# Patient Record
Sex: FEMALE | Race: WHITE | NOT HISPANIC OR LATINO | Employment: OTHER | URBAN - METROPOLITAN AREA
[De-identification: names, ages, dates, MRNs, and addresses within clinical notes are randomized per-mention and may not be internally consistent; named-entity substitution may affect disease eponyms.]

---

## 2018-04-01 ENCOUNTER — HOSPITAL ENCOUNTER (EMERGENCY)
Facility: HOSPITAL | Age: 83
Discharge: HOME | End: 2018-04-01
Attending: EMERGENCY MEDICINE | Admitting: EMERGENCY MEDICINE

## 2018-04-01 ENCOUNTER — APPOINTMENT (OUTPATIENT)
Dept: GENERAL RADIOLOGY | Facility: HOSPITAL | Age: 83
End: 2018-04-01

## 2018-04-01 VITALS
HEIGHT: 64 IN | OXYGEN SATURATION: 100 % | BODY MASS INDEX: 21.34 KG/M2 | RESPIRATION RATE: 18 BRPM | TEMPERATURE: 98 F | DIASTOLIC BLOOD PRESSURE: 84 MMHG | WEIGHT: 125 LBS | HEART RATE: 76 BPM | SYSTOLIC BLOOD PRESSURE: 164 MMHG

## 2018-04-01 DIAGNOSIS — E87.1 HYPONATREMIA: ICD-10-CM

## 2018-04-01 DIAGNOSIS — R53.1 GENERALIZED WEAKNESS: Primary | ICD-10-CM

## 2018-04-01 DIAGNOSIS — J10.1 INFLUENZA B: ICD-10-CM

## 2018-04-01 LAB
ALBUMIN SERPL-MCNC: 4.1 G/DL (ref 3.2–4.8)
ALBUMIN/GLOB SERPL: 1.6 G/DL (ref 1.5–2.5)
ALP SERPL-CCNC: 76 U/L (ref 25–100)
ALT SERPL W P-5'-P-CCNC: 28 U/L (ref 7–40)
ANION GAP SERPL CALCULATED.3IONS-SCNC: 8 MMOL/L (ref 3–11)
AST SERPL-CCNC: 24 U/L (ref 0–33)
BACTERIA UR QL AUTO: ABNORMAL /HPF
BASOPHILS # BLD AUTO: 0.03 10*3/MM3 (ref 0–0.2)
BASOPHILS NFR BLD AUTO: 0.3 % (ref 0–1)
BILIRUB SERPL-MCNC: 0.5 MG/DL (ref 0.3–1.2)
BILIRUB UR QL STRIP: NEGATIVE
BUN BLD-MCNC: 7 MG/DL (ref 9–23)
BUN/CREAT SERPL: 14 (ref 7–25)
CALCIUM SPEC-SCNC: 9.6 MG/DL (ref 8.7–10.4)
CHLORIDE SERPL-SCNC: 92 MMOL/L (ref 99–109)
CLARITY UR: CLEAR
CO2 SERPL-SCNC: 28 MMOL/L (ref 20–31)
COLOR UR: YELLOW
CREAT BLD-MCNC: 0.5 MG/DL (ref 0.6–1.3)
DEPRECATED RDW RBC AUTO: 42.4 FL (ref 37–54)
EOSINOPHIL # BLD AUTO: 0.04 10*3/MM3 (ref 0–0.3)
EOSINOPHIL NFR BLD AUTO: 0.4 % (ref 0–3)
ERYTHROCYTE [DISTWIDTH] IN BLOOD BY AUTOMATED COUNT: 13.2 % (ref 11.3–14.5)
GFR SERPL CREATININE-BSD FRML MDRD: 115 ML/MIN/1.73
GLOBULIN UR ELPH-MCNC: 2.6 GM/DL
GLUCOSE BLD-MCNC: 102 MG/DL (ref 70–100)
GLUCOSE UR STRIP-MCNC: NEGATIVE MG/DL
HCT VFR BLD AUTO: 34.4 % (ref 34.5–44)
HGB BLD-MCNC: 11.5 G/DL (ref 11.5–15.5)
HGB UR QL STRIP.AUTO: NEGATIVE
HOLD SPECIMEN: NORMAL
HOLD SPECIMEN: NORMAL
HYALINE CASTS UR QL AUTO: ABNORMAL /LPF
IMM GRANULOCYTES # BLD: 0.03 10*3/MM3 (ref 0–0.03)
IMM GRANULOCYTES NFR BLD: 0.3 % (ref 0–0.6)
KETONES UR QL STRIP: NEGATIVE
LEUKOCYTE ESTERASE UR QL STRIP.AUTO: ABNORMAL
LYMPHOCYTES # BLD AUTO: 1 10*3/MM3 (ref 0.6–4.8)
LYMPHOCYTES NFR BLD AUTO: 9.9 % (ref 24–44)
MAGNESIUM SERPL-MCNC: 1.8 MG/DL (ref 1.3–2.7)
MCH RBC QN AUTO: 29.5 PG (ref 27–31)
MCHC RBC AUTO-ENTMCNC: 33.4 G/DL (ref 32–36)
MCV RBC AUTO: 88.2 FL (ref 80–99)
MONOCYTES # BLD AUTO: 0.9 10*3/MM3 (ref 0–1)
MONOCYTES NFR BLD AUTO: 8.9 % (ref 0–12)
NEUTROPHILS # BLD AUTO: 8.13 10*3/MM3 (ref 1.5–8.3)
NEUTROPHILS NFR BLD AUTO: 80.2 % (ref 41–71)
NITRITE UR QL STRIP: NEGATIVE
PH UR STRIP.AUTO: 7.5 [PH] (ref 5–8)
PLATELET # BLD AUTO: 374 10*3/MM3 (ref 150–450)
PMV BLD AUTO: 8.8 FL (ref 6–12)
POTASSIUM BLD-SCNC: 3.7 MMOL/L (ref 3.5–5.5)
PROT SERPL-MCNC: 6.7 G/DL (ref 5.7–8.2)
PROT UR QL STRIP: NEGATIVE
RBC # BLD AUTO: 3.9 10*6/MM3 (ref 3.89–5.14)
RBC # UR: ABNORMAL /HPF
REF LAB TEST METHOD: ABNORMAL
SODIUM BLD-SCNC: 128 MMOL/L (ref 132–146)
SP GR UR STRIP: <=1.005 (ref 1–1.03)
SQUAMOUS #/AREA URNS HPF: ABNORMAL /HPF
TROPONIN I SERPL-MCNC: 0 NG/ML (ref 0–0.07)
UROBILINOGEN UR QL STRIP: ABNORMAL
WBC NRBC COR # BLD: 10.13 10*3/MM3 (ref 3.5–10.8)
WBC UR QL AUTO: ABNORMAL /HPF
WHOLE BLOOD HOLD SPECIMEN: NORMAL
WHOLE BLOOD HOLD SPECIMEN: NORMAL

## 2018-04-01 PROCEDURE — 99285 EMERGENCY DEPT VISIT HI MDM: CPT

## 2018-04-01 PROCEDURE — 87086 URINE CULTURE/COLONY COUNT: CPT | Performed by: EMERGENCY MEDICINE

## 2018-04-01 PROCEDURE — 71045 X-RAY EXAM CHEST 1 VIEW: CPT

## 2018-04-01 PROCEDURE — 87186 SC STD MICRODIL/AGAR DIL: CPT | Performed by: EMERGENCY MEDICINE

## 2018-04-01 PROCEDURE — 87077 CULTURE AEROBIC IDENTIFY: CPT | Performed by: EMERGENCY MEDICINE

## 2018-04-01 PROCEDURE — 85025 COMPLETE CBC W/AUTO DIFF WBC: CPT | Performed by: EMERGENCY MEDICINE

## 2018-04-01 PROCEDURE — 80053 COMPREHEN METABOLIC PANEL: CPT | Performed by: EMERGENCY MEDICINE

## 2018-04-01 PROCEDURE — 81001 URINALYSIS AUTO W/SCOPE: CPT | Performed by: EMERGENCY MEDICINE

## 2018-04-01 PROCEDURE — 83735 ASSAY OF MAGNESIUM: CPT | Performed by: EMERGENCY MEDICINE

## 2018-04-01 PROCEDURE — 84484 ASSAY OF TROPONIN QUANT: CPT

## 2018-04-01 PROCEDURE — 93005 ELECTROCARDIOGRAM TRACING: CPT | Performed by: EMERGENCY MEDICINE

## 2018-04-01 RX ORDER — ASPIRIN 81 MG/1
81 TABLET, CHEWABLE ORAL DAILY
COMMUNITY
End: 2019-02-22 | Stop reason: HOSPADM

## 2018-04-01 RX ORDER — SODIUM CHLORIDE 0.9 % (FLUSH) 0.9 %
10 SYRINGE (ML) INJECTION AS NEEDED
Status: DISCONTINUED | OUTPATIENT
Start: 2018-04-01 | End: 2018-04-01 | Stop reason: HOSPADM

## 2018-04-01 RX ORDER — ESOMEPRAZOLE MAGNESIUM 40 MG/1
40 CAPSULE, DELAYED RELEASE ORAL
COMMUNITY
End: 2019-10-28

## 2018-04-01 RX ORDER — HYDROCODONE BITARTRATE AND ACETAMINOPHEN 5; 325 MG/1; MG/1
1 TABLET ORAL EVERY 6 HOURS PRN
Status: ON HOLD | COMMUNITY
End: 2018-04-12

## 2018-04-01 RX ORDER — IBUPROFEN 200 MG
400 TABLET ORAL EVERY 8 HOURS PRN
COMMUNITY
End: 2019-02-22 | Stop reason: HOSPADM

## 2018-04-01 RX ORDER — HYDROCHLOROTHIAZIDE 12.5 MG/1
12.5 TABLET ORAL DAILY
COMMUNITY
End: 2018-04-01

## 2018-04-01 RX ADMIN — SODIUM CHLORIDE 500 ML: 9 INJECTION, SOLUTION INTRAVENOUS at 11:28

## 2018-04-01 NOTE — ED PROVIDER NOTES
Subjective   Debbie Baum is a 89 y.o. female with a hx of an ablation who presents to the ED with c/o generalized weakness. The patient was diagnosed with flu b over a week ago at [facility], where she was admitted to the hospital for 2 days and discharged on 03/25/18. Although she was given and completed 5 days of tamiflu, she reports that she has been getting increasingly more weak since discharge. However, she remains capable of getting up from bed and ambulating with assistance from her walker which is her baseline. The patient denies N/V/D, cough, myalgias, fever, chills, decrease in appetite, sore throat, or any other acute complaints at this time. At the time of discharge she was given potassium and magnesium tablets which she states she has been compliant with.        History provided by:  Patient and relative  Weakness - Generalized   Severity:  Unable to specify  Onset quality:  Gradual  Duration:  1 week  Timing:  Constant  Progression:  Worsening  Chronicity:  New  Relieved by:  None tried  Worsened by:  Nothing  Ineffective treatments:  None tried  Associated symptoms: no abdominal pain, no chest pain, no cough, no diarrhea, no fever, no myalgias, no nausea, no shortness of breath and no vomiting        Review of Systems   Constitutional: Negative for appetite change, chills and fever.   HENT: Negative for sore throat.    Respiratory: Negative for cough and shortness of breath.    Cardiovascular: Negative for chest pain.   Gastrointestinal: Negative for abdominal pain, diarrhea, nausea and vomiting.   Musculoskeletal: Negative for myalgias.   Neurological: Positive for weakness (Generalized).   All other systems reviewed and are negative.      Past Medical History:   Diagnosis Date   • Cancer     colon   • Coronary artery disease    • GERD (gastroesophageal reflux disease)    • H/O cardiac radiofrequency ablation        Allergies   Allergen Reactions   • Penicillins Unknown (See  Comments)     unsure       Past Surgical History:   Procedure Laterality Date   • CHOLECYSTECTOMY     • COLON SURGERY     • HYSTERECTOMY     • REPLACEMENT TOTAL KNEE         History reviewed. No pertinent family history.    Social History     Social History   • Marital status:      Social History Main Topics   • Smoking status: Never Smoker   • Smokeless tobacco: Never Used   • Alcohol use No   • Drug use: No   • Sexual activity: Defer     Other Topics Concern   • Not on file         Objective   Physical Exam   Constitutional: She is oriented to person, place, and time. She appears well-developed and well-nourished. No distress.   HENT:   Head: Normocephalic and atraumatic.   Nose: Nose normal.   Mouth/Throat: Oropharynx is clear and moist and mucous membranes are normal.   Airway patent. Pharynx is benign.   Eyes: Conjunctivae are normal. No scleral icterus.   Neck: Normal range of motion. Neck supple.   Cardiovascular: Normal rate, regular rhythm and normal heart sounds.    No murmur heard.  Pulmonary/Chest: Effort normal and breath sounds normal. No respiratory distress.   Abdominal: Soft. There is no tenderness.   Musculoskeletal: She exhibits edema (Slightly puffy legs with trace pretibial edema).   Neurological: She is alert and oriented to person, place, and time.   Skin: Skin is warm and dry.   Psychiatric: She has a normal mood and affect. Her behavior is normal.   Nursing note and vitals reviewed.      Procedures         ED Course  ED Course     Recent Results (from the past 24 hour(s))   Comprehensive Metabolic Panel    Collection Time: 04/01/18 10:11 AM   Result Value Ref Range    Glucose 102 (H) 70 - 100 mg/dL    BUN 7 (L) 9 - 23 mg/dL    Creatinine 0.50 (L) 0.60 - 1.30 mg/dL    Sodium 128 (L) 132 - 146 mmol/L    Potassium 3.7 3.5 - 5.5 mmol/L    Chloride 92 (L) 99 - 109 mmol/L    CO2 28.0 20.0 - 31.0 mmol/L    Calcium 9.6 8.7 - 10.4 mg/dL    Total Protein 6.7 5.7 - 8.2 g/dL    Albumin 4.10 3.20  - 4.80 g/dL    ALT (SGPT) 28 7 - 40 U/L    AST (SGOT) 24 0 - 33 U/L    Alkaline Phosphatase 76 25 - 100 U/L    Total Bilirubin 0.5 0.3 - 1.2 mg/dL    eGFR Non African Amer 115 >60 mL/min/1.73    Globulin 2.6 gm/dL    A/G Ratio 1.6 1.5 - 2.5 g/dL    BUN/Creatinine Ratio 14.0 7.0 - 25.0    Anion Gap 8.0 3.0 - 11.0 mmol/L   Magnesium    Collection Time: 04/01/18 10:11 AM   Result Value Ref Range    Magnesium 1.8 1.3 - 2.7 mg/dL   Green Top (Gel)    Collection Time: 04/01/18 10:11 AM   Result Value Ref Range    Extra Tube Hold for add-ons.    Lavender Top    Collection Time: 04/01/18 10:11 AM   Result Value Ref Range    Extra Tube hold for add-on    CBC Auto Differential    Collection Time: 04/01/18 10:11 AM   Result Value Ref Range    WBC 10.13 3.50 - 10.80 10*3/mm3    RBC 3.90 3.89 - 5.14 10*6/mm3    Hemoglobin 11.5 11.5 - 15.5 g/dL    Hematocrit 34.4 (L) 34.5 - 44.0 %    MCV 88.2 80.0 - 99.0 fL    MCH 29.5 27.0 - 31.0 pg    MCHC 33.4 32.0 - 36.0 g/dL    RDW 13.2 11.3 - 14.5 %    RDW-SD 42.4 37.0 - 54.0 fl    MPV 8.8 6.0 - 12.0 fL    Platelets 374 150 - 450 10*3/mm3    Neutrophil % 80.2 (H) 41.0 - 71.0 %    Lymphocyte % 9.9 (L) 24.0 - 44.0 %    Monocyte % 8.9 0.0 - 12.0 %    Eosinophil % 0.4 0.0 - 3.0 %    Basophil % 0.3 0.0 - 1.0 %    Immature Grans % 0.3 0.0 - 0.6 %    Neutrophils, Absolute 8.13 1.50 - 8.30 10*3/mm3    Lymphocytes, Absolute 1.00 0.60 - 4.80 10*3/mm3    Monocytes, Absolute 0.90 0.00 - 1.00 10*3/mm3    Eosinophils, Absolute 0.04 0.00 - 0.30 10*3/mm3    Basophils, Absolute 0.03 0.00 - 0.20 10*3/mm3    Immature Grans, Absolute 0.03 0.00 - 0.03 10*3/mm3   Light Blue Top    Collection Time: 04/01/18 10:12 AM   Result Value Ref Range    Extra Tube hold for add-on    Gold Top - SST    Collection Time: 04/01/18 10:12 AM   Result Value Ref Range    Extra Tube Hold for add-ons.    POC Troponin, Rapid    Collection Time: 04/01/18 10:16 AM   Result Value Ref Range    Troponin I 0.00 0.00 - 0.07 ng/mL  "  Urinalysis With / Culture If Indicated - Urine, Clean Catch    Collection Time: 04/01/18 11:17 AM   Result Value Ref Range    Color, UA Yellow Yellow, Straw    Appearance, UA Clear Clear    pH, UA 7.5 5.0 - 8.0    Specific Gravity, UA <=1.005 1.001 - 1.030    Glucose, UA Negative Negative    Ketones, UA Negative Negative    Bilirubin, UA Negative Negative    Blood, UA Negative Negative    Protein, UA Negative Negative    Leuk Esterase, UA Moderate (2+) (A) Negative    Nitrite, UA Negative Negative    Urobilinogen, UA 0.2 E.U./dL 0.2 - 1.0 E.U./dL   Urinalysis, Microscopic Only - Urine, Clean Catch    Collection Time: 04/01/18 11:17 AM   Result Value Ref Range    RBC, UA 0-2 None Seen, 0-2 /HPF    WBC, UA 13-20 (A) None Seen, 0-2 /HPF    Bacteria, UA 3+ (A) None Seen, Trace /HPF    Squamous Epithelial Cells, UA 3-6 (A) None Seen, 0-2 /HPF    Hyaline Casts, UA None Seen 0 - 6 /LPF    Methodology Automated Microscopy      Note: In addition to lab results from this visit, the labs listed above may include labs taken at another facility or during a different encounter within the last 24 hours. Please correlate lab times with ED admission and discharge times for further clarification of the services performed during this visit.    XR Chest 1 View    (Results Pending)     Vitals:    04/01/18 0959 04/01/18 1015 04/01/18 1045 04/01/18 1100   BP: 148/75 146/79 158/73 164/84   Patient Position: Lying   Lying   Pulse: 72 69 72 76   Resp: 18   18   Temp: 98 °F (36.7 °C)      TempSrc: Oral      SpO2: 98% 99% 97% 100%   Weight: 56.7 kg (125 lb)      Height: 162.6 cm (64\")        Medications   sodium chloride 0.9 % flush 10 mL (not administered)   sodium chloride 0.9 % bolus 500 mL (500 mL Intravenous New Bag 4/1/18 1128)     ECG/EMG Results (last 24 hours)     Procedure Component Value Units Date/Time    ECG 12 Lead [[id number]] Collected:  04/01/18 1011     Updated:  04/01/18 1025    Narrative:       Test Reason : Weak/Dizzy/AMS " protocol  Blood Pressure : **/** mmHG  Vent. Rate : 070 BPM     Atrial Rate : 394 BPM     P-R Int : 230 ms          QRS Dur : 072 ms      QT Int : 402 ms       P-R-T Axes : 000 039 020 degrees     QTc Int : 434 ms    Normal sinus rhythm  Abnormal ECG  No previous ECGs available  Confirmed by BRODERICK GARCIA MD ([id number]) on 4/1/2018 10:24:58 AM    Referred By:  ALFRED           Confirmed By:BRODERICK GARCIA MD                        [facility]    Final diagnoses:   Generalized weakness   Influenza B   Hyponatremia       Documentation assistance provided by joaquina Serrano.  Information recorded by the scribe was done at my direction and has been verified and validated by me.     Huy Serrano  04/01/18 1014       Huy Serrano  04/01/18 1107       Huy Serrano  04/01/18 1205       Broderick Garcia MD  04/01/18 5673

## 2018-04-01 NOTE — DISCHARGE INSTRUCTIONS
Follow up with Dr. Scott in 3 days if you are not improving.    Activity as tolerated    Continue eating and drinking normally    Stop taking your fluid pill for 1 week.

## 2018-04-03 LAB — BACTERIA SPEC AEROBE CULT: ABNORMAL

## 2018-04-04 ENCOUNTER — TELEPHONE (OUTPATIENT)
Dept: EMERGENCY DEPT | Facility: HOSPITAL | Age: 83
End: 2018-04-04

## 2018-04-04 ENCOUNTER — APPOINTMENT (OUTPATIENT)
Dept: GENERAL RADIOLOGY | Facility: HOSPITAL | Age: 83
End: 2018-04-04

## 2018-04-04 ENCOUNTER — HOSPITAL ENCOUNTER (INPATIENT)
Facility: HOSPITAL | Age: 83
LOS: 8 days | Discharge: NURSING, REHAB OR LONG-TERM CARE | End: 2018-04-12
Attending: EMERGENCY MEDICINE | Admitting: INTERNAL MEDICINE

## 2018-04-04 DIAGNOSIS — R53.1 GENERALIZED WEAKNESS: ICD-10-CM

## 2018-04-04 DIAGNOSIS — Z74.09 IMPAIRED MOBILITY AND ADLS: ICD-10-CM

## 2018-04-04 DIAGNOSIS — N39.0 URINARY TRACT INFECTION WITHOUT HEMATURIA, SITE UNSPECIFIED: ICD-10-CM

## 2018-04-04 DIAGNOSIS — Z74.09 IMPAIRED FUNCTIONAL MOBILITY, BALANCE, GAIT, AND ENDURANCE: ICD-10-CM

## 2018-04-04 DIAGNOSIS — Z78.9 IMPAIRED MOBILITY AND ADLS: ICD-10-CM

## 2018-04-04 DIAGNOSIS — E87.1 HYPONATREMIA: Primary | ICD-10-CM

## 2018-04-04 PROBLEM — A49.8 PROTEUS MIRABILIS INFECTION: Status: ACTIVE | Noted: 2018-04-04

## 2018-04-04 LAB
ALBUMIN SERPL-MCNC: 4 G/DL (ref 3.2–4.8)
ALBUMIN/GLOB SERPL: 1.6 G/DL (ref 1.5–2.5)
ALP SERPL-CCNC: 76 U/L (ref 25–100)
ALT SERPL W P-5'-P-CCNC: 31 U/L (ref 7–40)
ANION GAP SERPL CALCULATED.3IONS-SCNC: 10 MMOL/L (ref 3–11)
AST SERPL-CCNC: 33 U/L (ref 0–33)
BASOPHILS # BLD AUTO: 0.03 10*3/MM3 (ref 0–0.2)
BASOPHILS NFR BLD AUTO: 0.2 % (ref 0–1)
BILIRUB SERPL-MCNC: 0.4 MG/DL (ref 0.3–1.2)
BILIRUB UR QL STRIP: NEGATIVE
BUN BLD-MCNC: 6 MG/DL (ref 9–23)
BUN/CREAT SERPL: 10 (ref 7–25)
CALCIUM SPEC-SCNC: 9.2 MG/DL (ref 8.7–10.4)
CHLORIDE SERPL-SCNC: 86 MMOL/L (ref 99–109)
CLARITY UR: CLEAR
CO2 SERPL-SCNC: 25 MMOL/L (ref 20–31)
COLOR UR: YELLOW
CREAT BLD-MCNC: 0.6 MG/DL (ref 0.6–1.3)
DEPRECATED RDW RBC AUTO: 41.2 FL (ref 37–54)
EOSINOPHIL # BLD AUTO: 0.01 10*3/MM3 (ref 0–0.3)
EOSINOPHIL NFR BLD AUTO: 0.1 % (ref 0–3)
ERYTHROCYTE [DISTWIDTH] IN BLOOD BY AUTOMATED COUNT: 13 % (ref 11.3–14.5)
GFR SERPL CREATININE-BSD FRML MDRD: 93 ML/MIN/1.73
GLOBULIN UR ELPH-MCNC: 2.5 GM/DL
GLUCOSE BLD-MCNC: 107 MG/DL (ref 70–100)
GLUCOSE UR STRIP-MCNC: NEGATIVE MG/DL
HCT VFR BLD AUTO: 32.3 % (ref 34.5–44)
HGB BLD-MCNC: 10.7 G/DL (ref 11.5–15.5)
HGB UR QL STRIP.AUTO: NEGATIVE
HOLD SPECIMEN: NORMAL
HOLD SPECIMEN: NORMAL
IMM GRANULOCYTES # BLD: 0.06 10*3/MM3 (ref 0–0.03)
IMM GRANULOCYTES NFR BLD: 0.5 % (ref 0–0.6)
KETONES UR QL STRIP: ABNORMAL
LEUKOCYTE ESTERASE UR QL STRIP.AUTO: NEGATIVE
LYMPHOCYTES # BLD AUTO: 0.85 10*3/MM3 (ref 0.6–4.8)
LYMPHOCYTES NFR BLD AUTO: 6.9 % (ref 24–44)
MAGNESIUM SERPL-MCNC: 1.6 MG/DL (ref 1.3–2.7)
MCH RBC QN AUTO: 28.5 PG (ref 27–31)
MCHC RBC AUTO-ENTMCNC: 33.1 G/DL (ref 32–36)
MCV RBC AUTO: 86.1 FL (ref 80–99)
MONOCYTES # BLD AUTO: 0.72 10*3/MM3 (ref 0–1)
MONOCYTES NFR BLD AUTO: 5.8 % (ref 0–12)
NEUTROPHILS # BLD AUTO: 10.73 10*3/MM3 (ref 1.5–8.3)
NEUTROPHILS NFR BLD AUTO: 86.5 % (ref 41–71)
NITRITE UR QL STRIP: NEGATIVE
OSMOLALITY SERPL: 251 MOSM/KG (ref 275–295)
OSMOLALITY UR: 213 MOSM/KG (ref 300–1100)
PH UR STRIP.AUTO: 8 [PH] (ref 5–8)
PLATELET # BLD AUTO: 402 10*3/MM3 (ref 150–450)
PMV BLD AUTO: 9 FL (ref 6–12)
POTASSIUM BLD-SCNC: 3.9 MMOL/L (ref 3.5–5.5)
PROT SERPL-MCNC: 6.5 G/DL (ref 5.7–8.2)
PROT UR QL STRIP: NEGATIVE
RBC # BLD AUTO: 3.75 10*6/MM3 (ref 3.89–5.14)
SODIUM BLD-SCNC: 121 MMOL/L (ref 132–146)
SODIUM UR-SCNC: 60 MMOL/L (ref 30–90)
SP GR UR STRIP: 1.01 (ref 1–1.03)
TROPONIN I SERPL-MCNC: 0 NG/ML (ref 0–0.07)
UROBILINOGEN UR QL STRIP: ABNORMAL
WBC NRBC COR # BLD: 12.4 10*3/MM3 (ref 3.5–10.8)
WHOLE BLOOD HOLD SPECIMEN: NORMAL
WHOLE BLOOD HOLD SPECIMEN: NORMAL

## 2018-04-04 PROCEDURE — 83735 ASSAY OF MAGNESIUM: CPT | Performed by: EMERGENCY MEDICINE

## 2018-04-04 PROCEDURE — 99285 EMERGENCY DEPT VISIT HI MDM: CPT

## 2018-04-04 PROCEDURE — 81003 URINALYSIS AUTO W/O SCOPE: CPT | Performed by: HOSPITALIST

## 2018-04-04 PROCEDURE — 85025 COMPLETE CBC W/AUTO DIFF WBC: CPT | Performed by: EMERGENCY MEDICINE

## 2018-04-04 PROCEDURE — 80053 COMPREHEN METABOLIC PANEL: CPT | Performed by: EMERGENCY MEDICINE

## 2018-04-04 PROCEDURE — 84484 ASSAY OF TROPONIN QUANT: CPT

## 2018-04-04 PROCEDURE — 71045 X-RAY EXAM CHEST 1 VIEW: CPT

## 2018-04-04 PROCEDURE — 25010000002 ENOXAPARIN PER 10 MG: Performed by: HOSPITALIST

## 2018-04-04 PROCEDURE — 84300 ASSAY OF URINE SODIUM: CPT | Performed by: PHYSICIAN ASSISTANT

## 2018-04-04 PROCEDURE — 25010000002 CEFTRIAXONE PER 250 MG: Performed by: HOSPITALIST

## 2018-04-04 PROCEDURE — 99223 1ST HOSP IP/OBS HIGH 75: CPT | Performed by: HOSPITALIST

## 2018-04-04 PROCEDURE — 83930 ASSAY OF BLOOD OSMOLALITY: CPT | Performed by: PHYSICIAN ASSISTANT

## 2018-04-04 PROCEDURE — 93005 ELECTROCARDIOGRAM TRACING: CPT | Performed by: EMERGENCY MEDICINE

## 2018-04-04 PROCEDURE — 83935 ASSAY OF URINE OSMOLALITY: CPT | Performed by: PHYSICIAN ASSISTANT

## 2018-04-04 RX ORDER — ONDANSETRON 4 MG/1
4 TABLET, FILM COATED ORAL EVERY 6 HOURS PRN
Status: DISCONTINUED | OUTPATIENT
Start: 2018-04-04 | End: 2018-04-12 | Stop reason: HOSPADM

## 2018-04-04 RX ORDER — SULFAMETHOXAZOLE AND TRIMETHOPRIM 800; 160 MG/1; MG/1
1 TABLET ORAL 2 TIMES DAILY
COMMUNITY
Start: 2018-04-03 | End: 2018-04-12 | Stop reason: HOSPADM

## 2018-04-04 RX ORDER — ESCITALOPRAM OXALATE 10 MG/1
10 TABLET ORAL DAILY
Status: DISCONTINUED | OUTPATIENT
Start: 2018-04-05 | End: 2018-04-05

## 2018-04-04 RX ORDER — HYDROXYZINE HYDROCHLORIDE 25 MG/1
25 TABLET, FILM COATED ORAL 3 TIMES DAILY PRN
Status: DISCONTINUED | OUTPATIENT
Start: 2018-04-04 | End: 2018-04-08

## 2018-04-04 RX ORDER — ESCITALOPRAM OXALATE 10 MG/1
10 TABLET ORAL DAILY
COMMUNITY
End: 2018-04-12 | Stop reason: HOSPADM

## 2018-04-04 RX ORDER — CEFTRIAXONE SODIUM 1 G/50ML
1 INJECTION, SOLUTION INTRAVENOUS EVERY 24 HOURS
Status: DISCONTINUED | OUTPATIENT
Start: 2018-04-04 | End: 2018-04-05

## 2018-04-04 RX ORDER — SODIUM CHLORIDE 0.9 % (FLUSH) 0.9 %
1-10 SYRINGE (ML) INJECTION AS NEEDED
Status: DISCONTINUED | OUTPATIENT
Start: 2018-04-04 | End: 2018-04-12 | Stop reason: HOSPADM

## 2018-04-04 RX ORDER — FUROSEMIDE 20 MG/1
10 TABLET ORAL DAILY
COMMUNITY
End: 2018-04-12 | Stop reason: HOSPADM

## 2018-04-04 RX ORDER — PHENAZOPYRIDINE HYDROCHLORIDE 100 MG/1
100 TABLET, FILM COATED ORAL ONCE
Status: DISCONTINUED | OUTPATIENT
Start: 2018-04-04 | End: 2018-04-04

## 2018-04-04 RX ORDER — ASPIRIN 81 MG/1
81 TABLET, CHEWABLE ORAL DAILY
Status: DISCONTINUED | OUTPATIENT
Start: 2018-04-05 | End: 2018-04-12 | Stop reason: HOSPADM

## 2018-04-04 RX ORDER — HYDROCODONE BITARTRATE AND ACETAMINOPHEN 5; 325 MG/1; MG/1
1 TABLET ORAL EVERY 4 HOURS PRN
Status: DISCONTINUED | OUTPATIENT
Start: 2018-04-04 | End: 2018-04-08

## 2018-04-04 RX ORDER — CARVEDILOL 6.25 MG/1
6.25 TABLET ORAL EVERY 12 HOURS SCHEDULED
Status: DISCONTINUED | OUTPATIENT
Start: 2018-04-04 | End: 2018-04-12 | Stop reason: HOSPADM

## 2018-04-04 RX ORDER — SODIUM CHLORIDE 0.9 % (FLUSH) 0.9 %
10 SYRINGE (ML) INJECTION AS NEEDED
Status: DISCONTINUED | OUTPATIENT
Start: 2018-04-04 | End: 2018-04-12 | Stop reason: HOSPADM

## 2018-04-04 RX ORDER — PANTOPRAZOLE SODIUM 40 MG/1
40 TABLET, DELAYED RELEASE ORAL EVERY MORNING
Status: DISCONTINUED | OUTPATIENT
Start: 2018-04-05 | End: 2018-04-12 | Stop reason: HOSPADM

## 2018-04-04 RX ORDER — POTASSIUM CHLORIDE 750 MG/1
10 TABLET, EXTENDED RELEASE ORAL DAILY
COMMUNITY
End: 2018-04-12 | Stop reason: HOSPADM

## 2018-04-04 RX ORDER — ACETAMINOPHEN 325 MG/1
650 TABLET ORAL EVERY 4 HOURS PRN
Status: DISCONTINUED | OUTPATIENT
Start: 2018-04-04 | End: 2018-04-12 | Stop reason: HOSPADM

## 2018-04-04 RX ADMIN — CEFTRIAXONE SODIUM 1 G: 1 INJECTION, SOLUTION INTRAVENOUS at 16:53

## 2018-04-04 RX ADMIN — HYDROXYZINE HYDROCHLORIDE 25 MG: 25 TABLET, FILM COATED ORAL at 20:59

## 2018-04-04 RX ADMIN — ENOXAPARIN SODIUM 30 MG: 30 INJECTION SUBCUTANEOUS at 20:59

## 2018-04-04 RX ADMIN — Medication 5 MG: at 20:59

## 2018-04-04 RX ADMIN — CARVEDILOL 6.25 MG: 6.25 TABLET, FILM COATED ORAL at 20:59

## 2018-04-04 RX ADMIN — SODIUM CHLORIDE 1000 ML: 9 INJECTION, SOLUTION INTRAVENOUS at 15:13

## 2018-04-04 NOTE — PLAN OF CARE
Problem: Skin Injury Risk (Adult)  Goal: Identify Related Risk Factors and Signs and Symptoms  Outcome: Ongoing (interventions implemented as appropriate)   04/04/18 4720   Skin Injury Risk (Adult)   Related Risk Factors (Skin Injury Risk) advanced age;infection;mobility impaired;moisture

## 2018-04-04 NOTE — H&P
[facility] Medicine Services  HISTORY AND PHYSICAL    Patient Name: Debbie Baum  : 1923  MRN: [id number]  Primary Care Physician: Paula Scott MD    Subjective   Subjective     Chief Complaint:  FU Generalized weakness    HPI:  Debbie Baum is a 89 y.o. female with recently diagnosed Influenza B in [facility] in 2018, A. Fib/flutter s/p ablation about 2 yrs ago, recent hx of pancreatitis presented with worsening generalized weakness. Pt presented to [facility] ED on  and UCx obtained grew Proteus. Bactrim was called in. She has taken a couple of doses since. Also saw PCP who stopped her HCTZ for hyponatremia and started her on lasix for LE edema. Other than generalized weakness, pt reports feeling well. No fevers, chills, CP, SOB, cough, N/V/D. +Urinary frequency. No dysuria.     Review of Systems   Gen- No fevers, chills  CV- No chest pain, palpitations  Resp- No cough, dyspnea  GI- No N/V/D, abd pain    Otherwise 10-system ROS reviewed and is negative except as mentioned in the HPI.    Personal History     Past Medical History:   Diagnosis Date   • Cancer     colon   • Coronary artery disease    • GERD (gastroesophageal reflux disease)    • H/O cardiac radiofrequency ablation        Past Surgical History:   Procedure Laterality Date   • CARDIAC ABLATION     • CHOLECYSTECTOMY     • COLON SURGERY      BOWEL RESECTION FOR HX COLON CANCER   • HYSTERECTOMY     • REPLACEMENT TOTAL KNEE         Family History: family history is not on file.     Social History:  reports that she has never smoked. She has never used smokeless tobacco. She reports that she does not drink alcohol or use drugs.    Medications:  Prescriptions Prior to Admission   Medication Sig Dispense Refill Last Dose   • aspirin 81 MG chewable tablet Chew 81 mg Daily.   4/3/2018 at Unknown time   • escitalopram (LEXAPRO) 10 MG tablet Take 10 mg by mouth Daily.   4/3/2018 at Unknown time   • esomeprazole (nexIUM) 40 MG  capsule Take 40 mg by mouth Every Morning Before Breakfast.   4/3/2018 at Unknown time   • furosemide (LASIX) 20 MG tablet Take 10 mg by mouth Daily. Take 1/2 tab daily   4/4/2018 at Unknown time   • HYDROcodone-acetaminophen (NORCO) 5-325 MG per tablet Take 1 tablet by mouth Every 6 (Six) Hours As Needed.   4/3/2018 at Unknown time   • ibuprofen (ADVIL,MOTRIN) 200 MG tablet Take 200 mg by mouth Every 8 (Eight) Hours As Needed for Mild Pain .   4/4/2018 at Unknown time   • magnesium oxide (MAGOX) 400 (241.3 Mg) MG tablet tablet Take 400 mg by mouth 2 (Two) Times a Day.   4/3/2018 at Unknown time   • Multiple Vitamins-Minerals (MULTIVITAMIN ADULT PO) Take 1 tablet by mouth Daily.   4/3/2018 at Unknown time   • potassium chloride (K-DUR,KLOR-CON) 10 MEQ CR tablet Take 10 mEq by mouth Daily. 1 tablet daily for 10 days.   4/3/2018 at Unknown time   • sulfamethoxazole-trimethoprim (BACTRIM DS,SEPTRA DS) 800-160 MG per tablet Take 1 tablet by mouth 2 (Two) Times a Day.   4/4/2018 at Unknown time       Allergies   Allergen Reactions   • Crab (Diagnostic) Hives   • Penicillins Rash     unsure       Objective   Objective     Vital Signs:   Temp:  [98.1 °F (36.7 °C)-98.3 °F (36.8 °C)] 98.3 °F (36.8 °C)  Heart Rate:  [65-92] 92  Resp:  [18] 18  BP: (146-173)/() 168/84        Physical Exam   Constitutional: No acute distress, awake, alert on RA  Eyes: PERRLA, sclerae anicteric, no conjunctival injection  HENT: NCAT, mucous membranes moist  Neck: Supple, no thyromegaly, no lymphadenopathy, trachea midline  Respiratory: Clear to auscultation bilaterally, nonlabored respirations   Cardiovascular: RRR, no murmurs, rubs, or gallops, palpable pedal pulses bilaterally  Gastrointestinal: Positive bowel sounds, soft, nontender, nondistended  Musculoskeletal: No bilateral ankle edema, no clubbing or cyanosis to extremities  Psychiatric: Appropriate affect, cooperative  Neurologic: Oriented x 3, strength symmetric in all  extremities, Cranial Nerves grossly intact to confrontation, speech clear  Skin: No rashes    Results Reviewed:  I have personally reviewed current lab, radiology, and data and agree.      Results from last 7 days  Lab Units 04/04/18  1321   WBC 10*3/mm3 12.40*   HEMOGLOBIN g/dL 10.7*   HEMATOCRIT % 32.3*   PLATELETS 10*3/mm3 402       Results from last 7 days  Lab Units 04/04/18  1321   SODIUM mmol/L 121*   POTASSIUM mmol/L 3.9   CHLORIDE mmol/L 86*   CO2 mmol/L 25.0   BUN mg/dL 6*   CREATININE mg/dL 0.60   GLUCOSE mg/dL 107*   CALCIUM mg/dL 9.2   ALT (SGPT) U/L 31   AST (SGOT) U/L 33     No results found for: BNP  No results found for: PHART  Imaging Results (last 24 hours)     Procedure Component Value Units Date/Time    XR Chest 1 View [[id number]] Collected:  04/04/18 1319     Updated:  04/04/18 1619    Narrative:       EXAMINATION: XR CHEST 1 VW-04/04/2018:      INDICATION: Weak/Dizzy/AMS, triage protocol.      COMPARISON: Chest x-ray 04/01/2018.     FINDINGS: Cardiac size within normal limits. Chronic lung changes  without focal consolidation, pneumothorax, or significant pleural  effusion. Degenerative changes of the thoracic spine and right shoulder  again noted.           Impression:       Chronic lung changes without acute cardiopulmonary process  specifically, no focal consolidation to suggest acute pneumonia.     D:  04/04/2018  E:  04/04/2018                      Assessment/Plan   Assessment / Plan     Hospital Problem List     Hyponatremia          Assessment & Plan:    - Proteus UTI: Ceftriaxone. Repeat UA unremarkable  - Euvolemic Hypotonic hyponatremia: Urine osm low. Likely tea-and-toast syndrome or malnutrition. Fluid restrict to 1000cc daily  - HTN: Start carvedilol BID     DVT prophylaxis: pLOV    CODE STATUS:  Full Code    Admission Status:  I believe this patient meets INPATIENT status due to the need for care which can only be reasonably provided in an hospital setting such as  aggressive/expedited ancillary services and/or consultation services, the necessity for IV medications, close physician monitoring and/or the possible need for procedures.  In such, I feel patient’s risk for adverse outcomes and need for care warrant INPATIENT evaluation and predict the patient’s care encounter to likely last beyond 2 midnights.    Rubina Varma MD   04/04/18   8:31 PM

## 2018-04-04 NOTE — ED PROVIDER NOTES
Subjective   89-year-old female presents to the emergency department with complaints of increasing generalized weakness.  The patient states that she was seen here a few days ago with the same symptoms and was discharged home with a diagnosis of influenza and mild hyponatremia.  Since her discharge, her weakness has increased.  Today, she states that she is too weak to walk with her walker.  She denies any pain.  No fever.  No nausea vomiting or diarrhea.  No urinary symptoms other than some increased urinary frequency.  Past medical history of cardiac ablation 5 years ago, colon resection 15 years ago secondary to colon cancer (resolved), right knee replacement.  The patient is a nonsmoker.  No alcohol use.  Her PCP is Dr. Scott.        History provided by:  Patient  Weakness - Generalized   Severity:  Severe  Onset quality:  Gradual  Duration: several days.  Timing:  Constant  Progression:  Worsening  Chronicity:  New  Relieved by:  Nothing  Worsened by:  Nothing  Ineffective treatments:  None tried  Associated symptoms: arthralgias (secondary to arthritis), difficulty walking and frequency    Associated symptoms: no abdominal pain, no chest pain, no cough, no diarrhea, no dysuria, no falls, no fever, no headaches, no loss of consciousness, no melena, no nausea, no shortness of breath and no vomiting        Review of Systems   Constitutional: Negative for fever.   HENT: Negative for sore throat.    Eyes: Negative for visual disturbance.   Respiratory: Negative for cough and shortness of breath.    Cardiovascular: Negative for chest pain.   Gastrointestinal: Negative for abdominal pain, diarrhea, melena, nausea and vomiting.   Endocrine: Negative.    Genitourinary: Positive for frequency. Negative for dysuria and hematuria.   Musculoskeletal: Positive for arthralgias (secondary to arthritis). Negative for back pain and falls.   Skin: Negative for rash.   Allergic/Immunologic: Negative for immunocompromised state.    Neurological: Positive for weakness (generalized). Negative for loss of consciousness, light-headedness and headaches.   Hematological: Negative for adenopathy.   Psychiatric/Behavioral: Negative for confusion.       Past Medical History:   Diagnosis Date   • Cancer     colon   • Coronary artery disease    • GERD (gastroesophageal reflux disease)    • H/O cardiac radiofrequency ablation        Allergies   Allergen Reactions   • Penicillins Unknown (See Comments)     unsure       Past Surgical History:   Procedure Laterality Date   • CHOLECYSTECTOMY     • COLON SURGERY     • HYSTERECTOMY     • REPLACEMENT TOTAL KNEE         No family history on file.    Social History     Social History   • Marital status:      Social History Main Topics   • Smoking status: Never Smoker   • Smokeless tobacco: Never Used   • Alcohol use No   • Drug use: No   • Sexual activity: Defer     Other Topics Concern   • Not on file           Objective   Physical Exam   Constitutional: She is oriented to person, place, and time. She appears well-developed and well-nourished. No distress.   HENT:   Nose: Nose normal.   Mouth/Throat: Oropharynx is clear and moist.   Eyes: Conjunctivae are normal. Pupils are equal, round, and reactive to light.   Neck: Normal range of motion.   Cardiovascular: Normal rate, regular rhythm, normal heart sounds and intact distal pulses.    Pulmonary/Chest: Effort normal and breath sounds normal.   Abdominal: Bowel sounds are normal. There is no tenderness.   Musculoskeletal: She exhibits no edema.   Neurological: She is alert and oriented to person, place, and time.   Skin: Skin is warm and dry.   Psychiatric: She has a normal mood and affect.       Procedures         ED Course  ED Course    The patient is resting comfortably.  She has a worsening hyponatremia at 121.  Her urine culture from a few days ago shows Proteus mirabilis infection.  Given her increasing weakness, hyponatremia, UTI and her age, the  patient will be admitted.  Recent Results (from the past 24 hour(s))   Comprehensive Metabolic Panel    Collection Time: 04/04/18  1:21 PM   Result Value Ref Range    Glucose 107 (H) 70 - 100 mg/dL    BUN 6 (L) 9 - 23 mg/dL    Creatinine 0.60 0.60 - 1.30 mg/dL    Sodium 121 (L) 132 - 146 mmol/L    Potassium 3.9 3.5 - 5.5 mmol/L    Chloride 86 (L) 99 - 109 mmol/L    CO2 25.0 20.0 - 31.0 mmol/L    Calcium 9.2 8.7 - 10.4 mg/dL    Total Protein 6.5 5.7 - 8.2 g/dL    Albumin 4.00 3.20 - 4.80 g/dL    ALT (SGPT) 31 7 - 40 U/L    AST (SGOT) 33 0 - 33 U/L    Alkaline Phosphatase 76 25 - 100 U/L    Total Bilirubin 0.4 0.3 - 1.2 mg/dL    eGFR Non African Amer 93 >60 mL/min/1.73    Globulin 2.5 gm/dL    A/G Ratio 1.6 1.5 - 2.5 g/dL    BUN/Creatinine Ratio 10.0 7.0 - 25.0    Anion Gap 10.0 3.0 - 11.0 mmol/L   Magnesium    Collection Time: 04/04/18  1:21 PM   Result Value Ref Range    Magnesium 1.6 1.3 - 2.7 mg/dL   Light Blue Top    Collection Time: 04/04/18  1:21 PM   Result Value Ref Range    Extra Tube hold for add-on    Green Top (Gel)    Collection Time: 04/04/18  1:21 PM   Result Value Ref Range    Extra Tube Hold for add-ons.    Lavender Top    Collection Time: 04/04/18  1:21 PM   Result Value Ref Range    Extra Tube hold for add-on    Gold Top - SST    Collection Time: 04/04/18  1:21 PM   Result Value Ref Range    Extra Tube Hold for add-ons.    CBC Auto Differential    Collection Time: 04/04/18  1:21 PM   Result Value Ref Range    WBC 12.40 (H) 3.50 - 10.80 10*3/mm3    RBC 3.75 (L) 3.89 - 5.14 10*6/mm3    Hemoglobin 10.7 (L) 11.5 - 15.5 g/dL    Hematocrit 32.3 (L) 34.5 - 44.0 %    MCV 86.1 80.0 - 99.0 fL    MCH 28.5 27.0 - 31.0 pg    MCHC 33.1 32.0 - 36.0 g/dL    RDW 13.0 11.3 - 14.5 %    RDW-SD 41.2 37.0 - 54.0 fl    MPV 9.0 6.0 - 12.0 fL    Platelets 402 150 - 450 10*3/mm3    Neutrophil % 86.5 (H) 41.0 - 71.0 %    Lymphocyte % 6.9 (L) 24.0 - 44.0 %    Monocyte % 5.8 0.0 - 12.0 %    Eosinophil % 0.1 0.0 - 3.0 %     "Basophil % 0.2 0.0 - 1.0 %    Immature Grans % 0.5 0.0 - 0.6 %    Neutrophils, Absolute 10.73 (H) 1.50 - 8.30 10*3/mm3    Lymphocytes, Absolute 0.85 0.60 - 4.80 10*3/mm3    Monocytes, Absolute 0.72 0.00 - 1.00 10*3/mm3    Eosinophils, Absolute 0.01 0.00 - 0.30 10*3/mm3    Basophils, Absolute 0.03 0.00 - 0.20 10*3/mm3    Immature Grans, Absolute 0.06 (H) 0.00 - 0.03 10*3/mm3   POC Troponin, Rapid    Collection Time: 04/04/18  1:44 PM   Result Value Ref Range    Troponin I 0.00 0.00 - 0.07 ng/mL   Sodium, Urine, Random - Urine, Clean Catch    Collection Time: 04/04/18  3:13 PM   Result Value Ref Range    Sodium, Urine 60 30 - 90 mmol/L     Note: In addition to lab results from this visit, the labs listed above may include labs taken at another facility or during a different encounter within the last 24 hours. Please correlate lab times with ED admission and discharge times for further clarification of the services performed during this visit.    XR Chest 1 View   Preliminary Result   Chronic lung changes without acute cardiopulmonary process   specifically no focal consolidation to suggest acute pneumonia.                Vitals:    04/04/18 1245 04/04/18 1410 04/04/18 1411 04/04/18 1412   BP: 170/79 162/74 161/80 (!) 146/105   BP Location: Right arm      Patient Position: Sitting Lying Sitting Standing   Pulse: 79 65 74    Resp: 18      Temp: 98.1 °F (36.7 °C)      TempSrc: Oral      SpO2: 97%      Weight: 54.4 kg (120 lb)      Height: 162.6 cm (64\")        Medications   sodium chloride 0.9 % flush 10 mL (not administered)   sodium chloride 0.9 % bolus 1,000 mL (1,000 mL Intravenous New Bag 4/4/18 1513)     ECG/EMG Results (last 24 hours)     Procedure Component Value Units Date/Time    ECG 12 Lead [[id number]] Collected:  04/04/18 1253     Updated:  04/04/18 1521                      MDM    Final diagnoses:   Hyponatremia   Generalized weakness   Urinary tract infection without hematuria, site unspecified          "   MONAE Rodriguez  04/04/18 1557

## 2018-04-05 PROBLEM — I25.10 CORONARY ARTERY DISEASE: Status: ACTIVE | Noted: 2018-04-05

## 2018-04-05 PROBLEM — R53.1 WEAKNESS GENERALIZED: Status: ACTIVE | Noted: 2018-04-05

## 2018-04-05 PROBLEM — I48.91 ATRIAL FIBRILLATION (HCC): Status: ACTIVE | Noted: 2018-04-05

## 2018-04-05 PROBLEM — I10 ESSENTIAL HYPERTENSION: Status: ACTIVE | Noted: 2018-04-05

## 2018-04-05 PROBLEM — K21.9 GERD (GASTROESOPHAGEAL REFLUX DISEASE): Status: ACTIVE | Noted: 2018-04-05

## 2018-04-05 PROBLEM — A49.8 PROTEUS MIRABILIS INFECTION: Status: RESOLVED | Noted: 2018-04-04 | Resolved: 2018-04-05

## 2018-04-05 LAB
ALBUMIN SERPL-MCNC: 3.4 G/DL (ref 3.2–4.8)
ANION GAP SERPL CALCULATED.3IONS-SCNC: 7 MMOL/L (ref 3–11)
ANION GAP SERPL CALCULATED.3IONS-SCNC: 7 MMOL/L (ref 3–11)
BUN BLD-MCNC: 5 MG/DL (ref 9–23)
BUN BLD-MCNC: 6 MG/DL (ref 9–23)
BUN/CREAT SERPL: 10 (ref 7–25)
BUN/CREAT SERPL: 12.5 (ref 7–25)
CALCIUM SPEC-SCNC: 8.9 MG/DL (ref 8.7–10.4)
CALCIUM SPEC-SCNC: 9.3 MG/DL (ref 8.7–10.4)
CHLORIDE SERPL-SCNC: 92 MMOL/L (ref 99–109)
CHLORIDE SERPL-SCNC: 96 MMOL/L (ref 99–109)
CO2 SERPL-SCNC: 24 MMOL/L (ref 20–31)
CO2 SERPL-SCNC: 25 MMOL/L (ref 20–31)
CREAT BLD-MCNC: 0.4 MG/DL (ref 0.6–1.3)
CREAT BLD-MCNC: 0.6 MG/DL (ref 0.6–1.3)
DEPRECATED RDW RBC AUTO: 41.2 FL (ref 37–54)
ERYTHROCYTE [DISTWIDTH] IN BLOOD BY AUTOMATED COUNT: 13 % (ref 11.3–14.5)
GFR SERPL CREATININE-BSD FRML MDRD: 149 ML/MIN/1.73
GFR SERPL CREATININE-BSD FRML MDRD: 93 ML/MIN/1.73
GLUCOSE BLD-MCNC: 97 MG/DL (ref 70–100)
GLUCOSE BLD-MCNC: 98 MG/DL (ref 70–100)
HCT VFR BLD AUTO: 31.6 % (ref 34.5–44)
HGB BLD-MCNC: 10.4 G/DL (ref 11.5–15.5)
MAGNESIUM SERPL-MCNC: 1.7 MG/DL (ref 1.3–2.7)
MCH RBC QN AUTO: 28.3 PG (ref 27–31)
MCHC RBC AUTO-ENTMCNC: 32.9 G/DL (ref 32–36)
MCV RBC AUTO: 86.1 FL (ref 80–99)
PHOSPHATE SERPL-MCNC: 2.5 MG/DL (ref 2.4–5.1)
PLATELET # BLD AUTO: 400 10*3/MM3 (ref 150–450)
PMV BLD AUTO: 9.1 FL (ref 6–12)
POTASSIUM BLD-SCNC: 3.3 MMOL/L (ref 3.5–5.5)
POTASSIUM BLD-SCNC: 4.6 MMOL/L (ref 3.5–5.5)
POTASSIUM BLD-SCNC: 4.6 MMOL/L (ref 3.5–5.5)
RBC # BLD AUTO: 3.67 10*6/MM3 (ref 3.89–5.14)
SODIUM BLD-SCNC: 123 MMOL/L (ref 132–146)
SODIUM BLD-SCNC: 128 MMOL/L (ref 132–146)
TSH SERPL DL<=0.05 MIU/L-ACNC: 1.89 MIU/ML (ref 0.35–5.35)
WBC NRBC COR # BLD: 9.24 10*3/MM3 (ref 3.5–10.8)

## 2018-04-05 PROCEDURE — 97163 PT EVAL HIGH COMPLEX 45 MIN: CPT

## 2018-04-05 PROCEDURE — 85027 COMPLETE CBC AUTOMATED: CPT | Performed by: HOSPITALIST

## 2018-04-05 PROCEDURE — 99233 SBSQ HOSP IP/OBS HIGH 50: CPT | Performed by: INTERNAL MEDICINE

## 2018-04-05 PROCEDURE — 80048 BASIC METABOLIC PNL TOTAL CA: CPT | Performed by: INTERNAL MEDICINE

## 2018-04-05 PROCEDURE — 80069 RENAL FUNCTION PANEL: CPT | Performed by: HOSPITALIST

## 2018-04-05 PROCEDURE — 84132 ASSAY OF SERUM POTASSIUM: CPT | Performed by: INTERNAL MEDICINE

## 2018-04-05 PROCEDURE — 97110 THERAPEUTIC EXERCISES: CPT

## 2018-04-05 PROCEDURE — 84443 ASSAY THYROID STIM HORMONE: CPT | Performed by: INTERNAL MEDICINE

## 2018-04-05 PROCEDURE — 83735 ASSAY OF MAGNESIUM: CPT | Performed by: NURSE PRACTITIONER

## 2018-04-05 RX ORDER — MAGNESIUM SULFATE HEPTAHYDRATE 40 MG/ML
2 INJECTION, SOLUTION INTRAVENOUS AS NEEDED
Status: DISCONTINUED | OUTPATIENT
Start: 2018-04-05 | End: 2018-04-12 | Stop reason: HOSPADM

## 2018-04-05 RX ORDER — ESCITALOPRAM OXALATE 10 MG/1
10 TABLET ORAL DAILY
Status: DISCONTINUED | OUTPATIENT
Start: 2018-04-06 | End: 2018-04-05

## 2018-04-05 RX ORDER — POTASSIUM CHLORIDE 1.5 G/1.77G
40 POWDER, FOR SOLUTION ORAL AS NEEDED
Status: DISCONTINUED | OUTPATIENT
Start: 2018-04-05 | End: 2018-04-12 | Stop reason: HOSPADM

## 2018-04-05 RX ORDER — MAGNESIUM SULFATE HEPTAHYDRATE 40 MG/ML
4 INJECTION, SOLUTION INTRAVENOUS AS NEEDED
Status: DISCONTINUED | OUTPATIENT
Start: 2018-04-05 | End: 2018-04-12 | Stop reason: HOSPADM

## 2018-04-05 RX ORDER — POTASSIUM CHLORIDE 750 MG/1
40 CAPSULE, EXTENDED RELEASE ORAL AS NEEDED
Status: DISCONTINUED | OUTPATIENT
Start: 2018-04-05 | End: 2018-04-12 | Stop reason: HOSPADM

## 2018-04-05 RX ADMIN — MAGNESIUM SULFATE HEPTAHYDRATE 4 G: 40 INJECTION, SOLUTION INTRAVENOUS at 08:38

## 2018-04-05 RX ADMIN — PANTOPRAZOLE SODIUM 40 MG: 40 TABLET, DELAYED RELEASE ORAL at 05:24

## 2018-04-05 RX ADMIN — POTASSIUM CHLORIDE 40 MEQ: 750 CAPSULE, EXTENDED RELEASE ORAL at 08:37

## 2018-04-05 RX ADMIN — ASPIRIN 81 MG 81 MG: 81 TABLET ORAL at 08:37

## 2018-04-05 RX ADMIN — POTASSIUM CHLORIDE 40 MEQ: 750 CAPSULE, EXTENDED RELEASE ORAL at 12:47

## 2018-04-05 RX ADMIN — HYDROXYZINE HYDROCHLORIDE 25 MG: 25 TABLET, FILM COATED ORAL at 08:38

## 2018-04-05 RX ADMIN — HYDROXYZINE HYDROCHLORIDE 25 MG: 25 TABLET, FILM COATED ORAL at 20:15

## 2018-04-05 RX ADMIN — ESCITALOPRAM OXALATE 10 MG: 10 TABLET ORAL at 08:37

## 2018-04-05 RX ADMIN — ACETAMINOPHEN 650 MG: 325 TABLET ORAL at 19:39

## 2018-04-05 RX ADMIN — Medication 5 MG: at 20:15

## 2018-04-05 RX ADMIN — CARVEDILOL 6.25 MG: 6.25 TABLET, FILM COATED ORAL at 20:14

## 2018-04-05 RX ADMIN — CARVEDILOL 6.25 MG: 6.25 TABLET, FILM COATED ORAL at 12:47

## 2018-04-05 NOTE — THERAPY EVALUATION
Acute Care - Physical Therapy Initial Evaluation  [facility]     Patient Name: Debbie Baum  : 1923  MRN: [id number]  Today's Date: 2018   Onset of Illness/Injury or Date of Surgery: 18  Date of Referral to PT: 18  Referring Physician: MD Kern      Admit Date: 2018    Visit Dx:     ICD-10-CM ICD-9-CM   1. Hyponatremia E87.1 276.1   2. Generalized weakness R53.1 780.79   3. Urinary tract infection without hematuria, site unspecified N39.0 599.0   4. Impaired functional mobility, balance, gait, and endurance Z74.09 V49.89     Patient Active Problem List   Diagnosis   • Hyponatremia   • Proteus mirabilis infection     Past Medical History:   Diagnosis Date   • Cancer     colon   • Coronary artery disease    • GERD (gastroesophageal reflux disease)    • H/O cardiac radiofrequency ablation      Past Surgical History:   Procedure Laterality Date   • CARDIAC ABLATION     • CHOLECYSTECTOMY     • COLON SURGERY      BOWEL RESECTION FOR HX COLON CANCER   • HYSTERECTOMY     • REPLACEMENT TOTAL KNEE          PT ASSESSMENT (last 72 hours)      Physical Therapy Evaluation     Row Name 18 1340          PT Evaluation Time/Intention    Subjective Information complains of;weakness  -DENICE     Document Type evaluation  -DENICE     Mode of Treatment physical therapy  -DENICE     Patient Effort good  -DENICE     Symptoms Noted During/After Treatment fatigue  -DENICE     Comment patient is upset she can't walk better  -DENICE     Row Name 18 1340          General Information    Patient Profile Reviewed? yes  -DENICE     Onset of Illness/Injury or Date of Surgery 18  -DENICE     Referring Physician MD Kern  -DENICE     Patient Observations alert;cooperative;agree to therapy  -DENICE     Prior Level of Function independent:;gait;transfer;bed mobility;ADL's  -DENICE     Equipment Currently Used at Home walker, rolling  -DENICE     Pertinent History of Current Functional Problem patient had recent flu now present to the [facility] with  increasing weakness, hyponatremia  -DENICE     Existing Precautions/Restrictions fall  -DENICE     Risks Reviewed patient:;LOB;increased discomfort  -DENICE     Benefits Reviewed patient:;improve function;increase strength;decrease risk of DVT  -DENICE     Barriers to Rehab medically complex  -DENICE     Row Name 04/05/18 1340          Relationship/Environment    Primary Source of Support/Comfort child(savage)  -DENICE     Lives With alone  -DENICE     Family Caregiver if Needed child(savage), adult  -DENICE     Row Name 04/05/18 1340          Resource/Environmental Concerns    Current Living Arrangements home/apartment/condo  -DENICE     Row Name 04/05/18 1340          Cognitive Assessment/Interventions    Additional Documentation Cognitive Assessment/Intervention (Group)  -DENICE     Row Name 04/05/18 1340          Cognitive Assessment/Intervention- PT/OT    Affect/Mental Status (Cognitive) WFL  -DENICE     Behavioral Issues (Cognitive) overwhelmed easily  -DENICE     Orientation Status (Cognition) oriented x 4  -DENICE     Follows Commands (Cognition) over 90% accuracy;follows one step commands  -DENICE     Cognitive Function (Cognitive) WFL  -DENICE     Safety Deficit (Cognitive) insight into deficits/self awareness  -DENICE     Personal Safety Interventions fall prevention program maintained;gait belt;nonskid shoes/slippers when out of bed  -DENICE     Row Name 04/05/18 1340          Safety Issues, Functional Mobility    Impairments Affecting Function (Mobility) balance;strength  -     Row Name 04/05/18 1340          Bed Mobility Assessment/Treatment    Comment (Bed Mobility) patient is OOB and returns to the chair  -     Row Name 04/05/18 1340          Transfer Assessment/Treatment    Transfer Assessment/Treatment sit-stand transfer;stand-sit transfer  -DENICE     Sit-Stand [place] (Transfers) moderate assist (50% patient effort)   needs assist shifting weight forward  -     Stand-Sit [place] (Transfers) moderate assist (50% patient effort)   dec, safety trying to sit  before squaring to the chair  -     Row Name 04/05/18 1340          Sit-Stand Transfer    Assistive Device (Sit-Stand Transfers) walker, front-wheeled  -     Row Name 04/05/18 1340          Stand-Sit Transfer    Assistive Device (Stand-Sit Transfers) walker, front-wheeled  -DENICE     Row Name 04/05/18 1340          Gait/Stairs Assessment/Training    Gait/Stairs Assessment/Training gait/ambulation independence  -     [place] Level (Gait) moderate assist (50% patient effort)  -     Assistive Device (Gait) walker, front-wheeled  -     Distance in Feet (Gait) 25  -DENICE     Pattern (Gait) step-to  -DENICE     Deviations/Abnormal Patterns (Gait) right sided deviations  -DENICE     Right Sided Gait Deviations forward flexed posture;heel strike decreased   dragging right LE behind dec hip flex to clear foot  -     Comment (Gait/Stairs) pt became anxious stating why can't I walk better than this. patient fatigues easily   -[facility] Name 04/05/18 1340          General ROM    GENERAL ROM COMMENTS age related limitations   -     Row Name 04/05/18 1340          MMT (Manual Muscle Testing)    Additional Documentation General Assessment (Manual Muscle Testing) (Group)  -[facility] Name 04/05/18 1340          General Assessment (Manual Muscle Testing)    General Manual Muscle Testing (MMT) Assessment lower extremity strength deficits identified  -     Row Name 04/05/18 1340          Lower Extremity (Manual Muscle Testing)    Lower Extremity: Manual Muscle Testing (MMT) left hip strength deficit;right hip strength deficit  -[facility] Name 04/05/18 1340          Left Hip (Manual Muscle Testing)    Left Hip Manual Muscle Testing (MMT) flexion  -     MMT: Flexion, Left Hip flexion  -     MMT, Gross Movement: Left Hip Flexion (4-/5) good minus  -[facility] Name 04/05/18 1340          Right Hip (Manual Muscle Testing)    Right Hip Manual Muscle Testing (MMT) flexion  -     MMT: Flexion, Right Hip flexion  -     MMT,  Gross Movement: Right Hip Flexion (3+/5) fair plus  -[facility] Name 04/05/18 1340          Motor Assessment/Intervention    Additional Documentation Balance (Group);Therapeutic Exercise (Group)  -[facility] Name 04/05/18 1340          Therapeutic Exercise    Therapeutic Exercise seated, lower extremities  -     Additional Documentation Therapeutic Exercise (Row)  -DENICE     Row Name 04/05/18 1340          Lower Extremity Seated Therapeutic Exercise    Performed, Seated Lower Extremity (Therapeutic Exercise) hip flexion/extension;knee flexion/extension;ankle dorsiflexion/plantarflexion  -     Exercise Type, Seated Lower Extremity (Therapeutic Exercise) AROM (active range of motion)  -     Sets/Reps Detail, Seated Lower Extremity (Therapeutic Exercise) 10 reps  -DENICE     Row Name 04/05/18 1340          Balance    Balance static sitting balance;static standing balance  -DENICE     Row Name 04/05/18 1340          Static Sitting Balance    Level of [place] (Unsupported Sitting, Static Balance) independent  -     Sitting Position (Unsupported Sitting, Static Balance) sitting in chair  -DENICE     Row Name 04/05/18 1340          Static Standing Balance    Level of [place] (Supported Standing, Static Balance) moderate assist, 50 to 74% patient effort  -     Time Able to Maintain Position (Supported Standing, Static Balance) 3 to 4 minutes  -[facility] Name 04/05/18 1340          Pain Assessment    Additional Documentation Pain Scale: Numbers Pre/Post-Treatment (Group)  -DENICE     Row Name 04/05/18 1340          Pain Scale: Numbers Pre/Post-Treatment    Pain Scale: Numbers, Pretreatment 0/10 - no pain  -     Pain Scale: Numbers, Post-Treatment 2/10  -DENICE     Pain Location - Side Bilateral  -DENICE     Pain Location - Orientation lower  -DENICE     Pain Location extremity  -DENICE     Pain Intervention(s) Repositioned;Ambulation/increased activity  -[facility] Name 04/05/18 1340          Coping    Observed Emotional State  calm;cooperative  -DENICE     Verbalized Emotional State anxiety   anxiety about not being able to ambulate well  -DENICE     Row Name 04/05/18 1340          Plan of Care Review    Plan of Care Reviewed With patient  -DENICE     Row Name 04/05/18 1340          Physical Therapy Clinical Impression    Date of Referral to PT 04/05/18  -DENICE     Patient/Family Goals Statement (PT Clinical Impression) patient wants to return home  -DENICE     Criteria for Skilled Interventions Met (PT Clinical Impression) yes;treatment indicated  -DENICE     Rehab Potential (PT Clinical Summary) fair, will monitor progress closely  -DENICE     Care Plan Review (PT) care plan/treatment goals reviewed;risks/benefits reviewed;patient/other agree to care plan  -     Row Name 04/05/18 1340          Physical Therapy Goals    Bed Mobility Goal Selection (PT) bed mobility, PT goal 1  -DENICE     Transfer Goal Selection (PT) transfer, PT goal 1  -DENICE     Gait Training Goal Selection (PT) gait training, PT goal 1  -     Row Name 04/05/18 1340          Bed Mobility Goal 1 (PT)    Activity/Assistive Device (Bed Mobility Goal 1, PT) sit to supine/supine to sit  -DENICE     [place] Level/Cues Needed (Bed Mobility Goal 1, PT) independent  -DENICE     Time Frame (Bed Mobility Goal 1, PT) 10 days  -DENICE     Progress/Outcomes (Bed Mobility Goal 1, PT) goal ongoing  -     Row Name 04/05/18 1340          Transfer Goal 1 (PT)    Activity/Assistive Device (Transfer Goal 1, PT) sit-to-stand/stand-to-sit  -DENICE     [place] Level/Cues Needed (Transfer Goal 1, PT) independent  -DENICE     Time Frame (Transfer Goal 1, PT) 10 days  -DENICE     Progress/Outcome (Transfer Goal 1, PT) goal ongoing  -DENICE     Row Name 04/05/18 1340          Gait Training Goal 1 (PT)    Activity/Assistive Device (Gait Training Goal 1, PT) gait (walking locomotion)  -DENICE     [place] Level (Gait Training Goal 1, PT) supervision required  -DENICE     Distance (Gait Goal 1, PT) 150  -DENICE     Time Frame (Gait Training Goal 1,  PT) 10 days  -DENICE     Progress/Outcome (Gait Training Goal 1, PT) goal ongoing  -DENICE     Row Name 04/05/18 1340          Patient Education Goal (PT)    Activity (Patient Education Goal, PT) verbalizes HEP  -DENICE     [place]/Cues/Accuracy (Memory Goal 2, PT) verbalizes understanding  -DENICE     Time Frame (Patient Education Goal, PT) 10 days  -DENICE     Progress/Outcome (Patient Education Goal, PT) goal ongoing  -DENICE     Row Name 04/05/18 1340          Positioning and Restraints    Pre-Treatment Position sitting in chair/recliner  -DENICE     Post Treatment Position chair  -DENICE     In Chair notified nsg;reclined;sitting;call light within reach;exit alarm on  -DENICE       User Key  (r) = Recorded By, (t) = Taken By, (c) = Cosigned By    Initials Name Provider Type    DENICE Summers PT Physical Therapist          Physical Therapy Education     Title: PT OT SLP Therapies (Active)     Topic: Physical Therapy (Active)     Point: Mobility training (Active)    Learning Progress Summary     Learner Status Readiness Method Response Comment Documented by    Patient Active Acceptance E NR   04/05/18 1519          Point: Home exercise program (Active)    Learning Progress Summary     Learner Status Readiness Method Response Comment Documented by    Patient Active Acceptance E NR   04/05/18 1519          Point: Body mechanics (Active)    Learning Progress Summary     Learner Status Readiness Method Response Comment Documented by    Patient Active Acceptance E NR   04/05/18 1519          Point: Precautions (Active)    Learning Progress Summary     Learner Status Readiness Method Response Comment Documented by    Patient Active Acceptance E NR   04/05/18 1519                      User Key     Initials Effective Dates Name Provider Type Discipline    DENICE 06/19/15 -  Deanna Summers PT Physical Therapist PT                PT Recommendation and Plan  Anticipated Discharge Disposition (PT): skilled nursing facility (SNF)  Planned  Therapy Interventions (PT Eval): bed mobility training, gait training, home exercise program, transfer training  Therapy Frequency (PT Clinical Impression): daily  Outcome Summary/Treatment Plan (PT)  Anticipated Discharge Disposition (PT): skilled nursing facility (SNF)  Plan of Care Reviewed With: patient  Outcome Summary: patient is able to ambulate 25 ft with mod assist using walker. pt may benefit from SNF placement for continued rehab post hosp stay          Outcome Measures     Row Name 04/05/18 1340             How much help from another person do you currently need...    Turning from your back to your side while in flat bed without using bedrails? 3  -DENICE      Moving from lying on back to sitting on the side of a flat bed without bedrails? 2  -DENICE      Moving to and from a bed to a chair (including a wheelchair)? 2  -DENICE      Standing up from a chair using your arms (e.g., wheelchair, bedside chair)? 2  -DENICE      Climbing 3-5 steps with a railing? 2  -DENICE      To walk in hospital room? 2  -DENICE      AM-PAC 6 Clicks Score 13  -DENICE         Functional Assessment    Outcome Measure Options AM-PAC 6 Clicks Basic Mobility (PT)  -DENICE        User Key  (r) = Recorded By, (t) = Taken By, (c) = Cosigned By    Initials Name Provider Type    DENICE Summers PT Physical Therapist           Time Calculation:         PT Charges     Row Name 04/05/18 1520             Time Calculation    Start Time 1340  -DENICE      PT Received On 04/05/18  -DENICE      PT Goal Re-Cert Due Date 04/15/18  -         Time Calculation- PT    Total Timed Code Minutes- PT 18 minute(s)  -DENICE        User Key  (r) = Recorded By, (t) = Taken By, (c) = Cosigned By    Initials Name Provider Type    DENICE Summers PT Physical Therapist          Therapy Charges for Today     Code Description Service Date Service Provider Modifiers Qty    30141466671 HC PT EVAL HIGH COMPLEXITY 3 4/5/2018 Deanna Summers PT GP 1    75904072156 HC PT THER PROC EA 15 MIN  4/5/2018 Deanna Summers, PT GP 1          PT G-Codes  Outcome Measure Options: AM-PAC 6 Clicks Basic Mobility (PT)      Deanna uSmmers, PT  4/5/2018

## 2018-04-05 NOTE — PLAN OF CARE
Problem: Patient Care Overview  Goal: Plan of Care Review  Outcome: Ongoing (interventions implemented as appropriate)   04/05/18 0171   OTHER   Outcome Summary patient is able to ambulate 25 ft with mod assist using walker. pt may benefit from SNF placement for continued rehab post hosp stay   Coping/Psychosocial   Plan of Care Reviewed With patient

## 2018-04-05 NOTE — PROGRESS NOTES
Discharge Planning Assessment  [facility]     Patient Name: Debbie Baum  MRN: [id number]  Today's Date: 4/5/2018    Admit Date: 4/4/2018          Discharge Needs Assessment     Row Name 04/05/18 1103       Living Environment    Lives With alone    Current Living Arrangements home/apartment/condo    Family Caregiver if Needed child(savage), adult    Living Arrangement Comments Lives in a house. One step to enter. States her children live close and help as needed. Has housekeeping help. The pt drives short distances. Rides an exercise bike.       Discharge Needs Assessment    Equipment Currently Used at Home cane, straight;bath bench;walker, rolling    Equipment Needed After Discharge none    Discharge Coordination/Progress States she has never had HH or gone for inpt rehab in the past. Has coverage for medications with her insurance. PCP is Dr Scott.            Discharge Plan     Row Name 04/05/18 1108       Plan    Plan Home at DC    Patient/Family in Agreement with Plan yes    Plan Comments I spoke with the pt. No family present. She states she is very independent. She has no DC needs at this time. She is anxious to get up and walk and see how she does. CM will follow.        Destination     No service coordination in this encounter.      Durable Medical Equipment     No service coordination in this encounter.      Dialysis/Infusion     No service coordination in this encounter.      Home Medical Care     No service coordination in this encounter.      Social Care     No service coordination in this encounter.        Expected Discharge Date and Time     Expected Discharge Date Expected Discharge Time    Apr 7, 2018               Demographic Summary    No documentation.           Functional Status     Row Name 04/05/18 1106       Functional Status    Usual Activity Tolerance excellent       Functional Status, IADL    Medications independent    Meal Preparation independent    Housekeeping assistive person    Laundry  assistive person    Shopping assistive person            Psychosocial    No documentation.           Abuse/Neglect    No documentation.           Legal    No documentation.           Substance Abuse    No documentation.           Patient Forms    No documentation.         Phyllis Corona RN

## 2018-04-05 NOTE — PROGRESS NOTES
[facility] Medicine Services  PROGRESS NOTE    Patient Name: Debbie Baum  : 1923  MRN: [id number]    Date of Admission: 2018  Length of Stay: 1  Primary Care Physician: Paula Scott MD    Subjective   Subjective     CC: F/U generalized weakness    HPI:  Feeling OK today.  She is eating well.  Complains of pain in left shoulder which she states was going on at her recent stay at [facility] and diagnosed as arthritis.  Denies vision changes or jaw claudication.    Review of Systems  Gen- No fevers, chills  CV- No chest pain, palpitations  Resp- No cough, dyspnea  GI- No N/V/D, abd pain    Otherwise ROS is negative except as mentioned in the HPI.    Objective   Objective     Vital Signs:   Temp:  [97.7 °F (36.5 °C)-98.5 °F (36.9 °C)] 98.5 °F (36.9 °C)  Heart Rate:  [53-89] 62  Resp:  [14-18] 16  BP: (112-181)/(51-89) 112/85        Physical Exam:  Constitutional: No acute distress, awake, alert, sitting up in chair eating lunch  HENT: NCAT, mucous membranes moist, no temporal artery tenderness  Respiratory: Clear to auscultation bilaterally, respiratory effort normal   Cardiovascular: RRR, no murmurs, rubs, or gallops  Gastrointestinal: Positive bowel sounds, soft, nontender, nondistended  Musculoskeletal: No bilateral ankle edema, decreased ability to abduct left shoulder, bilateral shoulders nontender to palpation   Psychiatric: Appropriate affect, cooperative  Neurologic: Cranial Nerves grossly intact to confrontation, speech clear  Skin: No rashes    Results Reviewed:  I have personally reviewed current lab, radiology, and data and agree.      Results from last 7 days  Lab Units 18  0537 18  1321 18  1011   WBC 10*3/mm3 9.24 12.40* 10.13   HEMOGLOBIN g/dL 10.4* 10.7* 11.5   HEMATOCRIT % 31.6* 32.3* 34.4*   PLATELETS 10*3/mm3 400 402 374       Results from last 7 days  Lab Units 18  1648 18  0537 18  1321 18  1011   SODIUM mmol/L  128* 123* 121* 128*   POTASSIUM mmol/L 4.6  4.6 3.3* 3.9 3.7   CHLORIDE mmol/L 96* 92* 86* 92*   CO2 mmol/L 25.0 24.0 25.0 28.0   BUN mg/dL 6* 5* 6* 7*   CREATININE mg/dL 0.60 0.40* 0.60 0.50*   GLUCOSE mg/dL 97 98 107* 102*   CALCIUM mg/dL 9.3 8.9 9.2 9.6   ALT (SGPT) U/L  --   --  31 28   AST (SGOT) U/L  --   --  33 24     Estimated Creatinine Clearance: 36.9 mL/min (by C-G formula based on SCr of 0.6 mg/dL).  No results found for: BNP  No results found for: PHART    Microbiology Results Abnormal     None          Imaging Results (last 24 hours)     Procedure Component Value Units Date/Time    XR Chest 1 View [[id number]] Collected:  04/04/18 1319     Updated:  04/05/18 0901    Narrative:       EXAMINATION: XR CHEST 1 VW-04/04/2018:      INDICATION: Weak/Dizzy/AMS, triage protocol.      COMPARISON: Chest x-ray 04/01/2018.     FINDINGS: Cardiac size within normal limits. Chronic lung changes  without focal consolidation, pneumothorax, or significant pleural  effusion. Degenerative changes of the thoracic spine and right shoulder  again noted.           Impression:       Chronic lung changes without acute cardiopulmonary process  specifically, no focal consolidation to suggest acute pneumonia.     D:  04/04/2018  E:  04/04/2018     This report was finalized on 4/5/2018 8:59 AM by Dr. Jae Baumann.                I have reviewed the medications.    Assessment/Plan   Assessment / Plan     Hospital Problem List     * (Principal)Hyponatremia    Essential hypertension    Coronary artery disease    Weakness generalized    GERD (gastroesophageal reflux disease)             Brief Hospital Course to date:  Debbie Baum is a 89 y.o. female with recently diagnosed Influenza B in [facility] in 03/2018, A. Fib/flutter s/p ablation about 2 yrs ago, recent hx of pancreatitis presented with worsening generalized weakness. She was seen at [facility] ED on 4/1 and noted to be mildly hyponatremic, urine culture obtained grew Proteus. Bactrim  was called in. She has taken a couple of doses since. Also saw PCP who stopped her HCTZ for hyponatremia and started her on lasix for LE edema.       Assessment & Plan:    Hyponatremia  -At this point, suspect she may have SIADH, however diuretic use and recent illness definitely cloud the picture  -Improving, correcting at appropriate rate, <8 mmol/L/24h  -Low urine osmolality and inappropriately high urine sodium on admission, however urine studies less reliable due to diuretic use  -TSH WNL  -Check am cortisol level tomorrow  -Continue to hold HCTZ, hold lasix  -Hold Lexapro as this was a new medication and elderly population at greatest risk for SIADH with SSRIs  -Continue fluid restriction 1000mL    Asymptomatic bacteruria  -Suspect weakness is related to recent flu and hyponatremia  -Initial UA this admission not suggestive of UTI  -D/C antibiotics as unnecessary antibiotics puts her at risk for C. Diff infection    Generalized weakness  -Likely due to recent illness and hyponatremia  -PT/OT recommending rehab    Left shoulder pain  -Obtain records from [facility]  -Low suspicion for PMR as symptoms are unilateral  -PT/OT    GERD  -Continue PPI    DVT Prophylaxis: Mechanical-had significant local reaction to lovenox injection per nursing.  Will review OSH records to see if she tolerated heparin.    CODE STATUS: Full Code    Disposition: I expect the patient to be discharged to rehab.      Electronically signed by Milena Kern MD, 04/05/18, 7:36 PM.

## 2018-04-05 NOTE — PLAN OF CARE
Problem: Patient Care Overview  Goal: Plan of Care Review  Outcome: Ongoing (interventions implemented as appropriate)   04/04/18 2000 04/05/18 0321   OTHER   Outcome Summary --  Pt rested well tonight. Denies pain. VSS. Will continue to monitor.   Coping/Psychosocial   Plan of Care Reviewed With patient;daughter --      Goal: Individualization and Mutuality  Outcome: Ongoing (interventions implemented as appropriate)    Goal: Discharge Needs Assessment  Outcome: Ongoing (interventions implemented as appropriate)      Problem: Fall Risk (Adult)  Goal: Identify Related Risk Factors and Signs and Symptoms  Outcome: Ongoing (interventions implemented as appropriate)    Goal: Absence of Fall  Outcome: Ongoing (interventions implemented as appropriate)      Problem: Skin Injury Risk (Adult)  Goal: Skin Health and Integrity  Outcome: Ongoing (interventions implemented as appropriate)

## 2018-04-06 LAB
ANION GAP SERPL CALCULATED.3IONS-SCNC: 12 MMOL/L (ref 3–11)
BUN BLD-MCNC: 6 MG/DL (ref 9–23)
BUN/CREAT SERPL: 12 (ref 7–25)
CALCIUM SPEC-SCNC: 9.1 MG/DL (ref 8.7–10.4)
CHLORIDE SERPL-SCNC: 97 MMOL/L (ref 99–109)
CO2 SERPL-SCNC: 22 MMOL/L (ref 20–31)
CORTIS SERPL-MCNC: 20.4 MCG/DL
CREAT BLD-MCNC: 0.5 MG/DL (ref 0.6–1.3)
GFR SERPL CREATININE-BSD FRML MDRD: 115 ML/MIN/1.73
GLUCOSE BLD-MCNC: 93 MG/DL (ref 70–100)
MAGNESIUM SERPL-MCNC: 2.3 MG/DL (ref 1.3–2.7)
POTASSIUM BLD-SCNC: 4.5 MMOL/L (ref 3.5–5.5)
SODIUM BLD-SCNC: 131 MMOL/L (ref 132–146)

## 2018-04-06 PROCEDURE — 97166 OT EVAL MOD COMPLEX 45 MIN: CPT

## 2018-04-06 PROCEDURE — 80048 BASIC METABOLIC PNL TOTAL CA: CPT | Performed by: INTERNAL MEDICINE

## 2018-04-06 PROCEDURE — 82533 TOTAL CORTISOL: CPT | Performed by: INTERNAL MEDICINE

## 2018-04-06 PROCEDURE — 99233 SBSQ HOSP IP/OBS HIGH 50: CPT | Performed by: INTERNAL MEDICINE

## 2018-04-06 PROCEDURE — 83735 ASSAY OF MAGNESIUM: CPT | Performed by: INTERNAL MEDICINE

## 2018-04-06 RX ADMIN — HYDROCODONE BITARTRATE AND ACETAMINOPHEN 1 TABLET: 5; 325 TABLET ORAL at 20:06

## 2018-04-06 RX ADMIN — PANTOPRAZOLE SODIUM 40 MG: 40 TABLET, DELAYED RELEASE ORAL at 06:13

## 2018-04-06 RX ADMIN — CARVEDILOL 6.25 MG: 6.25 TABLET, FILM COATED ORAL at 08:53

## 2018-04-06 RX ADMIN — ASPIRIN 81 MG 81 MG: 81 TABLET ORAL at 08:53

## 2018-04-06 RX ADMIN — Medication 5 MG: at 20:06

## 2018-04-06 RX ADMIN — ACETAMINOPHEN 650 MG: 325 TABLET ORAL at 08:59

## 2018-04-06 NOTE — PLAN OF CARE
Problem: Patient Care Overview  Goal: Plan of Care Review  Outcome: Ongoing (interventions implemented as appropriate)    Goal: Individualization and Mutuality  Outcome: Ongoing (interventions implemented as appropriate)   04/06/18 8508   Individualization   Patient Specific Goals (Include Timeframe) patient continues to improve and have no complaints today, will be going to rehab when accepted       Problem: Fall Risk (Adult)  Goal: Identify Related Risk Factors and Signs and Symptoms  Outcome: Ongoing (interventions implemented as appropriate)

## 2018-04-06 NOTE — PROGRESS NOTES
Continued Stay Note  [facility]     Patient Name: Debbie Baum  MRN: [id number]  Today's Date: 4/6/2018    Admit Date: 4/4/2018          Discharge Plan     Row Name 04/06/18 1359       Plan    Plan rehab    Patient/Family in Agreement with Plan yes    Plan Comments Per discussion in rounds, patient and daughter agreeable to rehab post discharge. CM visited with patient at bedside, discussed rehab and gave her a list of facilities in [facility]. She knows her first choice is [facility]. Unsure of any additional choices at this time. She would like to discuss with daughter. CM left list of facilities along with my contact information at bedside. CM called referral to liaison with [facility].     Addendum: call received from Daughter, Kecia. Second preference of facility is [facility], and 3rd is [facility]. Awaiting call back from liaison as bed availability at [facility].               Discharge Codes    No documentation.       Expected Discharge Date and Time     Expected Discharge Date Expected Discharge Time    Apr 7, 2018             Lisa Robbins RN

## 2018-04-06 NOTE — PROGRESS NOTES
[facility] Medicine Services  PROGRESS NOTE    Patient Name: Debbie Baum  : 1923  MRN: [id number]    Date of Admission: 2018  Length of Stay: 2  Primary Care Physician: Paula Scott MD    Subjective   Subjective     CC: F/U generalized weakness    HPI:  Weakness is bothering her.  She has pain in her left knee which is chronic-she thinks she is about due for a knee injection.  Left shoulder is still bothering her also and she cannot raise it above her head.    Review of Systems  Gen- No fevers, chills  CV- No chest pain, palpitations  Resp- No cough, dyspnea  GI- No N/V/D, abd pain    Otherwise ROS is negative except as mentioned in the HPI.    Objective   Objective     Vital Signs:   Temp:  [98 °F (36.7 °C)-98.7 °F (37.1 °C)] 98.6 °F (37 °C)  Heart Rate:  [56-91] 67  Resp:  [16] 16  BP: (132-156)/(61-88) 156/88        Physical Exam:  Constitutional: No acute distress, awake, alert, sitting up in chair  HENT: NCAT, mucous membranes moist  Respiratory: Clear to auscultation bilaterally, respiratory effort normal   Cardiovascular: RRR, no murmurs, rubs, or gallops  Gastrointestinal: Positive bowel sounds, soft, nontender, nondistended  Musculoskeletal: No bilateral ankle edema, decreased ability to abduct left shoulder  Psychiatric: Appropriate affect, cooperative  Neurologic: Cranial Nerves grossly intact to confrontation, speech clear  Skin: No rashes    Results Reviewed:  I have personally reviewed current lab, radiology, and data and agree.      Results from last 7 days  Lab Units 18  0537 18  1321 18  1011   WBC 10*3/mm3 9.24 12.40* 10.13   HEMOGLOBIN g/dL 10.4* 10.7* 11.5   HEMATOCRIT % 31.6* 32.3* 34.4*   PLATELETS 10*3/mm3 400 402 374       Results from last 7 days  Lab Units 18  0443 18  1648 18  0537 18  1321 18  1011   SODIUM mmol/L 131* 128* 123* 121* 128*   POTASSIUM mmol/L 4.5 4.6  4.6 3.3* 3.9 3.7   CHLORIDE  mmol/L 97* 96* 92* 86* 92*   CO2 mmol/L 22.0 25.0 24.0 25.0 28.0   BUN mg/dL 6* 6* 5* 6* 7*   CREATININE mg/dL 0.50* 0.60 0.40* 0.60 0.50*   GLUCOSE mg/dL 93 97 98 107* 102*   CALCIUM mg/dL 9.1 9.3 8.9 9.2 9.6   ALT (SGPT) U/L  --   --   --  31 28   AST (SGOT) U/L  --   --   --  33 24     Estimated Creatinine Clearance: 36.9 mL/min (by C-G formula based on SCr of 0.5 mg/dL (L)).  No results found for: BNP  No results found for: PHART    Microbiology Results Abnormal     None          Imaging Results (last 24 hours)     ** No results found for the last 24 hours. **             I have reviewed the medications.    Assessment/Plan   Assessment / Plan     Hospital Problem List     * (Principal)Hyponatremia    Essential hypertension    Coronary artery disease    Weakness generalized    GERD (gastroesophageal reflux disease)    Atrial fibrillation             Brief Hospital Course to date:  Debbie Baum is a 89 y.o. female with recently diagnosed Influenza B in [facility] in 03/2018, A. Fib/flutter s/p ablation about 2 yrs ago, recent hx of pancreatitis presented with worsening generalized weakness. She was seen at [facility] ED on 4/1 and noted to be mildly hyponatremic, urine culture obtained grew Proteus. Bactrim was called in. She has taken a couple of doses since. Also saw PCP who stopped her HCTZ for hyponatremia and started her on lasix for LE edema.       Assessment & Plan:    Hyponatremia  -At this point, suspect she may have SIADH, however diuretic use and recent illness definitely cloud the picture  -Improving, correcting at appropriate rate, <8 mmol/L/24h  -Low urine osmolality and inappropriately high urine sodium on admission, however urine studies less reliable due to diuretic use  -TSH WNL, am cortisol level normal  -Continue to hold HCTZ, hold lasix  -Hold Lexapro as this was a new medication and elderly population at greatest risk for SIADH with SSRIs  -Continue fluid restriction 1000mL    Asymptomatic  bacteruria  -Suspect weakness is related to recent flu and hyponatremia  -Initial UA this admission not suggestive of UTI  -D/C antibiotics as unnecessary antibiotics puts her at risk for C. Diff infection    Generalized weakness  -Likely due to recent illness and hyponatremia, however, given that this is ongoing and in the setting of her new left shoulder pain and inability to abduct the left shoulder, will check ESR in am and consider PMR as sometimes this does begin with unilateral symptoms  -PT/OT following and case management working on rehab    Left shoulder pain  -Reviewed records from [facility] which show arthritic changes on X-ray  -As above, will check ESR and consider PMR if elevated (once adjusted based on her age)    GERD  -Continue PPI    DVT Prophylaxis: Mechanical-had significant local reaction to lovenox injection per nursing.      CODE STATUS: Full Code    Disposition: I expect the patient to be discharged to rehab.  Discussed with family and case management today.      Electronically signed by Milena Kern MD, 04/06/18, 4:34 PM.

## 2018-04-06 NOTE — PLAN OF CARE
Problem: Patient Care Overview  Goal: Plan of Care Review  Outcome: Ongoing (interventions implemented as appropriate)   04/06/18 1600   OTHER   Outcome Summary OT IE completed. Pt presents with pain and strength/balance deficits that limit mobility and ADL participation. Mod A STS and transfer; strong posterior lean. Education initiated for L UE van-dressing due to shoulder pain. OT to follow. Recommend SNF level rehab at d/c for best outcome.   Coping/Psychosocial   Plan of Care Reviewed With patient;daughter

## 2018-04-06 NOTE — THERAPY EVALUATION
Acute Care - Occupational Therapy Initial Evaluation  [facility]     Patient Name: Debbie Baum  : 1923  MRN: [id number]  Today's Date: 2018  Onset of Illness/Injury or Date of Surgery: 18  Date of Referral to OT: 18  Referring Physician: Erlin    Admit Date: 2018       ICD-10-CM ICD-9-CM   1. Hyponatremia E87.1 276.1   2. Generalized weakness R53.1 780.79   3. Urinary tract infection without hematuria, site unspecified N39.0 599.0   4. Impaired functional mobility, balance, gait, and endurance Z74.09 V49.89   5. Impaired mobility and ADLs Z74.09 799.89     Patient Active Problem List   Diagnosis   • Hyponatremia   • Essential hypertension   • Coronary artery disease   • Weakness generalized   • GERD (gastroesophageal reflux disease)   • Atrial fibrillation     Past Medical History:   Diagnosis Date   • Cancer     colon   • Coronary artery disease    • GERD (gastroesophageal reflux disease)    • H/O cardiac radiofrequency ablation      Past Surgical History:   Procedure Laterality Date   • CARDIAC ABLATION     • CHOLECYSTECTOMY     • COLON SURGERY      BOWEL RESECTION FOR HX COLON CANCER   • HYSTERECTOMY     • REPLACEMENT TOTAL KNEE            OT ASSESSMENT FLOWSHEET (last 72 hours)      Occupational Therapy Evaluation     Row Name 18 1500                   OT Evaluation Time/Intention    Subjective Information complains of;weakness;pain  -TB        Document Type evaluation   Pt seen at 1448  -TB        Mode of Treatment occupational therapy;individual therapy  -TB        Patient Effort good  -TB        Symptoms Noted During/After Treatment increased pain;fatigue  -TB        Comment Pt requesting rehab at d/c  -TB           General Information    Patient Profile Reviewed? yes  -TB        Onset of Illness/Injury or Date of Surgery 18  -TB        Referring Physician Erlin  -TB        Patient Observations alert;cooperative  -TB        Patient/Family Observations Dtr present  for assessment  -TB        General Observations of Patient Pt rec'vd UIC, room air, IV heplocked, bruising to R UE from recent fall  -TB        Prior Level of Function independent:;all household mobility;community mobility;transfer;bed mobility;ADL's;home management;driving;shopping  -TB        Equipment Currently Used at Home grab bar;raised toilet;shower chair;walker, rolling  -TB        Pertinent History of Current Functional Problem Pt with recent Flu B/pancreatitis to ED with c/o worsening generalized weakness. UA (+) UTI. Labs (+) hypoatremia  -TB        Existing Precautions/Restrictions fall  -TB        Risks Reviewed patient:;LOB;increased discomfort;lines disloged  -TB        Benefits Reviewed patient:;improve function;decrease pain;increase knowledge  -TB        Barriers to Rehab medically complex  -TB           Relationship/Environment    Primary Source of Support/Comfort child(savage)  -TB        Lives With alone  -TB        Family Caregiver if Needed child(savage), adult  -TB           Resource/Environmental Concerns    Current Living Arrangements home/apartment/condo  -TB           Cognitive Assessment/Intervention- PT/OT    Affect/Mental Status (Cognitive) WNL  -TB        Orientation Status (Cognition) oriented x 4  -TB        Follows Commands (Cognition) WFL;over 90% accuracy  -TB        Cognitive Function (Cognitive) WFL  -TB        Safety Deficit (Cognitive) insight into deficits/self awareness  -TB        Personal Safety Interventions fall prevention program maintained;gait belt;nonskid shoes/slippers when out of bed;other (see comments)   exit alarms  -TB           Safety Issues, Functional Mobility    Safety Issues Affecting Function (Mobility) insight into deficits/self awareness  -TB        Impairments Affecting Function (Mobility) balance;pain;strength  -TB           Bed Mobility Assessment/Treatment    Comment (Bed Mobility) Pt rec'vd UIC  -TB           Functional Mobility    Functional Mobility-  Ind. Level moderate assist (50% patient effort);verbal cues required  -TB        Functional Mobility- Device rolling walker  -TB        Functional Mobility-Distance (Feet) 10  -TB        Functional Mobility- Safety Issues loses balance backward;weight-shifting ability decreased;step length decreased;sequencing ability decreased;balance decreased during turns  -TB        Functional Mobility- Comment reports L knee pain; unsteady gait  -TB           Transfer Assessment/Treatment    Transfer Assessment/Treatment sit-stand transfer;stand-sit transfer;toilet transfer  -TB        Comment (Transfers) cues for hand placement/sequencing  -TB        Sit-Stand [place] (Transfers) moderate assist (50% patient effort);verbal cues  -TB        Stand-Sit [place] (Transfers) moderate assist (50% patient effort);verbal cues  -TB        [place] Level (Toilet Transfer) moderate assist (50% patient effort);verbal cues  -TB        Assistive Device (Toilet Transfer) commode, bedside without drop arms;walker, front-wheeled  -TB           Sit-Stand Transfer    Assistive Device (Sit-Stand Transfers) walker, front-wheeled   assist to correct posterior lean  -TB           Stand-Sit Transfer    Assistive Device (Stand-Sit Transfers) walker, front-wheeled  -TB           Toilet Transfer    Type (Toilet Transfer) sit-stand;stand-sit  -TB           ADL Assessment/Intervention    BADL Assessment/Intervention upper body dressing;toileting  -TB           Upper Body Dressing Assessment/Training    Upper Body Dressing [place] Level don;luz marina/robe;moderate assist (50% patient effort);verbal cues  -TB        Assistive Devices (Upper Body Dressing) one hand technique  -TB        Upper Body Dressing Position supported sitting  -TB        Comment (Upper Body Dressing) Education initiated for L UE van-dressing due to painful shoulder  -TB           Toileting Assessment/Training    [place] Level (Toileting) moderate assist (50%  patient effort);verbal cues  -TB        Assistive Devices (Toileting) commode, bedside without drop arms  -TB        Toileting Position supported sitting  -TB        Comment (Toileting) assist for clothing mgmt; set-up for toileting hygiene  -TB           BADL Safety/Performance    Impairments, BADL Safety/Performance balance;pain;strength  -TB           General ROM    RT Upper Ext Rt Shoulder Flexion;Rt Shoulder ABduction  -TB        LT Upper Ext Lt Shoulder ABduction;Lt Shoulder Flexion  -TB        GENERAL ROM COMMENTS Significant B shoulder deficits L>R  -TB           Right Upper Ext    Rt Upper Extremity Comments  45% deficit AAROM  -TB           Left Upper Ext    Lt Upper Extremity Comments  50% deficit AAROM  -TB           General Assessment (Manual Muscle Testing)    Comment, General Manual Muscle Testing (MMT) Assessment R Shoulder 2+/5, L Shoulder 2-/5; remaining B MMT 3/5  -TB           Therapeutic Exercise    Upper Extremity Range of Motion (Therapeutic Exercise) shoulder flexion/extension, bilateral;shoulder abduction/adduction, bilateral;shoulder horizontal abduction/adduction, bilateral;elbow flexion/extension, bilateral  -TB        Hand (Therapeutic Exercise) hand , bilateral  -TB        Exercise Type (Therapeutic Exercise) AROM (active range of motion);AAROM (active assistive range of motion)  -TB        Position (Therapeutic Exercise) seated  -TB        Sets/Reps (Therapeutic Exercise) 1/8  -TB        Comment (Therapeutic Exercise) Pt does not tolerate L shoulder ROM >90 degrees FE/ABduction  -TB           Balance    Balance static sitting balance;static standing balance;dynamic sitting balance;dynamic standing balance  -TB           Static Sitting Balance    Level of [place] (Unsupported Sitting, Static Balance) supervision  -TB        Sitting Position (Unsupported Sitting, Static Balance) sitting in chair  -TB           Dynamic Sitting Balance    Level of [place], Reaches Outside  Midline (Sitting, Dynamic Balance) contact guard assist  -TB        Sitting Position, Reaches Outside Midline (Sitting, Dynamic Balance) sitting in chair  -TB           Static Standing Balance    Level of [place] (Supported Standing, Static Balance) moderate assist, 50 to 74% patient effort  -TB        Time Able to Maintain Position (Supported Standing, Static Balance) 2 to 3 minutes  -TB           Dynamic Standing Balance    Level of [place], Reaches Outside Midline (Standing, Dynamic Balance) maximal assist, 25 to 49% patient effort   loses balance backwards  -TB        Time Able to Maintain Position, Reaches Outside Midline (Standing, Dynamic Balance) 1 to 2 minutes  -TB           Sensory Assessment/Intervention    Sensory General Assessment no sensation deficits identified  -TB           Vision Assessment/Intervention    Visual Impairment/Limitations WFL  -TB           Positioning and Restraints    Pre-Treatment Position sitting in chair/recliner  -TB        Post Treatment Position chair  -TB        In Chair notified nsg;reclined;call light within reach;encouraged to call for assist;with family/caregiver   RN to place exit alarm  -TB           Pain Assessment    Additional Documentation Pain Scale: FACES Pre/Post-Treatment (Group);Pain Scale: Word Pre/Post-Treatment (Group)  -TB           Pain Scale: Numbers Pre/Post-Treatment    Pain Location - Side Left  -TB        Pain Location - Orientation --   L UE/shoulder; L LE/knee  -TB        Pain Location extremity  -TB        Pain Intervention(s) Ambulation/increased activity;Repositioned  -TB           Pain Scale: Word Pre/Post-Treatment    Pain: Word Scale, Pretreatment 2 - mild pain  -TB        Pain: Word Scale, Post-Treatment 4 - moderate pain  -TB           Coping    Observed Emotional State accepting;calm;cooperative  -TB        Verbalized Emotional State acceptance  -TB           Plan of Care Review    Plan of Care Reviewed With patient  -TB            Clinical Impression (OT)    Date of Referral to OT 04/05/18  -TB        OT Diagnosis Impaired mobility and ADL  -TB        Patient/Family Goals Statement (OT Eval) Pt/family requesting SNF level rehab at d/c to support pt goal of return to home alone  -TB        Criteria for Skilled Therapeutic Interventions Met (OT Eval) yes;treatment indicated  -TB        Rehab Potential (OT Eval) fair, will monitor progress closely  -TB        Therapy Frequency (OT Eval) daily  -TB        Care Plan Review (OT) evaluation/treatment results reviewed;risks/benefits reviewed  -TB        Care Plan Review, Other Participant (OT Eval) daughter  -TB        Anticipated Equipment Needs at Discharge (OT) tub bench  -TB        Anticipated Discharge Disposition (OT) skilled nursing facility (SNF)  -TB           Vital Signs    Pre Systolic BP Rehab --   RN cleared OT  -TB        O2 Delivery Post Treatment room air  -TB        Pre Patient Position Sitting  -TB        Intra Patient Position Standing  -TB        Post Patient Position Sitting  -TB           Planned OT Interventions    Planned Therapy Interventions (OT Eval) transfer/mobility retraining;occupation/activity based interventions;BADL retraining  -TB           OT Goals    Transfer Goal Selection (OT) transfer, OT goal 1  -TB        Dressing Goal Selection (OT) dressing, OT goal 1  -TB        Balance Goal Selection (OT) balance, OT goal 1  -TB           Transfer Goal 1 (OT)    Activity/Assistive Device (Transfer Goal 1, OT) sit-to-stand/stand-to-sit;toilet;commode, bedside without drop arms  -TB        [place] Level/Cues Needed (Transfer Goal 1, OT) minimum assist (75% or more patient effort);verbal cues required  -TB        Time Frame (Transfer Goal 1, OT) by discharge  -TB        Barriers (Transfers Goal 1, OT) balance, pain, strength  -TB           Dressing Goal 1 (OT)    Activity/Assistive Device (Dressing Goal 1, OT) upper body dressing   L UE van-dressing  -TB         [place]/Cues Needed (Dressing Goal 1, OT) moderate assist (50-74% patient effort);verbal cues required  -TB        Time Frame (Dressing Goal 1, OT) by discharge  -TB        Barriers (Dressing Goal 1, OT) pain, strength, ROM  -TB           Balance Goal 1 (OT)    Activity/Assistive Device (Balance Goal 1, OT) standing, dynamic;walker, rolling   decreased posterior lean to support safe transfers  -TB        [place] Level/Cues Needed (Balance Goal 1, OT) minimum assist (75% or more patient effort);verbal cues required  -TB        Time Frame (Balance Goal 1, OT) by discharge  -TB        Barriers (Balance Goal 1, OT) balance, strength, pain  -TB           Living Environment    Home Accessibility tub/shower is not walk in  -TB          User Key  (r) = Recorded By, (t) = Taken By, (c) = Cosigned By    Initials Name Effective Dates     Jocelyn Bach OT 06/22/15 -            Occupational Therapy Education     Title: PT OT SLP Therapies (Active)     Topic: Occupational Therapy (Done)     Point: ADL training (Done)     Description: Instruct learner(s) on proper safety adaptation and remediation techniques during self care or transfers.   Instruct in proper use of assistive devices.   Learning Progress Summary     Learner Status Readiness Method Response Comment Documented by    Patient Done Acceptance E,D,TB VU,NR Education initiated for benefits of activity/therapy, role of OT, transfer training, L UE van-dressing and recommendation for SNF level rehab at d/[facility] 04/06/18 1558    Family Done Acceptance E,D,TB VU,NR Education initiated for benefits of activity/therapy, role of OT, transfer training, L UE van-dressing and recommendation for SNF level rehab at d/[facility] 04/06/18 1558                      User Key     Initials Effective Dates Name Provider Type Discipline     06/22/15 -  Jocelyn Bach OT Occupational Therapist OT                  OT Recommendation and Plan  Outcome Summary/Treatment  Plan (OT)  Anticipated Equipment Needs at Discharge (OT): tub bench  Anticipated Discharge Disposition (OT): skilled nursing facility (SNF)  Planned Therapy Interventions (OT Eval): transfer/mobility retraining, occupation/activity based interventions, BADL retraining  Therapy Frequency (OT Eval): daily  Plan of Care Review  Plan of Care Reviewed With: patient, daughter  Plan of Care Reviewed With: patient, daughter  Outcome Summary: OT IE completed. Pt presents with pain and strength/balance deficits that limit mobility and ADL participation. Mod A STS and transfer; strong posterior lean.  Education initiated for L UE van-dressing due to shoulder pain.  OT to follow.  Recommend SNF level rehab at d/c for best outcome.          Outcome Measures     Row Name 04/06/18 1500 04/05/18 1340          How much help from another person do you currently need...    Turning from your back to your side while in flat bed without using bedrails?  -- 3  -DENICE     Moving from lying on back to sitting on the side of a flat bed without bedrails?  -- 2  -DENICE     Moving to and from a bed to a chair (including a wheelchair)?  -- 2  -DENICE     Standing up from a chair using your arms (e.g., wheelchair, bedside chair)?  -- 2  -DENICE     Climbing 3-5 steps with a railing?  -- 2  -DENICE     To walk in hospital room?  -- 2  -DENICE     AM-PAC 6 Clicks Score  -- 13  -DENICE        How much help from another is currently needed...    Putting on and taking off regular lower body clothing? 2  -TB  --     Bathing (including washing, rinsing, and drying) 2  -TB  --     Toileting (which includes using toilet bed pan or urinal) 2  -TB  --     Putting on and taking off regular upper body clothing 2  -TB  --     Taking care of personal grooming (such as brushing teeth) 3  -TB  --     Eating meals 3  -TB  --     Score 14  -TB  --        Functional Assessment    Outcome Measure Options AM-PAC 6 Clicks Daily Activity (OT)  -TB AM-PAC 6 Clicks Basic Mobility (PT)  -DENICE        User Key  (r) = Recorded By, (t) = Taken By, (c) = Cosigned By    Initials Name Provider Type    TB Jocelyn Bach, YAIR Occupational Therapist    DENICE Summers PT Physical Therapist          Time Calculation:   OT Start Time: 1500    Therapy Charges for Today     Code Description Service Date Service Provider Modifiers Qty    [id number] HC OT EVAL MOD COMPLEXITY 4 4/6/2018 Jocelyn Bach OT GO 1               Jocelyn Bach OT  4/6/2018

## 2018-04-06 NOTE — PLAN OF CARE
Problem: Patient Care Overview  Goal: Plan of Care Review  Outcome: Ongoing (interventions implemented as appropriate)   04/06/18 0806   OTHER   Outcome Summary A&O at start of shift but awoke confused in the night, continued incontinence, VSS, pain in L arm treated successfully at start of shift.

## 2018-04-07 LAB
ANION GAP SERPL CALCULATED.3IONS-SCNC: 4 MMOL/L (ref 3–11)
BUN BLD-MCNC: 7 MG/DL (ref 9–23)
BUN/CREAT SERPL: 14 (ref 7–25)
CALCIUM SPEC-SCNC: 9.6 MG/DL (ref 8.7–10.4)
CHLORIDE SERPL-SCNC: 103 MMOL/L (ref 99–109)
CO2 SERPL-SCNC: 26 MMOL/L (ref 20–31)
CREAT BLD-MCNC: 0.5 MG/DL (ref 0.6–1.3)
DEPRECATED RDW RBC AUTO: 45.2 FL (ref 37–54)
ERYTHROCYTE [DISTWIDTH] IN BLOOD BY AUTOMATED COUNT: 13.7 % (ref 11.3–14.5)
ERYTHROCYTE [SEDIMENTATION RATE] IN BLOOD: 13 MM/HR (ref 0–30)
GFR SERPL CREATININE-BSD FRML MDRD: 115 ML/MIN/1.73
GLUCOSE BLD-MCNC: 91 MG/DL (ref 70–100)
HCT VFR BLD AUTO: 36.7 % (ref 34.5–44)
HGB BLD-MCNC: 11.7 G/DL (ref 11.5–15.5)
MCH RBC QN AUTO: 29 PG (ref 27–31)
MCHC RBC AUTO-ENTMCNC: 31.9 G/DL (ref 32–36)
MCV RBC AUTO: 91.1 FL (ref 80–99)
PLATELET # BLD AUTO: 442 10*3/MM3 (ref 150–450)
PMV BLD AUTO: 9.2 FL (ref 6–12)
POTASSIUM BLD-SCNC: 4.1 MMOL/L (ref 3.5–5.5)
RBC # BLD AUTO: 4.03 10*6/MM3 (ref 3.89–5.14)
SODIUM BLD-SCNC: 133 MMOL/L (ref 132–146)
WBC NRBC COR # BLD: 11.94 10*3/MM3 (ref 3.5–10.8)

## 2018-04-07 PROCEDURE — 80048 BASIC METABOLIC PNL TOTAL CA: CPT | Performed by: INTERNAL MEDICINE

## 2018-04-07 PROCEDURE — 99233 SBSQ HOSP IP/OBS HIGH 50: CPT | Performed by: INTERNAL MEDICINE

## 2018-04-07 PROCEDURE — 85027 COMPLETE CBC AUTOMATED: CPT | Performed by: INTERNAL MEDICINE

## 2018-04-07 PROCEDURE — 85652 RBC SED RATE AUTOMATED: CPT | Performed by: INTERNAL MEDICINE

## 2018-04-07 RX ORDER — SACCHAROMYCES BOULARDII 250 MG
250 CAPSULE ORAL 2 TIMES DAILY
Status: DISCONTINUED | OUTPATIENT
Start: 2018-04-07 | End: 2018-04-12 | Stop reason: HOSPADM

## 2018-04-07 RX ADMIN — Medication 5 MG: at 21:20

## 2018-04-07 RX ADMIN — Medication 250 MG: at 12:19

## 2018-04-07 RX ADMIN — HYDROXYZINE HYDROCHLORIDE 25 MG: 25 TABLET, FILM COATED ORAL at 21:23

## 2018-04-07 RX ADMIN — PANTOPRAZOLE SODIUM 40 MG: 40 TABLET, DELAYED RELEASE ORAL at 06:03

## 2018-04-07 RX ADMIN — HYDROCODONE BITARTRATE AND ACETAMINOPHEN 1 TABLET: 5; 325 TABLET ORAL at 21:23

## 2018-04-07 RX ADMIN — CARVEDILOL 6.25 MG: 6.25 TABLET, FILM COATED ORAL at 21:24

## 2018-04-07 RX ADMIN — Medication 250 MG: at 21:20

## 2018-04-07 RX ADMIN — CARVEDILOL 6.25 MG: 6.25 TABLET, FILM COATED ORAL at 08:11

## 2018-04-07 RX ADMIN — ASPIRIN 81 MG 81 MG: 81 TABLET ORAL at 08:11

## 2018-04-07 RX ADMIN — ACETAMINOPHEN 650 MG: 325 TABLET ORAL at 08:46

## 2018-04-07 RX ADMIN — SODIUM CHLORIDE 500 ML: 9 INJECTION, SOLUTION INTRAVENOUS at 10:05

## 2018-04-07 NOTE — PLAN OF CARE
Problem: Patient Care Overview  Goal: Plan of Care Review  Outcome: Ongoing (interventions implemented as appropriate)    Goal: Individualization and Mutuality   04/07/18 1554   Individualization   Patient Specific Goals (Include Timeframe) patient having some diarrhea this am, b/p dropped into the 70's. After a 500 ml fluid bolus pressure returned to normal. awaiting rehab

## 2018-04-07 NOTE — PLAN OF CARE
Problem: Patient Care Overview  Goal: Plan of Care Review  Outcome: Ongoing (interventions implemented as appropriate)   04/07/18 8713   OTHER   Outcome Summary Sleeping well this shift, VSS, pain controlled with PO medication, awaiting rehab placement.

## 2018-04-07 NOTE — PROGRESS NOTES
[facility] Medicine Services  PROGRESS NOTE    Patient Name: Debbie Baum  : 1923  MRN: [id number]    Date of Admission: 2018  Length of Stay: 3  Primary Care Physician: Paula Scott MD    Subjective   Subjective     CC: F/U generalized weakness    HPI:  Began having nonbloody diarrhea overnight, recorded 10 BM total.  Blood pressure low this morning.  She feels terrible.    Review of Systems  Gen- No fevers, chills  CV- No chest pain, palpitations  Resp- No cough, dyspnea  GI- +diarrhea, no abd pain    Otherwise ROS is negative except as mentioned in the HPI.    Objective   Objective     Vital Signs:   Temp:  [98.1 °F (36.7 °C)-98.5 °F (36.9 °C)] 98.5 °F (36.9 °C)  Heart Rate:  [] 62  Resp:  [20] 20  BP: ()/(39-78) 121/45        Physical Exam:  Constitutional: No acute distress, awake, alert, laying in bed  HENT: NCAT, mucous membranes moist  Respiratory: Clear to auscultation bilaterally, respiratory effort normal   Cardiovascular: RRR, no murmurs, rubs, or gallops  Gastrointestinal: Positive bowel sounds, soft, nontender, nondistended  Musculoskeletal: No bilateral ankle edema, decreased ability to abduct left shoulder  Psychiatric: Appropriate affect, cooperative  Neurologic: Cranial Nerves grossly intact to confrontation, speech clear  Skin: No rashes    Results Reviewed:  I have personally reviewed current lab, radiology, and data and agree.      Results from last 7 days  Lab Units 18  0438 18  0537 18  1321   WBC 10*3/mm3 11.94* 9.24 12.40*   HEMOGLOBIN g/dL 11.7 10.4* 10.7*   HEMATOCRIT % 36.7 31.6* 32.3*   PLATELETS 10*3/mm3 442 400 402       Results from last 7 days  Lab Units 18  0438 18  0443 18  1648  18  1321 18  1011   SODIUM mmol/L 133 131* 128*  < > 121* 128*   POTASSIUM mmol/L 4.1 4.5 4.6  4.6  < > 3.9 3.7   CHLORIDE mmol/L 103 97* 96*  < > 86* 92*   CO2 mmol/L 26.0 22.0 25.0  < > 25.0 28.0    BUN mg/dL 7* 6* 6*  < > 6* 7*   CREATININE mg/dL 0.50* 0.50* 0.60  < > 0.60 0.50*   GLUCOSE mg/dL 91 93 97  < > 107* 102*   CALCIUM mg/dL 9.6 9.1 9.3  < > 9.2 9.6   ALT (SGPT) U/L  --   --   --   --  31 28   AST (SGOT) U/L  --   --   --   --  33 24   < > = values in this interval not displayed.  Estimated Creatinine Clearance: 36.9 mL/min (by C-G formula based on SCr of 0.5 mg/dL (L)).  No results found for: BNP  No results found for: PHART    Microbiology Results Abnormal     None          Imaging Results (last 24 hours)     ** No results found for the last 24 hours. **             I have reviewed the medications.    Assessment/Plan   Assessment / Plan     Hospital Problem List     * (Principal)Hyponatremia    Essential hypertension    Coronary artery disease    Weakness generalized    GERD (gastroesophageal reflux disease)    Atrial fibrillation             Brief Hospital Course to date:  Debbie Baum is a 89 y.o. female with recently diagnosed Influenza B in [facility] in 03/2018, A. Fib/flutter s/p ablation about 2 yrs ago, recent hx of pancreatitis presented with worsening generalized weakness. She was seen at [facility] ED on 4/1 and noted to be mildly hyponatremic, urine culture obtained grew Proteus. Bactrim was called in. She has taken a couple of doses since. Also saw PCP who stopped her HCTZ for hyponatremia and started her on lasix for LE edema.       Assessment & Plan:    Hyponatremia  -Improved  -Possibly SIADH due to SSRI  -Low urine osmolality and inappropriately high urine sodium on admission, however urine studies less reliable due to diuretic use  -TSH WNL, am cortisol level normal  -Continue to hold HCTZ, hold lasix  -D/C lexapro  -Continue fluid restriction 1000mL    Diarrhea  -Gave small fluid bolus this am due to low BP and diarrhea  -Check C. Diff  -Start probiotic    Asymptomatic bacteruria  -Initial UA this admission not suggestive of UTI and she does not have dysuria  -Antibiotics  discontinued    Generalized weakness  -Likely due to recent illness and hyponatremia  -PT/OT following and case management working on rehab    Left shoulder pain  -Reviewed records from [facility] which show arthritic changes on X-ray  -ESR normal    GERD  -Continue PPI    DVT Prophylaxis: Mechanical-had significant local reaction to lovenox injection per nursing.      CODE STATUS: Conditional Code-Discussed with patient.  She states she would not want to be resuscitated.    Disposition: I expect the patient to be discharged to rehab.       Electronically signed by Milena Kern MD, 04/07/18, 3:32 PM.

## 2018-04-08 LAB
ANION GAP SERPL CALCULATED.3IONS-SCNC: 5 MMOL/L (ref 3–11)
BUN BLD-MCNC: 9 MG/DL (ref 9–23)
BUN/CREAT SERPL: 15 (ref 7–25)
CALCIUM SPEC-SCNC: 9.1 MG/DL (ref 8.7–10.4)
CHLORIDE SERPL-SCNC: 104 MMOL/L (ref 99–109)
CO2 SERPL-SCNC: 27 MMOL/L (ref 20–31)
CREAT BLD-MCNC: 0.6 MG/DL (ref 0.6–1.3)
DEPRECATED RDW RBC AUTO: 45.4 FL (ref 37–54)
ERYTHROCYTE [DISTWIDTH] IN BLOOD BY AUTOMATED COUNT: 13.7 % (ref 11.3–14.5)
GFR SERPL CREATININE-BSD FRML MDRD: 93 ML/MIN/1.73
GLUCOSE BLD-MCNC: 157 MG/DL (ref 70–100)
HCT VFR BLD AUTO: 33.3 % (ref 34.5–44)
HGB BLD-MCNC: 10.6 G/DL (ref 11.5–15.5)
MCH RBC QN AUTO: 28.8 PG (ref 27–31)
MCHC RBC AUTO-ENTMCNC: 31.8 G/DL (ref 32–36)
MCV RBC AUTO: 90.5 FL (ref 80–99)
PLATELET # BLD AUTO: 373 10*3/MM3 (ref 150–450)
PMV BLD AUTO: 8.7 FL (ref 6–12)
POTASSIUM BLD-SCNC: 3.9 MMOL/L (ref 3.5–5.5)
RBC # BLD AUTO: 3.68 10*6/MM3 (ref 3.89–5.14)
SODIUM BLD-SCNC: 136 MMOL/L (ref 132–146)
WBC NRBC COR # BLD: 9.06 10*3/MM3 (ref 3.5–10.8)

## 2018-04-08 PROCEDURE — 97110 THERAPEUTIC EXERCISES: CPT

## 2018-04-08 PROCEDURE — 99232 SBSQ HOSP IP/OBS MODERATE 35: CPT | Performed by: INTERNAL MEDICINE

## 2018-04-08 PROCEDURE — 97116 GAIT TRAINING THERAPY: CPT

## 2018-04-08 PROCEDURE — 85027 COMPLETE CBC AUTOMATED: CPT | Performed by: INTERNAL MEDICINE

## 2018-04-08 PROCEDURE — 80048 BASIC METABOLIC PNL TOTAL CA: CPT | Performed by: INTERNAL MEDICINE

## 2018-04-08 RX ORDER — HYDROCODONE BITARTRATE AND ACETAMINOPHEN 5; 325 MG/1; MG/1
1 TABLET ORAL EVERY 6 HOURS PRN
Status: DISCONTINUED | OUTPATIENT
Start: 2018-04-08 | End: 2018-04-12 | Stop reason: HOSPADM

## 2018-04-08 RX ADMIN — ACETAMINOPHEN 650 MG: 325 TABLET ORAL at 09:45

## 2018-04-08 RX ADMIN — Medication 250 MG: at 21:12

## 2018-04-08 RX ADMIN — ASPIRIN 81 MG 81 MG: 81 TABLET ORAL at 09:40

## 2018-04-08 RX ADMIN — CARVEDILOL 6.25 MG: 6.25 TABLET, FILM COATED ORAL at 21:12

## 2018-04-08 RX ADMIN — PANTOPRAZOLE SODIUM 40 MG: 40 TABLET, DELAYED RELEASE ORAL at 06:04

## 2018-04-08 RX ADMIN — Medication 5 MG: at 21:12

## 2018-04-08 RX ADMIN — Medication 250 MG: at 09:40

## 2018-04-08 RX ADMIN — ACETAMINOPHEN 650 MG: 325 TABLET ORAL at 21:12

## 2018-04-08 NOTE — PLAN OF CARE
Problem: Patient Care Overview  Goal: Plan of Care Review  Outcome: Ongoing (interventions implemented as appropriate)   04/07/18 2000   Plan of Care Review   Progress improving

## 2018-04-08 NOTE — THERAPY TREATMENT NOTE
Acute Care - Physical Therapy Treatment Note  [facility]     Patient Name: Debbie Baum  : 1923  MRN: [id number]  Today's Date: 2018  Onset of Illness/Injury or Date of Surgery: 18  Date of Referral to PT: 18  Referring Physician: Erlin    Admit Date: 2018    Visit Dx:    ICD-10-CM ICD-9-CM   1. Hyponatremia E87.1 276.1   2. Generalized weakness R53.1 780.79   3. Urinary tract infection without hematuria, site unspecified N39.0 599.0   4. Impaired functional mobility, balance, gait, and endurance Z74.09 V49.89   5. Impaired mobility and ADLs Z74.09 799.89     Patient Active Problem List   Diagnosis   • Hyponatremia   • Essential hypertension   • Coronary artery disease   • Weakness generalized   • GERD (gastroesophageal reflux disease)   • Atrial fibrillation       Therapy Treatment    Therapy Treatment / Health Promotion    Treatment Time/Intention  Discipline: physical therapist (18 1408 : Carmel Corona, HAYDE)  Document Type: therapy note (daily note) (18 Clarence : Carmel Corona, HAYDE)  Subjective Information: complains of, fatigue, weakness (NSG REQUESTED BACK TO BED AT END OF RX TO IN/OUT CATH. ) (18 140 : Carmel Corona PT)  Mode of Treatment: physical therapy (18 140 : Carmel Corona PT)  Patient/Family Observations: PT SITTING UIC UPON ARRIVAL ON RA AND WITH SON PRESENT.  (18 : Carmel Corona PT)  Care Plan Review: patient/other agree to care plan (18 Sridhar : Carmel Corona PT)  Care Plan Review, Other Participant(s): son (18 140 : Carmel Corona PT)  Therapy Frequency (PT Clinical Impression): daily (18 Sridhar : Carmel Corona PT)  Patient Effort: excellent (18 : Carmel Corona PT)  Comment: ANTALGIC GAIT AT BASELINE DUE TO ARTHRITIS L KNEE.  (18 : Carmel Corona PT)  Treatment Considerations/Comments: PERSONAL SWIVEL R WALKER IN ROOM IS TOO TALL AND NOT SAFE. WILL NEED NEW REG R WX AT PROPER HEIGHT.  (18  1408 : Carmel Corona PT)  Plan of Care Review  Plan of Care Reviewed With: patient (04/08/18 1519 : Carmel Corona PT)    Vitals/Pain/Safety  Vital Signs  Pre Systolic BP Rehab:  (VSS. NSG CLEARED FOR TREATMENT. ) (04/08/18 1408 : Carmel Corona PT)  Pain Assessment  Additional Documentation: Pain Scale: FACES Pre/Post-Treatment (Group) (04/08/18 1408 : Carmel Corona PT)  Pain Scale: Numbers Pre/Post-Treatment  Pain Scale: Numbers, Pretreatment: 0/10 - no pain (04/08/18 1408 : Carmel Corona PT)  Pain Scale: Numbers, Post-Treatment: 0/10 - no pain (INTRA 4/10 ) (04/08/18 1408 : Carmel Corona PT)  Pain Location - Side: Left (04/08/18 1408 : Carmel Corona PT)  Pain Location: knee (04/08/18 1408 : Carmel Corona PT)  Pain Intervention(s): Repositioned, Ambulation/increased activity (04/08/18 1408 : Carmel Corona PT)  Pain Scale: Word Pre/Post-Treatment  Pain Location - Side: Left (04/08/18 1408 : Carmel Corona PT)  Pain Location: knee (04/08/18 1408 : Carmel Corona PT)  Pain Intervention(s): Repositioned, Ambulation/increased activity (04/08/18 1408 : Carmel Corona PT)  Pain Scale: FACES Pre/Post-Treatment  Pain Location - Side: Left (04/08/18 1408 : Carmel Corona PT)  Pain Location: knee (04/08/18 1408 : Carmel Corona PT)  Pain Intervention(s): Repositioned, Ambulation/increased activity (04/08/18 1408 : Carmel Corona PT)  Positioning and Restraints  Pre-Treatment Position: sitting in chair/recliner (04/08/18 1408 : Carmel Corona PT)  Post Treatment Position: bed (04/08/18 1408 : Carmel Corona PT)  In Bed: supine, call light within reach, encouraged to call for assist, exit alarm on, with nsg (NSG IN TO CATH. ) (04/08/18 1408 : Carmel Corona, PT)    Mobility,ADL,Motor, Modality  Bed Mobility Assessment/Treatment  Bed Mobility Assessment/Treatment: sit-supine (04/08/18 1408 : Carmel Corona, PT)  Sit-Supine [place] (Bed Mobility): maximum assist (25% patient effort), 2 person assist (04/08/18 1408 :  Carmel Corona, PT)  Bed Mobility, Safety Issues: decreased use of arms for pushing/pulling, decreased use of legs for bridging/pushing (04/08/18 1408 : Carmel Corona PT)  Assistive Device (Bed Mobility): draw sheet (04/08/18 1408 : Carmel Corona PT)  Transfer Assessment/Treatment  Transfer Assessment/Treatment: sit-stand transfer, stand-sit transfer (04/08/18 1408 : Carmel Corona PT)  Comment (Transfers): CUES FOR SAFETY AND HAND PLACEMENT.  (04/08/18 1408 : Carmel Corona PT)  Sit-Stand Transfer  Sit-Stand [place] (Transfers): moderate assist (50% patient effort), verbal cues (04/08/18 1408 : Carmel Corona PT)  Assistive Device (Sit-Stand Transfers): walker, front-wheeled (04/08/18 1408 : Carmel Corona PT)  Stand-Sit Transfer  Stand-Sit [place] (Transfers): minimum assist (75% patient effort) (04/08/18 1408 : Carmel Corona PT)  Assistive Device (Stand-Sit Transfers): walker, 4-wheeled (04/08/18 1408 : Carmel Corona PT)  Gait/Stairs Assessment/Training  Gait/Stairs Assessment/Training: gait/ambulation independence (04/08/18 1408 : Carmel Corona PT)  [place] Level (Gait): minimum assist (75% patient effort) (04/08/18 1408 : Carmel Corona PT)  Assistive Device (Gait): walker, front-wheeled (04/08/18 1408 : Carmel Corona PT)  Distance in Feet (Gait): 30 (FOLLOWED WITH RECLINER FOR SAFETY. ) (04/08/18 1408 : Carmel Corona PT)  Pattern (Gait): step-to (04/08/18 1408 : Carmel Corona PT)  Deviations/Abnormal Patterns (Gait): antalgic, stride length decreased (DECREASED STANCE TIME ON L DUE TO PAINFUL ARTHRITIS/WEAKNESS) (04/08/18 1408 : Carmel Corona PT)  Comment (Gait/Stairs): CUES FOR UPRIGHT POSTURE AND FOWARD GAZE. PT TEND TO LOOK AT FLOOR.  (04/08/18 1408 : Carmel Corona, PT)     Motor Skills Assessment/Interventions  Additional Documentation: Therapeutic Exercise Interventions (Group) (04/08/18 1408 : Carmel Corona, PT)  Lower Extremity Seated Therapeutic Exercise  Performed, Seated Lower  Extremity (Therapeutic Exercise): hip flexion/extension, LAQ (long arc quad), knee extension (AP'S ) (04/08/18 1408 : Carmel Corona PT)  Exercise Type, Seated Lower Extremity (Therapeutic Exercise): AROM (active range of motion) (04/08/18 1408 : Carmel Corona PT)  Sets/Reps Detail, Seated Lower Extremity (Therapeutic Exercise): 1 SET OF 10 REPS EACH.  (04/08/18 1408 : Carmel Corona PT)  Balance  Balance: static sitting balance, static standing balance (04/08/18 1408 : Carmel Corona PT)  Static Sitting Balance  Level of [place] (Unsupported Sitting, Static Balance): supervision (04/08/18 1408 : Carmel Corona PT)  Sitting Position (Unsupported Sitting, Static Balance): sitting in chair (04/08/18 1408 : Carmel Corona PT)  Time Able to Maintain Position (Unsupported Sitting, Static Balance): 4 to 5 minutes (04/08/18 1408 : Carmel Corona PT)  Static Standing Balance  Level of [place] (Supported Standing, Static Balance): contact guard assist (04/08/18 1408 : Carmel Corona PT)  Time Able to Maintain Position (Supported Standing, Static Balance): 1 to 2 minutes (04/08/18 1408 : Carmel Corona PT)  Assistive Device Utilized (Supported Standing, Static Balance): rolling walker (04/08/18 1408 : Carmel Corona PT)  Comment (Supported Standing, Static Balance): CUES FOR UPRIGHT POSTURE.  (04/08/18 1408 : Carmel Corona PT)  Level of [place] (Unsupported Standing, Static Balance): minimal assist, 75% patient effort (04/08/18 1408 : Carmel Corona PT)  Time Able to Maintain Position (Unsupported Standing, Static Balance): 1 to 2 minutes (04/08/18 1408 : Carmel Corona PT)        ROM/MMT             Sensory, Edema, Orthotics          Cognition, Communication, Swallow  Cognitive Assessment/Intervention- PT/OT  Affect/Mental Status (Cognitive): WFL (04/08/18 1408 : Carmel A Duby, PT)  Orientation Status (Cognition): oriented x 4 (04/08/18 1408 : Carmel Corona, PT)  Follows Commands (Cognition): over 90%  accuracy, follows one step commands (04/08/18 1408 : Carmel Corona, PT)    Outcome Summary  Outcome Summary/Treatment Plan (PT)  Daily Summary of Progress (PT): progress toward functional goals is good (04/08/18 1408 : Carmel Corona, PT)            PT Rehab Goals     Row Name 04/05/18 1340             Bed Mobility Goal 1 (PT)    Activity/Assistive Device (Bed Mobility Goal 1, PT) sit to supine/supine to sit  -DENICE      [place] Level/Cues Needed (Bed Mobility Goal 1, PT) independent  -DENICE      Time Frame (Bed Mobility Goal 1, PT) 10 days  -DENICE      Progress/Outcomes (Bed Mobility Goal 1, PT) goal ongoing  -DENICE         Transfer Goal 1 (PT)    Activity/Assistive Device (Transfer Goal 1, PT) sit-to-stand/stand-to-sit  -DENICE      [place] Level/Cues Needed (Transfer Goal 1, PT) independent  -DENICE      Time Frame (Transfer Goal 1, PT) 10 days  -DENICE      Progress/Outcome (Transfer Goal 1, PT) goal ongoing  -DENICE         Gait Training Goal 1 (PT)    Activity/Assistive Device (Gait Training Goal 1, PT) gait (walking locomotion)  -DENICE      [place] Level (Gait Training Goal 1, PT) supervision required  -DENICE      Distance (Gait Goal 1, PT) 150  -DENICE      Time Frame (Gait Training Goal 1, PT) 10 days  -DENICE      Progress/Outcome (Gait Training Goal 1, PT) goal ongoing  -DENICE         Patient Education Goal (PT)    Activity (Patient Education Goal, PT) verbalizes HEP  -DENICE      [place]/Cues/Accuracy (Memory Goal 2, PT) verbalizes understanding  -DENICE      Time Frame (Patient Education Goal, PT) 10 days  -DENICE      Progress/Outcome (Patient Education Goal, PT) goal ongoing  -DENICE        User Key  (r) = Recorded By, (t) = Taken By, (c) = Cosigned By    Initials Name Provider Type    DENICE Deanna Summers, PT Physical Therapist          Physical Therapy Education     Title: PT OT SLP Therapies (Done)     Topic: Physical Therapy (Done)     Point: Mobility training (Done)    Learning Progress Summary     Learner Status Readiness Method  Response Comment Documented by    Patient Done Helena RESENDEZ,NR SAFETY WITH MOBILITY, THER EX, BENEFITS OF OOB ACTIVITY, CD 04/08/18 1519     Active Acceptance E NR  DENICE 04/05/18 1519          Point: Home exercise program (Done)    Learning Progress Summary     Learner Status Readiness Method Response Comment Documented by    Patient Done Helena RESENDEZ,NR SAFETY WITH MOBILITY, THER EX, BENEFITS OF OOB ACTIVITY,  04/08/18 1519     Active Acceptance E NR   04/05/18 1519          Point: Body mechanics (Done)    Learning Progress Summary     Learner Status Readiness Method Response Comment Documented by    Patient Done Helena RESENDEZ,NR SAFETY WITH MOBILITY, THER EX, BENEFITS OF OOB ACTIVITY, CD 04/08/18 1519     Active Acceptance E NR  DENICE 04/05/18 1519          Point: Precautions (Done)    Learning Progress Summary     Learner Status Readiness Method Response Comment Documented by    Patient Done Helena RESENDEZ,NR SAFETY WITH MOBILITY, THER EX, BENEFITS OF OOB ACTIVITY,  04/08/18 1519     Active Acceptance E NR   04/05/18 1519                      User Key     Initials Effective Dates Name Provider Type Discipline     06/19/15 -  Deanna Summers, PT Physical Therapist PT     06/19/15 -  Carmel Corona, PT Physical Therapist PT                    PT Recommendation and Plan  Therapy Frequency (PT Clinical Impression): daily  Outcome Summary/Treatment Plan (PT)  Daily Summary of Progress (PT): progress toward functional goals is good  Plan of Care Reviewed With: patient  Progress: improving  Outcome Summary: INCREASED DISTANCE AMBULATED. PT MOTIVATED WORK WITH THERAPY. AMBULATED 30 FEET WITH R WALKER AND MIN ASSIST.           Outcome Measures     Row Name 04/08/18 1408 04/06/18 1500          How much help from another person do you currently need...    Turning from your back to your side while in flat bed without using bedrails? 3  -CD  --     Moving from lying on back to sitting on the side of a flat bed without bedrails?  2  -CD  --     Moving to and from a bed to a chair (including a wheelchair)? 2  -CD  --     Standing up from a chair using your arms (e.g., wheelchair, bedside chair)? 2  -CD  --     Climbing 3-5 steps with a railing? 2  -CD  --     To walk in hospital room? 2  -CD  --     AM-PAC 6 Clicks Score 13  -CD  --        How much help from another is currently needed...    Putting on and taking off regular lower body clothing?  -- 2  -TB     Bathing (including washing, rinsing, and drying)  -- 2  -TB     Toileting (which includes using toilet bed pan or urinal)  -- 2  -TB     Putting on and taking off regular upper body clothing  -- 2  -TB     Taking care of personal grooming (such as brushing teeth)  -- 3  -TB     Eating meals  -- 3  -TB     Score  -- 14  -TB        Functional Assessment    Outcome Measure Options AM-PAC 6 Clicks Basic Mobility (PT)  -CD AM-PAC 6 Clicks Daily Activity (OT)  -TB       User Key  (r) = Recorded By, (t) = Taken By, (c) = Cosigned By    Initials Name Provider Type    TB Jocelyn Bach, OT Occupational Therapist    CD Carmel Corona PT Physical Therapist           Time Calculation:         PT Charges     Row Name 04/08/18 1523             Time Calculation    Start Time 1408  -CD      PT Received On 04/08/18  -      PT Goal Re-Cert Due Date 04/15/18  -CD         Time Calculation- PT    Total Timed Code Minutes- PT 40 minute(s)  -CD        User Key  (r) = Recorded By, (t) = Taken By, (c) = Cosigned By    Initials Name Provider Type    CD Carmel Corona PT Physical Therapist          Therapy Charges for Today     Code Description Service Date Service Provider Modifiers Qty    16818122140 HC GAIT TRAINING EA 15 MIN 4/8/2018 Carmel Corona, PT GP 1    77213667743 HC PT THER PROC EA 15 MIN 4/8/2018 Carmel Corona, PT GP 2    05097984978 HC PT THER SUPP EA 15 MIN 4/8/2018 Carmel Corona, PT GP 3          PT G-Codes  Outcome Measure Options: AM-PAC 6 Clicks Basic Mobility (PT)    Carmel Corona  PT  4/8/2018

## 2018-04-08 NOTE — PLAN OF CARE
Problem: Patient Care Overview  Goal: Plan of Care Review  Outcome: Ongoing (interventions implemented as appropriate)   04/08/18 1516   OTHER   Outcome Summary INCREASED DISTANCE AMBULATED. PT MOTIVATED WORK WITH THERAPY. AMBULATED 30 FEET WITH R WALKER AND MIN ASSIST.    Coping/Psychosocial   Plan of Care Reviewed With patient   Plan of Care Review   Progress improving

## 2018-04-08 NOTE — PROGRESS NOTES
[facility] Medicine Services  PROGRESS NOTE    Patient Name: Debbie Baum  : 1923  MRN: [id number]    Date of Admission: 2018  Length of Stay: 4  Primary Care Physician: Paula Scott MD    Subjective   Subjective     CC: F/U generalized weakness    HPI:  Diarrhea better.  Now having some urinary retention issues and required in and out catheterization overnight.    Review of Systems  Gen- No fevers, chills  CV- No chest pain, palpitations  Resp- No cough, dyspnea  GI- No further diarrhea, no abd pain    Otherwise ROS is negative except as mentioned in the HPI.    Objective   Objective     Vital Signs:   Temp:  [97.6 °F (36.4 °C)-98.3 °F (36.8 °C)] 97.6 °F (36.4 °C)  Heart Rate:  [55-77] 73  Resp:  [18-20] 18  BP: (113-162)/(60-69) 162/69        Physical Exam:  Constitutional: No acute distress, awake, alert, sitting up in chair  HENT: NCAT, mucous membranes moist  Respiratory: Clear to auscultation bilaterally, respiratory effort normal   Cardiovascular: RRR, no murmurs, rubs, or gallops  Gastrointestinal: Positive bowel sounds, soft, nontender, nondistended  Musculoskeletal: No bilateral ankle edema  Psychiatric: Appropriate affect, cooperative  Neurologic: Cranial Nerves grossly intact to confrontation, speech clear  Skin: No rashes    Results Reviewed:  I have personally reviewed current lab, radiology, and data and agree.      Results from last 7 days  Lab Units 18  0847 188 18  0537   WBC 10*3/mm3 9.06 11.94* 9.24   HEMOGLOBIN g/dL 10.6* 11.7 10.4*   HEMATOCRIT % 33.3* 36.7 31.6*   PLATELETS 10*3/mm3 373 442 400       Results from last 7 days  Lab Units 18  0847 18  0438 18  0443  18  1321   SODIUM mmol/L 136 133 131*  < > 121*   POTASSIUM mmol/L 3.9 4.1 4.5  < > 3.9   CHLORIDE mmol/L 104 103 97*  < > 86*   CO2 mmol/L 27.0 26.0 22.0  < > 25.0   BUN mg/dL 9 7* 6*  < > 6*   CREATININE mg/dL 0.60 0.50* 0.50*  < > 0.60    GLUCOSE mg/dL 157* 91 93  < > 107*   CALCIUM mg/dL 9.1 9.6 9.1  < > 9.2   ALT (SGPT) U/L  --   --   --   --  31   AST (SGOT) U/L  --   --   --   --  33   < > = values in this interval not displayed.  Estimated Creatinine Clearance: 23.1 mL/min (by C-G formula based on SCr of 0.6 mg/dL).  No results found for: BNP  No results found for: PHART    Microbiology Results Abnormal     None          Imaging Results (last 24 hours)     ** No results found for the last 24 hours. **             I have reviewed the medications.    Assessment/Plan   Assessment / Plan     Hospital Problem List     * (Principal)Hyponatremia    Essential hypertension    Coronary artery disease    Weakness generalized    GERD (gastroesophageal reflux disease)    Atrial fibrillation             Brief Hospital Course to date:  Debbie Baum is a 89 y.o. female with recently diagnosed Influenza B in [facility] in 03/2018, A. Fib/flutter s/p ablation about 2 yrs ago, recent hx of pancreatitis presented with worsening generalized weakness. She was seen at [facility] ED on 4/1 and noted to be mildly hyponatremic, urine culture obtained grew Proteus. Bactrim was called in. She has taken a couple of doses since. Also saw PCP who stopped her HCTZ for hyponatremia and started her on lasix for LE edema.       Assessment & Plan:    Hyponatremia  -Resolved  -Possibly SIADH due to SSRI  -Low urine osmolality and inappropriately high urine sodium on admission, however urine studies less reliable due to diuretic use  -TSH WNL, am cortisol level normal  -Continue to hold HCTZ, hold lasix  -D/C lexapro  -Continue fluid restriction 1000mL    Urinary retention  -Likely due to PRN hydroxyzine which was added on admission  -D/C hydroxyzine  -Continue bladder scans and I/O cath PRN >350cc in bladder.  Hopefully this issues will resolve in the next 24 hours.  Last dose of hydroxyzine was yesterday evening.    Diarrhea  -Resolved  -Continue probiotic    Asymptomatic  bacteruria  -Initial UA this admission not suggestive of UTI and she does not have dysuria.  UA collected in ED last week had multiple squamous epithelial cells suggesting contamination.  -Antibiotics discontinued    Generalized weakness  -Likely due to recent illness and hyponatremia  -PT/OT following and case management working on rehab    Left shoulder pain  -Reviewed records from [facility] which show arthritic changes on X-ray  -ESR normal    GERD  -Continue PPI    DVT Prophylaxis: Mechanical-had significant local reaction to lovenox injection per nursing.      CODE STATUS: Conditional Code    Disposition: I expect the patient to be discharged to rehab. She states her first choice is [facility], but initially case management was looking at [facility].      Electronically signed by Milena Kern MD, 04/08/18, 2:28 PM.

## 2018-04-09 LAB
ANION GAP SERPL CALCULATED.3IONS-SCNC: 7 MMOL/L (ref 3–11)
BUN BLD-MCNC: 10 MG/DL (ref 9–23)
BUN/CREAT SERPL: 20 (ref 7–25)
CALCIUM SPEC-SCNC: 9.4 MG/DL (ref 8.7–10.4)
CHLORIDE SERPL-SCNC: 103 MMOL/L (ref 99–109)
CO2 SERPL-SCNC: 26 MMOL/L (ref 20–31)
CREAT BLD-MCNC: 0.5 MG/DL (ref 0.6–1.3)
GFR SERPL CREATININE-BSD FRML MDRD: 115 ML/MIN/1.73
GLUCOSE BLD-MCNC: 86 MG/DL (ref 70–100)
POTASSIUM BLD-SCNC: 3.7 MMOL/L (ref 3.5–5.5)
SODIUM BLD-SCNC: 136 MMOL/L (ref 132–146)

## 2018-04-09 PROCEDURE — 80048 BASIC METABOLIC PNL TOTAL CA: CPT | Performed by: INTERNAL MEDICINE

## 2018-04-09 PROCEDURE — 97530 THERAPEUTIC ACTIVITIES: CPT

## 2018-04-09 PROCEDURE — 99232 SBSQ HOSP IP/OBS MODERATE 35: CPT | Performed by: INTERNAL MEDICINE

## 2018-04-09 RX ORDER — CEFTRIAXONE SODIUM 1 G/50ML
1 INJECTION, SOLUTION INTRAVENOUS EVERY 24 HOURS
Status: DISCONTINUED | OUTPATIENT
Start: 2018-04-09 | End: 2018-04-09

## 2018-04-09 RX ORDER — TAMSULOSIN HYDROCHLORIDE 0.4 MG/1
0.4 CAPSULE ORAL DAILY
Status: DISCONTINUED | OUTPATIENT
Start: 2018-04-09 | End: 2018-04-12 | Stop reason: HOSPADM

## 2018-04-09 RX ADMIN — HYDROCODONE BITARTRATE AND ACETAMINOPHEN 1 TABLET: 5; 325 TABLET ORAL at 20:24

## 2018-04-09 RX ADMIN — ASPIRIN 81 MG 81 MG: 81 TABLET ORAL at 09:02

## 2018-04-09 RX ADMIN — PANTOPRAZOLE SODIUM 40 MG: 40 TABLET, DELAYED RELEASE ORAL at 05:59

## 2018-04-09 RX ADMIN — CARVEDILOL 6.25 MG: 6.25 TABLET, FILM COATED ORAL at 20:25

## 2018-04-09 RX ADMIN — Medication 5 MG: at 20:23

## 2018-04-09 RX ADMIN — Medication 250 MG: at 20:24

## 2018-04-09 RX ADMIN — Medication 250 MG: at 09:02

## 2018-04-09 RX ADMIN — TAMSULOSIN HYDROCHLORIDE 0.4 MG: 0.4 CAPSULE ORAL at 14:05

## 2018-04-09 NOTE — PLAN OF CARE
Problem: Patient Care Overview  Goal: Plan of Care Review  Outcome: Ongoing (interventions implemented as appropriate)   04/08/18 1519 04/08/18 2112 04/09/18 0517   OTHER   Outcome Summary --  --  Patient rested on and off during night. Patient did report pain once during night and recieved PRN tylenol as requested. Patient is alert and oriented. Vital signs remain stable. Will continue to monitor.    Coping/Psychosocial   Plan of Care Reviewed With --  patient --    Plan of Care Review   Progress improving --  --      Goal: Individualization and Mutuality  Outcome: Ongoing (interventions implemented as appropriate)    Goal: Discharge Needs Assessment  Outcome: Ongoing (interventions implemented as appropriate)      Problem: Fall Risk (Adult)  Goal: Identify Related Risk Factors and Signs and Symptoms  Outcome: Ongoing (interventions implemented as appropriate)    Goal: Absence of Fall  Outcome: Ongoing (interventions implemented as appropriate)      Problem: Skin Injury Risk (Adult)  Goal: Identify Related Risk Factors and Signs and Symptoms  Outcome: Ongoing (interventions implemented as appropriate)    Goal: Skin Health and Integrity  Outcome: Ongoing (interventions implemented as appropriate)

## 2018-04-09 NOTE — PROGRESS NOTES
Continued Stay Note  [facility]     Patient Name: Debbie Baum  MRN: [id number]  Today's Date: 4/9/2018    Admit Date: 4/4/2018          Discharge Plan     Row Name 04/09/18 1551       Plan    Plan [facility]    Patient/Family in Agreement with Plan yes    Plan Comments Spoke to Malena at [facility] and they can offer pt a skilled bed Tuesday 4/10 if medically ready. Spoke to pt and dtr Kecia and they are agreeable. Pt's daughter Kecia can transport. HM notified via email. Nurse to call report to [phone] and fax summary to [phone]. CM will cont to follow.     Final Discharge Disposition Code 03 - skilled nursing facility (SNF)              Discharge Codes    No documentation.       Expected Discharge Date and Time     Expected Discharge Date Expected Discharge Time    Apr 7, 2018             Cocnha Hernandes

## 2018-04-09 NOTE — PLAN OF CARE
Problem: Patient Care Overview  Goal: Plan of Care Review  Outcome: Ongoing (interventions implemented as appropriate)   04/09/18 0065   OTHER   Outcome Summary Pt completed transfer chair <>BSC with min A and VC's for HP and sequencing. Pt CGA for sit to stand practice and required mod VC's decreasing to min VC's for safety awareness and body positioning for transfers. Recom cont IPOT per POC   Coping/Psychosocial   Plan of Care Reviewed With patient   Plan of Care Review   Progress improving

## 2018-04-09 NOTE — PROGRESS NOTES
Continued Stay Note  [facility]     Patient Name: Debbie Baum  MRN: [id number]  Today's Date: 4/9/2018    Admit Date: 4/4/2018          Discharge Plan     Row Name 04/09/18 1156       Plan    Plan Rehab    Patient/Family in Agreement with Plan yes    Plan Comments Spoke to pt and Kecia, pt's daughter via phone. The pt would like to go [facility] first and then [facility]. [facility] is 3rd choice. Called referral to Viry at [facility]. Spoke to Malena at the [facility] and she is reviewing referal. Will make referral to [facility] if no beds available at [facility] or [facility]. CM will cont to follow.               Discharge Codes    No documentation.       Expected Discharge Date and Time     Expected Discharge Date Expected Discharge Time    Apr 7, 2018             Concha Hernandes

## 2018-04-09 NOTE — THERAPY TREATMENT NOTE
Acute Care - Occupational Therapy Treatment Note  [facility]     Patient Name: Debbie Baum  : 1923  MRN: [id number]  Today's Date: 2018  Onset of Illness/Injury or Date of Surgery: 18  Date of Referral to OT: 18  Referring Physician: Erlin    Admit Date: 2018       ICD-10-CM ICD-9-CM   1. Hyponatremia E87.1 276.1   2. Generalized weakness R53.1 780.79   3. Urinary tract infection without hematuria, site unspecified N39.0 599.0   4. Impaired functional mobility, balance, gait, and endurance Z74.09 V49.89   5. Impaired mobility and ADLs Z74.09 799.89     Patient Active Problem List   Diagnosis   • Hyponatremia   • Essential hypertension   • Coronary artery disease   • Weakness generalized   • GERD (gastroesophageal reflux disease)   • Atrial fibrillation     Past Medical History:   Diagnosis Date   • Cancer     colon   • Coronary artery disease    • GERD (gastroesophageal reflux disease)    • H/O cardiac radiofrequency ablation      Past Surgical History:   Procedure Laterality Date   • CARDIAC ABLATION     • CHOLECYSTECTOMY     • COLON SURGERY      BOWEL RESECTION FOR HX COLON CANCER   • HYSTERECTOMY     • REPLACEMENT TOTAL KNEE         Therapy Treatment    Therapy Treatment / Health Promotion    Treatment Time/Intention  Discipline: occupational therapist (18 : Honey Maria OT)  Document Type: therapy note (daily note) (18 : Honey Maria OT)  Subjective Information: complains of, weakness, fatigue (18 : Honey Maria OT)  Mode of Treatment: occupational therapy (18 : Honey Maria OT)  Patient/Family Observations: UIC on RA, RN present (18 : Honey Maria OT)  Care Plan Review: risks/benefits reviewed, current/potential barriers reviewed, care plan/treatment goals reviewed, patient/other agree to care plan (18 : Honey Maria OT)  Patient Effort: excellent (18 : Honey BETANCOURT  Candace OT)  Comment: Pt emotional and tearful due to recent limitations (04/09/18 0900 : Honey Maria OT)  Existing Precautions/Restrictions: fall (04/09/18 0900 : Honey Maria OT)  Plan of Care Review  Plan of Care Reviewed With: patient (04/09/18 0941 : Honey Maria OT)    Vitals/Pain/Safety  Vital Signs  Pre Systolic BP Rehab: 106 (04/09/18 0900 : Honey Maria OT)  Pre Treatment Diastolic BP: 46 (04/09/18 0900 : Honey Maria OT)  Pretreatment Heart Rate (beats/min): 75 (04/09/18 0900 : Honey Maria OT)  Pre SpO2 (%): 92 (04/09/18 0900 : Honey Maria OT)  O2 Delivery Pre Treatment: room air (04/09/18 0900 : Honey Maria OT)  Intra SpO2 (%): 95 (04/09/18 0900 : Honey Maria OT)  O2 Delivery Intra Treatment: room air (04/09/18 0900 : Honey Maria OT)  Post SpO2 (%): 92 (04/09/18 0900 : Honey Maria OT)  O2 Delivery Post Treatment: room air (04/09/18 0900 : Honey Maria OT)  Pre Patient Position: Sitting (04/09/18 0900 : Honey Maria OT)  Intra Patient Position: Standing (04/09/18 0900 : Honey Maria OT)  Post Patient Position: Sitting (04/09/18 0900 : Honey Maria OT)  Pain Assessment  Additional Documentation: Pain Scale: Numbers Pre/Post-Treatment (Group) (04/09/18 0900 : Honey Maria OT)  Pain Scale: Numbers Pre/Post-Treatment  Pain Scale: Numbers, Pretreatment: 0/10 - no pain (04/09/18 0900 : Honey Maria OT)  Pain Scale: Numbers, Post-Treatment: 0/10 - no pain (04/09/18 0900 : Honey Maria OT)  Pain Intervention(s): Repositioned, Ambulation/increased activity (04/09/18 0900 : Honey Maria OT)  Pain Scale: Word Pre/Post-Treatment  Pain Intervention(s): Repositioned, Ambulation/increased activity (04/09/18 0900 : Honey Maria OT)  Pain Scale: FACES Pre/Post-Treatment  Pain Intervention(s): Repositioned, Ambulation/increased activity (04/09/18 0900 : Honey Maria OT)  Safety Issues,  Functional Mobility  Safety Issues Affecting Function (Mobility): safety precaution awareness, insight into deficits/self awareness (04/09/18 0900 : Honey Maria OT)  Impairments Affecting Function (Mobility): balance, endurance/activity tolerance, strength (04/09/18 0900 : Honey Maria OT)  Positioning and Restraints  Pre-Treatment Position: sitting in chair/recliner (04/09/18 0900 : Honey Maria OT)  Post Treatment Position: chair (04/09/18 0900 : Honey Maria OT)  In Chair: with nsg, notified nsg, sitting, call light within reach, encouraged to call for assist, exit alarm on, LUE elevated (04/09/18 0900 : Honey Maria OT)    Mobility,ADL,Motor, Modality  Bed Mobility Assessment/Treatment  Bed Mobility Assessment/Treatment:  ([facility]) (04/09/18 0900 : Honey Maria OT)  Transfer Assessment/Treatment  Transfer Assessment/Treatment: sit-stand transfer, stand-sit transfer, toilet transfer (04/09/18 0900 : Honey Maria OT)  Comment (Transfers): VC's HP and sequencing for safety (04/09/18 0900 : Honey Maria OT)  Sit-Stand Transfer  Sit-Stand Breaks (Transfers): contact guard, verbal cues (04/09/18 0900 : Honey Maria OT)  Assistive Device (Sit-Stand Transfers): walker, front-wheeled (04/09/18 0900 : Honey Maria OT)  Stand-Sit Transfer  Stand-Sit Breaks (Transfers): contact guard, verbal cues (04/09/18 0900 : Honey Maria OT)  Assistive Device (Stand-Sit Transfers): walker, front-wheeled (04/09/18 0900 : Honey Maria OT)  Toilet Transfer  Type (Toilet Transfer): stand pivot/stand step (04/09/18 0900 : Honey Maria OT)  Breaks Level (Toilet Transfer): minimum assist (75% patient effort), verbal cues (04/09/18 0900 : Honey Maria OT)  Assistive Device (Toilet Transfer): commode, bedside without drop arms (04/09/18 0900 : Honey Maria, OT)  ADL Assessment/Intervention  BADL Assessment/Intervention: upper body dressing,  toileting (04/09/18 0900 : Honey Maria OT)  Upper Body Dressing Assessment/Training  Upper Body Dressing [place] Level: pajama/robe, moderate assist (50% patient effort), verbal cues (04/09/18 0900 : Honey Maria OT)  Assistive Devices (Upper Body Dressing): one hand technique (04/09/18 0900 : Honey Maria OT)  Upper Body Dressing Position: supported sitting (04/09/18 0900 : Honey Maria OT)  Comment (Upper Body Dressing): Education cont, needs reinforcement (04/09/18 0900 : Honey Maria OT)  Toileting Assessment/Training  [place] Level (Toileting): moderate assist (50% patient effort), verbal cues (04/09/18 0900 : Honey Maria OT)  Assistive Devices (Toileting): commode, bedside without drop arms (04/09/18 0900 : Honey Maria OT)  Toileting Position: supported sitting (04/09/18 0900 : Honey Maria OT)  Comment (Toileting): assist clothing mgmt and hygiene (04/09/18 0900 : Honey Maria OT)  BADL Safety/Performance  Impairments, BADL Safety/Performance: balance, strength, range of motion, trunk/postural control (04/09/18 0900 : Honey Maria OT)  Motor Skills Assessment/Interventions  Additional Documentation: Balance (Group), Balance Interventions (Group), Therapeutic Exercise (Group) (04/09/18 0900 : Honey Maria OT)  Therapeutic Exercise  Therapeutic Exercise: seated, upper extremities (04/09/18 0900 : Honey Maria OT)  Additional Documentation: Therapeutic Exercise (Row) (04/09/18 0900 : Honey Maria OT)  Upper Extremity Seated Therapeutic Exercise  Performed, Seated Upper Extremity (Therapeutic Exercise): shoulder flexion/extension (04/09/18 0900 : Honey Maria OT)  Exercise Type, Seated Upper Extremity (Therapeutic Exercise): AROM (active range of motion), AAROM (active assistive range of motion) (04/09/18 0900 : Honey M Fister, OT)  Expected Outcomes, Seated Upper Extremity (Therapeutic Exercise): improve  functional tolerance, self-care activity, improve performance, BADLs, improve performance, reaching above shoulder level, improve performance, reaching for objects (04/09/18 0900 : Honey Maria OT)  Sets/Reps Detail, Seated Upper Extremity (Therapeutic Exercise): 10 (04/09/18 0900 : Honey Maria OT)  Balance  Balance: static sitting balance, dynamic sitting balance, dynamic balance activity, dynamic standing balance (04/09/18 0900 : Honey Maria OT)  Static Sitting Balance  Level of [place] (Unsupported Sitting, Static Balance): supervision (04/09/18 0900 : Honey Maria OT)  Sitting Position (Unsupported Sitting, Static Balance): sitting in chair (04/09/18 0900 : Honey Maria OT)  Dynamic Sitting Balance  Level of [place], Reaches Outside Midline (Sitting, Dynamic Balance): standby assist (04/09/18 0900 : Honey Maria OT)  Sitting Position, Reaches Outside Midline (Sitting, Dynamic Balance): sitting in chair (04/09/18 0900 : Honey Maria OT)  Static Standing Balance  Level of [place] (Supported Standing, Static Balance): contact guard assist (04/09/18 0900 : Honey Maria OT)  Assistive Device Utilized (Supported Standing, Static Balance): rolling walker (04/09/18 0900 : Honey Mraia OT)  Comment (Supported Standing, Static Balance): VC's posture, HP (04/09/18 0900 : Honey Maria OT)  Level of [place] (Unsupported Standing, Static Balance): minimal assist, 75% patient effort (04/09/18 0900 : Honey Maria OT)  Dynamic Standing Balance  Level of [place], Reaches Outside Midline (Standing, Dynamic Balance): minimal assist, 75% patient effort (04/09/18 0900 : Honey Maria OT)  Time Able to Maintain Position, Reaches Outside Midline (Standing, Dynamic Balance): 1 to 2 minutes (04/09/18 0900 : Honey Maria OT)  Level of [place], Resists Mild Perturbations (Standing, Dynamic Balance): contact guard assist (04/09/18  0900 : Honey Maria OT)  Time Able to Maintain Position, Resists Mild Perturbations (Standing, Dynamic Balance): 15 to 30 seconds (04/09/18 0900 : Honey Maria OT)  Dynamic Balance Activity  Therapeutic Training Performed (Dynamic Balance): backward walking, side stepping (04/09/18 0900 : Honey Maria OT)  Support Needed for Balance (Dynamic Balance Training): uses both upper extremities for support (04/09/18 0900 : Honey Maria OT)  Progressive Balance Activity (Dynamic Balance Training): manual resistance applied during activity, upper extremity activities added during activity (04/09/18 0900 : Honey Maria OT)        ROM/MMT             Sensory, Edema, Orthotics          Cognition, Communication, Swallow  Cognitive Assessment/Intervention  Additional Documentation: Cognitive Assessment/Intervention (Group) (04/09/18 0900 : Honey Maria OT)  Cognitive Assessment/Intervention- PT/OT  Affect/Mental Status (Cognitive): WFL (04/09/18 0900 : Honey Maria OT)  Behavioral Issues (Cognitive): overwhelmed easily (04/09/18 0900 : Honey Maria OT)  Orientation Status (Cognition): oriented x 4 (04/09/18 0900 : Honey Maria OT)  Follows Commands (Cognition): over 90% accuracy, follows one step commands (04/09/18 0900 : Honey Maria OT)  Cognitive Function (Cognitive): safety deficit (04/09/18 0900 : Honey Maria OT)  Safety Deficit (Cognitive): mild deficit, insight into deficits/self awareness, awareness of need for assistance, safety precautions awareness (04/09/18 0900 : Honey Maria OT)  Personal Safety Interventions: fall prevention program maintained, gait belt, nonskid shoes/slippers when out of bed, muscle strengthening facilitated (04/09/18 0900 : Honey Maria OT)  Speaking Valve  Pretreatment Heart Rate (beats/min): 75 (04/09/18 0900 : Honey Maria OT)  Pre SpO2 (%): 92 (04/09/18 0900 : Honey Maria OT)  Post SpO2 (%): 92  (04/09/18 0900 : Honey Maria OT)  General Eating/Swallowing Observations  Pre SpO2 (%): 92 (04/09/18 0900 : Honey Maria OT)  Post SpO2 (%): 92 (04/09/18 0900 : Honey Maria OT)    Outcome Summary  Outcome Summary/Treatment Plan (OT)  Daily Summary of Progress (OT): progress toward functional goals is good (04/09/18 0900 : Honey Maria OT)  Plan for Continued Treatment (OT): cont IPOT (04/09/18 0900 : Honey Maria OT)        OT Rehab Goals     Row Name 04/06/18 1500             Transfer Goal 1 (OT)    Activity/Assistive Device (Transfer Goal 1, OT) sit-to-stand/stand-to-sit;toilet;commode, bedside without drop arms  -TB      [place] Level/Cues Needed (Transfer Goal 1, OT) minimum assist (75% or more patient effort);verbal cues required  -TB      Time Frame (Transfer Goal 1, OT) by discharge  -TB      Barriers (Transfers Goal 1, OT) balance, pain, strength  -TB         Dressing Goal 1 (OT)    Activity/Assistive Device (Dressing Goal 1, OT) upper body dressing   L UE van-dressing  -TB      [place]/Cues Needed (Dressing Goal 1, OT) moderate assist (50-74% patient effort);verbal cues required  -TB      Time Frame (Dressing Goal 1, OT) by discharge  -TB      Barriers (Dressing Goal 1, OT) pain, strength, ROM  -TB        User Key  (r) = Recorded By, (t) = Taken By, (c) = Cosigned By    Initials Name Provider Type    TB Jocelyn Bach OT Occupational Therapist        Occupational Therapy Education     Title: PT OT SLP Therapies (Active)     Topic: Occupational Therapy (Active)     Point: ADL training (Active)     Description: Instruct learner(s) on proper safety adaptation and remediation techniques during self care or transfers.   Instruct in proper use of assistive devices.   Learning Progress Summary     Learner Status Readiness Method Response Comment Documented by    Patient Active Acceptance E NR Pt education provided on safety during functional transfers and  sequencing to improve safety awareness and body mechanics during ADLs.  04/09/18 0940     Done Acceptance E,D,TB VU,NR Education initiated for benefits of activity/therapy, role of OT, transfer training, L UE van-dressing and recommendation for SNF level rehab at d/c  04/06/18 1558    Family Done Acceptance E,D,TB VU,NR Education initiated for benefits of activity/therapy, role of OT, transfer training, L UE van-dressing and recommendation for SNF level rehab at d/c  04/06/18 1558                      User Key     Initials Effective Dates Name Provider Type Discipline     06/22/15 -  Jocelyn Bach, OT Occupational Therapist OT     04/03/18 -  Honey Maria, OT Occupational Therapist OT                  OT Recommendation and Plan  Outcome Summary/Treatment Plan (OT)  Daily Summary of Progress (OT): progress toward functional goals is good  Plan for Continued Treatment (OT): cont IPOT  Daily Summary of Progress (OT): progress toward functional goals is good  Plan of Care Review  Plan of Care Reviewed With: patient  Plan of Care Reviewed With: patient  Outcome Summary: Pt completed transfer chair <>BSC with min A and VC's for HP and sequencing. Pt CGA for sit to stand practice and required mod VC's decreasing to min VC's for safety awareness and body positioning for transfers. Recom cont IPOT per POC        Outcome Measures     Row Name 04/09/18 0900 04/08/18 1408 04/06/18 1500       How much help from another person do you currently need...    Turning from your back to your side while in flat bed without using bedrails?  -- 3  -CD  --    Moving from lying on back to sitting on the side of a flat bed without bedrails?  -- 2  -CD  --    Moving to and from a bed to a chair (including a wheelchair)?  -- 2  -CD  --    Standing up from a chair using your arms (e.g., wheelchair, bedside chair)?  -- 2  -CD  --    Climbing 3-5 steps with a railing?  -- 2  -CD  --    To walk in hospital room?  -- 2  -CD   --    AM-PAC 6 Clicks Score  -- 13  -CD  --       How much help from another is currently needed...    Putting on and taking off regular lower body clothing? 2  -KF  -- 2  -TB    Bathing (including washing, rinsing, and drying) 2  -KF  -- 2  -TB    Toileting (which includes using toilet bed pan or urinal) 2  -KF  -- 2  -TB    Putting on and taking off regular upper body clothing 2  -KF  -- 2  -TB    Taking care of personal grooming (such as brushing teeth) 3  -KF  -- 3  -TB    Eating meals 3  -KF  -- 3  -TB    Score 14  -KF  -- 14  -TB       Functional Assessment    Outcome Measure Options AM-PAC 6 Clicks Daily Activity (OT)  -KF AM-PAC 6 Clicks Basic Mobility (PT)  -CD AM-PAC 6 Clicks Daily Activity (OT)  -TB      User Key  (r) = Recorded By, (t) = Taken By, (c) = Cosigned By    Initials Name Provider Type    TB Jocelyn Bach, OT Occupational Therapist    KRISTI Corona, PT Physical Therapist    KF Honey Maria, OT Occupational Therapist           Time Calculation:         Time Calculation- OT     Row Name 04/09/18 0943             Time Calculation- OT    OT Start Time 0900  -KF      Total Timed Code Minutes- OT 23 minute(s)  -KF      OT Received On 04/09/18  -      OT Goal Re-Cert Due Date 04/16/18  -        User Key  (r) = Recorded By, (t) = Taken By, (c) = Cosigned By    Initials Name Provider Type     Honey Maria, OT Occupational Therapist           Therapy Charges for Today     Code Description Service Date Service Provider Modifiers Qty    [id number]  OT THERAPEUTIC ACT EA 15 MIN 4/9/2018 Honey Maria OT GO 2               Honey Maria OT  4/9/2018

## 2018-04-10 LAB
ANION GAP SERPL CALCULATED.3IONS-SCNC: 3 MMOL/L (ref 3–11)
BUN BLD-MCNC: 10 MG/DL (ref 9–23)
BUN/CREAT SERPL: 25 (ref 7–25)
CALCIUM SPEC-SCNC: 9.1 MG/DL (ref 8.7–10.4)
CHLORIDE SERPL-SCNC: 105 MMOL/L (ref 99–109)
CO2 SERPL-SCNC: 27 MMOL/L (ref 20–31)
CREAT BLD-MCNC: 0.4 MG/DL (ref 0.6–1.3)
GFR SERPL CREATININE-BSD FRML MDRD: 149 ML/MIN/1.73
GLUCOSE BLD-MCNC: 98 MG/DL (ref 70–100)
MAGNESIUM SERPL-MCNC: 1.6 MG/DL (ref 1.3–2.7)
PHOSPHATE SERPL-MCNC: 3.2 MG/DL (ref 2.4–5.1)
POTASSIUM BLD-SCNC: 3.6 MMOL/L (ref 3.5–5.5)
POTASSIUM BLD-SCNC: 4.5 MMOL/L (ref 3.5–5.5)
SODIUM BLD-SCNC: 135 MMOL/L (ref 132–146)

## 2018-04-10 PROCEDURE — 84132 ASSAY OF SERUM POTASSIUM: CPT | Performed by: INTERNAL MEDICINE

## 2018-04-10 PROCEDURE — 99233 SBSQ HOSP IP/OBS HIGH 50: CPT | Performed by: INTERNAL MEDICINE

## 2018-04-10 PROCEDURE — 25010000002 MAGNESIUM SULFATE 2 GM/50ML SOLUTION: Performed by: NURSE PRACTITIONER

## 2018-04-10 PROCEDURE — 83735 ASSAY OF MAGNESIUM: CPT | Performed by: INTERNAL MEDICINE

## 2018-04-10 PROCEDURE — 97530 THERAPEUTIC ACTIVITIES: CPT

## 2018-04-10 PROCEDURE — 80048 BASIC METABOLIC PNL TOTAL CA: CPT | Performed by: INTERNAL MEDICINE

## 2018-04-10 PROCEDURE — 84100 ASSAY OF PHOSPHORUS: CPT | Performed by: INTERNAL MEDICINE

## 2018-04-10 RX ORDER — IBUPROFEN 400 MG/1
400 TABLET ORAL EVERY 6 HOURS PRN
Status: DISCONTINUED | OUTPATIENT
Start: 2018-04-10 | End: 2018-04-12 | Stop reason: HOSPADM

## 2018-04-10 RX ADMIN — Medication 250 MG: at 21:44

## 2018-04-10 RX ADMIN — MAGNESIUM SULFATE HEPTAHYDRATE 2 G: 40 INJECTION, SOLUTION INTRAVENOUS at 18:04

## 2018-04-10 RX ADMIN — TAMSULOSIN HYDROCHLORIDE 0.4 MG: 0.4 CAPSULE ORAL at 09:01

## 2018-04-10 RX ADMIN — POTASSIUM CHLORIDE 40 MEQ: 750 CAPSULE, EXTENDED RELEASE ORAL at 18:05

## 2018-04-10 RX ADMIN — POTASSIUM CHLORIDE 40 MEQ: 750 CAPSULE, EXTENDED RELEASE ORAL at 11:36

## 2018-04-10 RX ADMIN — CARVEDILOL 6.25 MG: 6.25 TABLET, FILM COATED ORAL at 21:45

## 2018-04-10 RX ADMIN — Medication 5 MG: at 21:44

## 2018-04-10 RX ADMIN — IBUPROFEN 400 MG: 400 TABLET ORAL at 21:50

## 2018-04-10 RX ADMIN — HYDROCODONE BITARTRATE AND ACETAMINOPHEN 1 TABLET: 5; 325 TABLET ORAL at 02:16

## 2018-04-10 RX ADMIN — CARVEDILOL 6.25 MG: 6.25 TABLET, FILM COATED ORAL at 09:00

## 2018-04-10 RX ADMIN — MAGNESIUM SULFATE HEPTAHYDRATE 2 G: 40 INJECTION, SOLUTION INTRAVENOUS at 12:54

## 2018-04-10 RX ADMIN — Medication 250 MG: at 09:01

## 2018-04-10 RX ADMIN — HYDROCODONE BITARTRATE AND ACETAMINOPHEN 1 TABLET: 5; 325 TABLET ORAL at 21:44

## 2018-04-10 RX ADMIN — PANTOPRAZOLE SODIUM 40 MG: 40 TABLET, DELAYED RELEASE ORAL at 06:27

## 2018-04-10 RX ADMIN — ASPIRIN 81 MG 81 MG: 81 TABLET ORAL at 09:01

## 2018-04-10 NOTE — PLAN OF CARE
Problem: Patient Care Overview  Goal: Plan of Care Review  Outcome: Ongoing (interventions implemented as appropriate)   04/10/18 0514   OTHER   Outcome Summary Patient rested on and off during night. Patient is on room air and is alert and oriented. Patient reported pain twice during the night. Patient's vital signs remain stable. Will continue to monitor.    Coping/Psychosocial   Plan of Care Reviewed With patient   Plan of Care Review   Progress improving     Goal: Individualization and Mutuality  Outcome: Ongoing (interventions implemented as appropriate)    Goal: Discharge Needs Assessment  Outcome: Ongoing (interventions implemented as appropriate)      Problem: Fall Risk (Adult)  Goal: Identify Related Risk Factors and Signs and Symptoms  Outcome: Ongoing (interventions implemented as appropriate)    Goal: Absence of Fall  Outcome: Ongoing (interventions implemented as appropriate)      Problem: Skin Injury Risk (Adult)  Goal: Identify Related Risk Factors and Signs and Symptoms  Outcome: Ongoing (interventions implemented as appropriate)    Goal: Skin Health and Integrity  Outcome: Ongoing (interventions implemented as appropriate)

## 2018-04-10 NOTE — PLAN OF CARE
Problem: Patient Care Overview  Goal: Plan of Care Review  Outcome: Ongoing (interventions implemented as appropriate)    Goal: Individualization and Mutuality  Outcome: Ongoing (interventions implemented as appropriate)    Goal: Discharge Needs Assessment  Outcome: Ongoing (interventions implemented as appropriate)    Goal: Interprofessional Rounds/Family Conf  Outcome: Ongoing (interventions implemented as appropriate)      Problem: Fall Risk (Adult)  Goal: Identify Related Risk Factors and Signs and Symptoms  Outcome: Outcome(s) achieved Date Met: 04/10/18    Goal: Absence of Fall  Outcome: Ongoing (interventions implemented as appropriate)      Problem: Skin Injury Risk (Adult)  Goal: Skin Health and Integrity  Outcome: Ongoing (interventions implemented as appropriate)

## 2018-04-10 NOTE — PLAN OF CARE
Problem: Patient Care Overview  Goal: Plan of Care Review  Outcome: Ongoing (interventions implemented as appropriate)   04/10/18 1506   OTHER   Outcome Summary Pt. continues to increase I and safety with mobility. Addressed ex of UE/LEs this date to improve functional strength or ADL's.    Coping/Psychosocial   Plan of Care Reviewed With patient   Plan of Care Review   Progress improving

## 2018-04-10 NOTE — PROGRESS NOTES
Continued Stay Note  [facility]     Patient Name: Debbie Baum  MRN: [id number]  Today's Date: 4/10/2018    Admit Date: 4/4/2018          Discharge Plan     Row Name 04/10/18 0845       Plan    Plan [facility]    Patient/Family in Agreement with Plan yes    Plan Comments Spoke to Dr. Orona and pt is not medically ready today. Talked to Malena at [facility] and pt will have a bed Wednesday 4/11 if medically ready. Notified pt and her daughter and are agreeable. Nurse will call report to [phone] and fax summary to [phone]. CM will cont to follow.               Discharge Codes    No documentation.       Expected Discharge Date and Time     Expected Discharge Date Expected Discharge Time    Apr 10, 2018             Concha Hernandes

## 2018-04-10 NOTE — PROGRESS NOTES
Continued Stay Note  [facility]     Patient Name: Debbie Baum  MRN: [id number]  Today's Date: 4/10/2018    Admit Date: 4/4/2018    Consent obtained for the participation in the [facility] Transitions Program. Emerald Miguel RN        Discharge Plan    No documentation.             Discharge Codes    No documentation.       Expected Discharge Date and Time     Expected Discharge Date Expected Discharge Time    Apr 10, 2018             Emerald Miguel RN

## 2018-04-10 NOTE — PROGRESS NOTES
[facility] Medicine Services  PROGRESS NOTE    Patient Name: Debbie Baum  : 1923  MRN: [id number]    Date of Admission: 2018  Length of Stay: 5  Primary Care Physician: Paula Scott MD    Subjective   Subjective     CC: F/U generalized weakness    Subjective:  Resting in a chair in no acute distress and tells me that she has improved.  Diarrhea has improved markedly.  Patient still is retaining urine and nursing staff needs to in and out catheterized her.     Review of Systems  Gen- No fevers, chills  CV- No chest pain, palpitations  Resp- No cough, dyspnea  GI- No further diarrhea, no abd pain    Otherwise ROS is negative except as mentioned in the HPI.    Objective   Objective     Vital Signs:   Temp:  [98.2 °F (36.8 °C)-98.5 °F (36.9 °C)] 98.4 °F (36.9 °C)  Heart Rate:  [62-72] 62  Resp:  [16] 16  BP: (106-142)/(46-75) 142/75        Physical Exam:  Constitutional: No acute distress, awake, alert, sitting up in chair  HENT: NCAT, mucous membranes moist  Respiratory: Clear to auscultation bilaterally, respiratory effort normal   Cardiovascular: RRR, no murmurs, rubs, or gallops  Gastrointestinal: Positive bowel sounds, soft, nontender, nondistended  Musculoskeletal: No bilateral ankle edema  Psychiatric: Appropriate affect, cooperative  Neurologic: Cranial Nerves grossly intact to confrontation, speech clear  Skin: No rashes    Results Reviewed:  I have personally reviewed current lab, radiology, and data and agree.      Results from last 7 days  Lab Units 18  0847 18  0438 18  0537   WBC 10*3/mm3 9.06 11.94* 9.24   HEMOGLOBIN g/dL 10.6* 11.7 10.4*   HEMATOCRIT % 33.3* 36.7 31.6*   PLATELETS 10*3/mm3 373 442 400       Results from last 7 days  Lab Units 18  0503 18  0847 18  0438  18  1321   SODIUM mmol/L 136 136 133  < > 121*   POTASSIUM mmol/L 3.7 3.9 4.1  < > 3.9   CHLORIDE mmol/L 103 104 103  < > 86*   CO2 mmol/L 26.0 27.0  26.0  < > 25.0   BUN mg/dL 10 9 7*  < > 6*   CREATININE mg/dL 0.50* 0.60 0.50*  < > 0.60   GLUCOSE mg/dL 86 157* 91  < > 107*   CALCIUM mg/dL 9.4 9.1 9.6  < > 9.2   ALT (SGPT) U/L  --   --   --   --  31   AST (SGOT) U/L  --   --   --   --  33   < > = values in this interval not displayed.  Estimated Creatinine Clearance: 38.7 mL/min (by C-G formula based on SCr of 0.5 mg/dL (L)).  No results found for: BNP  No results found for: PHART    Microbiology Results Abnormal     None          Imaging Results (last 24 hours)     ** No results found for the last 24 hours. **             I have reviewed the medications.    Assessment/Plan   Assessment / Plan     Hospital Problem List     * (Principal)Hyponatremia    Essential hypertension    Coronary artery disease    Weakness generalized    GERD (gastroesophageal reflux disease)    Atrial fibrillation             Brief Hospital Course to date:  Debbie Baum is a 89 y.o. female with recently diagnosed Influenza B in [facility] in 03/2018, A. Fib/flutter s/p ablation about 2 yrs ago, recent hx of pancreatitis presented with worsening generalized weakness. She was seen at [facility] ED on 4/1 and noted to be mildly hyponatremic, urine culture obtained grew Proteus. Bactrim was called in. She has taken a couple of doses since. Also saw PCP who stopped her HCTZ for hyponatremia and started her on lasix for LE edema.       Assessment & Plan:    Hyponatremia  -Etiology uncertain but Resolved      Urinary retention  -Likely due to PRN hydroxyzine which was added on admission  -Continue bladder scans and I/O cath PRN >350cc in bladder.  Hopefully this issues will resolve in the next 24 hours.  Last dose of hydroxyzine was yesterday evening.  - Start Flomax.    Diarrhea  -Much improved.  -Continue probiotic    Asymptomatic bacteruria  -Initial UA this admission not suggestive of UTI and she does not have dysuria.  UA collected in ED last week had multiple squamous epithelial cells suggesting  contamination.  -Antibiotics discontinued    Generalized weakness  -Likely due to recent illness and hyponatremia  -PT/OT following and case management working on rehab    Left shoulder pain  -Reviewed records from [facility] which show arthritic changes on X-ray  -ESR normal    GERD  -Continue PPI    DVT Prophylaxis: Mechanical-had significant local reaction to lovenox injection per nursing.      CODE STATUS: Conditional Code    Disposition: I expect the patient to be discharged to rehab. She states her first choice is [facility], but initially case management was looking at [facility].      Electronically signed by Rm Orona MD, 04/09/18, 9:46 PM.

## 2018-04-10 NOTE — THERAPY TREATMENT NOTE
Acute Care - Occupational Therapy Treatment Note  [facility]     Patient Name: Debbie Baum  : 1923  MRN: [id number]  Today's Date: 4/10/2018  Onset of Illness/Injury or Date of Surgery: 18  Date of Referral to OT: 18  Referring Physician: Erlin    Admit Date: 2018       ICD-10-CM ICD-9-CM   1. Hyponatremia E87.1 276.1   2. Generalized weakness R53.1 780.79   3. Urinary tract infection without hematuria, site unspecified N39.0 599.0   4. Impaired functional mobility, balance, gait, and endurance Z74.09 V49.89   5. Impaired mobility and ADLs Z74.09 799.89     Patient Active Problem List   Diagnosis   • Hyponatremia   • Essential hypertension   • Coronary artery disease   • Weakness generalized   • GERD (gastroesophageal reflux disease)   • Atrial fibrillation     Past Medical History:   Diagnosis Date   • Cancer     colon   • Coronary artery disease    • GERD (gastroesophageal reflux disease)    • H/O cardiac radiofrequency ablation      Past Surgical History:   Procedure Laterality Date   • CARDIAC ABLATION     • CHOLECYSTECTOMY     • COLON SURGERY      BOWEL RESECTION FOR HX COLON CANCER   • HYSTERECTOMY     • REPLACEMENT TOTAL KNEE         Therapy Treatment          Rehabilitation Treatment Summary     Row Name 04/10/18 1424             Treatment Time/Intention    Discipline occupational therapist  -AN      Document Type therapy note (daily note)  -AN      Subjective Information no complaints  -AN      Mode of Treatment occupational therapy  -AN      Patient/Family Observations Pt sitting in chair, head on hand dozing, daughter present  -AN      Care Plan Review care plan/treatment goals reviewed  -AN      Patient Effort good  -AN      Recorded by [AN] Flori Jain OT 04/10/18 1503 04/10/18 1503      Row Name 04/10/18 1424             Vital Signs    Pre Systolic BP Rehab --   vitals stable  -AN      Recorded by [SAM] Flori Jain OT 04/10/18 1503 04/10/18 1503      Row Name 04/10/18  1424             Cognitive Assessment/Intervention- PT/OT    Orientation Status (Cognition) oriented x 4  -AN      Follows Commands (Cognition) WFL  -AN      Recorded by [AN] Flori Jain OT 04/10/18 1503 04/10/18 1503      Row Name 04/10/18 1424             Bed Mobility Assessment/Treatment    Comment (Bed Mobility) pt up in chair  -AN      Recorded by [AN] Flori Jain OT 04/10/18 1503 04/10/18 1503      Row Name 04/10/18 1424             Functional Mobility    Functional Mobility- Ind. Level minimum assist (75% patient effort)   intermittently CGA   -AN      Functional Mobility- Device rolling walker  -AN      Functional Mobility-Distance (Feet) 30  -AN      Recorded by [AN] Flori Jain OT 04/10/18 1503 04/10/18 1503      Row Name 04/10/18 1424             Transfer Assessment/Treatment    Sit-Stand Foard (Transfers) minimum assist (75% patient effort)  -AN      Stand-Sit Foard (Transfers) contact guard;verbal cues  -AN      Recorded by [AN] Flori Jain OT 04/10/18 1503 04/10/18 1503      Row Name 04/10/18 1424             Sit-Stand Transfer    Assistive Device (Sit-Stand Transfers) walker, front-wheeled  -AN      Recorded by [AN] Flori Jain OT 04/10/18 1503 04/10/18 1503      Row Name 04/10/18 1424             Stand-Sit Transfer    Assistive Device (Stand-Sit Transfers) walker, front-wheeled  -AN      Recorded by [AN] Flori Jain OT 04/10/18 1503 04/10/18 1503      Row Name 04/10/18 1424             Upper Body Dressing Assessment/Training    Upper Body Dressing Foard Level pajama/robe;minimum assist (75% patient effort)  -AN      Recorded by [AN] Flori Jain OT 04/10/18 1503 04/10/18 1503      Row Name 04/10/18 1424             Lower Extremity Seated Therapeutic Exercise    Performed, Seated Lower Extremity (Therapeutic Exercise) hip abduction/adduction;knee flexion/extension;ankle dorsiflexion/plantarflexion   unable to complete L knee ext due to pain  -AN      Recorded  by [AN] Flori Jain, YAIR 04/10/18 1503 04/10/18 1503      Row Name 04/10/18 1424             Therapeutic Exercise    Upper Extremity Range of Motion (Therapeutic Exercise) shoulder flexion/extension, bilateral;shoulder horizontal abduction/adduction, left;shoulder internal/external rotation, bilateral;forearm supination/pronation, bilateral;wrist flexion/extension, bilateral   AAROM with L shoulder, onlyto 80-90 degrees with pain  -AN      Recorded by [AN] Flori Jain, YAIR 04/10/18 1503 04/10/18 1503      Row Name 04/10/18 1424             Static Standing Balance    Level of [place] (Supported Standing, Static Balance) minimal assist, 75% patient effort  -AN      Time Able to Maintain Position (Supported Standing, Static Balance) 45 to 60 seconds  -AN      Assistive Device Utilized (Supported Standing, Static Balance) rolling walker  -AN      Recorded by [AN] Flori Jain, YAIR 04/10/18 1503 04/10/18 1503      Row Name 04/10/18 1424             Positioning and Restraints    Pre-Treatment Position sitting in chair/recliner  -AN      Post Treatment Position chair  -AN      In Chair reclined;call light within reach;encouraged to call for assist  -AN      Recorded by [AN] Flori Jain, YAIR 04/10/18 1503 04/10/18 1503      Row Name 04/10/18 1424             Pain Scale: Numbers Pre/Post-Treatment    Pain Scale: Numbers, Pretreatment 0/10 - no pain  -AN      Pain Scale: Numbers, Post-Treatment 2/10  -AN      Pain Location - Side Left  -AN      Pain Location - Orientation upper  -AN      Pain Location shoulder  -AN      Recorded by [AN] Flori Jain, YAIR 04/10/18 1503 04/10/18 1503      Row Name 04/10/18 1424             Outcome Summary/Treatment Plan (OT)    Daily Summary of Progress (OT) progress towards functional goals is fair  -AN      Recorded by [SAM] Flori Jain, YAIR 04/10/18 1503 04/10/18 1503        User Key  (r) = Recorded By, (t) = Taken By, (c) = Cosigned By    Initials Name Effective Dates Discipline     SAM Jain OT 06/22/15 -  OT               Occupational Therapy Education     Title: PT OT SLP Therapies (Done)     Topic: Occupational Therapy (Done)     Point: ADL training (Done)     Description: Instruct learner(s) on proper safety adaptation and remediation techniques during self care or transfers.   Instruct in proper use of assistive devices.   Learning Progress Summary     Learner Status Readiness Method Response Comment Documented by    Patient Done Acceptance E,D,TB VU,DU,NR Educated on pt's use of compensation on L shoulder . Reviewed arthritic ROM ex of UE's.  04/10/18 1503     Active Acceptance E NR Pt education provided on safety during functional transfers and sequencing to improve safety awareness and body mechanics during ADLs.  04/09/18 0940     Done Acceptance E,D,TB VU,NR Education initiated for benefits of activity/therapy, role of OT, transfer training, L UE van-dressing and recommendation for SNF level rehab at d/[facility] 04/06/18 1558    Family Done Acceptance E,D,TB VU,DU,NR Educated on pt's use of compensation on L shoulder . Reviewed arthritic ROM ex of UE's.  04/10/18 1503     Done Acceptance E,D,TB VU,NR Education initiated for benefits of activity/therapy, role of OT, transfer training, L UE van-dressing and recommendation for SNF level rehab at d/[facility] 04/06/18 1558                      User Key     Initials Effective Dates Name Provider Type Discipline     06/22/15 -  Jocelyn Bach, OT Occupational Therapist OT     06/22/15 -  Flori Jain, OT Occupational Therapist OT     04/03/18 -  Honey Maria, OT Occupational Therapist OT                OT Recommendation and Plan  Outcome Summary/Treatment Plan (OT)  Daily Summary of Progress (OT): progress towards functional goals is fair  Daily Summary of Progress (OT): progress towards functional goals is fair  Plan of Care Review  Plan of Care Reviewed With: patient  Plan of Care Reviewed With: patient  Outcome  Summary: Pt. continues to increase I and safety with mobility. Addressed ex of UE/LEs this date to improve functional strength or ADL's.         Outcome Measures     Row Name 04/10/18 1424 04/09/18 0900 04/08/18 1408       How much help from another person do you currently need...    Turning from your back to your side while in flat bed without using bedrails?  --  -- 3  -CD    Moving from lying on back to sitting on the side of a flat bed without bedrails?  --  -- 2  -CD    Moving to and from a bed to a chair (including a wheelchair)?  --  -- 2  -CD    Standing up from a chair using your arms (e.g., wheelchair, bedside chair)?  --  -- 2  -CD    Climbing 3-5 steps with a railing?  --  -- 2  -CD    To walk in hospital room?  --  -- 2  -CD    AM-PAC 6 Clicks Score  --  -- 13  -CD       How much help from another is currently needed...    Putting on and taking off regular lower body clothing? 2  -AN 2  -KF  --    Bathing (including washing, rinsing, and drying) 2  -AN 2  -KF  --    Toileting (which includes using toilet bed pan or urinal) 2  -AN 2  -KF  --    Putting on and taking off regular upper body clothing 2  -AN 2  -KF  --    Taking care of personal grooming (such as brushing teeth) 3  -AN 3  -KF  --    Eating meals 3  -AN 3  -KF  --    Score 14  -AN 14  -KF  --       Functional Assessment    Outcome Measure Options  -- AM-PAC 6 Clicks Daily Activity (OT)  -KF AM-PAC 6 Clicks Basic Mobility (PT)  -CD      User Key  (r) = Recorded By, (t) = Taken By, (c) = Cosigned By    Initials Name Provider Type    CD Carmel Corona, PT Physical Therapist    SAM Jain, OT Occupational Therapist    CHAPARRO Maria OT Occupational Therapist           Time Calculation:         Time Calculation- OT     Row Name 04/10/18 1505             Time Calculation- OT    OT Start Time 1424  -AN      Total Timed Code Minutes- OT 24 minute(s)  -AN      OT Received On 04/10/18  -AN      OT Goal Re-Cert Due Date 04/16/18  -AN         User Key  (r) = Recorded By, (t) = Taken By, (c) = Cosigned By    Initials Name Provider Type    SAM Jain OT Occupational Therapist           Therapy Charges for Today     Code Description Service Date Service Provider Modifiers Qty    [id number]  OT THERAPEUTIC ACT EA 15 MIN 4/10/2018 Flori Jain OT GO 2               Flori Jain OT  4/10/2018

## 2018-04-11 LAB — MAGNESIUM SERPL-MCNC: 1.9 MG/DL (ref 1.3–2.7)

## 2018-04-11 PROCEDURE — 83735 ASSAY OF MAGNESIUM: CPT

## 2018-04-11 PROCEDURE — 99232 SBSQ HOSP IP/OBS MODERATE 35: CPT | Performed by: INTERNAL MEDICINE

## 2018-04-11 PROCEDURE — 97110 THERAPEUTIC EXERCISES: CPT

## 2018-04-11 PROCEDURE — 97116 GAIT TRAINING THERAPY: CPT

## 2018-04-11 RX ADMIN — CARVEDILOL 6.25 MG: 6.25 TABLET, FILM COATED ORAL at 20:17

## 2018-04-11 RX ADMIN — PANTOPRAZOLE SODIUM 40 MG: 40 TABLET, DELAYED RELEASE ORAL at 05:57

## 2018-04-11 RX ADMIN — ASPIRIN 81 MG 81 MG: 81 TABLET ORAL at 08:35

## 2018-04-11 RX ADMIN — HYDROCODONE BITARTRATE AND ACETAMINOPHEN 1 TABLET: 5; 325 TABLET ORAL at 03:17

## 2018-04-11 RX ADMIN — IBUPROFEN 400 MG: 400 TABLET ORAL at 20:17

## 2018-04-11 RX ADMIN — IBUPROFEN 400 MG: 400 TABLET ORAL at 08:39

## 2018-04-11 RX ADMIN — Medication 250 MG: at 20:17

## 2018-04-11 RX ADMIN — Medication 250 MG: at 08:35

## 2018-04-11 RX ADMIN — TAMSULOSIN HYDROCHLORIDE 0.4 MG: 0.4 CAPSULE ORAL at 08:35

## 2018-04-11 RX ADMIN — Medication 5 MG: at 20:17

## 2018-04-11 RX ADMIN — HYDROCODONE BITARTRATE AND ACETAMINOPHEN 1 TABLET: 5; 325 TABLET ORAL at 20:23

## 2018-04-11 NOTE — H&P
Assessment   89 y.o. female with acute urinary retention due to multi-factorial etiology. She has h/o recent Proteus UTI on 4/1.  When she came to ER on 4/4 she had urinary frequency.  She has been treated with Rocephin while here.  No meds are contributing to her bladder dysfunction. She was admitted with generalized weakness.  She states she was able to void prior to admit and denies incontinence.     Plan   Dicsussed that her age and recent weakened state are the likely causes of her retention.  She was reassured she will be voiding back to normal when her strength and conditioning recover. She can go to the [facility] with robles and voiding trial when she gets stronger.     Subjective   Debbie Baum is a 89 y.o. female who was admitted for Hyponatremia [E87.1]. Patient was referred by hospitalist for evaluation and treatment of urinary retention that began 2 days ago. Patient currently has a urinary catheter in place.  400 mL of urine was drained when catheter was placed. Prior to this event, voiding symptoms consisted of frequency.. There were no prior treatments. Recent medications that may have affected voiding include none. Patient has not had recent constipation. Patient has had no surgeries.    Patient admits to history of no risk factors for cancer. Patient denies history of previous retention. Patient has had no prior workup.    Outside records reviewed. Pertinent radiology and labs reviewed.    Review of Systems  A comprehensive review of systems was negative.     Objective   Physicial Exam  General appearance: alert, appears stated age and cooperative  Head: Normocephalic, without obvious abnormality, atraumatic  Neck: supple, symmetrical, trachea midline and thyroid not enlarged, symmetric, no tenderness/mass/nodules  Lungs: clear to auscultation bilaterally  Abdomen: soft, non-tender; bowel sounds normal; no masses,  no organomegaly    Imaging  none    Labs  Urine Microscopy:       Invalid input(s):  LABCAST, Urine Culture:   , BMP:   Results from last 7 days  Lab Units 04/10/18  2216 04/10/18  0424  04/05/18  0537   SODIUM mmol/L  --  135  < > 123*   POTASSIUM mmol/L 4.5 3.6  < > 3.3*   CALCIUM mg/dL  --  9.1  < > 8.9   CHLORIDE mmol/L  --  105  < > 92*   CO2 mmol/L  --  27.0  < > 24.0   BUN mg/dL  --  10  < > 5*   CREATININE mg/dL  --  0.40*  < > 0.40*   ALBUMIN g/dL  --   --   --  3.40   < > = values in this interval not displayed. and CBC:   Results from last 7 days  Lab Units 04/08/18  0847   WBC 10*3/mm3 9.06   RBC 10*6/mm3 3.68*   HEMOGLOBIN g/dL 10.6*   HEMATOCRIT % 33.3*   PLATELETS 10*3/mm3 373

## 2018-04-11 NOTE — PLAN OF CARE
Problem: Patient Care Overview  Goal: Plan of Care Review  Outcome: Ongoing (interventions implemented as appropriate)   04/11/18 3409   OTHER   Outcome Summary patient able to increase gait distance with use of rolling walker for support and follow with chair for safety. Distance limited by left hip pain and fatigue.   Coping/Psychosocial   Plan of Care Reviewed With patient   Plan of Care Review   Progress improving

## 2018-04-11 NOTE — PROGRESS NOTES
[facility] Medicine Services  PROGRESS NOTE    Patient Name: Debbie Baum  : 1923  MRN: [id number]    Date of Admission: 2018  Length of Stay: 7  Primary Care Physician: Paula Scott MD    Subjective   Subjective     CC: F/U generalized weakness    Subjective:  Resting in a chair in no acute distress. Does not have any specific complaint except urinary retention.  Diarrhea has improved markedly.Patient has an Pal catheter now.  Review of Systems  Gen- No fevers, chills  CV- No chest pain, palpitations  Resp- No cough, dyspnea  GI- No further diarrhea, no abd pain    Otherwise ROS is negative except as mentioned in the HPI.    Objective   Objective     Vital Signs:   Temp:  [98.5 °F (36.9 °C)] 98.5 °F (36.9 °C)  Heart Rate:  [59-73] 59  Resp:  [18] 18  BP: (106-160)/(47-89) 106/47        Physical Exam:  Constitutional: No acute distress, awake, alert, sitting up in chair  HENT: NCAT, mucous membranes moist  Respiratory: Clear to auscultation bilaterally, respiratory effort normal   Cardiovascular: RRR, no murmurs, rubs, or gallops  Gastrointestinal: Positive bowel sounds, soft, nontender, nondistended  Musculoskeletal: No bilateral ankle edema  Psychiatric: Appropriate affect, cooperative  Neurologic: Awake, alert, follows commands.  Skin: No rashes    Results Reviewed:  I have personally reviewed current lab, radiology, and data and agree.      Results from last 7 days  Lab Units 18  0847 18  0438 18  0537   WBC 10*3/mm3 9.06 11.94* 9.24   HEMOGLOBIN g/dL 10.6* 11.7 10.4*   HEMATOCRIT % 33.3* 36.7 31.6*   PLATELETS 10*3/mm3 373 442 400       Results from last 7 days  Lab Units 04/10/18  2216 04/10/18  0424 18  0503 18  0847   SODIUM mmol/L  --  135 136 136   POTASSIUM mmol/L 4.5 3.6 3.7 3.9   CHLORIDE mmol/L  --  105 103 104   CO2 mmol/L  --  27.0 26.0 27.0   BUN mg/dL  --  10 10 9   CREATININE mg/dL  --  0.40* 0.50* 0.60   GLUCOSE mg/dL  --   98 86 157*   CALCIUM mg/dL  --  9.1 9.4 9.1     Estimated Creatinine Clearance: 38.7 mL/min (by C-G formula based on SCr of 0.4 mg/dL (L)).  No results found for: BNP  No results found for: PHART    Microbiology Results Abnormal     None          Imaging Results (last 24 hours)     ** No results found for the last 24 hours. **             I have reviewed the medications.    Assessment/Plan   Assessment / Plan     Hospital Problem List     * (Principal)Hyponatremia    Essential hypertension    Coronary artery disease    Weakness generalized    GERD (gastroesophageal reflux disease)    Atrial fibrillation             Brief Hospital Course to date:  Debbie Baum is a 89 y.o. female with recently diagnosed Influenza B in [facility] in 03/2018, A. Fib/flutter s/p ablation about 2 yrs ago, recent hx of pancreatitis presented with worsening generalized weakness. She was seen at [facility] ED on 4/1 and noted to be mildly hyponatremic, urine culture obtained grew Proteus. Bactrim was called in. She has taken a couple of doses since. Also saw PCP who stopped her HCTZ for hyponatremia and started her on lasix for LE edema.       Assessment & Plan:    Hyponatremia  -Etiology uncertain but Resolved      Urinary retention  -Likely due to PRN hydroxyzine which was added on admission  -[place] Pal catheter and continue Flomax.   -Bladder training.  If the problem continues we will Consult urology.      Diarrhea  -Much improved.  -Continue probiotic    Asymptomatic bacteruria  -Initial UA this admission not suggestive of UTI and she does not have dysuria.  UA collected in ED last week had multiple squamous epithelial cells suggesting contamination.  -Antibiotics discontinued    Generalized weakness  -Likely due to recent illness and hyponatremia  -PT/OT following and case management working on rehab    Left shoulder pain  -Reviewed records from [facility] which show arthritic changes on X-ray  -ESR normal    GERD  -Continue  PPI    Plan:  - CONT CURRENT CARE  - WILL TRANSFER TO REHAB AFTER EVALUATION BY UROLOGY AND IF ok WITH THEM.    DVT Prophylaxis: Mechanical-had significant local reaction to lovenox injection per nursing.      CODE STATUS: Conditional Code    Disposition: I expect the patient to be discharged to rehab. She states her first choice is [facility], but initially case management was looking at [facility].      Electronically signed by Rm Orona MD, 04/11/18, 4:52 PM.

## 2018-04-11 NOTE — PROGRESS NOTES
Continued Stay Note  [facility]     Patient Name: Debbie Baum  MRN: [id number]  Today's Date: 4/11/2018    Admit Date: 4/4/2018          Discharge Plan     Row Name 04/11/18 1053       Plan    Plan [facility]    Patient/Family in Agreement with Plan yes    Plan Comments Per Dr. Orona, pt is not medically ready today, potential d/c Thursday 4/12.  F/C placed and bladder training in progress. Urology consult pending. Called Malena at [facility] to notify her and she will check with [facility] to ask if pt can d/c with f/c and continue bladder training. Discussed with pt and she has notified her daughter and agreeable.  CM will cont to follow.               Discharge Codes    No documentation.       Expected Discharge Date and Time     Expected Discharge Date Expected Discharge Time    Apr 10, 2018             Concha Hernandes

## 2018-04-11 NOTE — PLAN OF CARE
Problem: Patient Care Overview  Goal: Plan of Care Review  Outcome: Ongoing (interventions implemented as appropriate)   04/11/18 0927 04/11/18 1733   OTHER   Outcome Summary --  Vital signs wnl. Oxygen level on room air greater than 90%. Pain controlled. bladder training with robles catheter. urologist consulted.   Coping/Psychosocial   Plan of Care Reviewed With patient --    Plan of Care Review   Progress improving --        Problem: Fall Risk (Adult)  Goal: Absence of Fall  Outcome: Ongoing (interventions implemented as appropriate)      Problem: Skin Injury Risk (Adult)  Goal: Identify Related Risk Factors and Signs and Symptoms  Outcome: Ongoing (interventions implemented as appropriate)    Goal: Skin Health and Integrity  Outcome: Ongoing (interventions implemented as appropriate)

## 2018-04-11 NOTE — THERAPY TREATMENT NOTE
Acute Care - Physical Therapy Treatment Note  [facility]     Patient Name: Debbie Baum  : 1923  MRN: [id number]  Today's Date: 2018  Onset of Illness/Injury or Date of Surgery: 18  Date of Referral to PT: 18  Referring Physician: Erlin    Admit Date: 2018    Visit Dx:    ICD-10-CM ICD-9-CM   1. Hyponatremia E87.1 276.1   2. Generalized weakness R53.1 780.79   3. Urinary tract infection without hematuria, site unspecified N39.0 599.0   4. Impaired functional mobility, balance, gait, and endurance Z74.09 V49.89   5. Impaired mobility and ADLs Z74.09 799.89     Patient Active Problem List   Diagnosis   • Hyponatremia   • Essential hypertension   • Coronary artery disease   • Weakness generalized   • GERD (gastroesophageal reflux disease)   • Atrial fibrillation       Therapy Treatment          Rehabilitation Treatment Summary     Row Name 1845             Treatment Time/Intention    Discipline physical therapy assistant  -AS      Document Type therapy note (daily note)  -AS      Subjective Information complains of;weakness;pain  -AS      Mode of Treatment physical therapy  -AS      Patient Effort good  -AS      Recorded by [AS] Dara Clark, PTA 18 0926 18      Row Name 18 0845             Cognitive Assessment/Intervention- PT/OT    Affect/Mental Status (Cognitive) WFL  -AS      Behavioral Issues (Cognitive) overwhelmed easily  -AS      Orientation Status (Cognition) oriented x 3  -AS      Follows Commands (Cognition) WFL  -AS      Safety Deficit (Cognitive) mild deficit  -AS      Personal Safety Interventions fall prevention program maintained;gait belt;nonskid shoes/slippers when out of bed;other (see comments)   exit alarm  -AS      Recorded by [AS] Dara Clark, FAISAL 1826 18      Row Name 1845             Bed Mobility Assessment/Treatment    Comment (Bed Mobility) patient up in chair  -AS      Recorded by [AS]  Dara Clark, PTA 04/11/18 0926 04/11/18 0926      Row Name 04/11/18 0845             Transfer Assessment/Treatment    Sit-Stand [place] (Transfers) verbal cues;minimum assist (75% patient effort)  -AS      Stand-Sit [place] (Transfers) verbal cues;contact guard  -AS      Recorded by [AS] Dara Clark, PTA 04/11/18 0926 04/11/18 0926      Row Name 04/11/18 0845             Sit-Stand Transfer    Assistive Device (Sit-Stand Transfers) walker, front-wheeled  -AS      Recorded by [AS] Dara Clark, [facility] 04/11/18 0926 04/11/18 0926      Row Name 04/11/18 0845             Stand-Sit Transfer    Assistive Device (Stand-Sit Transfers) walker, front-wheeled  -AS      Recorded by [AS] Dara Clark, [facility] 04/11/18 0926 04/11/18 0926      Row Name 04/11/18 0845             Gait/Stairs Assessment/Training    Gait/Stairs Assessment/Training gait/ambulation assistive device  -AS      [place] Level (Gait) verbal cues;minimum assist (75% patient effort);1 person to manage equipment  -AS      Assistive Device (Gait) walker, front-wheeled  -AS      Distance in Feet (Gait) 40  -AS      Pattern (Gait) step-to  -AS      Deviations/Abnormal Patterns (Gait) antalgic;niles decreased;stride length decreased;gait speed decreased  -AS      Comment (Gait/Stairs) verbal cues to stay inside walker, tech followed with chair for safety  -AS      Recorded by [AS] Dara Clark, [facility] 04/11/18 0926 04/11/18 0926      Row Name 04/11/18 0845             Therapeutic Exercise    Lower Extremity Range of Motion (Therapeutic Exercise) hip flexion/extension, bilateral;knee flexion/extension, bilateral;ankle dorsiflexion/plantar flexion, bilateral  -AS      Exercise Type (Therapeutic Exercise) AROM (active range of motion)  -AS      Position (Therapeutic Exercise) seated  -AS      Sets/Reps (Therapeutic Exercise) 1/10  -AS      Recorded by [AS] Dara Clark, PTA 04/11/18 0926 04/11/18 0926      Row Name 04/11/18  0845             Positioning and Restraints    Pre-Treatment Position sitting in chair/recliner  -AS      Post Treatment Position chair  -AS      In Chair reclined;call light within reach;encouraged to call for assist;exit alarm on  -AS      Recorded by [AS] Dara Clark, PTA 04/11/18 0926 04/11/18 0926      Row Name 04/11/18 0845             Pain Scale: Numbers Pre/Post-Treatment    Pain Scale: Numbers, Pretreatment 2/10  -AS      Pain Scale: Numbers, Post-Treatment 2/10  -AS      Pain Location - Side Left  -AS      Pain Location hip  -AS      Pain Intervention(s) Repositioned;Ambulation/increased activity  -AS      Recorded by [AS] Dara Clark, PTA 04/11/18 0926 04/11/18 0926        User Key  (r) = Recorded By, (t) = Taken By, (c) = Cosigned By    Initials Name Effective Dates Discipline    AS Dara Clark, PTA 06/22/15 -  PT                     Physical Therapy Education     Title: PT OT SLP Therapies (Active)     Topic: Physical Therapy (Active)     Point: Mobility training (Active)    Learning Progress Summary     Learner Status Readiness Method Response Comment Documented by    Patient Active Acceptance E NR  AS 04/11/18 0927     Done [place]ena RESENDEZ,NR SAFETY WITH MOBILITY, THER EX, BENEFITS OF OOB ACTIVITY,  04/08/18 1519     Active Acceptance E NR  DENICE 04/05/18 1519          Point: Home exercise program (Active)    Learning Progress Summary     Learner Status Readiness Method Response Comment Documented by    Patient Active Acceptance E NR  AS 04/11/18 0927     Done [place] RESENDEZ,NR SAFETY WITH MOBILITY, THER EX, BENEFITS OF OOB ACTIVITY,  04/08/18 1519     Active Acceptance E NR  DENICE 04/05/18 1519          Point: Body mechanics (Active)    Learning Progress Summary     Learner Status Readiness Method Response Comment Documented by    Patient Active Acceptance E NR  AS 04/11/18 0927     Done [place] E VU,NR SAFETY WITH MOBILITY, THER EX, BENEFITS OF OOB ACTIVITY,  04/08/18 1519     Active  Acceptance E NR  DENICE 04/05/18 1519          Point: Precautions (Active)    Learning Progress Summary     Learner Status Readiness Method Response Comment Documented by    Patient Active Acceptance E NR  AS 04/11/18 0927     Done TAIOW Marsh SAFETY WITH MOBILITY, THER EX, BENEFITS OF OOB ACTIVITY, CD 04/08/18 1519     Active Acceptance E NR  DENICE 04/05/18 1519                      User Key     Initials Effective Dates Name Provider Type Discipline     06/19/15 -  Deanna Summers, PT Physical Therapist PT    CD 06/19/15 -  Carmel Corona, PT Physical Therapist PT    AS 06/22/15 -  Dara Clark, PTA Physical Therapy Assistant PT                    PT Recommendation and Plan     Plan of Care Reviewed With: patient  Progress: improving  Outcome Summary: patient able to increase gait distance with use of rolling walker for support and follow with chair for safety. Distance limited by left hip pain and fatigue.          Outcome Measures     Row Name 04/11/18 0845 04/10/18 1424 04/09/18 0900       How much help from another person do you currently need...    Turning from your back to your side while in flat bed without using bedrails? 3  -AS  --  --    Moving from lying on back to sitting on the side of a flat bed without bedrails? 3  -AS  --  --    Moving to and from a bed to a chair (including a wheelchair)? 3  -AS  --  --    Standing up from a chair using your arms (e.g., wheelchair, bedside chair)? 3  -AS  --  --    Climbing 3-5 steps with a railing? 2  -AS  --  --    To walk in hospital room? 3  -AS  --  --    AM-PAC 6 Clicks Score 17  -AS  --  --       How much help from another is currently needed...    Putting on and taking off regular lower body clothing?  -- 2  -AN 2  -KF    Bathing (including washing, rinsing, and drying)  -- 2  -AN 2  -KF    Toileting (which includes using toilet bed pan or urinal)  -- 2  -AN 2  -KF    Putting on and taking off regular upper body clothing  -- 2  -AN 2  -KF    Taking care  of personal grooming (such as brushing teeth)  -- 3  -AN 3  -KF    Eating meals  -- 3  -AN 3  -KF    Score  -- 14  -AN 14  -KF       Functional Assessment    Outcome Measure Options AM-PAC 6 Clicks Basic Mobility (PT)  -AS  -- AM-PAC 6 Clicks Daily Activity (OT)  -KF    Row Name 04/08/18 1408             How much help from another person do you currently need...    Turning from your back to your side while in flat bed without using bedrails? 3  -CD      Moving from lying on back to sitting on the side of a flat bed without bedrails? 2  -CD      Moving to and from a bed to a chair (including a wheelchair)? 2  -CD      Standing up from a chair using your arms (e.g., wheelchair, bedside chair)? 2  -CD      Climbing 3-5 steps with a railing? 2  -CD      To walk in hospital room? 2  -CD      AM-PAC 6 Clicks Score 13  -CD         Functional Assessment    Outcome Measure Options AM-PAC 6 Clicks Basic Mobility (PT)  -CD        User Key  (r) = Recorded By, (t) = Taken By, (c) = Cosigned By    Initials Name Provider Type    CD Carmel Corona, PT Physical Therapist    AN Flori Jain, OT Occupational Therapist    AS Dara Clark PTA Physical Therapy Assistant    CHAPARRO Maria, OT Occupational Therapist           Time Calculation:         PT Charges     Row Name 04/11/18 0929             Time Calculation    Start Time 0845  -AS      PT Received On 04/11/18  -AS      PT Goal Re-Cert Due Date 04/15/18  -AS         Time Calculation- PT    Total Timed Code Minutes- PT 23 minute(s)  -AS        User Key  (r) = Recorded By, (t) = Taken By, (c) = Cosigned By    Initials Name Provider Type    AS Dara Clark PTA Physical Therapy Assistant          Therapy Charges for Today     Code Description Service Date Service Provider Modifiers Qty    16135493145 HC GAIT TRAINING EA 15 MIN 4/11/2018 Dara Clark PTA GP 1    17733679099 HC PT THER PROC EA 15 MIN 4/11/2018 Dara Clark PTA GP 1    17275714779 HC PT  THER SUPP EA 15 MIN 4/11/2018 Dara Clark, PTA GP 2          PT G-Codes  Outcome Measure Options: AM-PAC 6 Clicks Basic Mobility (PT)    Dara Clark PTA  4/11/2018

## 2018-04-11 NOTE — PROGRESS NOTES
"Adult Nutrition  Assessment/PES    Patient Name:  Debbie Baum  YOB: 1923  MRN: [id number]  Admit Date:  4/4/2018    Assessment Date:  4/11/2018    Comments:            Adult Nutrition Assessment     Row Name 04/11/18 1226       Nutrition Prescription PO    Current PO Diet Regular       PO Evaluation    Number of Meals 3    % PO Intake 58    Row Name 04/11/18 1225       Anthropometrics    Height 162.6 cm (64\")    Current Weight Method measured   STANDING    Weight 57 kg (125 lb 9.6 oz)       Ideal Body Weight (IBW)    Ideal Body Weight (IBW) (kg) 55    % Ideal Body Weight 103.58       Body Mass Index (BMI)    BMI (kg/m2) 21.6       IBW Adjustment, Para/Tetraplegia    5% Adjustment, Para (IBW) 52.25    10% Adjustment, Para (IBW) 49.5    10% Adjustment, Tetra (IBW) 49.5    15% Adjustment, Tetra (IBW) 46.75       Labs/Procedures/Meds    Lab Results Reviewed reviewed       Physical Findings    Gastrointestinal other (see comments)   WDL PER NURSING DOCUMENTATION    Skin other (see comments)   WDL PER NURSING DOCUMENTATION    Row Name 04/11/18 1223       Nutrition/Diet History    Typical Food/Fluid Intake PT REPORTS THE MEALS HAVE BEEN DELICIOUS OTHER THAN THE TURKEY WAS TOUGH LAST SUPPER MEAL. SHE DENIES FOOD DISLIKES OR INTOLERANCES & REPORTS IS EATING FAIRLY WELL. SHE IS RECEPTIVE TO A BOOST SUPPLEMENT DAILY @ SUPPER MEAL.    Row Name 04/11/18 1220       Reason for Assessment    Reason For Assessment other (see comments)   LOS/30\"    Diagnosis --   S/P HYPONATREMIA, WEAKNESS, RECENT FLU B, RECENT PANCREATITIS, URINARY RETENTION, DIARRHEA (IMPROVED), L SHOULDER PAIN. HX OF CAD, HTN, GERD, A-FIB/FLUTTER, S/P ABLATION          Problem/Interventions:        Problem 1     Row Name 04/11/18 1226       Nutrition Diagnoses Problem 1    Problem 1 Inadequate Nutrient Intake    Etiology (related to) Other (comment)   CLINICAL CONDITION    Signs/Symptoms (evidenced by) PO Intake    Percent (%) intake recorded 58 " %    Over number of meals 3                    Intervention Goal     Row Name 04/11/18 1227       Intervention Goal    General Nutrition support treatment    PO Increase intake            Nutrition Intervention     Row Name 04/11/18 1227       Nutrition Intervention    RD/Tech Action Advise alternate selection;Advise available snack;Interview for preference;Encourage intake;Recommend/ordered;Follow Tx progress;Care plan reviewd    Recommended/Ordered Supplement            Nutrition Prescription     Row Name 04/11/18 1227       Nutrition Prescription PO    PO Prescription Begin/change supplement    Supplement Boost Plus    Supplement Frequency Daily    New PO Prescription Ordered? Yes            Education/Evaluation     Row Name 04/11/18 1227       Monitor/Evaluation    Monitor Per protocol;I&O;PO intake;Supplement intake;Pertinent labs;Weight;Skin status;Symptoms        Electronically signed by:  Maria Isabel Willoughby RD  04/11/18 12:27 PM

## 2018-04-11 NOTE — PROGRESS NOTES
Continued Stay Note  [facility]     Patient Name: Debbie Baum  MRN: [id number]  Today's Date: 4/11/2018    Admit Date: 4/4/2018          Discharge Plan     Row Name 04/11/18 1457       Plan    Plan [facility]    Patient/Family in Agreement with Plan yes    Plan Comments Spoke to Malena at the [facility] and they will be able to accept pt with a f/c Thursday and continue bladder training if needed. Pt has a bed Thursday at [facility] if medically ready. Spoke to pt and she is agreeable and her daughter will transport. Nurse will call report to [phone] and fax summary to [phone]. CM will cont to follow.               Discharge Codes    No documentation.       Expected Discharge Date and Time     Expected Discharge Date Expected Discharge Time    Apr 10, 2018             Concha Hernandes

## 2018-04-12 VITALS
OXYGEN SATURATION: 97 % | HEART RATE: 88 BPM | HEIGHT: 64 IN | RESPIRATION RATE: 18 BRPM | BODY MASS INDEX: 21.44 KG/M2 | TEMPERATURE: 98.6 F | SYSTOLIC BLOOD PRESSURE: 147 MMHG | DIASTOLIC BLOOD PRESSURE: 71 MMHG | WEIGHT: 125.6 LBS

## 2018-04-12 PROCEDURE — 99239 HOSP IP/OBS DSCHRG MGMT >30: CPT | Performed by: NURSE PRACTITIONER

## 2018-04-12 RX ORDER — CARVEDILOL 6.25 MG/1
6.25 TABLET ORAL EVERY 12 HOURS SCHEDULED
Qty: 60 TABLET | Refills: 0
Start: 2018-04-12 | End: 2019-02-13

## 2018-04-12 RX ORDER — TAMSULOSIN HYDROCHLORIDE 0.4 MG/1
0.4 CAPSULE ORAL DAILY
Qty: 6 CAPSULE | Refills: 0
Start: 2018-04-13 | End: 2018-04-19

## 2018-04-12 RX ORDER — HYDROCODONE BITARTRATE AND ACETAMINOPHEN 5; 325 MG/1; MG/1
1 TABLET ORAL EVERY 6 HOURS PRN
Qty: 12 TABLET | Refills: 0 | Status: SHIPPED | OUTPATIENT
Start: 2018-04-12 | End: 2019-02-13

## 2018-04-12 RX ADMIN — PANTOPRAZOLE SODIUM 40 MG: 40 TABLET, DELAYED RELEASE ORAL at 06:01

## 2018-04-12 RX ADMIN — Medication 250 MG: at 08:34

## 2018-04-12 RX ADMIN — CARVEDILOL 6.25 MG: 6.25 TABLET, FILM COATED ORAL at 08:34

## 2018-04-12 RX ADMIN — ASPIRIN 81 MG 81 MG: 81 TABLET ORAL at 08:33

## 2018-04-12 RX ADMIN — HYDROCODONE BITARTRATE AND ACETAMINOPHEN 1 TABLET: 5; 325 TABLET ORAL at 02:15

## 2018-04-12 RX ADMIN — TAMSULOSIN HYDROCHLORIDE 0.4 MG: 0.4 CAPSULE ORAL at 08:33

## 2018-04-12 RX ADMIN — IBUPROFEN 400 MG: 400 TABLET ORAL at 06:01

## 2018-04-12 NOTE — PROGRESS NOTES
Continued Stay Note  [facility]     Patient Name: Debbie Baum  MRN: [id number]  Today's Date: 4/12/2018    Admit Date: 4/4/2018          Discharge Plan     Row Name 04/12/18 1352       Plan    Plan [facility]    Patient/Family in Agreement with Plan yes    Plan Comments Efaxed d/c summary to [facility] at [phone]              Discharge Codes    No documentation.       Expected Discharge Date and Time     Expected Discharge Date Expected Discharge Time    Apr 12, 2018             Concha Hernandes

## 2018-04-12 NOTE — PLAN OF CARE
Problem: Patient Care Overview  Goal: Plan of Care Review  Outcome: Ongoing (interventions implemented as appropriate)   04/12/18 0451   OTHER   Outcome Summary Pt's VSS through the night. PT c/o chronic arthritic knee pain, improved w/ PO Lortab and Motrin. Pt fell asleep about 04:00. Bladder training throughout the night. Possible transfer to [facility] (w/ robles) today if medically ready.    Coping/Psychosocial   Plan of Care Reviewed With patient   Plan of Care Review   Progress improving     Goal: Individualization and Mutuality  Outcome: Ongoing (interventions implemented as appropriate)    Goal: Discharge Needs Assessment  Outcome: Ongoing (interventions implemented as appropriate)    Goal: Interprofessional Rounds/Family Conf  Outcome: Ongoing (interventions implemented as appropriate)      Problem: Fall Risk (Adult)  Goal: Absence of Fall  Outcome: Ongoing (interventions implemented as appropriate)      Problem: Skin Injury Risk (Adult)  Goal: Identify Related Risk Factors and Signs and Symptoms  Outcome: Ongoing (interventions implemented as appropriate)    Goal: Skin Health and Integrity  Outcome: Ongoing (interventions implemented as appropriate)      Problem: Pain, Chronic (Adult)  Goal: Identify Related Risk Factors and Signs and Symptoms  Outcome: Ongoing (interventions implemented as appropriate)    Goal: Acceptable Pain/Comfort Level and Functional Ability  Outcome: Ongoing (interventions implemented as appropriate)

## 2018-04-12 NOTE — DISCHARGE SUMMARY
[facility] Medicine Services  DISCHARGE SUMMARY    Patient Name: Debbie Baum  : 1923  MRN: [id number]    Date of Admission: 2018  Date of Discharge:  2018  Primary Care Physician: Paula Scott MD    Consults     Date and Time Order Name Status Description    2018 0945 Inpatient Urology Consult          Hospital Course     Presenting Problem:   Hyponatremia [E87.1]    Active Hospital Problems (** Indicates Principal Problem)    Diagnosis Date Noted   • **Hyponatremia [E87.1] 2018   • Essential hypertension [I10] 2018   • Coronary artery disease [I25.10] 2018   • Weakness generalized [R53.1] 2018   • GERD (gastroesophageal reflux disease) [K21.9] 2018   • Atrial fibrillation [I48.91] 2018      Resolved Hospital Problems    Diagnosis Date Noted Date Resolved   • Proteus mirabilis infection [B96.4] 2018          Hospital Course:  Debbie Baum is a 89 y.o. female who was recently diagnosed with flu B at [facility] 3/2018.  She has a history of a fib/flutter s/p ablation about 2 years ago, recent pancreatitis.  She presented to [facility] for worsening generalized weakness.  She presented to the ED on  and a urine culture was obtain which grew proteus.  She was sent home and a bactrim prescription was called in.  Her PCP recently stopped her HCTZ for hyponatremia and started her on lasix for LE edema.  She was admitted to hospital medicine and placed on ceftriaxone for her existing UTI.  She was noted to have euvolemic hypotonic hyponatremia.  This is likely due to malnutrition.  Her fluid was restricted and her hyponatremia resolved.  She was noted to by hypertensive and was placed on carvedilol twice daily.  The patient had some difficulty with urinary retention during her admission.  She ultimately had a robles placed and she was seen by urology.  It was felt that this was likely due to debility and age.  She will  continue with her robles catheter at rehab and will do bladder training there once she is stronger.  She is now medically stable and will be discharged to rehab by family today.               Day of Discharge     HPI:   Resting in chair this afternoon eating lunch.  States that she is fairly tired today.  Still with robles catheter.  No other complaints.  Anxious to get to rehab.    Review of Systems  Gen- No fevers, chills  CV- No chest pain, palpitations  Resp- No cough, dyspnea  GI- No N/V/D, abd pain      Otherwise ROS is negative except as mentioned in the HPI.    Vital Signs:   Temp:  [97.9 °F (36.6 °C)-98.6 °F (37 °C)] 98.6 °F (37 °C)  Heart Rate:  [60-88] 88  Resp:  [18] 18  BP: (147-149)/(71-75) 147/71     Physical Exam:  Constitutional: No acute distress, awake, alert, frail appearing, up in chair  HENT: NCAT, mucous membranes moist  Respiratory: Clear to auscultation bilaterally, respiratory effort normal on room air  Cardiovascular: RRR, no murmurs, rubs, or gallops, palpable pedal pulses bilaterally  Gastrointestinal: Positive bowel sounds, soft, nontender, non-distended  :  Robles cath in place draining clear yellow urine  Musculoskeletal: No bilateral ankle edema  Psychiatric: Appropriate affect, cooperative  Neurologic: Oriented x 3, strength symmetric in all extremities, Cranial Nerves grossly intact to confrontation, speech clear  Skin: No rashes      Pertinent  and/or Most Recent Results       Results from last 7 days  Lab Units 04/10/18  2216 04/10/18  0424 04/09/18  0503 04/08/18  0847 04/07/18  0438 04/06/18  0443 04/05/18  1648   WBC 10*3/mm3  --   --   --  9.06 11.94*  --   --    HEMOGLOBIN g/dL  --   --   --  10.6* 11.7  --   --    HEMATOCRIT %  --   --   --  33.3* 36.7  --   --    PLATELETS 10*3/mm3  --   --   --  373 442  --   --    SODIUM mmol/L  --  135 136 136 133 131* 128*   POTASSIUM mmol/L 4.5 3.6 3.7 3.9 4.1 4.5 4.6  4.6   CHLORIDE mmol/L  --  105 103 104 103 97* 96*   CO2 mmol/L   --  27.0 26.0 27.0 26.0 22.0 25.0   BUN mg/dL  --  10 10 9 7* 6* 6*   CREATININE mg/dL  --  0.40* 0.50* 0.60 0.50* 0.50* 0.60   GLUCOSE mg/dL  --  98 86 157* 91 93 97   CALCIUM mg/dL  --  9.1 9.4 9.1 9.6 9.1 9.3           Invalid input(s): PROT, LABALBU        Invalid input(s): TG, LDLCALC, LDLREALC    Results from last 7 days  Lab Units 04/06/18  0443   CORTISOL mcg/dL 20.40     Brief Urine Lab Results  (Last result in the past 365 days)      Color   Clarity   Blood   Leuk Est   Nitrite   Protein   CREAT   Urine HCG        04/04/18 1513 Yellow Clear Negative Negative Negative Negative               Microbiology Results Abnormal     None          Imaging Results (all)     Procedure Component Value Units Date/Time    XR Chest 1 View [[id number]] Collected:  04/04/18 1319     Updated:  04/05/18 0901    Narrative:       EXAMINATION: XR CHEST 1 VW-04/04/2018:      INDICATION: Weak/Dizzy/AMS, triage protocol.      COMPARISON: Chest x-ray 04/01/2018.     FINDINGS: Cardiac size within normal limits. Chronic lung changes  without focal consolidation, pneumothorax, or significant pleural  effusion. Degenerative changes of the thoracic spine and right shoulder  again noted.           Impression:       Chronic lung changes without acute cardiopulmonary process  specifically, no focal consolidation to suggest acute pneumonia.     D:  04/04/2018  E:  04/04/2018     This report was finalized on 4/5/2018 8:59 AM by Dr. Jae Baumann.                              Discharge Details      Debbie Baum   Home Medication Instructions CHELSEY:[id number]    Printed on:04/12/18 1221   Medication Information                      aspirin 81 MG chewable tablet  Chew 81 mg Daily.             carvedilol (COREG) 6.25 MG tablet  Take 1 tablet by mouth Every 12 (Twelve) Hours.             esomeprazole (nexIUM) 40 MG capsule  Take 40 mg by mouth Every Morning Before Breakfast.             HYDROcodone-acetaminophen (NORCO) 5-325 MG per tablet  Take 1  tablet by mouth Every 6 (Six) Hours As Needed for Moderate Pain .             ibuprofen (ADVIL,MOTRIN) 200 MG tablet  Take 200 mg by mouth Every 8 (Eight) Hours As Needed for Mild Pain .             magnesium oxide (MAGOX) 400 (241.3 Mg) MG tablet tablet  Take 400 mg by mouth 2 (Two) Times a Day.             Multiple Vitamins-Minerals (MULTIVITAMIN ADULT PO)  Take 1 tablet by mouth Daily.             tamsulosin (FLOMAX) 0.4 MG capsule 24 hr capsule  Take 1 capsule by mouth Daily for 6 doses.                   Discharge Disposition:  Skilled Nursing Facility ([facility] - External)    Discharge Diet:  Diet Instructions     Diet: Regular; Thin       Discharge Diet:  Regular    Fluid Consistency:  Thin          Discharge Activity:   Activity Instructions     Activity as Tolerated             No future appointments.    Additional Instructions for the Follow-ups that You Need to Schedule     Discharge Follow-up with PCP    As directed      Follow Up Details:  once discharged from rehab               Time Spent on Discharge:  40 minutes    Electronically signed by LAMAR Moreno, 04/12/18, 12:21 PM.

## 2018-08-08 ENCOUNTER — APPOINTMENT (OUTPATIENT)
Dept: GENERAL RADIOLOGY | Facility: HOSPITAL | Age: 83
End: 2018-08-08

## 2018-08-08 ENCOUNTER — APPOINTMENT (OUTPATIENT)
Dept: MRI IMAGING | Facility: HOSPITAL | Age: 83
End: 2018-08-08

## 2018-08-08 ENCOUNTER — APPOINTMENT (OUTPATIENT)
Dept: CT IMAGING | Facility: HOSPITAL | Age: 83
End: 2018-08-08

## 2018-08-08 ENCOUNTER — HOSPITAL ENCOUNTER (OUTPATIENT)
Facility: HOSPITAL | Age: 83
Setting detail: OBSERVATION
Discharge: HOME | End: 2018-08-11
Attending: EMERGENCY MEDICINE | Admitting: INTERNAL MEDICINE

## 2018-08-08 DIAGNOSIS — R41.0 CONFUSION: ICD-10-CM

## 2018-08-08 DIAGNOSIS — R40.0 SOMNOLENCE: Primary | ICD-10-CM

## 2018-08-08 DIAGNOSIS — T50.905A MEDICATION ADVERSE EFFECT, INITIAL ENCOUNTER: ICD-10-CM

## 2018-08-08 DIAGNOSIS — R53.1 WEAKNESS GENERALIZED: ICD-10-CM

## 2018-08-08 DIAGNOSIS — G89.4 CHRONIC PAIN SYNDROME: ICD-10-CM

## 2018-08-08 DIAGNOSIS — Z74.09 IMPAIRED MOBILITY AND ADLS: ICD-10-CM

## 2018-08-08 DIAGNOSIS — Z74.09 IMPAIRED FUNCTIONAL MOBILITY, BALANCE, GAIT, AND ENDURANCE: ICD-10-CM

## 2018-08-08 DIAGNOSIS — Z78.9 IMPAIRED MOBILITY AND ADLS: ICD-10-CM

## 2018-08-08 LAB
ALBUMIN SERPL-MCNC: 3.8 G/DL (ref 3.2–4.8)
ALBUMIN/GLOB SERPL: 1.4 G/DL (ref 1.5–2.5)
ALP SERPL-CCNC: 73 U/L (ref 25–100)
ALT SERPL W P-5'-P-CCNC: 15 U/L (ref 7–40)
ANION GAP SERPL CALCULATED.3IONS-SCNC: 14 MMOL/L (ref 3–11)
AST SERPL-CCNC: 19 U/L (ref 0–33)
BASOPHILS # BLD AUTO: 0.03 10*3/MM3 (ref 0–0.2)
BASOPHILS NFR BLD AUTO: 0.3 % (ref 0–1)
BILIRUB SERPL-MCNC: 0.2 MG/DL (ref 0.3–1.2)
BILIRUB UR QL STRIP: NEGATIVE
BUN BLD-MCNC: 17 MG/DL (ref 9–23)
BUN BLDA-MCNC: 18 MG/DL (ref 8–26)
BUN/CREAT SERPL: 32.7 (ref 7–25)
CA-I BLDA-SCNC: 1.34 MMOL/L (ref 1.2–1.32)
CALCIUM SPEC-SCNC: 9.5 MG/DL (ref 8.7–10.4)
CHLORIDE BLDA-SCNC: 98 MMOL/L (ref 98–109)
CHLORIDE SERPL-SCNC: 100 MMOL/L (ref 99–109)
CLARITY UR: CLEAR
CO2 BLDA-SCNC: 24 MMOL/L (ref 24–29)
CO2 SERPL-SCNC: 25 MMOL/L (ref 20–31)
COLOR UR: YELLOW
CREAT BLD-MCNC: 0.52 MG/DL (ref 0.6–1.3)
CREAT BLDA-MCNC: 0.6 MG/DL (ref 0.6–1.3)
DEPRECATED RDW RBC AUTO: 47.1 FL (ref 37–54)
EOSINOPHIL # BLD AUTO: 0.42 10*3/MM3 (ref 0–0.3)
EOSINOPHIL NFR BLD AUTO: 3.7 % (ref 0–3)
ERYTHROCYTE [DISTWIDTH] IN BLOOD BY AUTOMATED COUNT: 14.4 % (ref 11.3–14.5)
GFR SERPL CREATININE-BSD FRML MDRD: 110 ML/MIN/1.73
GLOBULIN UR ELPH-MCNC: 2.7 GM/DL
GLUCOSE BLD-MCNC: 129 MG/DL (ref 70–100)
GLUCOSE BLDC GLUCOMTR-MCNC: 129 MG/DL (ref 70–130)
GLUCOSE UR STRIP-MCNC: NEGATIVE MG/DL
HCT VFR BLD AUTO: 36.9 % (ref 34.5–44)
HCT VFR BLDA CALC: 35 % (ref 38–51)
HGB BLD-MCNC: 11.6 G/DL (ref 11.5–15.5)
HGB BLDA-MCNC: 11.9 G/DL (ref 12–17)
HGB UR QL STRIP.AUTO: NEGATIVE
HOLD SPECIMEN: NORMAL
HOLD SPECIMEN: NORMAL
IMM GRANULOCYTES # BLD: 0.04 10*3/MM3 (ref 0–0.03)
IMM GRANULOCYTES NFR BLD: 0.4 % (ref 0–0.6)
KETONES UR QL STRIP: NEGATIVE
LEUKOCYTE ESTERASE UR QL STRIP.AUTO: NEGATIVE
LYMPHOCYTES # BLD AUTO: 1.27 10*3/MM3 (ref 0.6–4.8)
LYMPHOCYTES NFR BLD AUTO: 11.3 % (ref 24–44)
MAGNESIUM SERPL-MCNC: 1.8 MG/DL (ref 1.3–2.7)
MCH RBC QN AUTO: 28.1 PG (ref 27–31)
MCHC RBC AUTO-ENTMCNC: 31.4 G/DL (ref 32–36)
MCV RBC AUTO: 89.3 FL (ref 80–99)
MONOCYTES # BLD AUTO: 0.82 10*3/MM3 (ref 0–1)
MONOCYTES NFR BLD AUTO: 7.3 % (ref 0–12)
NEUTROPHILS # BLD AUTO: 8.68 10*3/MM3 (ref 1.5–8.3)
NEUTROPHILS NFR BLD AUTO: 77.4 % (ref 41–71)
NITRITE UR QL STRIP: NEGATIVE
NRBC BLD MANUAL-RTO: 0 /100 WBC (ref 0–0)
PH UR STRIP.AUTO: 5.5 [PH] (ref 5–8)
PLATELET # BLD AUTO: 265 10*3/MM3 (ref 150–450)
PMV BLD AUTO: 9.6 FL (ref 6–12)
POTASSIUM BLD-SCNC: 3.7 MMOL/L (ref 3.5–5.5)
POTASSIUM BLDA-SCNC: 3.7 MMOL/L (ref 3.5–4.9)
PROT SERPL-MCNC: 6.5 G/DL (ref 5.7–8.2)
PROT UR QL STRIP: NEGATIVE
RBC # BLD AUTO: 4.13 10*6/MM3 (ref 3.89–5.14)
SODIUM BLD-SCNC: 139 MMOL/L (ref 132–146)
SODIUM BLDA-SCNC: 135 MMOL/L (ref 138–146)
SP GR UR STRIP: 1.03 (ref 1–1.03)
TROPONIN I SERPL-MCNC: 0 NG/ML (ref 0–0.07)
UROBILINOGEN UR QL STRIP: NORMAL
WBC NRBC COR # BLD: 11.22 10*3/MM3 (ref 3.5–10.8)
WHOLE BLOOD HOLD SPECIMEN: NORMAL
WHOLE BLOOD HOLD SPECIMEN: NORMAL

## 2018-08-08 PROCEDURE — G0378 HOSPITAL OBSERVATION PER HR: HCPCS

## 2018-08-08 PROCEDURE — 93005 ELECTROCARDIOGRAM TRACING: CPT

## 2018-08-08 PROCEDURE — 85025 COMPLETE CBC W/AUTO DIFF WBC: CPT | Performed by: EMERGENCY MEDICINE

## 2018-08-08 PROCEDURE — 83735 ASSAY OF MAGNESIUM: CPT | Performed by: EMERGENCY MEDICINE

## 2018-08-08 PROCEDURE — 71250 CT THORAX DX C-: CPT

## 2018-08-08 PROCEDURE — 71045 X-RAY EXAM CHEST 1 VIEW: CPT

## 2018-08-08 PROCEDURE — 70551 MRI BRAIN STEM W/O DYE: CPT

## 2018-08-08 PROCEDURE — 96374 THER/PROPH/DIAG INJ IV PUSH: CPT

## 2018-08-08 PROCEDURE — 85014 HEMATOCRIT: CPT

## 2018-08-08 PROCEDURE — 80053 COMPREHEN METABOLIC PANEL: CPT | Performed by: EMERGENCY MEDICINE

## 2018-08-08 PROCEDURE — 99285 EMERGENCY DEPT VISIT HI MDM: CPT

## 2018-08-08 PROCEDURE — 70450 CT HEAD/BRAIN W/O DYE: CPT

## 2018-08-08 PROCEDURE — 93005 ELECTROCARDIOGRAM TRACING: CPT | Performed by: EMERGENCY MEDICINE

## 2018-08-08 PROCEDURE — 81003 URINALYSIS AUTO W/O SCOPE: CPT | Performed by: EMERGENCY MEDICINE

## 2018-08-08 PROCEDURE — 80047 BASIC METABLC PNL IONIZED CA: CPT

## 2018-08-08 PROCEDURE — 25010000002 KETOROLAC TROMETHAMINE PER 15 MG: Performed by: EMERGENCY MEDICINE

## 2018-08-08 PROCEDURE — 84484 ASSAY OF TROPONIN QUANT: CPT

## 2018-08-08 RX ORDER — SODIUM CHLORIDE 0.9 % (FLUSH) 0.9 %
10 SYRINGE (ML) INJECTION AS NEEDED
Status: DISCONTINUED | OUTPATIENT
Start: 2018-08-08 | End: 2018-08-11 | Stop reason: HOSPADM

## 2018-08-08 RX ORDER — ACETAMINOPHEN 500 MG
500 TABLET ORAL EVERY 6 HOURS PRN
Qty: 12 TABLET | Refills: 0 | Status: SHIPPED | OUTPATIENT
Start: 2018-08-08 | End: 2019-02-13

## 2018-08-08 RX ORDER — KETOROLAC TROMETHAMINE 15 MG/ML
7.5 INJECTION, SOLUTION INTRAMUSCULAR; INTRAVENOUS ONCE
Status: COMPLETED | OUTPATIENT
Start: 2018-08-08 | End: 2018-08-08

## 2018-08-08 RX ADMIN — SODIUM CHLORIDE 500 ML: 9 INJECTION, SOLUTION INTRAVENOUS at 19:01

## 2018-08-08 RX ADMIN — KETOROLAC TROMETHAMINE 7.5 MG: 15 INJECTION, SOLUTION INTRAMUSCULAR; INTRAVENOUS at 19:23

## 2018-08-08 NOTE — ED PROVIDER NOTES
Subjective   Debbie Baum is a 89 y.o.female who presents to the ED status post fall. The patient fell on her back yesterday. She has been experiencing left sided abdominal pain since the incident. She did not hit her head or lose consciousness. She has taken Advil but has not experienced any relief. She called her doctor and was prescribed Tramadol to take twice a day. However, she has taken five Tramadol since yesterday afternoon. She also takes baby Aspirin and Hydrocodone daily. She also complains of feeling weak. She uses a walker at baseline. There are no other complaints at this time.         History provided by:  Patient and relative  History limited by:  Age  Fall   Mechanism of injury: fall    Injury location:  Torso  Torso injury location:  Abd LUQ and abd LLQ  Incident location:  Bathroom  Time since incident:  1 day  Arrived directly from scene: no    Fall:     Fall occurred:  Walking    Impact surface:  Hard floor    Point of impact:  Back    Entrapped after fall: no    Suspicion of alcohol use: no    Suspicion of drug use: no    Prior to arrival data:     Patient ambulatory at scene: no      Blood loss:  None    Responsiveness at scene:  Alert    Orientation at scene:  Person, place, situation and time    Loss of consciousness: no      Amnesic to event: no    Associated symptoms: abdominal pain (left sided)    Associated symptoms: no headaches and no loss of consciousness        Review of Systems   Unable to perform ROS: Age   Gastrointestinal: Positive for abdominal pain (left sided).   Neurological: Positive for weakness. Negative for loss of consciousness and headaches.       Past Medical History:   Diagnosis Date   • Cancer (CMS/HCC)     colon   • Coronary artery disease    • GERD (gastroesophageal reflux disease)    • H/O cardiac radiofrequency ablation        Allergies   Allergen Reactions   • Crab (Diagnostic) Hives   • Lovenox [Enoxaparin Sodium] Rash   • Penicillins Rash     unsure        Past Surgical History:   Procedure Laterality Date   • CARDIAC ABLATION     • CHOLECYSTECTOMY     • COLON SURGERY      BOWEL RESECTION FOR HX COLON CANCER   • HYSTERECTOMY     • REPLACEMENT TOTAL KNEE         History reviewed. No pertinent family history.    Social History     Social History   • Marital status:      Social History Main Topics   • Smoking status: Never Smoker   • Smokeless tobacco: Never Used   • Alcohol use No   • Drug use: No   • Sexual activity: Defer     Other Topics Concern   • Not on file         Objective   Physical Exam   Constitutional: She is oriented to person, place, and time. She appears well-developed and well-nourished. No distress.   Slow speech. Vague, poor historian. Somnulent    HENT:   Head: Normocephalic and atraumatic.   Nose: Nose normal.   Eyes: Conjunctivae are normal. No scleral icterus.   Neck: Normal range of motion. Neck supple.   Cardiovascular: Normal rate, normal heart sounds and intact distal pulses.  An irregularly irregular rhythm present.   No murmur heard.  Pulmonary/Chest: Effort normal and breath sounds normal. No respiratory distress.   Abdominal: Soft. Bowel sounds are normal. There is no tenderness.   Musculoskeletal: Normal range of motion. She exhibits no edema or tenderness.   No focal tenderness. No ct or L spine tenderness   Neurological: She is alert and oriented to person, place, and time.   Skin: Skin is warm and dry.   Psychiatric: She has a normal mood and affect. Her behavior is normal.   Nursing note and vitals reviewed.      Procedures         ED Course  ED Course as of Aug 10 0108   Wed Aug 08, 2018   1403 Troponin I: 0.00 [RS]   1431 Triage documentation mentions right upper quadrant abdominal pain though the patient denies any pain in this region.  She reports the only pain she has is in the left lateral rib region.  Abdomen is benign.  I assume the documentation of right upper quadrant pain was in reference to the left rib pain.   "[RS]   1437 Dr. Mckinley is bedside with the patient and her family discussing the lab results and treatment plan.  [TB]   1439 No emergent findings here in the emergency department.  I feel like the patient's symptoms are related to the accidental overuse of the tramadol with the patient's age.  I advised discontinuing the tramadol.  Advised returning to the patient's baseline medications and taking them as prescribed.  Also described and suggested some symptomatic management.  The patient in the family understand and are happy with this plan.  [RS]   1827 Patient continues to be alert and oriented.  She is resting overall comfortably but states she does not feel like she can go home because she \"feels bad\".  With the patient's age and other risk factors, we will continue to monitor.  There is no indication at this point for admission to the hospital.  [RS]   2116 We did today evaluate the patient.  She's continues to have significant weakness and unsteadiness.  She does live alone and I do feel like she will have a challenge going home at this point.  With her age and risk factors, I believe the patient will benefit from admission to the hospital.  The patient does seem to have some weakness in that right leg which could be concerning for the possibility of a neurologic in etiology.  We'll obtain an MRI.  Hospitalist page.  [RS]   2121 I talked with the daughter who states the patient's right leg is always weaker than the left secondary to a prior fall and fracture.  This is consistent with her current observations.  They agreed with admission for observation.  [RS]   2127 Discussed with Dr. Alfaro who will admit.   [RS]      ED Course User Index  [RS] Adrien Mckinley MD  [TB] Rafael Ramos     Recent Results (from the past 24 hour(s))   POC CHEM 8    Collection Time: 08/08/18  1:34 PM   Result Value Ref Range    Glucose 129 70 - 130 mg/dL    BUN 18 8 - 26 mg/dL    Creatinine 0.60 0.60 - 1.30 mg/dL    Sodium " 135 (L) 138 - 146 mmol/L    Potassium 3.7 3.5 - 4.9 mmol/L    Chloride 98 98 - 109 mmol/L    Total CO2 24 24 - 29 mmol/L    Hemoglobin 11.9 (L) 12.0 - 17.0 g/dL    Hematocrit 35 (L) 38 - 51 %    Ionized Calcium 1.34 (H) 1.20 - 1.32 mmol/L   Comprehensive Metabolic Panel    Collection Time: 08/08/18  1:37 PM   Result Value Ref Range    Glucose 129 (H) 70 - 100 mg/dL    BUN 17 9 - 23 mg/dL    Creatinine 0.52 (L) 0.60 - 1.30 mg/dL    Sodium 139 132 - 146 mmol/L    Potassium 3.7 3.5 - 5.5 mmol/L    Chloride 100 99 - 109 mmol/L    CO2 25.0 20.0 - 31.0 mmol/L    Calcium 9.5 8.7 - 10.4 mg/dL    Total Protein 6.5 5.7 - 8.2 g/dL    Albumin 3.80 3.20 - 4.80 g/dL    ALT (SGPT) 15 7 - 40 U/L    AST (SGOT) 19 0 - 33 U/L    Alkaline Phosphatase 73 25 - 100 U/L    Total Bilirubin 0.2 (L) 0.3 - 1.2 mg/dL    eGFR Non African Amer 110 >60 mL/min/1.73    Globulin 2.7 gm/dL    A/G Ratio 1.4 (L) 1.5 - 2.5 g/dL    BUN/Creatinine Ratio 32.7 (H) 7.0 - 25.0    Anion Gap 14.0 (H) 3.0 - 11.0 mmol/L   Magnesium    Collection Time: 08/08/18  1:37 PM   Result Value Ref Range    Magnesium 1.8 1.3 - 2.7 mg/dL   Light Blue Top    Collection Time: 08/08/18  1:37 PM   Result Value Ref Range    Extra Tube hold for add-on    Green Top (Gel)    Collection Time: 08/08/18  1:37 PM   Result Value Ref Range    Extra Tube Hold for add-ons.    Lavender Top    Collection Time: 08/08/18  1:37 PM   Result Value Ref Range    Extra Tube hold for add-on    Gold Top - SST    Collection Time: 08/08/18  1:37 PM   Result Value Ref Range    Extra Tube Hold for add-ons.    CBC Auto Differential    Collection Time: 08/08/18  1:37 PM   Result Value Ref Range    WBC 11.22 (H) 3.50 - 10.80 10*3/mm3    RBC 4.13 3.89 - 5.14 10*6/mm3    Hemoglobin 11.6 11.5 - 15.5 g/dL    Hematocrit 36.9 34.5 - 44.0 %    MCV 89.3 80.0 - 99.0 fL    MCH 28.1 27.0 - 31.0 pg    MCHC 31.4 (L) 32.0 - 36.0 g/dL    RDW 14.4 11.3 - 14.5 %    RDW-SD 47.1 37.0 - 54.0 fl    MPV 9.6 6.0 - 12.0 fL     Platelets 265 150 - 450 10*3/mm3    Neutrophil % 77.4 (H) 41.0 - 71.0 %    Lymphocyte % 11.3 (L) 24.0 - 44.0 %    Monocyte % 7.3 0.0 - 12.0 %    Eosinophil % 3.7 (H) 0.0 - 3.0 %    Basophil % 0.3 0.0 - 1.0 %    Immature Grans % 0.4 0.0 - 0.6 %    Neutrophils, Absolute 8.68 (H) 1.50 - 8.30 10*3/mm3    Lymphocytes, Absolute 1.27 0.60 - 4.80 10*3/mm3    Monocytes, Absolute 0.82 0.00 - 1.00 10*3/mm3    Eosinophils, Absolute 0.42 (H) 0.00 - 0.30 10*3/mm3    Basophils, Absolute 0.03 0.00 - 0.20 10*3/mm3    Immature Grans, Absolute 0.04 (H) 0.00 - 0.03 10*3/mm3    nRBC 0.0 0.0 - 0.0 /100 WBC   POC Troponin, Rapid    Collection Time: 08/08/18  1:39 PM   Result Value Ref Range    Troponin I 0.00 0.00 - 0.07 ng/mL   Urinalysis With Microscopic If Indicated (No Culture) - Urine, Clean Catch    Collection Time: 08/08/18  2:15 PM   Result Value Ref Range    Color, UA Yellow Yellow, Straw    Appearance, UA Clear Clear    pH, UA 5.5 5.0 - 8.0    Specific Gravity, UA 1.029 1.001 - 1.030    Glucose, UA Negative Negative    Ketones, UA Negative Negative    Bilirubin, UA Negative Negative    Blood, UA Negative Negative    Protein, UA Negative Negative    Leuk Esterase, UA Negative Negative    Nitrite, UA Negative Negative    Urobilinogen, UA 0.2 E.U./dL 0.2 - 1.0 E.U./dL     Note: In addition to lab results from this visit, the labs listed above may include labs taken at another facility or during a different encounter within the last 24 hours. Please correlate lab times with ED admission and discharge times for further clarification of the services performed during this visit.    XR Chest 1 View   Final Result   No acute cardiopulmonary abnormality.       D:  08/08/2018   E:  08/08/2018       This report was finalized on 8/8/2018 2:49 PM by Franklin Baum.          CT Head Without Contrast   Final Result   Atrophy without focal or acute abnormality.       D:  08/08/2018   E:  08/08/2018               This report was finalized on  8/8/2018 2:34 PM by Dr. Chester Escoto MD.          CT Chest Without Contrast   Final Result   Large hiatal hernia; otherwise negative examination with no   acute pulmonary process. No rib fracture is identified and there is no   pneumothorax.       D:  08/08/2018   E:  08/08/2018               This report was finalized on 8/8/2018 2:34 PM by Dr. Chester Escoto MD.          MRI Brain Without Contrast    (Results Pending)     Vitals:    08/08/18 1800 08/08/18 1830 08/08/18 1930 08/08/18 1930   BP: 148/69 156/88  168/92   Pulse: 76 78 90    Resp:       Temp:       TempSrc:       SpO2: 93% 95% 94%    Weight:       Height:         Medications   sodium chloride 0.9 % flush 10 mL (not administered)   sodium chloride 0.9 % bolus 500 mL (0 mL Intravenous Stopped 8/8/18 1923)   ketorolac (TORADOL) injection 7.5 mg (7.5 mg Intravenous Given 8/8/18 1923)     ECG/EMG Results (last 24 hours)     Procedure Component Value Units Date/Time    ECG 12 Lead [[id number]] Collected:  08/08/18 1258     Updated:  08/08/18 1310                        MDM  Number of Diagnoses or Management Options  Chronic pain syndrome:   Confusion:   Medication adverse effect, initial encounter:   Somnolence:   Weakness generalized:   Diagnosis management comments: ECG/EMG Results (last 24 hours)     Procedure Component Value Units Date/Time    ECG 12 Lead ([id number]) Collected:  08/08/18 1258     Updated:  08/08/18 1918    Narrative:       Test Reason : Weak/Dizzy/AMS protocol  Blood Pressure : **/** mmHG  Vent. Rate : 058 BPM     Atrial Rate : 058 BPM     P-R Int : 242 ms          QRS Dur : 080 ms      QT Int : 438 ms       P-R-T Axes : 047 041 012 degrees     QTc Int : 429 ms    Sinus bradycardia with marked sinus arrhythmia with 1st degree AV block  Otherwise normal ECG  When compared with ECG of 04-APR-2018 12:53,  WV interval has increased  Confirmed by ROBYN LAN MD ([id number]) on 8/8/2018 7:18:32 PM    Referred By:  HEIDY           Confirmed By:ROBYN  RIKY DOWNEY             Amount and/or Complexity of Data Reviewed  Clinical lab tests: reviewed  Tests in the radiology section of CPT®: reviewed  Decide to obtain previous medical records or to obtain history from someone other than the patient: yes  Obtain history from someone other than the patient: yes  Discuss the patient with other providers: yes  Independent visualization of images, tracings, or specimens: yes        Final diagnoses:   Somnolence   Medication adverse effect, initial encounter   Chronic pain syndrome   Confusion   Weakness generalized       Documentation assistance provided by joaquina Ramos.  Information recorded by the joaquina was done at my direction and has been verified and validated by me.     Rafael Ramos  08/08/18 1330       Rafael Ramos  08/08/18 1632       Rafael Ramos  08/08/18 1736       Rafael Ramos  08/08/18 1925       Adrien Mckinley MD  08/08/18 8325       Adrien Mckinley MD  08/10/18 0100

## 2018-08-09 ENCOUNTER — APPOINTMENT (OUTPATIENT)
Dept: GENERAL RADIOLOGY | Facility: HOSPITAL | Age: 83
End: 2018-08-09

## 2018-08-09 PROBLEM — T50.905A MEDICATION ADVERSE EFFECT: Status: ACTIVE | Noted: 2018-08-09

## 2018-08-09 PROBLEM — G89.29 CHRONIC PAIN: Status: ACTIVE | Noted: 2018-08-09

## 2018-08-09 PROBLEM — R53.81 DEBILITY: Status: ACTIVE | Noted: 2018-08-09

## 2018-08-09 LAB
ANION GAP SERPL CALCULATED.3IONS-SCNC: 10 MMOL/L (ref 3–11)
BUN BLD-MCNC: 9 MG/DL (ref 9–23)
BUN/CREAT SERPL: 19.6 (ref 7–25)
CALCIUM SPEC-SCNC: 9.4 MG/DL (ref 8.7–10.4)
CHLORIDE SERPL-SCNC: 104 MMOL/L (ref 99–109)
CO2 SERPL-SCNC: 26 MMOL/L (ref 20–31)
CREAT BLD-MCNC: 0.46 MG/DL (ref 0.6–1.3)
DEPRECATED RDW RBC AUTO: 46.4 FL (ref 37–54)
ERYTHROCYTE [DISTWIDTH] IN BLOOD BY AUTOMATED COUNT: 14.2 % (ref 11.3–14.5)
GFR SERPL CREATININE-BSD FRML MDRD: 126 ML/MIN/1.73
GLUCOSE BLD-MCNC: 88 MG/DL (ref 70–100)
HCT VFR BLD AUTO: 37.1 % (ref 34.5–44)
HGB BLD-MCNC: 11.6 G/DL (ref 11.5–15.5)
MCH RBC QN AUTO: 28 PG (ref 27–31)
MCHC RBC AUTO-ENTMCNC: 31.3 G/DL (ref 32–36)
MCV RBC AUTO: 89.4 FL (ref 80–99)
PLATELET # BLD AUTO: 255 10*3/MM3 (ref 150–450)
PMV BLD AUTO: 10 FL (ref 6–12)
POTASSIUM BLD-SCNC: 3.5 MMOL/L (ref 3.5–5.5)
RBC # BLD AUTO: 4.15 10*6/MM3 (ref 3.89–5.14)
SODIUM BLD-SCNC: 140 MMOL/L (ref 132–146)
WBC NRBC COR # BLD: 8.09 10*3/MM3 (ref 3.5–10.8)

## 2018-08-09 PROCEDURE — 80048 BASIC METABOLIC PNL TOTAL CA: CPT | Performed by: NURSE PRACTITIONER

## 2018-08-09 PROCEDURE — G0378 HOSPITAL OBSERVATION PER HR: HCPCS

## 2018-08-09 PROCEDURE — G8979 MOBILITY GOAL STATUS: HCPCS

## 2018-08-09 PROCEDURE — G8978 MOBILITY CURRENT STATUS: HCPCS

## 2018-08-09 PROCEDURE — 99220 PR INITIAL OBSERVATION CARE/DAY 70 MINUTES: CPT | Performed by: INTERNAL MEDICINE

## 2018-08-09 PROCEDURE — 97162 PT EVAL MOD COMPLEX 30 MIN: CPT

## 2018-08-09 PROCEDURE — 25010000002 MORPHINE SULFATE (PF) 2 MG/ML SOLUTION: Performed by: INTERNAL MEDICINE

## 2018-08-09 PROCEDURE — 73502 X-RAY EXAM HIP UNI 2-3 VIEWS: CPT

## 2018-08-09 PROCEDURE — 85027 COMPLETE CBC AUTOMATED: CPT | Performed by: NURSE PRACTITIONER

## 2018-08-09 PROCEDURE — 96375 TX/PRO/DX INJ NEW DRUG ADDON: CPT

## 2018-08-09 RX ORDER — IBUPROFEN 400 MG/1
200 TABLET ORAL EVERY 8 HOURS PRN
Status: DISCONTINUED | OUTPATIENT
Start: 2018-08-09 | End: 2018-08-09

## 2018-08-09 RX ORDER — CARVEDILOL 6.25 MG/1
6.25 TABLET ORAL EVERY 12 HOURS SCHEDULED
Status: DISCONTINUED | OUTPATIENT
Start: 2018-08-09 | End: 2018-08-11 | Stop reason: HOSPADM

## 2018-08-09 RX ORDER — LIDOCAINE 50 MG/G
1 PATCH TOPICAL
Status: DISCONTINUED | OUTPATIENT
Start: 2018-08-09 | End: 2018-08-11 | Stop reason: HOSPADM

## 2018-08-09 RX ORDER — IBUPROFEN 400 MG/1
400 TABLET ORAL EVERY 6 HOURS PRN
Status: DISCONTINUED | OUTPATIENT
Start: 2018-08-09 | End: 2018-08-10

## 2018-08-09 RX ORDER — ACETAMINOPHEN 325 MG/1
650 TABLET ORAL EVERY 4 HOURS PRN
Status: DISCONTINUED | OUTPATIENT
Start: 2018-08-09 | End: 2018-08-11 | Stop reason: HOSPADM

## 2018-08-09 RX ORDER — PANTOPRAZOLE SODIUM 40 MG/1
40 TABLET, DELAYED RELEASE ORAL EVERY MORNING
Status: DISCONTINUED | OUTPATIENT
Start: 2018-08-09 | End: 2018-08-11 | Stop reason: HOSPADM

## 2018-08-09 RX ORDER — SODIUM CHLORIDE 0.9 % (FLUSH) 0.9 %
1-10 SYRINGE (ML) INJECTION AS NEEDED
Status: DISCONTINUED | OUTPATIENT
Start: 2018-08-09 | End: 2018-08-11 | Stop reason: HOSPADM

## 2018-08-09 RX ORDER — MORPHINE SULFATE 2 MG/ML
2 INJECTION, SOLUTION INTRAMUSCULAR; INTRAVENOUS ONCE
Status: COMPLETED | OUTPATIENT
Start: 2018-08-09 | End: 2018-08-09

## 2018-08-09 RX ORDER — HYDROCODONE BITARTRATE AND ACETAMINOPHEN 5; 325 MG/1; MG/1
1 TABLET ORAL EVERY 4 HOURS PRN
Status: DISCONTINUED | OUTPATIENT
Start: 2018-08-09 | End: 2018-08-11 | Stop reason: HOSPADM

## 2018-08-09 RX ORDER — ASPIRIN 81 MG/1
81 TABLET, CHEWABLE ORAL DAILY
Status: DISCONTINUED | OUTPATIENT
Start: 2018-08-09 | End: 2018-08-11 | Stop reason: HOSPADM

## 2018-08-09 RX ORDER — HYDROCODONE BITARTRATE AND ACETAMINOPHEN 5; 325 MG/1; MG/1
1 TABLET ORAL EVERY 6 HOURS PRN
Status: DISCONTINUED | OUTPATIENT
Start: 2018-08-09 | End: 2018-08-09

## 2018-08-09 RX ORDER — HYDROCODONE BITARTRATE AND ACETAMINOPHEN 5; 325 MG/1; MG/1
1 TABLET ORAL ONCE
Status: COMPLETED | OUTPATIENT
Start: 2018-08-09 | End: 2018-08-09

## 2018-08-09 RX ADMIN — CARVEDILOL 6.25 MG: 6.25 TABLET, FILM COATED ORAL at 08:51

## 2018-08-09 RX ADMIN — IBUPROFEN 200 MG: 400 TABLET ORAL at 01:23

## 2018-08-09 RX ADMIN — HYDROCODONE BITARTRATE AND ACETAMINOPHEN 1 TABLET: 5; 325 TABLET ORAL at 23:04

## 2018-08-09 RX ADMIN — ASPIRIN 81 MG CHEWABLE TABLET 81 MG: 81 TABLET CHEWABLE at 08:51

## 2018-08-09 RX ADMIN — PANTOPRAZOLE SODIUM 40 MG: 40 TABLET, DELAYED RELEASE ORAL at 08:51

## 2018-08-09 RX ADMIN — HYDROCODONE BITARTRATE AND ACETAMINOPHEN 1 TABLET: 5; 325 TABLET ORAL at 18:48

## 2018-08-09 RX ADMIN — MORPHINE SULFATE 2 MG: 2 INJECTION, SOLUTION INTRAMUSCULAR; INTRAVENOUS at 02:26

## 2018-08-09 RX ADMIN — CARVEDILOL 6.25 MG: 6.25 TABLET, FILM COATED ORAL at 01:22

## 2018-08-09 RX ADMIN — IBUPROFEN 400 MG: 400 TABLET ORAL at 20:07

## 2018-08-09 RX ADMIN — HYDROCODONE BITARTRATE AND ACETAMINOPHEN 1 TABLET: 5; 325 TABLET ORAL at 08:51

## 2018-08-09 RX ADMIN — Medication 400 MG: at 08:51

## 2018-08-09 RX ADMIN — HYDROCODONE BITARTRATE AND ACETAMINOPHEN 1 TABLET: 5; 325 TABLET ORAL at 22:22

## 2018-08-09 RX ADMIN — CARVEDILOL 6.25 MG: 6.25 TABLET, FILM COATED ORAL at 20:07

## 2018-08-09 RX ADMIN — HYDROCODONE BITARTRATE AND ACETAMINOPHEN 1 TABLET: 5; 325 TABLET ORAL at 14:51

## 2018-08-09 RX ADMIN — LIDOCAINE 1 PATCH: 50 PATCH CUTANEOUS at 12:03

## 2018-08-09 NOTE — PLAN OF CARE
Problem: Patient Care Overview  Goal: Plan of Care Review  Outcome: Ongoing (interventions implemented as appropriate)   08/09/18 1321   Coping/Psychosocial   Plan of Care Reviewed With patient   OTHER   Outcome Summary Pt ambulates in gonzalez x 150ft with CGAx2 using RWx with gait abnormailities putting her at risk for subsequent falls. Pt recommended to go to SNF for short term rehab, but if she returns home she would need 24/7 care and HHPT initially.

## 2018-08-09 NOTE — THERAPY EVALUATION
Acute Care - Physical Therapy Initial Evaluation  [facility]     Patient Name: Debbie Baum  : 1923  MRN: [id number]  Today's Date: 2018   Onset of Illness/Injury or Date of Surgery: 18  Date of Referral to PT: 18  Referring Physician: LAMAR Willoughby      Admit Date: 2018    Visit Dx:     ICD-10-CM ICD-9-CM   1. Somnolence R40.0 780.09   2. Medication adverse effect, initial encounter T88.7XXA E947.9   3. Chronic pain syndrome G89.4 338.4   4. Confusion R41.0 298.9   5. Weakness generalized R53.1 780.79   6. Impaired functional mobility, balance, gait, and endurance Z74.09 V49.89     Patient Active Problem List   Diagnosis   • Hyponatremia   • Essential hypertension   • Coronary artery disease   • Weakness generalized   • GERD (gastroesophageal reflux disease)   • Atrial fibrillation (CMS/HCC)   • Somnolence   • Medication adverse effect   • Chronic pain     Past Medical History:   Diagnosis Date   • Cancer (CMS/HCC)     colon   • Coronary artery disease    • GERD (gastroesophageal reflux disease)    • H/O cardiac radiofrequency ablation      Past Surgical History:   Procedure Laterality Date   • CARDIAC ABLATION     • CHOLECYSTECTOMY     • COLON SURGERY      BOWEL RESECTION FOR HX COLON CANCER   • HYSTERECTOMY     • REPLACEMENT TOTAL KNEE          PT ASSESSMENT (last 12 hours)      Physical Therapy Evaluation     Row Name 18 1013          PT Evaluation Time/Intention    Subjective Information complains of;pain  -EH     Document Type evaluation  -EH     Mode of Treatment individual therapy;physical therapy  -EH     Patient Effort excellent  -EH     Symptoms Noted During/After Treatment fatigue  -EH     Comment requests to turn around at end of gonzalez  -     Row Name 18 1013          General Information    Patient Profile Reviewed? yes  -     Onset of Illness/Injury or Date of Surgery 18  -     Referring Physician LAMAR Willoughby  -     Patient Observations  "alert;cooperative;agree to therapy  -     Patient/Family Observations no family present  -     General Observations of Patient Pt supine in bed, positions self into long sitting as PT introduces self.  -     Prior Level of Function independent:;all household mobility;ADL's;dependent:;cleaning;driving;shopping   does not use stairs  -     Equipment Currently Used at Home grab bar;raised toilet;walker, rolling;bath bench   multiple rolling walkers  -     Pertinent History of Current Functional Problem Pt prsents to hospital with confusion, chronic pain syndrome, general weakness s/p fall on back day prior to admit with L sided pain and back pain. She called her MD and was perscribed Tramadol to take 2x a day, but had taken five between that afternoon and persenting to hospital. ALso takes asprin and hydrocodone/ C/o weakness, \"feeling bad\".  -     Existing Precautions/Restrictions fall  -     Risks Reviewed patient:;LOB;increased discomfort;dizziness  -     Benefits Reviewed patient:;improve function;increase independence;increase strength;increase balance;improve skin integrity  -     Barriers to Rehab previous functional deficit  -     Row Name 08/09/18 1013          Relationship/Environment    Lives With alone   reports noone in family availible to stay at home with her  -     Row Name 08/09/18 1013          Resource/Environmental Concerns    Current Living Arrangements home/apartment/condo  -     Row Name 08/09/18 1013          Cognitive Assessment/Intervention- PT/OT    Orientation Status (Cognition) oriented x 4  -     Follows Commands (Cognition) follows multi-step commands;over 90% accuracy  -     Safety Deficit (Cognitive) mild deficit;safety precautions awareness;safety precautions follow-through/compliance;insight into deficits/self awareness  -     Row Name 08/09/18 1013          Safety Issues, Functional Mobility    Safety Issues Affecting Function (Mobility) safety " precaution awareness;safety precautions follow-through/compliance;insight into deficits/self awareness  -     Impairments Affecting Function (Mobility) strength;pain  -EH     Row Name 08/09/18 1013          Bed Mobility Assessment/Treatment    Bed Mobility Assessment/Treatment supine-sit  -     Supine-Sit [place] (Bed Mobility) independent  -EH     Row Name 08/09/18 1013          Transfer Assessment/Treatment    Transfer Assessment/Treatment sit-stand transfer;stand-sit transfer;toilet transfer  -     Sit-Stand [place] (Transfers) contact guard  -     Stand-Sit [place] (Transfers) contact guard  -     [place] Level (Toilet Transfer) contact guard  -     Assistive Device (Toilet Transfer) commode, bedside without drop arms  -EH     Row Name 08/09/18 1013          Sit-Stand Transfer    Assistive Device (Sit-Stand Transfers) walker, front-wheeled  -EH     Row Name 08/09/18 1013          Stand-Sit Transfer    Assistive Device (Stand-Sit Transfers) walker, front-wheeled  -EH     Row Name 08/09/18 1013          Toilet Transfer    Type (Toilet Transfer) stand pivot/stand step  -EH     Row Name 08/09/18 1013          Gait/Stairs Assessment/Training    [place] Level (Gait) contact guard;2 person assist  -     Assistive Device (Gait) walker, front-wheeled  [facility]     Bilateral Gait Deviations forward flexed posture  -     Right Sided Gait Deviations hip circumduction   external rotation   -[facility] Name 08/09/18 1013          General ROM    GENERAL ROM COMMENTS BLE WFL  -EH     Row Name 08/09/18 1013          General Assessment (Manual Muscle Testing)    Comment, General Manual Muscle Testing (MMT) Assessment RLE functionally 4-/5, LLE functionally 4/5.  -[facility] Name 08/09/18 1013          Sensory Assessment/Intervention    Sensory General Assessment no sensation deficits identified  -EH     Row Name 08/09/18 1013          Pain Assessment    Additional Documentation Pain Scale:  Numbers Pre/Post-Treatment (Group)  -EH     Row Name 08/09/18 1013          Pain Scale: Numbers Pre/Post-Treatment    Pain Scale: Numbers, Pretreatment 5/10  -     Pain Scale: Numbers, Post-Treatment 5/10  -EH     Pain Location back  -     Pain Intervention(s) Repositioned;Ambulation/increased activity  -EH     Row Name 08/09/18 1013          Coping    Observed Emotional State cooperative  -     Verbalized Emotional State acceptance  -EH     Row Name 08/09/18 1013          Plan of Care Review    Plan of Care Reviewed With patient  -EH     Row Name 08/09/18 1013          Physical Therapy Clinical Impression    Date of Referral to PT 08/08/18  -     Criteria for Skilled Interventions Met (PT Clinical Impression) yes;treatment indicated  -EH     Row Name 08/09/18 1013          Physical Therapy Goals    Bed Mobility Goal Selection (PT) --  -     Transfer Goal Selection (PT) transfer, PT goal 1  -     Gait Training Goal Selection (PT) gait training, PT goal 1  -EH     Row Name 08/09/18 1013          Transfer Goal 1 (PT)    Activity/Assistive Device (Transfer Goal 1, PT) sit-to-stand/stand-to-sit;walker, rolling  -     [place] Level/Cues Needed (Transfer Goal 1, PT) conditional independence  -EH     Time Frame (Transfer Goal 1, PT) long term goal (LTG);5 days  -EH     Row Name 08/09/18 1013          Gait Training Goal 1 (PT)    Activity/Assistive Device (Gait Training Goal 1, PT) walker, rolling;gait (walking locomotion);assistive device use  -     [place] Level (Gait Training Goal 1, PT) conditional independence  -     Distance (Gait Goal 1, PT) 150  -     Time Frame (Gait Training Goal 1, PT) long term goal (LTG);5 days  -EH     Row Name 08/09/18 1013          Positioning and Restraints    Pre-Treatment Position in bed  -     Post Treatment Position chair  -     In Chair call light within reach;encouraged to call for assist;exit alarm on  -EH     Row Name 08/09/18 1013          Living  Environment    Home Accessibility tub/shower is not walk in  [facility]       User Key  (r) = Recorded By, (t) = Taken By, (c) = Cosigned By    Initials Name Provider Type     Nayana Laird PT Physical Therapist          Physical Therapy Education     Title: PT OT SLP Therapies (Active)     Topic: Physical Therapy (Active)     Point: Mobility training (Active)    Learning Progress Summary     Learner Status Readiness Method Response Comment Documented by    Patient Active Acceptance E NR   08/09/18 1321          Point: Body mechanics (Active)    Learning Progress Summary     Learner Status Readiness Method Response Comment Documented by    Patient Active Acceptance E NR   08/09/18 1321          Point: Precautions (Active)    Learning Progress Summary     Learner Status Readiness Method Response Comment Documented by    Patient Active Acceptance E NR   08/09/18 1321                      User Key     Initials Effective Dates Name Provider Type [facility] 06/19/15 -  Nayana Laird PT Physical Therapist PT                PT Recommendation and Plan  Anticipated Discharge Disposition (PT): skilled nursing facility (VS HOME WITH 24/7 assist and HHPT)  Planned Therapy Interventions (PT Eval): balance training, gait training, bed mobility training, home exercise program, strengthening, stair training, ROM (range of motion), stretching, postural re-education, patient/family education, transfer training  Therapy Frequency (PT Clinical Impression): daily  Outcome Summary/Treatment Plan (PT)  Anticipated Discharge Disposition (PT): skilled nursing facility ([facility] HOME WITH 24/7 assist and HHPT)  Plan of Care Reviewed With: patient  Outcome Summary: Pt ambulates in gonzalez x 150ft with CGAx2 using RWx with gait abnormailities putting her at risk for subsequent falls. Pt recommended to go to SNF for short term rehab, but if she returns home she would need 24/7 care and HHPT initially.          Outcome Measures      Row Name 08/09/18 1013             How much help from another person do you currently need...    Turning from your back to your side while in flat bed without using bedrails? 4  -EH      Moving from lying on back to sitting on the side of a flat bed without bedrails? 4  -EH      Moving to and from a bed to a chair (including a wheelchair)? 3  -EH      Standing up from a chair using your arms (e.g., wheelchair, bedside chair)? 3  -EH      Climbing 3-5 steps with a railing? 2  -EH      To walk in hospital room? 3  -EH      AM-PAC 6 Clicks Score 19  -         Functional Assessment    Outcome Measure Options AM-PAC 6 Clicks Basic Mobility (PT)  -        User Key  (r) = Recorded By, (t) = Taken By, (c) = Cosigned By    Initials Name Provider Type     Nayana Laird, PT Physical Therapist           Time Calculation:         PT Charges     Row Name 08/09/18 1013             Time Calculation    Start Time 1013  -      PT Received On 08/09/18  -      PT Goal Re-Cert Due Date 08/19/18  -         Time Calculation- PT    Total Timed Code Minutes- PT 0 minute(s)  -        User Key  (r) = Recorded By, (t) = Taken By, (c) = Cosigned By    Initials Name Provider Type     Nayana Laird PT Physical Therapist        Therapy Suggested Charges     Code   Minutes Charges    None           Therapy Charges for Today     Code Description Service Date Service Provider Modifiers Qty    83872995201 HC PT MOBILITY CURRENT 8/9/2018 Nayana Laird, PT GP, CJ 1    [id number] HC PT MOBILITY PROJECTED 8/9/2018 Nayana Laird, PT GP, CI 1    [id number]  PT EVAL MOD COMPLEXITY 4 8/9/2018 Nayana Laird, PT GP 1    [id number] HC PT THER SUPP EA 15 MIN 8/9/2018 Nayana Laird, PT GP 2          PT G-Codes  PT Professional Judgement Used?: Yes  Outcome Measure Options: AM-PAC 6 Clicks Basic Mobility (PT)  Score: 19  Functional Limitation: Mobility: Walking and moving around  Mobility: Walking and Moving  Around Current Status (): At least 20 percent but less than 40 percent impaired, limited or restricted  Mobility: Walking and Moving Around Goal Status (): At least 1 percent but less than 20 percent impaired, limited or restricted      Nayana Laird, PT  8/9/2018

## 2018-08-09 NOTE — H&P
[facility]  HISTORY AND PHYSICAL    Patient Name: Debbie Baum  : 1923  MRN: [id number]  Primary Care Physician: Paula Scott MD    Subjective   Subjective     Chief Complaint:  somnolence    HPI:  Debbie Baum is a 89 y.o. female presents to the ED status post fall yesterday at her home.  Patient was up walking with her walker when it got caught on the door causing her to fall backwards.  She reports having some back pain since the incident.  She denies hitting her head or loss of consciousness.  She called her PCP and was prescribed Tramadol to take twice a day, however she has taken five since yesterday afternoon.  Patient also takes hydrocodone daily for chronic pain.  Patient and family reported that she has been somnolent all day today and has had generalized weakness.  She denies soa, chest pain, nausea, vomiting, abdominal pain, or any other complaints at this time.  CTA of the chest, CT of the head, and MRI of the brain unremarkable.  Patient is being admitted to the Hospitalist for further evaluation and management.    Review of Systems   HENT: Negative.    Eyes: Negative.    Respiratory: Negative.    Cardiovascular: Negative.    Gastrointestinal: Negative.    Endocrine: Negative.    Genitourinary: Negative.    Musculoskeletal: Positive for back pain.   Allergic/Immunologic: Negative.    Neurological: Positive for weakness (generalized). Negative for dizziness, tremors, seizures, syncope, speech difficulty, light-headedness, numbness and headaches.        Somnolent   Hematological: Negative.    Psychiatric/Behavioral: Negative.         Personal History     Past Medical History:   Diagnosis Date   • Cancer (CMS/HCC)     colon   • Coronary artery disease    • GERD (gastroesophageal reflux disease)    • H/O cardiac radiofrequency ablation        Past Surgical History:   Procedure Laterality Date   • CARDIAC ABLATION     • CHOLECYSTECTOMY     • COLON  SURGERY      BOWEL RESECTION FOR HX COLON CANCER   • HYSTERECTOMY     • REPLACEMENT TOTAL KNEE         Family History: family history is not on file.     Social History:  reports that she has never smoked. She has never used smokeless tobacco. She reports that she does not drink alcohol or use drugs.  Social History     Social History Narrative   • No narrative on file       Medications:  Prescriptions Prior to Admission   Medication Sig Dispense Refill Last Dose   • aspirin 81 MG chewable tablet Chew 81 mg Daily.   4/3/2018 at Unknown time   • carvedilol (COREG) 6.25 MG tablet Take 1 tablet by mouth Every 12 (Twelve) Hours. 60 tablet 0    • esomeprazole (nexIUM) 40 MG capsule Take 40 mg by mouth Every Morning Before Breakfast.   4/3/2018 at Unknown time   • HYDROcodone-acetaminophen (NORCO) 5-325 MG per tablet Take 1 tablet by mouth Every 6 (Six) Hours As Needed for Moderate Pain . 12 tablet 0    • ibuprofen (ADVIL,MOTRIN) 200 MG tablet Take 200 mg by mouth Every 8 (Eight) Hours As Needed for Mild Pain .   4/4/2018 at Unknown time   • magnesium oxide (MAGOX) 400 (241.3 Mg) MG tablet tablet Take 400 mg by mouth Every Other Day.   4/3/2018 at Unknown time   • Multiple Vitamins-Minerals (MULTIVITAMIN ADULT PO) Take 1 tablet by mouth Daily.   4/3/2018 at Unknown time       Allergies   Allergen Reactions   • Crab (Diagnostic) Hives   • Lovenox [Enoxaparin Sodium] Rash   • Penicillins Rash     unsure       Objective   Objective     Vital Signs:   Temp:  [97.4 °F (36.3 °C)] 97.4 °F (36.3 °C)  Heart Rate:  [52-90] 90  Resp:  [12] 12  BP: (137-168)/(65-99) 168/92        Physical Exam   Constitutional: She is oriented to person, place, and time. She appears well-developed and well-nourished.   HENT:   Head: Normocephalic.   Eyes: Pupils are equal, round, and reactive to light.   Neck: Normal range of motion. Neck supple.   Cardiovascular: Normal rate, normal heart sounds and intact distal pulses.  Exam reveals no gallop and  no friction rub.    No murmur heard.  Irregularly irregular   Pulmonary/Chest: Effort normal and breath sounds normal. No respiratory distress. She has no wheezes. She has no rales. She exhibits no tenderness.   Abdominal: Soft. Bowel sounds are normal. She exhibits no distension and no mass. There is no tenderness. There is no rebound and no guarding.   Musculoskeletal: Normal range of motion. She exhibits no edema, tenderness or deformity.   Neurological: She is oriented to person, place, and time. No cranial nerve deficit.   drowsy   Skin: Skin is warm. No rash noted. She is not diaphoretic. No erythema. No pallor.   Psychiatric: She has a normal mood and affect. Her behavior is normal.          Results Reviewed:  I have personally reviewed current lab, radiology, and data and agree.      Results from last 7 days  Lab Units 08/08/18  1337   WBC 10*3/mm3 11.22*   HEMOGLOBIN g/dL 11.6   HEMATOCRIT % 36.9   PLATELETS 10*3/mm3 265       Results from last 7 days  Lab Units 08/08/18  1337   SODIUM mmol/L 139   POTASSIUM mmol/L 3.7   CHLORIDE mmol/L 100   CO2 mmol/L 25.0   BUN mg/dL 17   CREATININE mg/dL 0.52*   GLUCOSE mg/dL 129*   CALCIUM mg/dL 9.5   ALT (SGPT) U/L 15   AST (SGOT) U/L 19     Estimated Creatinine Clearance: 37.7 mL/min (A) (by C-G formula based on SCr of 0.52 mg/dL (L)).  Brief Urine Lab Results  (Last result in the past 365 days)      Color   Clarity   Blood   Leuk Est   Nitrite   Protein   CREAT   Urine HCG        08/08/18 1415 Yellow Clear Negative Negative Negative Negative             No results found for: BNP  Imaging Results (last 24 hours)     Procedure Component Value Units Date/Time    MRI Brain Without Contrast [[id number]] Collected:  08/08/18 2119     Updated:  08/09/18 0029    Narrative:       EXAM:    MR Head Without Intravenous Contrast    CLINICAL HISTORY:    89 years old, female; Chronic pain syndrome; Somnolence; Disorientation,   unspecified; Weakness; Unspecified adverse effect of  drug or medicament,   initial encounter; Signs and symptoms; Altered mental status/memory loss and   malaise or fatigue; Confusion or disorientation; Patient HX: AMS and weakness   status post fall. The patient fell on her back yesterday. She did not hit her   head or lose consciousness. She also complains of feeling weak.    TECHNIQUE:    Magnetic resonance images of the head/brain without intravenous contrast in   multiple planes.    COMPARISON:    CT HEAD WO CONTRAST 2018-08-08 14:00    FINDINGS:    Brain:  Age-related involutional changes and chronic microvascular ischemic   disease.  No acute infarct.  No mass effect or midline shift.  No extra-axial   collection.  No acute intracranial hemorrhage.  Basal cisterns are patent.    Ventricles:  Unremarkable.  No ventriculomegaly.    Bones/joints:  Unremarkable.    Sinuses:  Unremarkable as visualized.  No acute sinusitis.    Mastoid air cells: Clear.    Orbits:  Bilateral cataract surgery.      Impression:         No acute infarct, acute intracranial hemorrhage, or mass effect.    THIS DOCUMENT HAS BEEN ELECTRONICALLY SIGNED BY SUJATHA STEWART MD    XR Chest 1 View [[id number]] Collected:  08/08/18 1348     Updated:  08/08/18 1451    Narrative:       EXAMINATION: XR CHEST 1 VW- 08/08/2018     INDICATION: Weak/Dizzy/AMS triage protocol     TECHNIQUE:  Single view frontal chest.     COMPARISONS: 04/04/2018     FINDINGS:  No focal airspace disease, pleural effusion or pneumothorax.  Large hiatal hernia. Unchanged thoracolumbar and right glenohumeral  degenerative disease.       Impression:       No acute cardiopulmonary abnormality.     D:  08/08/2018  E:  08/08/2018     This report was finalized on 8/8/2018 2:49 PM by Franklin Baum.       CT Head Without Contrast [[id number]] Collected:  08/08/18 1427     Updated:  08/08/18 1436    Narrative:       EXAMINATION: CT HEAD WO CONTRAST-08/08/2018:      INDICATION: Fall with AMS and generalized weakness.         TECHNIQUE: Images  of the head were obtained without intravenous  contrast.     The radiation dose reduction device was turned on for each scan per the  ALARA (As Low as Reasonably Achievable) protocol.     COMPARISON: 2004 (atrophy).     FINDINGS: As on the previous examination, there is central and cortical  atrophy. There are no acute findings and there has been no interval  change.       Impression:       Atrophy without focal or acute abnormality.     D:  08/08/2018  E:  08/08/2018           This report was finalized on 8/8/2018 2:34 PM by Dr. Chester Escoto MD.       CT Chest Without Contrast [[id number]] Collected:  08/08/18 1428     Updated:  08/08/18 1436    Narrative:       EXAMINATION: CT CHEST WO CONTRAST-08/08/2018:      INDICATION: Fall with left lateral rib pain and AMS.         TECHNIQUE: CT scan of the chest was performed. No intravenous contrast  was utilized. There is no prior exam for comparison.     The radiation dose reduction device was turned on for each scan per the  ALARA (As Low as Reasonably Achievable) protocol.     COMPARISON: NONE.     FINDINGS: There is no axillary or mediastinal adenopathy. There is a  large hiatal hernia. There is no acute inflammatory process or mass.  There are no findings of pneumothorax or rib fracture.       Impression:       Large hiatal hernia; otherwise negative examination with no  acute pulmonary process. No rib fracture is identified and there is no  pneumothorax.     D:  08/08/2018  E:  08/08/2018           This report was finalized on 8/8/2018 2:34 PM by Dr. Chester Escoto MD.                Assessment/Plan   Assessment / Plan     Hospital Problem List     * (Principal)Somnolence    Weakness generalized    Medication adverse effect    Essential hypertension    Coronary artery disease    GERD (gastroesophageal reflux disease)    Atrial fibrillation (CMS/HCC)    Chronic pain            Assessment & Plan:    1.  Somnolence secondary to medication adverse effect, improving   -DC  tramadol   -hold norco for now until patient more alert   -neuro checks   -fall precautions    2.  Generalized weakness   -pt consult   -fall precautions   -consult case management for discharge planning    3.  Essential HTN   -continue coreg    4.  Afib, rate controlled   -continue asa and coreg    5.  Chronic pain   -hold norco until patient more alert    6.  CAD   -continue asa    7.  GERD   -protonix    DVT prophylaxis:  scuds    CODE STATUS:    Code Status and Medical Interventions:   Ordered at: 08/09/18 0054     Limited Support to NOT Include:    Intubation    Cardioversion/Defibrillation     Level Of Support Discussed With:    Patient     Code Status:    No CPR     Medical Interventions (Level of Support Prior to Arrest):    Limited         Electronically signed by LAMAR Warner, 08/09/18, 12:25 AM.    Brief Attending Admission Attestation     I have seen and examined the patient, performing an independent face-to-face diagnostic evaluation with plan of care reviewed and developed with the advanced practice clinician (APC).      Brief Summary Statement/HPI:   Debbie Baum is a 89 y.o. female who lives in her home per report and was brought to ER due to excessively somnolence. Patients states that she fells the previous day and was hurting in her back.She called her PCP who prescribed some tramadol but patient accidentally overdosed on the medication.Work up in the ER does not reveal any acute abnormalities. Currently pain is awake and alert. She is a very pleasant elderly woman. Will monitor closely overnight and have PT/OT evaluation in the morning.      Attending Physical Exam:  Constitutional: No acute distress, awake, alert and oriented x 4.  Eyes: PERRLA, sclerae anicteric, no conjunctival injection  HENT: NCAT, mucous membranes moist  Neck: Supple, no thyromegaly, no lymphadenopathy, trachea midline  Respiratory: Clear to auscultation bilaterally, nonlabored respirations   Cardiovascular:  Irregular rates, no murmurs, rubs, or gallops, palpable pedal pulses bilaterally  Gastrointestinal: Positive bowel sounds, soft, nontender, nondistended  Musculoskeletal: No bilateral ankle edema, no clubbing or cyanosis to extremities  Psychiatric: Appropriate affect, cooperative  Neurologic: Oriented x 3, strength symmetric in all extremities, Cranial Nerves grossly, speech clear  Skin: No rashes      Brief Assessment/Plan :  See above for further detailed assessment and plan developed with APC which I have reviewed and/or edited.      Electronically signed by King Alfaro MD, 08/09/18, 3:06 AM.

## 2018-08-09 NOTE — PROGRESS NOTES
Discharge Planning Assessment  [facility]     Patient Name: Debbie Baum  MRN: [id number]  Today's Date: 8/9/2018    Admit Date: 8/8/2018          Discharge Needs Assessment     Row Name 08/09/18 1608       Living Environment    Lives With alone    Current Living Arrangements home/apartment/condo    Primary Care Provided by self    Provides Primary Care For no one    Family Caregiver if Needed child(savage), adult;grandchild(savage), adult    Quality of Family Relationships supportive;involved;helpful    Able to Return to Prior Arrangements --   TBD       Resource/Environmental Concerns    Transportation Concerns car, none       Transition Planning    Patient/Family Anticipates Transition to home    Patient/Family Anticipated Services at Transition     Transportation Anticipated family or friend will provide       Discharge Needs Assessment    Readmission Within the Last 30 Days no previous admission in last 30 days    Concerns to be Addressed discharge planning    Equipment Currently Used at Home walker, rolling;rollator;cane, straight;shower chair    Anticipated Changes Related to Illness --   TBD    Equipment Needed After Discharge --   TBD    Discharge Facility/Level of Care Needs --   TBD            Discharge Plan     Row Name 08/09/18 1610       Plan    Plan ONGOING    Patient/Family in Agreement with Plan yes    Plan Comments Met with pt and grandson at bedside.  Pt resides alone in [facility].  She is independent with ADLs.  She is ambulatory with a RW.  PT notes rec STR when medically ready.  Discussed with pt that [facility] is the only options given current status.  Per request, CM called referral to [facility].  No current HH services, however, she feels she may benefit from HH if [facility] is not an option.  If appropriate, she prefers [facility].  Confirmed she has Medicare and [employer] insurance with Rx coverage.  Goal is to return home upon DC.  CM will cont to follow.        Destination     No service  coordination in this encounter.      Durable Medical Equipment     No service coordination in this encounter.      Dialysis/Infusion     No service coordination in this encounter.      Home Medical Care     No service coordination in this encounter.      Social Care     No service coordination in this encounter.                Demographic Summary     Row Name 08/09/18 1608       General Information    Admission Type observation    Referral Source admission list    Reason for Consult decision making    Preferred Language English       Contact Information    Permission Granted to Share Info With     Contact Information Obtained for             Functional Status     Row Name 08/09/18 1608       Functional Status    Usual Activity Tolerance good       Functional Status, IADL    Medications independent    Meal Preparation independent    Housekeeping independent    Laundry independent    Shopping assistive person            Psychosocial    No documentation.           Abuse/Neglect    No documentation.           Legal    No documentation.           Substance Abuse    No documentation.           Patient Forms    No documentation.         Flaquita Posadas

## 2018-08-10 PROCEDURE — 96372 THER/PROPH/DIAG INJ SC/IM: CPT

## 2018-08-10 PROCEDURE — 25010000002 HEPARIN (PORCINE) PER 1000 UNITS: Performed by: HOSPITALIST

## 2018-08-10 PROCEDURE — G0378 HOSPITAL OBSERVATION PER HR: HCPCS

## 2018-08-10 PROCEDURE — 99226 PR SBSQ OBSERVATION CARE/DAY 35 MINUTES: CPT | Performed by: HOSPITALIST

## 2018-08-10 RX ORDER — HEPARIN SODIUM 5000 [USP'U]/ML
5000 INJECTION, SOLUTION INTRAVENOUS; SUBCUTANEOUS EVERY 8 HOURS SCHEDULED
Status: DISCONTINUED | OUTPATIENT
Start: 2018-08-10 | End: 2018-08-11 | Stop reason: HOSPADM

## 2018-08-10 RX ADMIN — CARVEDILOL 6.25 MG: 6.25 TABLET, FILM COATED ORAL at 22:17

## 2018-08-10 RX ADMIN — IBUPROFEN 400 MG: 400 TABLET ORAL at 14:51

## 2018-08-10 RX ADMIN — HYDROCODONE BITARTRATE AND ACETAMINOPHEN 1 TABLET: 5; 325 TABLET ORAL at 14:51

## 2018-08-10 RX ADMIN — HYDROCODONE BITARTRATE AND ACETAMINOPHEN 1 TABLET: 5; 325 TABLET ORAL at 22:17

## 2018-08-10 RX ADMIN — PANTOPRAZOLE SODIUM 40 MG: 40 TABLET, DELAYED RELEASE ORAL at 09:20

## 2018-08-10 RX ADMIN — HYDROCODONE BITARTRATE AND ACETAMINOPHEN 1 TABLET: 5; 325 TABLET ORAL at 10:19

## 2018-08-10 RX ADMIN — HYDROCODONE BITARTRATE AND ACETAMINOPHEN 1 TABLET: 5; 325 TABLET ORAL at 05:59

## 2018-08-10 RX ADMIN — HEPARIN SODIUM 5000 UNITS: 5000 INJECTION, SOLUTION INTRAVENOUS; SUBCUTANEOUS at 22:18

## 2018-08-10 RX ADMIN — ASPIRIN 81 MG CHEWABLE TABLET 81 MG: 81 TABLET CHEWABLE at 09:20

## 2018-08-10 RX ADMIN — LIDOCAINE 1 PATCH: 50 PATCH CUTANEOUS at 09:20

## 2018-08-10 RX ADMIN — HYDROCODONE BITARTRATE AND ACETAMINOPHEN 1 TABLET: 5; 325 TABLET ORAL at 02:14

## 2018-08-10 RX ADMIN — CARVEDILOL 6.25 MG: 6.25 TABLET, FILM COATED ORAL at 09:20

## 2018-08-10 RX ADMIN — HYDROCODONE BITARTRATE AND ACETAMINOPHEN 1 TABLET: 5; 325 TABLET ORAL at 18:31

## 2018-08-10 NOTE — PLAN OF CARE
Problem: Fall Risk (Adult)  Goal: Absence of Fall  Outcome: Ongoing (interventions implemented as appropriate)   08/10/18 0505   Fall Risk (Adult)   Absence of Fall making progress toward outcome       Problem: Patient Care Overview  Goal: Plan of Care Review  Outcome: Ongoing (interventions implemented as appropriate)   08/10/18 0505   Coping/Psychosocial   Plan of Care Reviewed With patient   Plan of Care Review   Progress no change   OTHER   Outcome Summary Pt c/o pain throughout night in hip. X ray results pending. Pain medication administered PRN as well as a one time extra dose of Norco.        Problem: Pain, Acute (Adult)  Goal: Acceptable Pain Control/Comfort Level  Outcome: Ongoing (interventions implemented as appropriate)   08/10/18 0505   Pain, Acute (Adult)   Acceptable Pain Control/Comfort Level making progress toward outcome

## 2018-08-10 NOTE — PROGRESS NOTES
Continued Stay Note  [facility]     Patient Name: Debbie Baum  MRN: [id number]  Today's Date: 8/10/2018    Admit Date: 8/8/2018          Discharge Plan     Row Name 08/10/18 1549       Plan    Plan Home with home health     Patient/Family in Agreement with Plan yes    Plan Comments Spoke with patient at bedside and called her daughter over the phone - discussed her observation status and inability to meet for skilled rehab due to obersvation status and unable to meet for acute rehab at [facility] because she does not meet acute care guidelines. They understand and ask that [facility] health be ordered - Referral called to [facility]. Sitter list was faxed to the daughter so she can have more help at home as well. They anticipate discharge tomorrow -  following - [phone]     Final Discharge Disposition Code 06 - home with home health care              Discharge Codes    No documentation.           Haydee Quintero RN

## 2018-08-10 NOTE — PROGRESS NOTES
[facility]  PROGRESS NOTE    Patient Name: Debbie Baum  : 1923  MRN: [id number]    Date of Admission: 2018  Length of Stay: 0  Primary Care Physician: Paula Scott MD    Subjective   Subjective     CC:  Intractable pain after fall    HPI:  Complaining of pain troughout night last night.  She called her primary care and was unable to control pain in outpatient setting so presented to ER.  She lives alone but currently doesn't think she can manage ambulation even with her home rolling walker.  PT recommending SNF for short term rehab.    Review of Systems    Gen- No fevers, chills  CV- No chest pain, palpitations  Resp- No cough, dyspnea  GI- No N/V/D, abd pain  Otherwise ROS is negative except as mentioned in the HPI.    Objective   Objective     Vital Signs:   Temp:  [98.5 °F (36.9 °C)-98.7 °F (37.1 °C)] 98.5 °F (36.9 °C)  Heart Rate:  [70-79] 79  Resp:  [18] 18  BP: (151-182)/(82-85) 151/85     Physical Exam:    Constitutional: No acute distress, awake, alert  HENT: NCAT, wasted temporalis muscles  Respiratory: Clear to auscultation bilaterally, respiratory effort normal   Cardiovascular: RRR, s1 and s2  Gastrointestinal: Positive bowel sounds, soft, nontender, nondistended  Musculoskeletal: No bilateral ankle edema  Psychiatric: Appropriate affect, cooperative  Neurologic: Oriented x 3, strength symmetric in all extremities, Cranial Nerves grossly intact to confrontation, speech clear  Skin: dry, + skin tenting    Results Reviewed:  I have personally reviewed current lab, radiology, and data and agree.      Results from last 7 days  Lab Units 18  0459 18  1337 18  1334   WBC 10*3/mm3 8.09 11.22*  --    HEMOGLOBIN g/dL 11.6 11.6  --    HEMOGLOBIN, POC g/dL  --   --  11.9*   HEMATOCRIT % 37.1 36.9  --    HEMATOCRIT POC %  --   --  35*   PLATELETS 10*3/mm3 255 265  --        Results from last 7 days  Lab Units 18  0457  08/08/18  1337 08/08/18  1334   SODIUM mmol/L 140 139  --    POTASSIUM mmol/L 3.5 3.7  --    CHLORIDE mmol/L 104 100  --    CO2 mmol/L 26.0 25.0  --    BUN mg/dL 9 17  --    CREATININE mg/dL 0.46* 0.52* 0.60   GLUCOSE mg/dL 88 129*  --    CALCIUM mg/dL 9.4 9.5  --    ALT (SGPT) U/L  --  15  --    AST (SGOT) U/L  --  19  --      Estimated Creatinine Clearance: 37.7 mL/min (A) (by C-G formula based on SCr of 0.46 mg/dL (L)).  No results found for: BNP    Microbiology Results Abnormal     None          Imaging Results (last 24 hours)     Procedure Component Value Units Date/Time    XR Hip With or Without Pelvis 2 - 3 View Left [[id number]] Collected:  08/10/18 0928     Updated:  08/10/18 1033    Narrative:       EXAMINATION: XR HIP W OR WO PELVIS, 2-3 VIEW, LEFT-08/09/2018:      INDICATION: Hip pain after fall; R40.0-Somnolence; T88.7XXA-Unspecified  adverse effect of drug or medicament, initial encounter; G89.4-Chronic  pain syndrome; R41.0-Disorientation, unspecified; R53.1-Weakness;  Z74.09-Other reduced mobility.      COMPARISON: NONE.     FINDINGS: Imaging of the pelvis and 2 views of the left hip reveal  degenerative changes seen within the lower lumbar spine and sacroiliac  joints bilaterally. Degenerative changes seen within the acetabulum.  Cortex is intact. No fracture or dislocation. Deformity identified of  the right inferior pubic ramus possibly from prior healed injury.  Degenerative changes seen within the sacroiliac joints bilaterally.       Impression:       Osteopenia identified of the bony structures. No evidence of  acute bony abnormality identified. Degenerative changes seen within the  hips and sacroiliac joints bilaterally.     D:  08/10/2018  E:  08/10/2018     This report was finalized on 8/10/2018 10:31 AM by Dr. Sylvia Wood MD.                I have reviewed the medications.    Assessment/Plan   Assessment / Plan     Hospital Problem List     * (Principal)Somnolence    Essential  hypertension    Coronary artery disease    Weakness generalized    GERD (gastroesophageal reflux disease)    Atrial fibrillation (CMS/HCC)    Medication adverse effect    Chronic pain        Brief Hospital Course to date:  Debbie Baum is a 89 y.o. female with chronic pain, but had a fall and has been having intractable pain and inability to get back to her activities of daily living due to this.    Assessment & Plan:    Somnolence secondary to medication adverse effect              -DC tramadol              -hold norco for now until patient more alert              -neuro checks              -fall precautions     Generalized weakness              -pt consult              -fall precautions              -fall risk with a walker     Essential HTN              -continue coreg     Afib, rate controlled              -continue asa and coreg     Chronic pain              -hold norco until patient more alert     CAD              -continue asa     GERD              -protonix    PT recommended SNF for short term rehab.  She is needing 2 assist with RWalker and has fall risk and gait abnormalities.    DVT Prophylaxis:  Heparin SC    CODE STATUS:   Code Status and Medical Interventions:   Ordered at: 08/09/18 0054     Limited Support to NOT Include:    Intubation    Cardioversion/Defibrillation     Level Of Support Discussed With:    Patient     Code Status:    No CPR     Medical Interventions (Level of Support Prior to Arrest):    Limited       Disposition: I expect the patient to be discharged TBD      Electronically signed by Nikko Kelly MD, 08/10/18, 4:55 PM.

## 2018-08-11 VITALS
DIASTOLIC BLOOD PRESSURE: 89 MMHG | BODY MASS INDEX: 20.09 KG/M2 | WEIGHT: 125 LBS | RESPIRATION RATE: 18 BRPM | HEART RATE: 82 BPM | HEIGHT: 66 IN | SYSTOLIC BLOOD PRESSURE: 156 MMHG | TEMPERATURE: 98.2 F | OXYGEN SATURATION: 96 %

## 2018-08-11 PROCEDURE — 99217 PR OBSERVATION CARE DISCHARGE MANAGEMENT: CPT | Performed by: HOSPITALIST

## 2018-08-11 PROCEDURE — G8988 SELF CARE GOAL STATUS: HCPCS

## 2018-08-11 PROCEDURE — G0378 HOSPITAL OBSERVATION PER HR: HCPCS

## 2018-08-11 PROCEDURE — 25010000002 HEPARIN (PORCINE) PER 1000 UNITS: Performed by: HOSPITALIST

## 2018-08-11 PROCEDURE — 96372 THER/PROPH/DIAG INJ SC/IM: CPT

## 2018-08-11 PROCEDURE — G8987 SELF CARE CURRENT STATUS: HCPCS

## 2018-08-11 RX ADMIN — HYDROCODONE BITARTRATE AND ACETAMINOPHEN 1 TABLET: 5; 325 TABLET ORAL at 06:12

## 2018-08-11 RX ADMIN — LIDOCAINE 1 PATCH: 50 PATCH CUTANEOUS at 08:00

## 2018-08-11 RX ADMIN — HYDROCODONE BITARTRATE AND ACETAMINOPHEN 1 TABLET: 5; 325 TABLET ORAL at 01:56

## 2018-08-11 RX ADMIN — PANTOPRAZOLE SODIUM 40 MG: 40 TABLET, DELAYED RELEASE ORAL at 06:12

## 2018-08-11 RX ADMIN — CARVEDILOL 6.25 MG: 6.25 TABLET, FILM COATED ORAL at 08:00

## 2018-08-11 RX ADMIN — ASPIRIN 81 MG CHEWABLE TABLET 81 MG: 81 TABLET CHEWABLE at 07:58

## 2018-08-11 RX ADMIN — HYDROCODONE BITARTRATE AND ACETAMINOPHEN 1 TABLET: 5; 325 TABLET ORAL at 10:32

## 2018-08-11 RX ADMIN — HEPARIN SODIUM 5000 UNITS: 5000 INJECTION, SOLUTION INTRAVENOUS; SUBCUTANEOUS at 06:13

## 2018-08-11 NOTE — DISCHARGE SUMMARY
[facility] Medicine Services  DISCHARGE SUMMARY    Patient Name: Debbie Baum  : 1923  MRN: [id number]    Date of Admission: 2018  Date of Discharge:  2018 (Saturday)  Primary Care Physician: Paula Scott MD    Consults     No orders found from 7/10/2018 to 2018.        Hospital Course     Presenting Problem:   Somnolence [R40.0]    Active Hospital Problems    Diagnosis Date Noted   • **Somnolence [R40.0] 2018   • Medication adverse effect [T88.7XXA] 2018   • Chronic pain [G89.29] 2018   • Coronary artery disease [I25.10] 2018   • Essential hypertension [I10] 2018   • GERD (gastroesophageal reflux disease) [K21.9] 2018   • Atrial fibrillation (CMS/HCC) [I48.91] 2018   • Weakness generalized [R53.1] 2018      Resolved Hospital Problems    Diagnosis Date Noted Date Resolved   No resolved problems to display.       Hospital Course:  Debbie Baum is a 89 y.o. female who was admitted with somnolence and concerns about medication side effects.  She had been taking several pain medication prior to evaluation and is taking several Norco 5 prior to discharge today.  She was seen by PT and felt would benefit from short term rehab, but per CM did not approve this.  CM discussed with patient and family today and would like to go home.  They were reportedly threatening leaving against medical advice today.    Day of Discharge     HPI: patient asking about PT.  Family threatening to leave AMA if not discharged before 2pm.    Review of Systems    Gen- No fevers, chills  CV- No chest pain, palpitations  Resp- No cough, dyspnea  GI- No N/V/D, abd pain    Otherwise ROS is negative except as mentioned in the HPI.    Vital Signs:   Temp:  [98.2 °F (36.8 °C)] 98.2 °F (36.8 °C)  Heart Rate:  [62-82] 82  Resp:  [18] 18  BP: (147-169)/(81-93) 156/89     Physical Exam:    Gen:  NAD  HEENT:  No JVD  CVS:  RRR, s1 and s2  Lungs:  Clear  Abd:  soft, NT, ND, no guarding  Ext: no pedal edema    Pertinent  and/or Most Recent Results       Results from last 7 days  Lab Units 08/09/18  0459 08/08/18  1337 08/08/18  1334   WBC 10*3/mm3 8.09 11.22*  --    HEMOGLOBIN g/dL 11.6 11.6  --    HEMOGLOBIN, POC g/dL  --   --  11.9*   HEMATOCRIT % 37.1 36.9  --    HEMATOCRIT POC %  --   --  35*   PLATELETS 10*3/mm3 255 265  --    SODIUM mmol/L 140 139  --    POTASSIUM mmol/L 3.5 3.7  --    CHLORIDE mmol/L 104 100  --    CO2 mmol/L 26.0 25.0  --    BUN mg/dL 9 17  --    CREATININE mg/dL 0.46* 0.52* 0.60   GLUCOSE mg/dL 88 129*  --    CALCIUM mg/dL 9.4 9.5  --        Results from last 7 days  Lab Units 08/08/18  1337   BILIRUBIN mg/dL 0.2*   ALK PHOS U/L 73   ALT (SGPT) U/L 15   AST (SGOT) U/L 19           Invalid input(s): TG, LDLCALC, LDLREALC      Brief Urine Lab Results  (Last result in the past 365 days)      Color   Clarity   Blood   Leuk Est   Nitrite   Protein   CREAT   Urine HCG        08/08/18 1415 Yellow Clear Negative Negative Negative Negative               Microbiology Results Abnormal     None          Imaging Results (all)     Procedure Component Value Units Date/Time    XR Hip With or Without Pelvis 2 - 3 View Left [[id number]] Collected:  08/10/18 0928     Updated:  08/10/18 1033    Narrative:       EXAMINATION: XR HIP W OR WO PELVIS, 2-3 VIEW, LEFT-08/09/2018:      INDICATION: Hip pain after fall; R40.0-Somnolence; T88.7XXA-Unspecified  adverse effect of drug or medicament, initial encounter; G89.4-Chronic  pain syndrome; R41.0-Disorientation, unspecified; R53.1-Weakness;  Z74.09-Other reduced mobility.      COMPARISON: NONE.     FINDINGS: Imaging of the pelvis and 2 views of the left hip reveal  degenerative changes seen within the lower lumbar spine and sacroiliac  joints bilaterally. Degenerative changes seen within the acetabulum.  Cortex is intact. No fracture or dislocation. Deformity identified of  the right inferior pubic ramus possibly from prior  healed injury.  Degenerative changes seen within the sacroiliac joints bilaterally.       Impression:       Osteopenia identified of the bony structures. No evidence of  acute bony abnormality identified. Degenerative changes seen within the  hips and sacroiliac joints bilaterally.     D:  08/10/2018  E:  08/10/2018     This report was finalized on 8/10/2018 10:31 AM by Dr. Sylvia Wood MD.       MRI Brain Without Contrast [[id number]] Collected:  08/08/18 2119     Updated:  08/09/18 0029    Narrative:       EXAM:    MR Head Without Intravenous Contrast    CLINICAL HISTORY:    89 years old, female; Chronic pain syndrome; Somnolence; Disorientation,   unspecified; Weakness; Unspecified adverse effect of drug or medicament,   initial encounter; Signs and symptoms; Altered mental status/memory loss and   malaise or fatigue; Confusion or disorientation; Patient HX: AMS and weakness   status post fall. The patient fell on her back yesterday. She did not hit her   head or lose consciousness. She also complains of feeling weak.    TECHNIQUE:    Magnetic resonance images of the head/brain without intravenous contrast in   multiple planes.    COMPARISON:    CT HEAD WO CONTRAST 2018-08-08 14:00    FINDINGS:    Brain:  Age-related involutional changes and chronic microvascular ischemic   disease.  No acute infarct.  No mass effect or midline shift.  No extra-axial   collection.  No acute intracranial hemorrhage.  Basal cisterns are patent.    Ventricles:  Unremarkable.  No ventriculomegaly.    Bones/joints:  Unremarkable.    Sinuses:  Unremarkable as visualized.  No acute sinusitis.    Mastoid air cells: Clear.    Orbits:  Bilateral cataract surgery.      Impression:         No acute infarct, acute intracranial hemorrhage, or mass effect.    THIS DOCUMENT HAS BEEN ELECTRONICALLY SIGNED BY SUJATHA STEWART MD    XR Chest 1 View [[id number]] Collected:  08/08/18 1348     Updated:  08/08/18 1451    Narrative:       EXAMINATION: XR  CHEST 1 VW- 08/08/2018     INDICATION: Weak/Dizzy/AMS triage protocol     TECHNIQUE:  Single view frontal chest.     COMPARISONS: 04/04/2018     FINDINGS:  No focal airspace disease, pleural effusion or pneumothorax.  Large hiatal hernia. Unchanged thoracolumbar and right glenohumeral  degenerative disease.       Impression:       No acute cardiopulmonary abnormality.     D:  08/08/2018  E:  08/08/2018     This report was finalized on 8/8/2018 2:49 PM by Franklin Baum.       CT Head Without Contrast [[id number]] Collected:  08/08/18 1427     Updated:  08/08/18 1436    Narrative:       EXAMINATION: CT HEAD WO CONTRAST-08/08/2018:      INDICATION: Fall with AMS and generalized weakness.         TECHNIQUE: Images of the head were obtained without intravenous  contrast.     The radiation dose reduction device was turned on for each scan per the  ALARA (As Low as Reasonably Achievable) protocol.     COMPARISON: 2004 (atrophy).     FINDINGS: As on the previous examination, there is central and cortical  atrophy. There are no acute findings and there has been no interval  change.       Impression:       Atrophy without focal or acute abnormality.     D:  08/08/2018  E:  08/08/2018           This report was finalized on 8/8/2018 2:34 PM by Dr. Chester Escoto MD.       CT Chest Without Contrast [[id number]] Collected:  08/08/18 1428     Updated:  08/08/18 1436    Narrative:       EXAMINATION: CT CHEST WO CONTRAST-08/08/2018:      INDICATION: Fall with left lateral rib pain and AMS.         TECHNIQUE: CT scan of the chest was performed. No intravenous contrast  was utilized. There is no prior exam for comparison.     The radiation dose reduction device was turned on for each scan per the  ALARA (As Low as Reasonably Achievable) protocol.     COMPARISON: NONE.     FINDINGS: There is no axillary or mediastinal adenopathy. There is a  large hiatal hernia. There is no acute inflammatory process or mass.  There are no findings of  pneumothorax or rib fracture.       Impression:       Large hiatal hernia; otherwise negative examination with no  acute pulmonary process. No rib fracture is identified and there is no  pneumothorax.     D:  08/08/2018  E:  08/08/2018           This report was finalized on 8/8/2018 2:34 PM by Dr. Chester Escoto MD.                       Discharge Details        Discharge Medications      New Medications      Instructions Start Date   acetaminophen 500 MG tablet  Commonly known as:  TYLENOL   500 mg, Oral, Every 6 Hours PRN         Continue These Medications      Instructions Start Date   aspirin 81 MG chewable tablet   81 mg, Oral, Daily      carvedilol 6.25 MG tablet  Commonly known as:  COREG   6.25 mg, Oral, Every 12 Hours Scheduled      esomeprazole 40 MG capsule  Commonly known as:  nexIUM   40 mg, Oral, Every Morning Before Breakfast      HYDROcodone-acetaminophen 5-325 MG per tablet  Commonly known as:  NORCO   1 tablet, Oral, Every 6 Hours PRN      ibuprofen 200 MG tablet  Commonly known as:  ADVIL,MOTRIN   200 mg, Oral, Every 8 Hours PRN      magnesium oxide 400 (241.3 Mg) MG tablet tablet  Commonly known as:  MAGOX   400 mg, Oral, Every Other Day      MULTIVITAMIN ADULT PO   1 tablet, Oral, Daily           Discharge Disposition:  Home or Self Care    Discharge Diet: as tolerated    Discharge Activity: as tolerated    Special Instructions:  Patient should be very careful with medications as pain medication may have contributed to evaluation in the ER.    Code Status/Level of Support:  Code Status and Medical Interventions:   Ordered at: 08/09/18 0054     Limited Support to NOT Include:    Intubation    Cardioversion/Defibrillation     Level Of Support Discussed With:    Patient     Code Status:    No CPR     Medical Interventions (Level of Support Prior to Arrest):    Limited       No future appointments.    Additional Instructions for the Follow-ups that You Need to Schedule     Ambulatory Referral to Home  Health    As directed      Face to Face Visit Date:  8/10/2018    Follow-up Provider for Plan of Care?:  I treated the patient in an acute care facility and will not continue treatment after discharge.    Follow-up Provider:  JOSUE BRAMBILA [[id number]]    Reason/Clinical Findings:  admitted with somnolence, fell at home, increased weakness, deconditioning    Describe mobility limitations that make leaving home difficult:  generalized weakness, homebound, impaired mobility/ gait/ balance    Nursing/Therapeutic Services Requested:  Skilled Nursing Physical Therapy Occupational Therapy    Skilled nursing orders:  Medication education Pain management Cardiopulmonary assessments Neurovascular assessments    PT orders:  Total joint pathway Therapeutic exercise Gait Training Transfer training Strengthening Home safety assessment    Weight Bearing Status:  Partial Weight Bearing    Occupational orders:  Activities of daily living Energy conservation Strengthening Cognition Fine motor Home safety assessment             Family threatening to sign patient out AMA if not discharged before 2pm.    Time Spent on Discharge:  40 minutes    Electronically signed by Nikko Kelly MD, 08/11/18, 11:51 AM.

## 2018-08-11 NOTE — THERAPY EVALUATION
Acute Care - Occupational Therapy Initial Evaluation  [facility]     Patient Name: Debbie Baum  : 1923  MRN: [id number]  Today's Date: 2018  Onset of Illness/Injury or Date of Surgery: 18  Date of Referral to OT: 08/10/18  Referring Physician: MD Leticia    Admit Date: 2018       ICD-10-CM ICD-9-CM   1. Somnolence R40.0 780.09   2. Medication adverse effect, initial encounter T88.7XXA E947.9   3. Chronic pain syndrome G89.4 338.4   4. Confusion R41.0 298.9   5. Weakness generalized R53.1 780.79   6. Impaired functional mobility, balance, gait, and endurance Z74.09 V49.89   7. Impaired mobility and ADLs Z74.09 799.89     Patient Active Problem List   Diagnosis   • Hyponatremia   • Essential hypertension   • Coronary artery disease   • Weakness generalized   • GERD (gastroesophageal reflux disease)   • Atrial fibrillation (CMS/HCC)   • Somnolence   • Medication adverse effect   • Chronic pain     Past Medical History:   Diagnosis Date   • Cancer (CMS/HCC)     colon   • Coronary artery disease    • GERD (gastroesophageal reflux disease)    • H/O cardiac radiofrequency ablation      Past Surgical History:   Procedure Laterality Date   • CARDIAC ABLATION     • CHOLECYSTECTOMY     • COLON SURGERY      BOWEL RESECTION FOR HX COLON CANCER   • HYSTERECTOMY     • REPLACEMENT TOTAL KNEE            OT ASSESSMENT FLOWSHEET (last 72 hours)      Occupational Therapy Evaluation     Row Name 18 1005                   OT Evaluation Time/Intention    Subjective Information complains of;pain  -AC        Document Type evaluation  -AC        Patient Effort good  -AC        Symptoms Noted During/After Treatment fatigue  -AC           General Information    Patient Profile Reviewed? yes  -AC        Onset of Illness/Injury or Date of Surgery 18  -AC        Referring Physician MD Leticia  -AC        Patient Observations alert;cooperative;agree to therapy  -AC        General Observations of Patient Pt  received in bed.  -AC        Prior Level of Function independent:;all household mobility;ADL's;dependent:;home management;driving;shopping  -AC        Equipment Currently Used at Home grab bar;raised toilet;walker, rolling;bath bench  -AC        Pertinent History of Current Functional Problem Pt admit with confusion and weakness, and recent fall  -AC        Existing Precautions/Restrictions fall  -AC        Risks Reviewed spouse/S.O.:;LOB  -AC        Benefits Reviewed patient:;improve function;increase independence;increase strength;increase balance;increase knowledge  -AC        Barriers to Rehab previous functional deficit  -AC           Relationship/Environment    Primary Source of Support/Comfort child(savage)  -AC        Lives With alone  -AC        Family Caregiver if Needed child(savage), adult  -AC           Resource/Environmental Concerns    Current Living Arrangements home/apartment/condo  -AC           Home Main Entrance    Number of Stairs, Main Entrance one  -AC           Cognitive Assessment/Interventions    Additional Documentation Cognitive Assessment/Intervention (Group)  -AC           Cognitive Assessment/Intervention- PT/OT    Orientation Status (Cognition) oriented x 4  -AC        Follows Commands (Cognition) follows one step commands;over 90% accuracy  -AC        Safety Deficit (Cognitive) mild deficit;judgment;awareness of need for assistance;insight into deficits/self awareness  -AC           Safety Issues, Functional Mobility    Safety Issues Affecting Function (Mobility) at risk behavior observed;insight into deficits/self awareness;judgment;problem solving;safety precaution awareness  -AC        Impairments Affecting Function (Mobility) balance;pain;strength  -AC           Bed Mobility Assessment/Treatment    Bed Mobility Assessment/Treatment supine-sit;sit-supine  -AC        Supine-Sit [place] (Bed Mobility) independent  -AC        Sit-Supine [place] (Bed Mobility) independent  -AC            Functional Mobility    Functional Mobility- Ind. Level contact guard assist  -        Functional Mobility- Device rolling walker  -        Functional Mobility-Distance (Feet) 60  -AC        Functional Mobility- Safety Issues sequencing ability decreased;step length decreased  -           Transfer Assessment/Treatment    Transfer Assessment/Treatment sit-stand transfer;stand-sit transfer  -           Sit-Stand Transfer    Sit-Stand [place] (Transfers) supervision  -        Assistive Device (Sit-Stand Transfers) walker, front-wheeled  -AC           Stand-Sit Transfer    Stand-Sit [place] (Transfers) supervision  -        Assistive Device (Stand-Sit Transfers) walker, front-wheeled  -AC           ADL Assessment/Intervention    BADL Assessment/Intervention lower body dressing  -           Lower Body Dressing Assessment/Training    Lower Body Dressing [place] Level doff;don;shoes/slippers;set up  -        Lower Body Dressing Position edge of bed sitting  -           BADL Safety/Performance    Skilled BADL Treatment/Intervention BADL process/adaptation training  -           General ROM    GENERAL ROM COMMENTS B shld limited 25%; otherwise WFL  -           General Assessment (Manual Muscle Testing)    Comment, General Manual Muscle Testing (MMT) Assessment BShld 3-/5; otherwis 4-/5  -           Positioning and Restraints    Pre-Treatment Position in bed  -AC        Post Treatment Position bed  -AC        In Bed fowlers;call light within reach;encouraged to call for assist;exit alarm on;with family/caregiver  -           Pain Scale: Numbers Pre/Post-Treatment    Pain Scale: Numbers, Pretreatment 4/10  -        Pain Scale: Numbers, Post-Treatment 4/10  -        Pain Location back  -        Pain Intervention(s) Repositioned  -           Plan of Care Review    Plan of Care Reviewed With patient  -           Clinical Impression (OT)    Date of Referral to OT 08/10/18  -         OT Diagnosis Increase ADL independence   -AC        Patient/Family Goals Statement (OT Eval) Increase safety and ondeendence with self care and mobility  -AC        Criteria for Skilled Therapeutic Interventions Met (OT Eval) yes;treatment indicated  -AC        Rehab Potential (OT Eval) good, to achieve stated therapy goals  -AC        Therapy Frequency (OT Eval) daily  -AC        Care Plan Review (OT) evaluation/treatment results reviewed  -AC        Anticipated Discharge Disposition (OT) skilled nursing facility  -AC           Planned OT Interventions    Planned Therapy Interventions (OT Eval) adaptive equipment training;BADL retraining;functional balance retraining;occupation/activity based interventions;strengthening exercise;transfer/mobility retraining  -AC           OT Goals    Transfer Goal Selection (OT) transfer, OT goal 1  -AC        Toileting Goal Selection (OT) toileting, OT goal 1  -AC        Strength Goal Selection (OT) strength, OT goal 1  -AC        Additional Documentation Strength Goal Selection (OT) (Row)  -AC           Transfer Goal 1 (OT)    Activity/Assistive Device (Transfer Goal 1, OT) bed-to-chair/chair-to-bed;toilet;walker, rolling  -AC        [place] Level/Cues Needed (Transfer Goal 1, OT) supervision required  -AC        Time Frame (Transfer Goal 1, OT) by discharge  -AC        Progress/Outcome (Transfer Goal 1, OT) goal ongoing  -AC           Toileting Goal 1 (OT)    Activity/Device (Toileting Goal 1, OT) adjust/manage clothing;perform perineal hygiene  -AC        [place] Level/Cues Needed (Toileting Goal 1, OT) supervision required  -AC        Time Frame (Toileting Goal 1, OT) by discharge  -AC        Progress/Outcome (Toileting Goal 1, OT) goal ongoing  -AC           Strength Goal 1 (OT)    Strength Goal 1 (OT) Pt will complete 15 reps BUE AROM daily as needed to increase strength to support ADLs  -AC        Time Frame (Strength Goal 1, OT) by discharge  -AC            Living Environment    Home Accessibility stairs to enter home;tub/shower is not walk in  -          User Key  (r) = Recorded By, (t) = Taken By, (c) = Cosigned By    Initials Name Effective Dates     Chitra Thomas, OT 06/23/15 -            Occupational Therapy Education     Title: PT OT SLP Therapies (Active)     Topic: Occupational Therapy (Active)     Point: ADL training (Done)     Description: Instruct learner(s) on proper safety adaptation and remediation techniques during self care or transfers.   Instruct in proper use of assistive devices.   Learning Progress Summary     Learner Status Readiness Method Response Comment Documented by    Patient Done Acceptance E VU benefits of activity, role of OT, discharge plan  08/11/18 1139                      User Key     Initials Effective Dates Name Provider Type Discipline     06/23/15 -  Chitra Thomas, OT Occupational Therapist OT                  OT Recommendation and Plan  Outcome Summary/Treatment Plan (OT)  Anticipated Discharge Disposition (OT): skilled nursing facility  Planned Therapy Interventions (OT Eval): adaptive equipment training, BADL retraining, functional balance retraining, occupation/activity based interventions, strengthening exercise, transfer/mobility retraining  Therapy Frequency (OT Eval): daily  Plan of Care Review  Plan of Care Reviewed With: patient  Plan of Care Reviewed With: patient  Outcome Summary: OT eval complete. Pt independent with bed mobility, setup to don/doff shoes, supervision to stand , CGA  to ambulate 60 ft with RW.   Pt presents with decreased safety awareness, weakness adn decreased balance.  Pt will benefit from SNF for rehab upon d/c.           Outcome Measures     Row Name 08/11/18 1005 08/09/18 1013          How much help from another person do you currently need...    Turning from your back to your side while in flat bed without using bedrails?  -- 4  -EH     Moving from lying on back to sitting on the side  of a flat bed without bedrails?  -- 4  -EH     Moving to and from a bed to a chair (including a wheelchair)?  -- 3  -EH     Standing up from a chair using your arms (e.g., wheelchair, bedside chair)?  -- 3  -EH     Climbing 3-5 steps with a railing?  -- 2  -EH     To walk in hospital room?  -- 3  -EH     AM-PAC 6 Clicks Score  -- 19  -EH        How much help from another is currently needed...    Putting on and taking off regular lower body clothing? 3  -AC  --     Bathing (including washing, rinsing, and drying) 3  -AC  --     Toileting (which includes using toilet bed pan or urinal) 3  -AC  --     Putting on and taking off regular upper body clothing 4  -AC  --     Taking care of personal grooming (such as brushing teeth) 4  -AC  --     Eating meals 4  -AC  --     Score 21  -AC  --        Functional Assessment    Outcome Measure Options AM-PAC 6 Clicks Daily Activity (OT)  - AM-PAC 6 Clicks Basic Mobility (PT)  -       User Key  (r) = Recorded By, (t) = Taken By, (c) = Cosigned By    Initials Name Provider Type    AC Chitra Thomas, OT Occupational Therapist     Nayana Laird, PT Physical Therapist          Time Calculation:   OT Start Time: 1005  Therapy Suggested Charges     Code   Minutes Charges    None           Therapy Charges for Today     Code Description Service Date Service Provider Modifiers Qty    [id number]  OT SELFCARE CURRENT 8/11/2018 Chitra Thomas OT GO,  1    [id number]  OT SELFCARE PROJECTED 8/11/2018 Chitra Thomas OT GO,  1          OT G-codes  Functional Assessment Tool Used: impact 6 clicks  Score: 21  Functional Limitation: Self care  Self Care Current Status (): At least 20 percent but less than 40 percent impaired, limited or restricted  Self Care Goal Status (): At least 1 percent but less than 20 percent impaired, limited or restricted    Chitra Thomas OT  8/11/2018

## 2018-08-11 NOTE — PROGRESS NOTES
Case Management Discharge Note    Final Note: CM met with pt and son at bedside and spoke with pt's daughter, Kecia, via phone.  Discussed all possible DC options, including paying out-of-pocket for STR or long-term care, providing sitters to assist with home needs, and returning home with HH.  Per lengthy discussions, pt (and son) feels she is quite capable of returning home at present.  Pt relates she is able to get herself to the bathroom for toileting and bathing.  Family provides meals that can be heated in the microwave.  Family checks in on pt frequently.  Son, Fadi, relates family is currently attempting to arrange for some private caregivers (though not likely 24/7).  She has all necessary DME already in place.  Final DCP is to return home with [facility] services.  CM notified [facility] of DC today.      Destination     No service has been selected for the patient.      Durable Medical Equipment     No service has been selected for the patient.      Dialysis/Infusion     No service has been selected for the patient.      Home Medical Care     Service Request Status Selected Specialties Address Phone Number Fax Number    [facility] [address] [address] [phone]      Social Care     No service has been selected for the patient.             Final Discharge Disposition Code: 02 - short term hospital for [facility]

## 2018-08-11 NOTE — PLAN OF CARE
Problem: Patient Care Overview  Goal: Plan of Care Review  Outcome: Ongoing (interventions implemented as appropriate)   08/11/18 0658   Coping/Psychosocial   Plan of Care Reviewed With patient   Plan of Care Review   Progress no change   OTHER   Outcome Summary Pt with no acute distress overnight. Continues to complain of pain in left hip. PRN medications administered throughout shift.

## 2018-08-11 NOTE — PLAN OF CARE
Problem: Patient Care Overview  Goal: Plan of Care Review  Outcome: Ongoing (interventions implemented as appropriate)   08/11/18 1005   Coping/Psychosocial   Plan of Care Reviewed With patient   Plan of Care Review   Progress (Evaluation)   OTHER   Outcome Summary OT eval complete. Pt independent with bed mobility, setup to don/doff shoes, supervision to stand , CGA to ambulate 60 ft with RW. Pt presents with decreased safety awareness, weakness adn decreased balance. Pt will benefit from SNF for rehab upon d/c.

## 2018-08-13 NOTE — THERAPY DISCHARGE NOTE
Acute Care - Physical Therapy Discharge Summary  [facility]       Patient Name: Debbie aBum  : 1923  MRN: [id number]    Today's Date: 2018  Onset of Illness/Injury or Date of Surgery: 18    Date of Referral to PT: 18  Referring Physician: MD Leticia      Admit Date: 2018      PT Recommendation and Plan    Visit Dx:    ICD-10-CM ICD-9-CM   1. Somnolence R40.0 780.09   2. Medication adverse effect, initial encounter T88.7XXA E947.9   3. Chronic pain syndrome G89.4 338.4   4. Confusion R41.0 298.9   5. Weakness generalized R53.1 780.79   6. Impaired functional mobility, balance, gait, and endurance Z74.09 V49.89   7. Impaired mobility and ADLs Z74.09 799.89             Outcome Measures     Row Name 18 1005             How much help from another is currently needed...    Putting on and taking off regular lower body clothing? 3  -AC      Bathing (including washing, rinsing, and drying) 3  -AC      Toileting (which includes using toilet bed pan or urinal) 3  -AC      Putting on and taking off regular upper body clothing 4  -AC      Taking care of personal grooming (such as brushing teeth) 4  -AC      Eating meals 4  -AC      Score 21  -AC         Functional Assessment    Outcome Measure Options AM-PAC 6 Clicks Daily Activity (OT)  -AC        User Key  (r) = Recorded By, (t) = Taken By, (c) = Cosigned By    Initials Name Provider Type    AC Chitra Thomas OT Occupational Therapist            Therapy Suggested Charges     Code   Minutes Charges    None                   PT Rehab Goals     Row Name 18 7279             Transfer Goal 1 (PT)    Activity/Assistive Device (Transfer Goal 1, PT) sit-to-stand/stand-to-sit;walker, rolling  -MC      [place] Level/Cues Needed (Transfer Goal 1, PT) conditional independence  -MC      Time Frame (Transfer Goal 1, PT) long term goal (LTG);5 days  -MC         Gait Training Goal 1 (PT)    Activity/Assistive Device (Gait Training Goal 1, PT)  walker, rolling;gait (walking locomotion);assistive device use  -MC      [place] Level (Gait Training Goal 1, PT) conditional independence  -MC      Distance (Gait Goal 1, PT) 150  -MC      Time Frame (Gait Training Goal 1, PT) long term goal (LTG);5 days  -MC        User Key  (r) = Recorded By, (t) = Taken By, (c) = Cosigned By    Initials Name Provider Type Discipline    Bindu Elaine, PT Physical Therapist PT              PT Discharge Summary  Anticipated Discharge Disposition (PT): skilled nursing facility  Reason for Discharge: Discharge from facility  Outcomes Achieved: Refer to plan of care for updates on goals achieved  Discharge Destination: Home with home health      Bindu Molina, PT   8/13/2018

## 2018-08-14 NOTE — THERAPY DISCHARGE NOTE
Acute Care - Occupational Therapy Discharge Summary  [facility]     Patient Name: Debbie Baum  : 1923  MRN: [id number]    Today's Date: 2018  Onset of Illness/Injury or Date of Surgery: 18    Date of Referral to OT: 08/10/18  Referring Physician: MD Leticia      Admit Date: 2018        OT Recommendation and Plan    Visit Dx:    ICD-10-CM ICD-9-CM   1. Somnolence R40.0 780.09   2. Medication adverse effect, initial encounter T88.7XXA E947.9   3. Chronic pain syndrome G89.4 338.4   4. Confusion R41.0 298.9   5. Weakness generalized R53.1 780.79   6. Impaired functional mobility, balance, gait, and endurance Z74.09 V49.89   7. Impaired mobility and ADLs Z74.09 799.89                     OT Rehab Goals     Row Name 18 1353             Transfer Goal 1 (OT)    Progress/Outcome (Transfer Goal 1, OT) goal not met;discharged from facility  -DENICE         Toileting Goal 1 (OT)    Progress/Outcome (Toileting Goal 1, OT) goal not met;discharged from facility  -DENICE         Strength Goal 1 (OT)    Progress/Outcome (Strength Goal 1, OT) goal not met;discharged from facility  -DENICE        User Key  (r) = Recorded By, (t) = Taken By, (c) = Cosigned By    Initials Name Provider Type Discipline    Amaris Youssef, OT Occupational Therapist OT              Therapy Suggested Charges     Code   Minutes Charges    None                 OT Discharge Summary  Reason for Discharge: Discharge from facility  Outcomes Achieved: Discharge from facility occurred on same date as evluation  Discharge Destination: Home with assist, Home with home health      Amaris Lares OT  2018

## 2018-09-08 ENCOUNTER — HOSPITAL ENCOUNTER (EMERGENCY)
Facility: HOSPITAL | Age: 83
Discharge: HOME | End: 2018-09-08
Attending: EMERGENCY MEDICINE | Admitting: EMERGENCY MEDICINE

## 2018-09-08 ENCOUNTER — APPOINTMENT (OUTPATIENT)
Dept: GENERAL RADIOLOGY | Facility: HOSPITAL | Age: 83
End: 2018-09-08

## 2018-09-08 ENCOUNTER — APPOINTMENT (OUTPATIENT)
Dept: CARDIOLOGY | Facility: HOSPITAL | Age: 83
End: 2018-09-08

## 2018-09-08 VITALS
HEIGHT: 65 IN | SYSTOLIC BLOOD PRESSURE: 180 MMHG | HEART RATE: 58 BPM | RESPIRATION RATE: 18 BRPM | BODY MASS INDEX: 19.99 KG/M2 | TEMPERATURE: 98.9 F | WEIGHT: 120 LBS | DIASTOLIC BLOOD PRESSURE: 78 MMHG | OXYGEN SATURATION: 99 %

## 2018-09-08 DIAGNOSIS — R53.1 GENERALIZED WEAKNESS: ICD-10-CM

## 2018-09-08 DIAGNOSIS — R53.83 FATIGUE, UNSPECIFIED TYPE: Primary | ICD-10-CM

## 2018-09-08 DIAGNOSIS — E87.6 HYPOKALEMIA: ICD-10-CM

## 2018-09-08 LAB
ALBUMIN SERPL-MCNC: 4.02 G/DL (ref 3.2–4.8)
ALBUMIN/GLOB SERPL: 1.6 G/DL (ref 1.5–2.5)
ALP SERPL-CCNC: 101 U/L (ref 25–100)
ALT SERPL W P-5'-P-CCNC: 30 U/L (ref 7–40)
ANION GAP SERPL CALCULATED.3IONS-SCNC: 4 MMOL/L (ref 3–11)
AST SERPL-CCNC: 36 U/L (ref 0–33)
BASOPHILS # BLD AUTO: 0.03 10*3/MM3 (ref 0–0.2)
BASOPHILS NFR BLD AUTO: 0.4 % (ref 0–1)
BH CV UPPER VENOUS LEFT SUBCLAVIAN AUGMENT: NORMAL
BH CV UPPER VENOUS LEFT SUBCLAVIAN COMPRESS: NORMAL
BH CV UPPER VENOUS LEFT SUBCLAVIAN PHASIC: NORMAL
BH CV UPPER VENOUS LEFT SUBCLAVIAN SPONT: NORMAL
BH CV UPPER VENOUS RIGHT AXILLARY AUGMENT: NORMAL
BH CV UPPER VENOUS RIGHT AXILLARY COMPRESS: NORMAL
BH CV UPPER VENOUS RIGHT AXILLARY PHASIC: NORMAL
BH CV UPPER VENOUS RIGHT AXILLARY SPONT: NORMAL
BH CV UPPER VENOUS RIGHT BASILIC UPPER COMPRESS: NORMAL
BH CV UPPER VENOUS RIGHT BRACHIAL COMPRESS: NORMAL
BH CV UPPER VENOUS RIGHT INTERNAL JUGULAR AUGMENT: NORMAL
BH CV UPPER VENOUS RIGHT INTERNAL JUGULAR COMPRESS: NORMAL
BH CV UPPER VENOUS RIGHT INTERNAL JUGULAR PHASIC: NORMAL
BH CV UPPER VENOUS RIGHT INTERNAL JUGULAR SPONT: NORMAL
BH CV UPPER VENOUS RIGHT SUBCLAVIAN AUGMENT: NORMAL
BH CV UPPER VENOUS RIGHT SUBCLAVIAN COMPRESS: NORMAL
BH CV UPPER VENOUS RIGHT SUBCLAVIAN PHASIC: NORMAL
BH CV UPPER VENOUS RIGHT SUBCLAVIAN SPONT: NORMAL
BILIRUB SERPL-MCNC: 0.2 MG/DL (ref 0.3–1.2)
BILIRUB UR QL STRIP: NEGATIVE
BUN BLD-MCNC: 15 MG/DL (ref 9–23)
BUN/CREAT SERPL: 27.8 (ref 7–25)
CALCIUM SPEC-SCNC: 9.7 MG/DL (ref 8.7–10.4)
CHLORIDE SERPL-SCNC: 108 MMOL/L (ref 99–109)
CLARITY UR: CLEAR
CO2 SERPL-SCNC: 24 MMOL/L (ref 20–31)
COLOR UR: YELLOW
CREAT BLD-MCNC: 0.54 MG/DL (ref 0.6–1.3)
D-LACTATE SERPL-SCNC: 1.5 MMOL/L (ref 0.5–2)
DEPRECATED RDW RBC AUTO: 48.3 FL (ref 37–54)
EOSINOPHIL # BLD AUTO: 0.14 10*3/MM3 (ref 0–0.3)
EOSINOPHIL NFR BLD AUTO: 1.8 % (ref 0–3)
ERYTHROCYTE [DISTWIDTH] IN BLOOD BY AUTOMATED COUNT: 14.9 % (ref 11.3–14.5)
FLUAV SUBTYP SPEC NAA+PROBE: NOT DETECTED
FLUBV RNA ISLT QL NAA+PROBE: NOT DETECTED
GFR SERPL CREATININE-BSD FRML MDRD: 105 ML/MIN/1.73
GLOBULIN UR ELPH-MCNC: 2.5 GM/DL
GLUCOSE BLD-MCNC: 130 MG/DL (ref 70–100)
GLUCOSE UR STRIP-MCNC: NEGATIVE MG/DL
HCT VFR BLD AUTO: 36.8 % (ref 34.5–44)
HGB BLD-MCNC: 11.6 G/DL (ref 11.5–15.5)
HGB UR QL STRIP.AUTO: NEGATIVE
HOLD SPECIMEN: NORMAL
HOLD SPECIMEN: NORMAL
IMM GRANULOCYTES # BLD: 0.02 10*3/MM3 (ref 0–0.03)
IMM GRANULOCYTES NFR BLD: 0.3 % (ref 0–0.6)
KETONES UR QL STRIP: NEGATIVE
LEUKOCYTE ESTERASE UR QL STRIP.AUTO: NEGATIVE
LYMPHOCYTES # BLD AUTO: 1.34 10*3/MM3 (ref 0.6–4.8)
LYMPHOCYTES NFR BLD AUTO: 16.9 % (ref 24–44)
MAGNESIUM SERPL-MCNC: 1.8 MG/DL (ref 1.3–2.7)
MCH RBC QN AUTO: 28 PG (ref 27–31)
MCHC RBC AUTO-ENTMCNC: 31.5 G/DL (ref 32–36)
MCV RBC AUTO: 88.7 FL (ref 80–99)
MONOCYTES # BLD AUTO: 0.64 10*3/MM3 (ref 0–1)
MONOCYTES NFR BLD AUTO: 8.1 % (ref 0–12)
NEUTROPHILS # BLD AUTO: 5.79 10*3/MM3 (ref 1.5–8.3)
NEUTROPHILS NFR BLD AUTO: 72.8 % (ref 41–71)
NITRITE UR QL STRIP: NEGATIVE
PH UR STRIP.AUTO: 6.5 [PH] (ref 5–8)
PLATELET # BLD AUTO: 347 10*3/MM3 (ref 150–450)
PMV BLD AUTO: 8.9 FL (ref 6–12)
POTASSIUM BLD-SCNC: 3.2 MMOL/L (ref 3.5–5.5)
PROT SERPL-MCNC: 6.5 G/DL (ref 5.7–8.2)
PROT UR QL STRIP: NEGATIVE
RBC # BLD AUTO: 4.15 10*6/MM3 (ref 3.89–5.14)
SODIUM BLD-SCNC: 136 MMOL/L (ref 132–146)
SP GR UR STRIP: 1.01 (ref 1–1.03)
TROPONIN I SERPL-MCNC: 0 NG/ML (ref 0–0.07)
UROBILINOGEN UR QL STRIP: NORMAL
WBC NRBC COR # BLD: 7.94 10*3/MM3 (ref 3.5–10.8)
WHOLE BLOOD HOLD SPECIMEN: NORMAL
WHOLE BLOOD HOLD SPECIMEN: NORMAL

## 2018-09-08 PROCEDURE — 87040 BLOOD CULTURE FOR BACTERIA: CPT | Performed by: PHYSICIAN ASSISTANT

## 2018-09-08 PROCEDURE — 99285 EMERGENCY DEPT VISIT HI MDM: CPT

## 2018-09-08 PROCEDURE — 96360 HYDRATION IV INFUSION INIT: CPT

## 2018-09-08 PROCEDURE — 83735 ASSAY OF MAGNESIUM: CPT | Performed by: EMERGENCY MEDICINE

## 2018-09-08 PROCEDURE — 93005 ELECTROCARDIOGRAM TRACING: CPT | Performed by: EMERGENCY MEDICINE

## 2018-09-08 PROCEDURE — 87502 INFLUENZA DNA AMP PROBE: CPT | Performed by: PHYSICIAN ASSISTANT

## 2018-09-08 PROCEDURE — 93005 ELECTROCARDIOGRAM TRACING: CPT

## 2018-09-08 PROCEDURE — 81003 URINALYSIS AUTO W/O SCOPE: CPT | Performed by: EMERGENCY MEDICINE

## 2018-09-08 PROCEDURE — 83605 ASSAY OF LACTIC ACID: CPT | Performed by: PHYSICIAN ASSISTANT

## 2018-09-08 PROCEDURE — 80053 COMPREHEN METABOLIC PANEL: CPT | Performed by: EMERGENCY MEDICINE

## 2018-09-08 PROCEDURE — 93971 EXTREMITY STUDY: CPT

## 2018-09-08 PROCEDURE — 84484 ASSAY OF TROPONIN QUANT: CPT

## 2018-09-08 PROCEDURE — 71045 X-RAY EXAM CHEST 1 VIEW: CPT

## 2018-09-08 PROCEDURE — 73030 X-RAY EXAM OF SHOULDER: CPT

## 2018-09-08 PROCEDURE — 85025 COMPLETE CBC W/AUTO DIFF WBC: CPT | Performed by: EMERGENCY MEDICINE

## 2018-09-08 RX ORDER — SODIUM CHLORIDE 0.9 % (FLUSH) 0.9 %
10 SYRINGE (ML) INJECTION AS NEEDED
Status: DISCONTINUED | OUTPATIENT
Start: 2018-09-08 | End: 2018-09-08 | Stop reason: HOSPADM

## 2018-09-08 RX ORDER — POTASSIUM CHLORIDE 20 MEQ/1
20 TABLET, EXTENDED RELEASE ORAL DAILY
Qty: 5 TABLET | Refills: 0 | Status: SHIPPED | OUTPATIENT
Start: 2018-09-08 | End: 2018-09-13

## 2018-09-08 RX ORDER — POTASSIUM CHLORIDE 750 MG/1
40 CAPSULE, EXTENDED RELEASE ORAL ONCE
Status: COMPLETED | OUTPATIENT
Start: 2018-09-08 | End: 2018-09-08

## 2018-09-08 RX ADMIN — SODIUM CHLORIDE 1000 ML: 9 INJECTION, SOLUTION INTRAVENOUS at 15:44

## 2018-09-08 RX ADMIN — POTASSIUM CHLORIDE 40 MEQ: 750 CAPSULE, EXTENDED RELEASE ORAL at 17:35

## 2018-09-13 ENCOUNTER — HOSPITAL ENCOUNTER (EMERGENCY)
Facility: HOSPITAL | Age: 83
Discharge: HOME | End: 2018-09-13
Attending: EMERGENCY MEDICINE | Admitting: EMERGENCY MEDICINE

## 2018-09-13 ENCOUNTER — APPOINTMENT (OUTPATIENT)
Dept: GENERAL RADIOLOGY | Facility: HOSPITAL | Age: 83
End: 2018-09-13

## 2018-09-13 VITALS
RESPIRATION RATE: 16 BRPM | HEIGHT: 65 IN | SYSTOLIC BLOOD PRESSURE: 134 MMHG | WEIGHT: 125 LBS | BODY MASS INDEX: 20.83 KG/M2 | HEART RATE: 72 BPM | DIASTOLIC BLOOD PRESSURE: 73 MMHG | TEMPERATURE: 97.9 F | OXYGEN SATURATION: 98 %

## 2018-09-13 DIAGNOSIS — S00.03XA HEMATOMA OF SCALP, INITIAL ENCOUNTER: ICD-10-CM

## 2018-09-13 DIAGNOSIS — S09.90XA INJURY OF HEAD, INITIAL ENCOUNTER: Primary | ICD-10-CM

## 2018-09-13 DIAGNOSIS — R53.1 GENERALIZED WEAKNESS: ICD-10-CM

## 2018-09-13 LAB
ALBUMIN SERPL-MCNC: 3.65 G/DL (ref 3.2–4.8)
ALBUMIN/GLOB SERPL: 1.6 G/DL (ref 1.5–2.5)
ALP SERPL-CCNC: 93 U/L (ref 25–100)
ALT SERPL W P-5'-P-CCNC: 46 U/L (ref 7–40)
ANION GAP SERPL CALCULATED.3IONS-SCNC: 11 MMOL/L (ref 3–11)
AST SERPL-CCNC: 51 U/L (ref 0–33)
BACTERIA SPEC AEROBE CULT: NORMAL
BACTERIA SPEC AEROBE CULT: NORMAL
BASOPHILS # BLD AUTO: 0.02 10*3/MM3 (ref 0–0.2)
BASOPHILS NFR BLD AUTO: 0.2 % (ref 0–1)
BILIRUB SERPL-MCNC: 0.2 MG/DL (ref 0.3–1.2)
BILIRUB UR QL STRIP: NEGATIVE
BUN BLD-MCNC: 14 MG/DL (ref 9–23)
BUN/CREAT SERPL: 24.1 (ref 7–25)
CALCIUM SPEC-SCNC: 9.5 MG/DL (ref 8.7–10.4)
CHLORIDE SERPL-SCNC: 106 MMOL/L (ref 99–109)
CLARITY UR: CLEAR
CO2 SERPL-SCNC: 21 MMOL/L (ref 20–31)
COLOR UR: YELLOW
CREAT BLD-MCNC: 0.58 MG/DL (ref 0.6–1.3)
DEPRECATED RDW RBC AUTO: 50.2 FL (ref 37–54)
EOSINOPHIL # BLD AUTO: 0.18 10*3/MM3 (ref 0–0.3)
EOSINOPHIL NFR BLD AUTO: 2.1 % (ref 0–3)
ERYTHROCYTE [DISTWIDTH] IN BLOOD BY AUTOMATED COUNT: 15.3 % (ref 11.3–14.5)
GFR SERPL CREATININE-BSD FRML MDRD: 97 ML/MIN/1.73
GLOBULIN UR ELPH-MCNC: 2.4 GM/DL
GLUCOSE BLD-MCNC: 98 MG/DL (ref 70–100)
GLUCOSE UR STRIP-MCNC: NEGATIVE MG/DL
HCT VFR BLD AUTO: 35.4 % (ref 34.5–44)
HGB BLD-MCNC: 11 G/DL (ref 11.5–15.5)
HGB UR QL STRIP.AUTO: NEGATIVE
IMM GRANULOCYTES # BLD: 0.04 10*3/MM3 (ref 0–0.03)
IMM GRANULOCYTES NFR BLD: 0.5 % (ref 0–0.6)
KETONES UR QL STRIP: NEGATIVE
LEUKOCYTE ESTERASE UR QL STRIP.AUTO: NEGATIVE
LYMPHOCYTES # BLD AUTO: 1.19 10*3/MM3 (ref 0.6–4.8)
LYMPHOCYTES NFR BLD AUTO: 13.9 % (ref 24–44)
MCH RBC QN AUTO: 28.2 PG (ref 27–31)
MCHC RBC AUTO-ENTMCNC: 31.1 G/DL (ref 32–36)
MCV RBC AUTO: 90.8 FL (ref 80–99)
MONOCYTES # BLD AUTO: 0.64 10*3/MM3 (ref 0–1)
MONOCYTES NFR BLD AUTO: 7.5 % (ref 0–12)
NEUTROPHILS # BLD AUTO: 6.49 10*3/MM3 (ref 1.5–8.3)
NEUTROPHILS NFR BLD AUTO: 75.8 % (ref 41–71)
NITRITE UR QL STRIP: NEGATIVE
PH UR STRIP.AUTO: 6 [PH] (ref 5–8)
PLATELET # BLD AUTO: 350 10*3/MM3 (ref 150–450)
PMV BLD AUTO: 8.6 FL (ref 6–12)
POTASSIUM BLD-SCNC: 3.7 MMOL/L (ref 3.5–5.5)
PROT SERPL-MCNC: 6 G/DL (ref 5.7–8.2)
PROT UR QL STRIP: NEGATIVE
RBC # BLD AUTO: 3.9 10*6/MM3 (ref 3.89–5.14)
SODIUM BLD-SCNC: 138 MMOL/L (ref 132–146)
SP GR UR STRIP: 1.02 (ref 1–1.03)
TROPONIN I SERPL-MCNC: <0.006 NG/ML
UROBILINOGEN UR QL STRIP: NORMAL
WBC NRBC COR # BLD: 8.56 10*3/MM3 (ref 3.5–10.8)

## 2018-09-13 PROCEDURE — 85025 COMPLETE CBC W/AUTO DIFF WBC: CPT | Performed by: EMERGENCY MEDICINE

## 2018-09-13 PROCEDURE — P9612 CATHETERIZE FOR URINE SPEC: HCPCS

## 2018-09-13 PROCEDURE — 80053 COMPREHEN METABOLIC PANEL: CPT | Performed by: EMERGENCY MEDICINE

## 2018-09-13 PROCEDURE — 99284 EMERGENCY DEPT VISIT MOD MDM: CPT

## 2018-09-13 PROCEDURE — 84484 ASSAY OF TROPONIN QUANT: CPT | Performed by: EMERGENCY MEDICINE

## 2018-09-13 PROCEDURE — 71045 X-RAY EXAM CHEST 1 VIEW: CPT

## 2018-09-13 PROCEDURE — 93005 ELECTROCARDIOGRAM TRACING: CPT | Performed by: EMERGENCY MEDICINE

## 2018-09-13 PROCEDURE — 81003 URINALYSIS AUTO W/O SCOPE: CPT | Performed by: EMERGENCY MEDICINE

## 2018-09-13 NOTE — DISCHARGE INSTRUCTIONS
Place a bedside commode next to your bed.    Move slowly from lying to sitting and from sitting to standing.    Push fluids.    If you become lightheaded immediately lie down and raise your legs.    Follow up with your primary care provider at next available.     Return to the ER if you develop any acute complaints at this time.    Please talk with your primary care provider to discuss skilled nursing facility, nursing home, etc. Options/recommendations.    Please review the medications you are supposed to be taking according to prior physician recommendations. I have not changed your home medications during this visit. If your discharge instructions indicate that I have changed your home medications, this is not the case, and you should disregard. If you have any questions about the medication you should be taking at home, please call your physician.

## 2018-09-13 NOTE — ED PROVIDER NOTES
Subjective   This pleasant 89-year-old woman presents to the emergency department after a fall in her bathroom.  She got up early this morning around 5 AM to use the bathroom.  She ambulated with her walker into the bathroom but then lost her balance as she was turning.  She fell to the floor striking her head.  She complains of moderate head pain on the left top of her head.  She had no loss of consciousness.  Aspirin is the only blood thinner she takes.  She has not taken any medications for symptom relief.  She denies dizziness however notes that she has been weaker in the standing position then when lying or sitting.  She states this is been going on for the last week to 2 weeks.  She was recently seen in our emergency department and she and her family members tell me that she had a thorough workup that was unrevealing of underlying problems.  She denies sore throat, cough, fevers, chills, abdominal pain, dysuria, darkness to urine.            Review of Systems   Constitutional: Positive for fatigue. Negative for chills and fever.   HENT: Negative for congestion, sneezing and sore throat.    Respiratory: Negative for cough, chest tightness and shortness of breath.    Cardiovascular: Negative for chest pain and palpitations.   Gastrointestinal: Negative for abdominal pain.   Genitourinary: Negative for dysuria.   Musculoskeletal: Positive for gait problem. Negative for back pain.   Skin: Negative for rash.   Neurological: Positive for light-headedness. Negative for syncope, speech difficulty, numbness and headaches.   Psychiatric/Behavioral: Negative for agitation and confusion.   All other systems reviewed and are negative.      Past Medical History:   Diagnosis Date   • Cancer (CMS/HCC)     colon   • Coronary artery disease    • GERD (gastroesophageal reflux disease)    • H/O cardiac radiofrequency ablation        Allergies   Allergen Reactions   • Crab (Diagnostic) Hives   • Lovenox [Enoxaparin Sodium] Rash    • Penicillins Rash     unsure       Past Surgical History:   Procedure Laterality Date   • CARDIAC ABLATION     • CHOLECYSTECTOMY     • COLON SURGERY      BOWEL RESECTION FOR HX COLON CANCER   • HYSTERECTOMY     • REPLACEMENT TOTAL KNEE         No family history on file.    Social History     Social History   • Marital status:      Social History Main Topics   • Smoking status: Never Smoker   • Smokeless tobacco: Never Used   • Alcohol use No   • Drug use: No   • Sexual activity: Defer     Other Topics Concern   • Not on file           Objective   Physical Exam   Constitutional: She is oriented to person, place, and time. She appears well-developed and well-nourished.   HENT:   Head: Normocephalic.       Eyes: Conjunctivae are normal.   Neck: Trachea normal, normal range of motion and phonation normal. Neck supple. No JVD present.   Cardiovascular: Normal rate, regular rhythm and normal heart sounds.    Pulmonary/Chest: Effort normal and breath sounds normal. No respiratory distress.   Abdominal: Soft. There is no tenderness.   Musculoskeletal: Normal range of motion. She exhibits no edema.   No C, T, L spine tenderness.   Neurological: She is alert and oriented to person, place, and time. She has normal strength. No cranial nerve deficit. Coordination normal.   Skin: Skin is warm and dry. She is not diaphoretic. No pallor.   Psychiatric: She has a normal mood and affect. Her speech is normal and behavior is normal.   Nursing note and vitals reviewed.      Procedures           ED Course  ED Course as of Sep 15 0841   Thu Sep 13, 2018   0857 Dr. Almazan is at bedside re-examining the patient and updating her on lab results and plan.   [AT]   9243 Dr. Almazan is at bedside updating the patient before discharge.  [AT]      ED Course User Index  [AT] Haydee Hunter      No results found for this or any previous visit (from the past 24 hour(s)).  Note: In addition to lab results from this visit, the labs listed  above may include labs taken at another facility or during a different encounter within the last 24 hours. Please correlate lab times with ED admission and discharge times for further clarification of the services performed during this visit.    XR Chest 1 View   Final Result   Chronic change; no acute disease.       D:  09/13/2018   E:  09/13/2018       This report was finalized on 9/13/2018 1:20 PM by Dr. Chester Escoto MD.            Vitals:    09/13/18 0845 09/13/18 0915 09/13/18 0930 09/13/18 0937   BP: 125/89 139/55 134/73    BP Location:       Patient Position:       Pulse: 84 68 71 72   Resp:       Temp:       TempSrc:       SpO2: 91% 94% 95% 98%   Weight:       Height:         Medications - No data to display  ECG/EMG Results (last 24 hours)     Procedure Component Value Units Date/Time    ECG 12 Lead [[id number]] Collected:  09/13/18 0659     Updated:  09/13/18 0822    Narrative:       Test Reason : FALL  Blood Pressure : **/** mmHG  Vent. Rate : 078 BPM     Atrial Rate : 078 BPM     P-R Int : 000 ms          QRS Dur : 072 ms      QT Int : 394 ms       P-R-T Axes : 000 035 -40 degrees     QTc Int : 449 ms    Sinus rhythm with first-degree AV block.  PACs.  Confirmed by SHEBA RADFORD MD ([id number]) on 9/13/2018 8:21:50 AM    Referred By:  DR. RADFORD           Confirmed By:SHEBA RADFORD MD                    [facility]      Final diagnoses:   Injury of head, initial encounter   Hematoma of scalp, initial encounter   Generalized weakness            Sheba Radford MD  09/15/18 0842

## 2018-09-13 NOTE — ED NOTES
" stated the CMP is late in resulting because it \"was just something that was late being put on the analyzer\".      Иван Murray  09/13/18 0828    "

## 2018-09-17 ENCOUNTER — HOSPITAL ENCOUNTER (OUTPATIENT)
Dept: CARDIOLOGY | Facility: HOSPITAL | Age: 83
Discharge: HOME | End: 2018-09-17
Admitting: INTERNAL MEDICINE

## 2018-09-17 ENCOUNTER — TRANSCRIBE ORDERS (OUTPATIENT)
Dept: ADMINISTRATIVE | Facility: HOSPITAL | Age: 83
End: 2018-09-17

## 2018-09-17 VITALS — WEIGHT: 125 LBS | BODY MASS INDEX: 20.83 KG/M2 | HEIGHT: 65 IN

## 2018-09-17 DIAGNOSIS — R60.9 BODY FLUID RETENTION: ICD-10-CM

## 2018-09-17 DIAGNOSIS — R53.83 OTHER FATIGUE: ICD-10-CM

## 2018-09-17 DIAGNOSIS — R06.00 DYSPNEA, UNSPECIFIED TYPE: Primary | ICD-10-CM

## 2018-09-17 DIAGNOSIS — R06.00 DYSPNEA, UNSPECIFIED TYPE: ICD-10-CM

## 2018-09-17 PROCEDURE — 93306 TTE W/DOPPLER COMPLETE: CPT

## 2018-09-18 LAB
BH CV ECHO MEAS - AO ROOT AREA (BSA CORRECTED): 2
BH CV ECHO MEAS - AO ROOT AREA: 8.3 CM^2
BH CV ECHO MEAS - AO ROOT DIAM: 3.3 CM
BH CV ECHO MEAS - BSA(HAYCOCK): 1.6 M^2
BH CV ECHO MEAS - BSA: 1.6 M^2
BH CV ECHO MEAS - BZI_BMI: 20.8 KILOGRAMS/M^2
BH CV ECHO MEAS - BZI_METRIC_HEIGHT: 165.1 CM
BH CV ECHO MEAS - BZI_METRIC_WEIGHT: 56.7 KG
BH CV ECHO MEAS - EDV(CUBED): 97.1 ML
BH CV ECHO MEAS - EDV(MOD-SP2): 75 ML
BH CV ECHO MEAS - EDV(MOD-SP4): 79 ML
BH CV ECHO MEAS - EDV(TEICH): 97.1 ML
BH CV ECHO MEAS - EF(CUBED): 67.4 %
BH CV ECHO MEAS - EF(MOD-SP2): 57.3 %
BH CV ECHO MEAS - EF(MOD-SP4): 48.1 %
BH CV ECHO MEAS - EF(TEICH): 59.1 %
BH CV ECHO MEAS - ESV(CUBED): 31.6 ML
BH CV ECHO MEAS - ESV(MOD-SP2): 32 ML
BH CV ECHO MEAS - ESV(MOD-SP4): 41 ML
BH CV ECHO MEAS - ESV(TEICH): 39.8 ML
BH CV ECHO MEAS - FS: 31.2 %
BH CV ECHO MEAS - IVS/LVPW: 0.93
BH CV ECHO MEAS - IVSD: 0.66 CM
BH CV ECHO MEAS - LA DIMENSION: 2.6 CM
BH CV ECHO MEAS - LA/AO: 0.79
BH CV ECHO MEAS - LAD MAJOR: 4.7 CM
BH CV ECHO MEAS - LAT PEAK E' VEL: 8.2 CM/SEC
BH CV ECHO MEAS - LATERAL E/E' RATIO: 10.1
BH CV ECHO MEAS - LV DIASTOLIC VOL/BSA (35-75): 48.8 ML/M^2
BH CV ECHO MEAS - LV MASS(C)D: 95.7 GRAMS
BH CV ECHO MEAS - LV MASS(C)DI: 59.1 GRAMS/M^2
BH CV ECHO MEAS - LV MAX PG: 2.8 MMHG
BH CV ECHO MEAS - LV MEAN PG: 1.5 MMHG
BH CV ECHO MEAS - LV SYSTOLIC VOL/BSA (12-30): 25.3 ML/M^2
BH CV ECHO MEAS - LV V1 MAX: 83.9 CM/SEC
BH CV ECHO MEAS - LV V1 MEAN: 58.3 CM/SEC
BH CV ECHO MEAS - LV V1 VTI: 21.9 CM
BH CV ECHO MEAS - LVIDD: 4.6 CM
BH CV ECHO MEAS - LVIDS: 3.2 CM
BH CV ECHO MEAS - LVLD AP2: 7.8 CM
BH CV ECHO MEAS - LVLD AP4: 7.3 CM
BH CV ECHO MEAS - LVLS AP2: 6.2 CM
BH CV ECHO MEAS - LVLS AP4: 5.4 CM
BH CV ECHO MEAS - LVOT AREA (M): 2.5 CM^2
BH CV ECHO MEAS - LVOT AREA: 2.6 CM^2
BH CV ECHO MEAS - LVOT DIAM: 1.8 CM
BH CV ECHO MEAS - LVPWD: 0.7 CM
BH CV ECHO MEAS - MED PEAK E' VEL: 6.3 CM/SEC
BH CV ECHO MEAS - MEDIAL E/E' RATIO: 13.2
BH CV ECHO MEAS - MV A MAX VEL: 76.5 CM/SEC
BH CV ECHO MEAS - MV E MAX VEL: 84.4 CM/SEC
BH CV ECHO MEAS - MV E/A: 1.1
BH CV ECHO MEAS - PA ACC SLOPE: 481.8 CM/SEC^2
BH CV ECHO MEAS - PA ACC TIME: 0.12 SEC
BH CV ECHO MEAS - PA PR(ACCEL): 25.5 MMHG
BH CV ECHO MEAS - RAP SYSTOLE: 8 MMHG
BH CV ECHO MEAS - RVDD: 2.1 CM
BH CV ECHO MEAS - RVSP: 33 MMHG
BH CV ECHO MEAS - SI(CUBED): 40.4 ML/M^2
BH CV ECHO MEAS - SI(LVOT): 34.5 ML/M^2
BH CV ECHO MEAS - SI(MOD-SP2): 26.5 ML/M^2
BH CV ECHO MEAS - SI(MOD-SP4): 23.5 ML/M^2
BH CV ECHO MEAS - SI(TEICH): 35.4 ML/M^2
BH CV ECHO MEAS - SV(CUBED): 65.5 ML
BH CV ECHO MEAS - SV(LVOT): 55.9 ML
BH CV ECHO MEAS - SV(MOD-SP2): 43 ML
BH CV ECHO MEAS - SV(MOD-SP4): 38 ML
BH CV ECHO MEAS - SV(TEICH): 57.4 ML
BH CV ECHO MEAS - TAPSE (>1.6): 2 CM2
BH CV ECHO MEAS - TR MAX PG: 25 MMHG
BH CV ECHO MEAS - TR MAX VEL: 246.3 CM/SEC
BH CV ECHO MEASUREMENTS AVERAGE E/E' RATIO: 11.64
BH CV VAS BP LEFT ARM: NORMAL MMHG
BH CV XLRA - RV BASE: 3.2 CM
BH CV XLRA - RV LENGTH: 4.6 CM
BH CV XLRA - RV MID: 2.3 CM
BH CV XLRA - TDI S': 13.2 CM/SEC
LEFT ATRIUM VOLUME INDEX: 30.9 ML/M2
MAXIMAL PREDICTED HEART RATE: 125 BPM
STRESS TARGET HR: 106 BPM

## 2018-10-06 ENCOUNTER — LAB REQUISITION (OUTPATIENT)
Dept: LAB | Facility: HOSPITAL | Age: 83
End: 2018-10-06

## 2018-10-06 DIAGNOSIS — Z00.00 ROUTINE GENERAL MEDICAL EXAMINATION AT A HEALTH CARE FACILITY: ICD-10-CM

## 2018-10-06 LAB
BACTERIA UR QL AUTO: ABNORMAL /HPF
BILIRUB UR QL STRIP: NEGATIVE
CLARITY UR: CLEAR
COD CRY URNS QL: ABNORMAL /HPF
COLOR UR: YELLOW
GLUCOSE UR STRIP-MCNC: NEGATIVE MG/DL
HGB UR QL STRIP.AUTO: NEGATIVE
HYALINE CASTS UR QL AUTO: ABNORMAL /LPF
KETONES UR QL STRIP: NEGATIVE
LEUKOCYTE ESTERASE UR QL STRIP.AUTO: NEGATIVE
NITRITE UR QL STRIP: NEGATIVE
PH UR STRIP.AUTO: 6.5 [PH] (ref 5–8)
PROT UR QL STRIP: NEGATIVE
RBC # UR: ABNORMAL /HPF
REF LAB TEST METHOD: ABNORMAL
SP GR UR STRIP: 1.01 (ref 1–1.03)
SQUAMOUS #/AREA URNS HPF: ABNORMAL /HPF
UROBILINOGEN UR QL STRIP: NORMAL
WBC UR QL AUTO: ABNORMAL /HPF

## 2018-10-06 PROCEDURE — 81001 URINALYSIS AUTO W/SCOPE: CPT

## 2019-02-13 ENCOUNTER — HOSPITAL ENCOUNTER (INPATIENT)
Facility: HOSPITAL | Age: 84
LOS: 9 days | Discharge: NURSING, REHAB OR LONG-TERM CARE | End: 2019-02-22
Attending: EMERGENCY MEDICINE | Admitting: ORTHOPAEDIC SURGERY

## 2019-02-13 ENCOUNTER — APPOINTMENT (OUTPATIENT)
Dept: GENERAL RADIOLOGY | Facility: HOSPITAL | Age: 84
End: 2019-02-13

## 2019-02-13 ENCOUNTER — ANESTHESIA (OUTPATIENT)
Dept: EMERGENCY DEPT | Facility: HOSPITAL | Age: 84
End: 2019-02-13

## 2019-02-13 ENCOUNTER — APPOINTMENT (OUTPATIENT)
Dept: CT IMAGING | Facility: HOSPITAL | Age: 84
End: 2019-02-13

## 2019-02-13 ENCOUNTER — ANESTHESIA EVENT (OUTPATIENT)
Dept: PERIOP | Facility: HOSPITAL | Age: 84
End: 2019-02-13

## 2019-02-13 ENCOUNTER — ANESTHESIA EVENT (OUTPATIENT)
Dept: EMERGENCY DEPT | Facility: HOSPITAL | Age: 84
End: 2019-02-13

## 2019-02-13 ENCOUNTER — ANESTHESIA (OUTPATIENT)
Dept: PERIOP | Facility: HOSPITAL | Age: 84
End: 2019-02-13

## 2019-02-13 DIAGNOSIS — S05.12XA CONTUSION OF LEFT ORBITAL TISSUES, INITIAL ENCOUNTER: ICD-10-CM

## 2019-02-13 DIAGNOSIS — S00.81XA ABRASION OF FACE, INITIAL ENCOUNTER: ICD-10-CM

## 2019-02-13 DIAGNOSIS — S72.002A CLOSED FRACTURE OF LEFT HIP, INITIAL ENCOUNTER (HCC): Primary | ICD-10-CM

## 2019-02-13 DIAGNOSIS — S09.90XA INJURY OF HEAD, INITIAL ENCOUNTER: ICD-10-CM

## 2019-02-13 DIAGNOSIS — Z74.09 IMPAIRED FUNCTIONAL MOBILITY, BALANCE, GAIT, AND ENDURANCE: ICD-10-CM

## 2019-02-13 DIAGNOSIS — Z78.9 IMPAIRED MOBILITY AND ADLS: ICD-10-CM
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
## 2019-02-13 DIAGNOSIS — Z74.09 IMPAIRED MOBILITY AND ADLS: ICD-10-CM

## 2019-02-13 PROBLEM — T14.8XXA HEMATOMA: Status: ACTIVE | Noted: 2019-02-13

## 2019-02-13 PROBLEM — S00.12XA PERIORBITAL ECCHYMOSIS OF LEFT EYE: Status: ACTIVE | Noted: 2019-02-13

## 2019-02-13 LAB
ABO GROUP BLD: NORMAL
ABO GROUP BLD: NORMAL
ALBUMIN SERPL-MCNC: 3.96 G/DL (ref 3.2–4.8)
ALBUMIN/GLOB SERPL: 1.7 G/DL (ref 1.5–2.5)
ALP SERPL-CCNC: 71 U/L (ref 25–100)
ALT SERPL W P-5'-P-CCNC: 20 U/L (ref 7–40)
ANION GAP SERPL CALCULATED.3IONS-SCNC: 5 MMOL/L (ref 3–11)
AST SERPL-CCNC: 21 U/L (ref 0–33)
BASOPHILS # BLD AUTO: 0.03 10*3/MM3 (ref 0–0.2)
BASOPHILS NFR BLD AUTO: 0.3 % (ref 0–1)
BILIRUB SERPL-MCNC: 0.5 MG/DL (ref 0.3–1.2)
BLD GP AB SCN SERPL QL: NEGATIVE
BUN BLD-MCNC: 24 MG/DL (ref 9–23)
BUN/CREAT SERPL: 27.9 (ref 7–25)
CALCIUM SPEC-SCNC: 9.9 MG/DL (ref 8.7–10.4)
CHLORIDE SERPL-SCNC: 107 MMOL/L (ref 99–109)
CO2 SERPL-SCNC: 27 MMOL/L (ref 20–31)
CREAT BLD-MCNC: 0.86 MG/DL (ref 0.6–1.3)
DEPRECATED RDW RBC AUTO: 50.2 FL (ref 37–54)
EOSINOPHIL # BLD AUTO: 0.16 10*3/MM3 (ref 0–0.3)
EOSINOPHIL NFR BLD AUTO: 1.5 % (ref 0–3)
ERYTHROCYTE [DISTWIDTH] IN BLOOD BY AUTOMATED COUNT: 14.6 % (ref 11.3–14.5)
GFR SERPL CREATININE-BSD FRML MDRD: 61 ML/MIN/1.73
GLOBULIN UR ELPH-MCNC: 2.3 GM/DL
GLUCOSE BLD-MCNC: 95 MG/DL (ref 70–100)
HCT VFR BLD AUTO: 34.1 % (ref 34.5–44)
HGB BLD-MCNC: 10.7 G/DL (ref 11.5–15.5)
IMM GRANULOCYTES # BLD AUTO: 0.05 10*3/MM3 (ref 0–0.05)
IMM GRANULOCYTES NFR BLD AUTO: 0.5 % (ref 0–0.6)
INR PPP: 1.07 (ref 0.85–1.16)
LYMPHOCYTES # BLD AUTO: 1.36 10*3/MM3 (ref 0.6–4.8)
LYMPHOCYTES NFR BLD AUTO: 12.6 % (ref 24–44)
MCH RBC QN AUTO: 29.6 PG (ref 27–31)
MCHC RBC AUTO-ENTMCNC: 31.4 G/DL (ref 32–36)
MCV RBC AUTO: 94.5 FL (ref 80–99)
MONOCYTES # BLD AUTO: 0.67 10*3/MM3 (ref 0–1)
MONOCYTES NFR BLD AUTO: 6.2 % (ref 0–12)
NEUTROPHILS # BLD AUTO: 8.54 10*3/MM3 (ref 1.5–8.3)
NEUTROPHILS NFR BLD AUTO: 79.4 % (ref 41–71)
PLATELET # BLD AUTO: 312 10*3/MM3 (ref 150–450)
PMV BLD AUTO: 9.3 FL (ref 6–12)
POTASSIUM BLD-SCNC: 3.8 MMOL/L (ref 3.5–5.5)
PROT SERPL-MCNC: 6.3 G/DL (ref 5.7–8.2)
PROTHROMBIN TIME: 13.4 SECONDS (ref 11.2–14.3)
RBC # BLD AUTO: 3.61 10*6/MM3 (ref 3.89–5.14)
RH BLD: POSITIVE
RH BLD: POSITIVE
SODIUM BLD-SCNC: 139 MMOL/L (ref 132–146)
T&S EXPIRATION DATE: NORMAL
WBC NRBC COR # BLD: 10.76 10*3/MM3 (ref 3.5–10.8)

## 2019-02-13 PROCEDURE — C1776 JOINT DEVICE (IMPLANTABLE): HCPCS | Performed by: ORTHOPAEDIC SURGERY

## 2019-02-13 PROCEDURE — 85610 PROTHROMBIN TIME: CPT | Performed by: PHYSICIAN ASSISTANT

## 2019-02-13 PROCEDURE — C1713 ANCHOR/SCREW BN/BN,TIS/BN: HCPCS | Performed by: ORTHOPAEDIC SURGERY

## 2019-02-13 PROCEDURE — 71045 X-RAY EXAM CHEST 1 VIEW: CPT

## 2019-02-13 PROCEDURE — 25010000002 ALBUMIN HUMAN 5% PER 50 ML: Performed by: ANESTHESIOLOGY

## 2019-02-13 PROCEDURE — 25010000003 CEFAZOLIN IN DEXTROSE 2-4 GM/100ML-% SOLUTION: Performed by: ORTHOPAEDIC SURGERY

## 2019-02-13 PROCEDURE — 73502 X-RAY EXAM HIP UNI 2-3 VIEWS: CPT

## 2019-02-13 PROCEDURE — 86901 BLOOD TYPING SEROLOGIC RH(D): CPT | Performed by: PHYSICIAN ASSISTANT

## 2019-02-13 PROCEDURE — 25010000002 FENTANYL CITRATE (PF) 100 MCG/2ML SOLUTION: Performed by: ANESTHESIOLOGY

## 2019-02-13 PROCEDURE — 86901 BLOOD TYPING SEROLOGIC RH(D): CPT

## 2019-02-13 PROCEDURE — 25010000002 ONDANSETRON PER 1 MG: Performed by: EMERGENCY MEDICINE

## 2019-02-13 PROCEDURE — 27245 TREAT THIGH FRACTURE: CPT | Performed by: PHYSICIAN ASSISTANT

## 2019-02-13 PROCEDURE — 25010000002 MORPHINE SULFATE (PF) 2 MG/ML SOLUTION: Performed by: EMERGENCY MEDICINE

## 2019-02-13 PROCEDURE — 99285 EMERGENCY DEPT VISIT HI MDM: CPT

## 2019-02-13 PROCEDURE — 86900 BLOOD TYPING SEROLOGIC ABO: CPT | Performed by: PHYSICIAN ASSISTANT

## 2019-02-13 PROCEDURE — 25010000002 ROPIVACINE HCL-NACL: Performed by: NURSE ANESTHETIST, CERTIFIED REGISTERED

## 2019-02-13 PROCEDURE — 25010000002 MORPHINE PER 10 MG: Performed by: INTERNAL MEDICINE

## 2019-02-13 PROCEDURE — 86923 COMPATIBILITY TEST ELECTRIC: CPT

## 2019-02-13 PROCEDURE — P9041 ALBUMIN (HUMAN),5%, 50ML: HCPCS | Performed by: ANESTHESIOLOGY

## 2019-02-13 PROCEDURE — 76000 FLUOROSCOPY <1 HR PHYS/QHP: CPT

## 2019-02-13 PROCEDURE — C1769 GUIDE WIRE: HCPCS | Performed by: ORTHOPAEDIC SURGERY

## 2019-02-13 PROCEDURE — 0QS704Z REPOSITION LEFT UPPER FEMUR WITH INTERNAL FIXATION DEVICE, OPEN APPROACH: ICD-10-PCS | Performed by: ORTHOPAEDIC SURGERY

## 2019-02-13 PROCEDURE — 99223 1ST HOSP IP/OBS HIGH 75: CPT | Performed by: INTERNAL MEDICINE

## 2019-02-13 PROCEDURE — 70450 CT HEAD/BRAIN W/O DYE: CPT

## 2019-02-13 PROCEDURE — 70486 CT MAXILLOFACIAL W/O DYE: CPT

## 2019-02-13 PROCEDURE — 99222 1ST HOSP IP/OBS MODERATE 55: CPT | Performed by: ORTHOPAEDIC SURGERY

## 2019-02-13 PROCEDURE — 25010000002 ONDANSETRON PER 1 MG: Performed by: INTERNAL MEDICINE

## 2019-02-13 PROCEDURE — 25010000002 PROPOFOL 10 MG/ML EMULSION: Performed by: ANESTHESIOLOGY

## 2019-02-13 PROCEDURE — 25010000002 ROPIVACAINE PER 1 MG: Performed by: NURSE ANESTHETIST, CERTIFIED REGISTERED

## 2019-02-13 PROCEDURE — 72192 CT PELVIS W/O DYE: CPT

## 2019-02-13 PROCEDURE — 86900 BLOOD TYPING SEROLOGIC ABO: CPT

## 2019-02-13 PROCEDURE — 25010000002 DEXAMETHASONE PER 1 MG: Performed by: ANESTHESIOLOGY

## 2019-02-13 PROCEDURE — 27245 TREAT THIGH FRACTURE: CPT | Performed by: ORTHOPAEDIC SURGERY

## 2019-02-13 PROCEDURE — 93005 ELECTROCARDIOGRAM TRACING: CPT | Performed by: PHYSICIAN ASSISTANT

## 2019-02-13 PROCEDURE — 80053 COMPREHEN METABOLIC PANEL: CPT | Performed by: PHYSICIAN ASSISTANT

## 2019-02-13 PROCEDURE — 25010000002 MORPHINE PER 10 MG: Performed by: EMERGENCY MEDICINE

## 2019-02-13 PROCEDURE — 85025 COMPLETE CBC W/AUTO DIFF WBC: CPT | Performed by: PHYSICIAN ASSISTANT

## 2019-02-13 PROCEDURE — 86850 RBC ANTIBODY SCREEN: CPT | Performed by: PHYSICIAN ASSISTANT

## 2019-02-13 DEVICE — LOCKING SCREW, FULLY THREADED: Type: IMPLANTABLE DEVICE | Site: HIP | Status: FUNCTIONAL

## 2019-02-13 DEVICE — K-WIRE: Type: IMPLANTABLE DEVICE | Site: HIP | Status: FUNCTIONAL

## 2019-02-13 DEVICE — U-BLADE SET, TI
Type: IMPLANTABLE DEVICE | Site: HIP | Status: FUNCTIONAL
Brand: GAMMA

## 2019-02-13 DEVICE — TROCHANTERIC NAIL KIT, TI
Type: IMPLANTABLE DEVICE | Site: HIP | Status: FUNCTIONAL
Brand: GAMMA

## 2019-02-13 DEVICE — K-WIRE, STERILE: Type: IMPLANTABLE DEVICE | Site: HIP | Status: FUNCTIONAL

## 2019-02-13 RX ORDER — CARVEDILOL 3.12 MG/1
3.12 TABLET ORAL 2 TIMES DAILY WITH MEALS
Status: DISCONTINUED | OUTPATIENT
Start: 2019-02-13 | End: 2019-02-22 | Stop reason: HOSPADM

## 2019-02-13 RX ORDER — ONDANSETRON 4 MG/1
4 TABLET, FILM COATED ORAL EVERY 6 HOURS PRN
Status: DISCONTINUED | OUTPATIENT
Start: 2019-02-13 | End: 2019-02-17

## 2019-02-13 RX ORDER — ONDANSETRON 2 MG/ML
4 INJECTION INTRAMUSCULAR; INTRAVENOUS ONCE
Status: COMPLETED | OUTPATIENT
Start: 2019-02-13 | End: 2019-02-13

## 2019-02-13 RX ORDER — ACETAMINOPHEN 160 MG/5ML
650 SOLUTION ORAL EVERY 4 HOURS PRN
Status: DISCONTINUED | OUTPATIENT
Start: 2019-02-13 | End: 2019-02-19

## 2019-02-13 RX ORDER — FENTANYL CITRATE 50 UG/ML
50 INJECTION, SOLUTION INTRAMUSCULAR; INTRAVENOUS
Status: CANCELLED | OUTPATIENT
Start: 2019-02-13

## 2019-02-13 RX ORDER — MAGNESIUM HYDROXIDE 1200 MG/15ML
LIQUID ORAL AS NEEDED
Status: DISCONTINUED | OUTPATIENT
Start: 2019-02-13 | End: 2019-02-13 | Stop reason: HOSPADM

## 2019-02-13 RX ORDER — BISACODYL 5 MG/1
10 TABLET, DELAYED RELEASE ORAL DAILY PRN
Status: DISCONTINUED | OUTPATIENT
Start: 2019-02-13 | End: 2019-02-16

## 2019-02-13 RX ORDER — SODIUM CHLORIDE 0.9 % (FLUSH) 0.9 %
3-10 SYRINGE (ML) INJECTION AS NEEDED
Status: DISCONTINUED | OUTPATIENT
Start: 2019-02-13 | End: 2019-02-22 | Stop reason: HOSPADM

## 2019-02-13 RX ORDER — SODIUM CHLORIDE 0.9 % (FLUSH) 0.9 %
3 SYRINGE (ML) INJECTION EVERY 12 HOURS SCHEDULED
Status: DISCONTINUED | OUTPATIENT
Start: 2019-02-13 | End: 2019-02-13 | Stop reason: HOSPADM

## 2019-02-13 RX ORDER — HYDROCODONE BITARTRATE AND ACETAMINOPHEN 5; 325 MG/1; MG/1
1 TABLET ORAL EVERY 6 HOURS PRN
Status: DISCONTINUED | OUTPATIENT
Start: 2019-02-13 | End: 2019-02-14

## 2019-02-13 RX ORDER — CEFAZOLIN SODIUM 2 G/100ML
2 INJECTION, SOLUTION INTRAVENOUS ONCE
Status: COMPLETED | OUTPATIENT
Start: 2019-02-13 | End: 2019-02-13

## 2019-02-13 RX ORDER — MORPHINE SULFATE 4 MG/ML
4 INJECTION, SOLUTION INTRAMUSCULAR; INTRAVENOUS ONCE
Status: COMPLETED | OUTPATIENT
Start: 2019-02-13 | End: 2019-02-13

## 2019-02-13 RX ORDER — HYDROMORPHONE HYDROCHLORIDE 1 MG/ML
0.5 INJECTION, SOLUTION INTRAMUSCULAR; INTRAVENOUS; SUBCUTANEOUS
Status: DISCONTINUED | OUTPATIENT
Start: 2019-02-13 | End: 2019-02-18

## 2019-02-13 RX ORDER — ONDANSETRON 2 MG/ML
4 INJECTION INTRAMUSCULAR; INTRAVENOUS EVERY 6 HOURS PRN
Status: DISCONTINUED | OUTPATIENT
Start: 2019-02-13 | End: 2019-02-17

## 2019-02-13 RX ORDER — TRIAMTERENE AND HYDROCHLOROTHIAZIDE 75; 50 MG/1; MG/1
1 TABLET ORAL DAILY PRN
COMMUNITY
End: 2019-02-22 | Stop reason: HOSPADM

## 2019-02-13 RX ORDER — NALOXONE HCL 0.4 MG/ML
0.1 VIAL (ML) INJECTION
Status: DISCONTINUED | OUTPATIENT
Start: 2019-02-13 | End: 2019-02-22 | Stop reason: HOSPADM

## 2019-02-13 RX ORDER — ACETAMINOPHEN 325 MG/1
650 TABLET ORAL EVERY 4 HOURS PRN
Status: DISCONTINUED | OUTPATIENT
Start: 2019-02-13 | End: 2019-02-19

## 2019-02-13 RX ORDER — ASPIRIN 81 MG/1
81 TABLET, CHEWABLE ORAL 2 TIMES DAILY
Status: DISCONTINUED | OUTPATIENT
Start: 2019-02-13 | End: 2019-02-22 | Stop reason: HOSPADM

## 2019-02-13 RX ORDER — CEFAZOLIN SODIUM 2 G/100ML
2 INJECTION, SOLUTION INTRAVENOUS EVERY 8 HOURS
Status: COMPLETED | OUTPATIENT
Start: 2019-02-14 | End: 2019-02-14

## 2019-02-13 RX ORDER — SODIUM CHLORIDE 0.9 % (FLUSH) 0.9 %
3 SYRINGE (ML) INJECTION EVERY 12 HOURS SCHEDULED
Status: DISCONTINUED | OUTPATIENT
Start: 2019-02-13 | End: 2019-02-22 | Stop reason: HOSPADM

## 2019-02-13 RX ORDER — SODIUM CHLORIDE 9 MG/ML
100 INJECTION, SOLUTION INTRAVENOUS CONTINUOUS
Status: DISCONTINUED | OUTPATIENT
Start: 2019-02-13 | End: 2019-02-15

## 2019-02-13 RX ORDER — METHENAMINE, SODIUM PHOSPHATE, MONOBASIC, MONOHYDRATE, PHENYL SALICYLATE, METHYLENE BLUE, AND HYOSCYAMINE SULFATE 120; 40.8; 36; 10; .12 MG/1; MG/1; MG/1; MG/1; MG/1
1 CAPSULE ORAL
COMMUNITY

## 2019-02-13 RX ORDER — ROPIVACAINE HYDROCHLORIDE 2 MG/ML
8 INJECTION, SOLUTION EPIDURAL; INFILTRATION CONTINUOUS
Status: DISCONTINUED | OUTPATIENT
Start: 2019-02-13 | End: 2019-02-18

## 2019-02-13 RX ORDER — BISACODYL 10 MG
10 SUPPOSITORY, RECTAL RECTAL DAILY PRN
Status: DISCONTINUED | OUTPATIENT
Start: 2019-02-13 | End: 2019-02-22 | Stop reason: HOSPADM

## 2019-02-13 RX ORDER — FAMOTIDINE 20 MG/1
20 TABLET, FILM COATED ORAL
Status: DISCONTINUED | OUTPATIENT
Start: 2019-02-13 | End: 2019-02-13 | Stop reason: HOSPADM

## 2019-02-13 RX ORDER — HYDROMORPHONE HYDROCHLORIDE 1 MG/ML
0.5 INJECTION, SOLUTION INTRAMUSCULAR; INTRAVENOUS; SUBCUTANEOUS
Status: DISCONTINUED | OUTPATIENT
Start: 2019-02-13 | End: 2019-02-13 | Stop reason: HOSPADM

## 2019-02-13 RX ORDER — DOCUSATE SODIUM 100 MG/1
100 CAPSULE, LIQUID FILLED ORAL 2 TIMES DAILY PRN
Status: DISCONTINUED | OUTPATIENT
Start: 2019-02-13 | End: 2019-02-16

## 2019-02-13 RX ORDER — SODIUM CHLORIDE 0.9 % (FLUSH) 0.9 %
3-10 SYRINGE (ML) INJECTION AS NEEDED
Status: DISCONTINUED | OUTPATIENT
Start: 2019-02-13 | End: 2019-02-13 | Stop reason: HOSPADM

## 2019-02-13 RX ORDER — CARVEDILOL 3.12 MG/1
3.12 TABLET ORAL 2 TIMES DAILY WITH MEALS
COMMUNITY

## 2019-02-13 RX ORDER — ASPIRIN 81 MG/1
81 TABLET, CHEWABLE ORAL DAILY
Status: DISCONTINUED | OUTPATIENT
Start: 2019-02-13 | End: 2019-02-14

## 2019-02-13 RX ORDER — ALBUMIN, HUMAN INJ 5% 5 %
SOLUTION INTRAVENOUS CONTINUOUS PRN
Status: DISCONTINUED | OUTPATIENT
Start: 2019-02-13 | End: 2019-02-13 | Stop reason: SURG

## 2019-02-13 RX ORDER — SODIUM CHLORIDE, SODIUM LACTATE, POTASSIUM CHLORIDE, CALCIUM CHLORIDE 600; 310; 30; 20 MG/100ML; MG/100ML; MG/100ML; MG/100ML
9 INJECTION, SOLUTION INTRAVENOUS CONTINUOUS PRN
Status: DISCONTINUED | OUTPATIENT
Start: 2019-02-13 | End: 2019-02-22 | Stop reason: HOSPADM

## 2019-02-13 RX ORDER — LANOLIN ALCOHOL/MO/W.PET/CERES
500 CREAM (GRAM) TOPICAL DAILY
Status: DISCONTINUED | OUTPATIENT
Start: 2019-02-13 | End: 2019-02-22 | Stop reason: HOSPADM

## 2019-02-13 RX ORDER — MORPHINE SULFATE 2 MG/ML
2 INJECTION, SOLUTION INTRAMUSCULAR; INTRAVENOUS ONCE
Status: COMPLETED | OUTPATIENT
Start: 2019-02-13 | End: 2019-02-13

## 2019-02-13 RX ORDER — LANSOPRAZOLE
30 KIT EVERY MORNING
Status: DISCONTINUED | OUTPATIENT
Start: 2019-02-13 | End: 2019-02-15

## 2019-02-13 RX ORDER — LIDOCAINE HYDROCHLORIDE 10 MG/ML
INJECTION, SOLUTION INFILTRATION; PERINEURAL AS NEEDED
Status: DISCONTINUED | OUTPATIENT
Start: 2019-02-13 | End: 2019-02-13 | Stop reason: SURG

## 2019-02-13 RX ORDER — DEXAMETHASONE SODIUM PHOSPHATE 4 MG/ML
INJECTION, SOLUTION INTRA-ARTICULAR; INTRALESIONAL; INTRAMUSCULAR; INTRAVENOUS; SOFT TISSUE AS NEEDED
Status: DISCONTINUED | OUTPATIENT
Start: 2019-02-13 | End: 2019-02-13 | Stop reason: SURG

## 2019-02-13 RX ORDER — ACETAMINOPHEN 325 MG/1
650 TABLET ORAL EVERY 8 HOURS
Status: DISCONTINUED | OUTPATIENT
Start: 2019-02-13 | End: 2019-02-19

## 2019-02-13 RX ORDER — ESCITALOPRAM OXALATE 10 MG/1
10 TABLET ORAL DAILY
COMMUNITY
End: 2019-10-28

## 2019-02-13 RX ORDER — MORPHINE SULFATE 2 MG/ML
2 INJECTION, SOLUTION INTRAMUSCULAR; INTRAVENOUS EVERY 4 HOURS PRN
Status: DISCONTINUED | OUTPATIENT
Start: 2019-02-13 | End: 2019-02-18

## 2019-02-13 RX ORDER — SODIUM CHLORIDE 9 MG/ML
50 INJECTION, SOLUTION INTRAVENOUS CONTINUOUS
Status: DISCONTINUED | OUTPATIENT
Start: 2019-02-13 | End: 2019-02-16

## 2019-02-13 RX ORDER — GLYCOPYRROLATE 0.2 MG/ML
INJECTION INTRAMUSCULAR; INTRAVENOUS AS NEEDED
Status: DISCONTINUED | OUTPATIENT
Start: 2019-02-13 | End: 2019-02-13 | Stop reason: SURG

## 2019-02-13 RX ORDER — ACETAMINOPHEN 650 MG/1
650 SUPPOSITORY RECTAL EVERY 4 HOURS PRN
Status: DISCONTINUED | OUTPATIENT
Start: 2019-02-13 | End: 2019-02-19

## 2019-02-13 RX ORDER — FENTANYL CITRATE 50 UG/ML
INJECTION, SOLUTION INTRAMUSCULAR; INTRAVENOUS AS NEEDED
Status: DISCONTINUED | OUTPATIENT
Start: 2019-02-13 | End: 2019-02-13 | Stop reason: SURG

## 2019-02-13 RX ORDER — METAXALONE 800 MG/1
400 TABLET ORAL 4 TIMES DAILY PRN
Status: DISCONTINUED | OUTPATIENT
Start: 2019-02-13 | End: 2019-02-18

## 2019-02-13 RX ORDER — MELATONIN
1000 DAILY
Status: DISCONTINUED | OUTPATIENT
Start: 2019-02-13 | End: 2019-02-22 | Stop reason: HOSPADM

## 2019-02-13 RX ORDER — NEOSTIGMINE METHYLSULFATE 5 MG/5 ML
SYRINGE (ML) INTRAVENOUS AS NEEDED
Status: DISCONTINUED | OUTPATIENT
Start: 2019-02-13 | End: 2019-02-13 | Stop reason: SURG

## 2019-02-13 RX ORDER — ROCURONIUM BROMIDE 10 MG/ML
INJECTION, SOLUTION INTRAVENOUS AS NEEDED
Status: DISCONTINUED | OUTPATIENT
Start: 2019-02-13 | End: 2019-02-13 | Stop reason: SURG

## 2019-02-13 RX ORDER — ESCITALOPRAM OXALATE 10 MG/1
10 TABLET ORAL DAILY
Status: DISCONTINUED | OUTPATIENT
Start: 2019-02-13 | End: 2019-02-15

## 2019-02-13 RX ORDER — ROPIVACAINE HYDROCHLORIDE 5 MG/ML
INJECTION, SOLUTION EPIDURAL; INFILTRATION; PERINEURAL
Status: COMPLETED | OUTPATIENT
Start: 2019-02-13 | End: 2019-02-13

## 2019-02-13 RX ORDER — FENTANYL CITRATE 50 UG/ML
50 INJECTION, SOLUTION INTRAMUSCULAR; INTRAVENOUS
Status: DISCONTINUED | OUTPATIENT
Start: 2019-02-13 | End: 2019-02-13 | Stop reason: HOSPADM

## 2019-02-13 RX ORDER — PROPOFOL 10 MG/ML
VIAL (ML) INTRAVENOUS AS NEEDED
Status: DISCONTINUED | OUTPATIENT
Start: 2019-02-13 | End: 2019-02-13 | Stop reason: SURG

## 2019-02-13 RX ORDER — SODIUM CHLORIDE 0.9 % (FLUSH) 0.9 %
10 SYRINGE (ML) INJECTION AS NEEDED
Status: DISCONTINUED | OUTPATIENT
Start: 2019-02-13 | End: 2019-02-22 | Stop reason: HOSPADM

## 2019-02-13 RX ORDER — HYDROMORPHONE HYDROCHLORIDE 1 MG/ML
0.5 INJECTION, SOLUTION INTRAMUSCULAR; INTRAVENOUS; SUBCUTANEOUS
Status: CANCELLED | OUTPATIENT
Start: 2019-02-13

## 2019-02-13 RX ORDER — MELATONIN
1000 DAILY
COMMUNITY

## 2019-02-13 RX ORDER — SENNOSIDES 8.6 MG
1300 CAPSULE ORAL NIGHTLY
COMMUNITY

## 2019-02-13 RX ADMIN — DEXAMETHASONE SODIUM PHOSPHATE 8 MG: 4 INJECTION, SOLUTION INTRAMUSCULAR; INTRAVENOUS at 18:25

## 2019-02-13 RX ADMIN — FENTANYL CITRATE 25 MCG: 50 INJECTION INTRAMUSCULAR; INTRAVENOUS at 20:55

## 2019-02-13 RX ADMIN — MORPHINE SULFATE 2 MG: 4 INJECTION INTRAVENOUS at 10:16

## 2019-02-13 RX ADMIN — SODIUM CHLORIDE 50 ML/HR: 9 INJECTION, SOLUTION INTRAVENOUS at 16:05

## 2019-02-13 RX ADMIN — ASPIRIN 81 MG 81 MG: 81 TABLET ORAL at 21:38

## 2019-02-13 RX ADMIN — FENTANYL CITRATE 25 MCG: 50 INJECTION INTRAMUSCULAR; INTRAVENOUS at 20:40

## 2019-02-13 RX ADMIN — ALBUMIN HUMAN: 0.05 INJECTION, SOLUTION INTRAVENOUS at 18:35

## 2019-02-13 RX ADMIN — FENTANYL CITRATE 50 MCG: 50 INJECTION, SOLUTION INTRAMUSCULAR; INTRAVENOUS at 19:45

## 2019-02-13 RX ADMIN — SODIUM CHLORIDE 100 ML/HR: 9 INJECTION, SOLUTION INTRAVENOUS at 20:36

## 2019-02-13 RX ADMIN — FENTANYL CITRATE 50 MCG: 50 INJECTION, SOLUTION INTRAMUSCULAR; INTRAVENOUS at 18:17

## 2019-02-13 RX ADMIN — ROCURONIUM BROMIDE 25 MG: 10 SOLUTION INTRAVENOUS at 18:17

## 2019-02-13 RX ADMIN — CEFAZOLIN SODIUM 2 G: 2 INJECTION, SOLUTION INTRAVENOUS at 18:30

## 2019-02-13 RX ADMIN — MORPHINE SULFATE 2 MG: 2 INJECTION, SOLUTION INTRAMUSCULAR; INTRAVENOUS at 17:51

## 2019-02-13 RX ADMIN — ACETAMINOPHEN 650 MG: 325 TABLET ORAL at 14:03

## 2019-02-13 RX ADMIN — LIDOCAINE HYDROCHLORIDE 30 MG: 10 INJECTION, SOLUTION INFILTRATION; PERINEURAL at 18:17

## 2019-02-13 RX ADMIN — SODIUM CHLORIDE, POTASSIUM CHLORIDE, SODIUM LACTATE AND CALCIUM CHLORIDE: 600; 310; 30; 20 INJECTION, SOLUTION INTRAVENOUS at 18:17

## 2019-02-13 RX ADMIN — GLYCOPYRROLATE 0.4 MG: 0.2 INJECTION, SOLUTION INTRAMUSCULAR; INTRAVENOUS at 19:25

## 2019-02-13 RX ADMIN — MORPHINE SULFATE 2 MG: 10 INJECTION INTRAVENOUS at 11:07

## 2019-02-13 RX ADMIN — SODIUM CHLORIDE, POTASSIUM CHLORIDE, SODIUM LACTATE AND CALCIUM CHLORIDE 9 ML/HR: 600; 310; 30; 20 INJECTION, SOLUTION INTRAVENOUS at 16:49

## 2019-02-13 RX ADMIN — SODIUM CHLORIDE, PRESERVATIVE FREE 10 ML: 5 INJECTION INTRAVENOUS at 16:05

## 2019-02-13 RX ADMIN — METAXALONE 400 MG: 800 TABLET ORAL at 14:32

## 2019-02-13 RX ADMIN — Medication 2 MG: at 19:25

## 2019-02-13 RX ADMIN — CYANOCOBALAMIN TAB 1000 MCG 500 MCG: 1000 TAB at 16:00

## 2019-02-13 RX ADMIN — ROPIVACAINE HYDROCHLORIDE 23 ML: 5 INJECTION, SOLUTION EPIDURAL; INFILTRATION; PERINEURAL at 11:30

## 2019-02-13 RX ADMIN — VITAMIN D, TAB 1000IU (100/BT) 1000 UNITS: 25 TAB at 16:00

## 2019-02-13 RX ADMIN — ASPIRIN 81 MG 81 MG: 81 TABLET ORAL at 16:00

## 2019-02-13 RX ADMIN — LANSOPRAZOLE 30 MG: KIT at 16:01

## 2019-02-13 RX ADMIN — ONDANSETRON 4 MG: 2 INJECTION INTRAMUSCULAR; INTRAVENOUS at 10:13

## 2019-02-13 RX ADMIN — ROPIVACAINE HYDROCHLORIDE 8 ML/HR: 5 INJECTION, SOLUTION EPIDURAL; INFILTRATION; PERINEURAL at 11:50

## 2019-02-13 RX ADMIN — FAMOTIDINE 20 MG: 20 TABLET, FILM COATED ORAL at 16:48

## 2019-02-13 RX ADMIN — ESCITALOPRAM OXALATE 10 MG: 10 TABLET ORAL at 16:00

## 2019-02-13 RX ADMIN — FENTANYL CITRATE 25 MCG: 50 INJECTION INTRAMUSCULAR; INTRAVENOUS at 20:35

## 2019-02-13 RX ADMIN — ONDANSETRON 4 MG: 2 INJECTION INTRAMUSCULAR; INTRAVENOUS at 19:24

## 2019-02-13 RX ADMIN — SODIUM CHLORIDE 100 ML/HR: 9 INJECTION, SOLUTION INTRAVENOUS at 21:37

## 2019-02-13 RX ADMIN — EPHEDRINE SULFATE 15 MG: 50 INJECTION INTRAMUSCULAR; INTRAVENOUS; SUBCUTANEOUS at 18:26

## 2019-02-13 RX ADMIN — PROPOFOL 120 MG: 10 INJECTION, EMULSION INTRAVENOUS at 18:17

## 2019-02-13 NOTE — ANESTHESIA PROCEDURE NOTES
Airway  Urgency: elective    Date/Time: 2/13/2019 6:20 PM  End Time:2/13/2019 6:20 PM  Airway not difficult    General Information and Staff    Patient location during procedure: OR  Anesthesiologist: Colin Castro MD    Indications and Patient Condition  Indications for airway management: airway protection    Preoxygenated: yes  MILS not maintained throughout  Mask difficulty assessment: 1 - vent by mask    Final Airway Details  Final airway type: endotracheal airway      Successful airway: ETT  Cuffed: yes   Successful intubation technique: direct laryngoscopy  Facilitating devices/methods: intubating stylet  Endotracheal tube insertion site: oral  Blade: Stefan  Blade size: 3  ETT size (mm): 7.0  Cormack-Lehane Classification: grade III - view of epiglottis only  Placement verified by: chest auscultation and capnometry   Measured from: lips  ETT to lips (cm): 20  Number of attempts at approach: 2    Additional Comments  Negative epigastric sounds, Breath sound equal bilaterally with symmetric chest rise and fall

## 2019-02-13 NOTE — ANESTHESIA PROCEDURE NOTES
Peripheral Block      Patient location during procedure: pre-op  Reason for block: procedure for pain and at surgeon's request  Performed by  CRNA: Colin Georges, CRNA  Assisted by: Dara Livingston RN  Preanesthetic Checklist  Completed: patient identified, site marked, surgical consent, pre-op evaluation, timeout performed, IV checked, risks and benefits discussed and monitors and equipment checked  Prep:  Sterile barriers:cap, gloves and mask  Prep: ChloraPrep  Patient monitoring: blood pressure monitoring, continuous pulse oximetry and EKG  Procedure    Guidance:ultrasound guided  Images:still images obtained    Laterality:left  Block Type:fascia iliaca catheter  Injection Technique:catheter  Needle Type:echogenic  Needle Gauge:18 G    Catheter Size:20 G (20g)  Cath Depth at skin: 18 cm  Medications Used: ropivacaine (NAROPIN) 0.5 % injection, 23 mL  Med admintered at 2/13/2019 11:30 AM  Medications  Preservative Free Saline:10ml  Comment:20ml ns added    Post Assessment  Injection Assessment: negative aspiration for heme, no paresthesia on injection and incremental injection  Patient Tolerance:comfortable throughout block  Complications:no  Additional Notes  Procedure:                 Pt placed in supine position.   The insertion site was prepped in sterile fashion with Chlorapreop and clear plastic drapes.  Analgesia was provided by skin infiltration at insertion site with Lidocaine 1% 3mls.  A B-Mcfarlane 18 g , 4 inch echogenic Touhy needle was advance In-plane under ultrasound guidance. The   Anterior superior Iliac crest was initially visualized and the probe was directed slightly medially and slightly towards the umbilicus.  The course of the needle was tracked over the sartorius muscle through the fascia Iliacus and into the anterior portion of the Iliacus muscle.  Major vessels where identified and avoided as where structures of the peritoneal cavity.  LA injection was made incrementally in 1-5ml amounts  spread was visualized superiorly below fascia iliacus.  Injection was completed with negative aspiration of blood and negative intravascular injection.  Injection pressures where normal or minimal resistance.  A 20 g B-Mcfarlane wire styleted catheter was then advance thru the needle and very easily placed in a superior or cephalad direction.  The catheter was secured at insertion site with skin afix , mastisol, steristreps.  A CHG tegaderm dressing was placed over the insertion site and the nerve catheter labeled and capped.  Thank You.

## 2019-02-13 NOTE — ED PROVIDER NOTES
Subjective   Pt is a 89 yo female presenting to ED by EMS from [facility]. Pt states she was using her walker going to the bathroom and lost her balance landing on her left side. She is having pain in her left hip and swelling with bruising around her left eye. She has been unable to bear weight since the fall. She denies LOC, neck or back pain. She denies numbness, tingling or weakness to UE or LE. She denies dizziness, CP, SOB or dizziness prior to fall or after. She denies prior hip fracture. She denies headache, nausea or vomiting. The only blood thinner she takes is a daily 81mg ASA. She has significant hx for prior colon cancer, cardiac ablation, GERD, and HTN.         History provided by:  Patient  Fall   Mechanism of injury: fall    Injury location:  Face and leg  Leg injury location:  L hip  Incident location:  care home  Time since incident: PTA.  Fall:     Fall occurred:  Standing    Point of impact:  Head (left hip)  Prior to arrival data:     Patient ambulatory at scene: no      Orientation at scene:  Person, place, situation and time    Loss of consciousness: no      Medications administered:  None    Immobilization:  None  Associated symptoms: no abdominal pain, no back pain, no chest pain, no difficulty breathing, no headaches, no loss of consciousness, no nausea, no neck pain and no vomiting    Risk factors: no anticoagulation therapy, no CAD, no CHF, no COPD, no diabetes, no dialysis and no pacemaker        Review of Systems   Constitutional: Negative for chills and fever.   HENT: Negative for congestion, ear pain, sore throat and trouble swallowing.    Eyes: Negative for pain, redness and visual disturbance.   Respiratory: Negative for cough, chest tightness and shortness of breath.    Cardiovascular: Negative for chest pain and leg swelling.   Gastrointestinal: Negative for abdominal pain, constipation, diarrhea, nausea and vomiting.   Genitourinary: Negative for difficulty urinating, dysuria,  flank pain, hematuria and vaginal bleeding.   Musculoskeletal: Positive for arthralgias (Left hip  ). Negative for back pain, joint swelling and neck pain.   Skin: Negative for rash and wound.   Neurological: Negative for dizziness, loss of consciousness, syncope, speech difficulty, weakness, numbness and headaches.   Psychiatric/Behavioral: Negative for confusion.   All other systems reviewed and are negative.      Past Medical History:   Diagnosis Date   • Atrial fibrillation (CMS/HCC)    • Cancer (CMS/HCC)     colon   • Coronary artery disease    • GERD (gastroesophageal reflux disease)    • Hypertension        Allergies   Allergen Reactions   • Crab (Diagnostic) Hives   • Lovenox [Enoxaparin Sodium] Rash   • Penicillins Rash     unsure       Past Surgical History:   Procedure Laterality Date   • CARDIAC ABLATION     • CHOLECYSTECTOMY     • COLON SURGERY      BOWEL RESECTION FOR HX COLON CANCER   • HYSTERECTOMY     • REPLACEMENT TOTAL KNEE         History reviewed. No pertinent family history.    Social History     Socioeconomic History   • Marital status:      Spouse name: Not on file   • Number of children: Not on file   • Years of education: Not on file   • Highest education level: Not on file   Tobacco Use   • Smoking status: Never Smoker   • Smokeless tobacco: Never Used   Substance and Sexual Activity   • Alcohol use: No   • Drug use: No   • Sexual activity: Defer           Objective   Physical Exam   Constitutional: She is oriented to person, place, and time. Vital signs are normal. She appears well-developed.   HENT:   Head: Atraumatic.   Nose: Nose normal.   Mouth/Throat: Mucous membranes are normal.   Eyes: Conjunctivae, EOM and lids are normal. Pupils are equal, round, and reactive to light.   Neck: Normal range of motion. Neck supple.   Cardiovascular: Normal rate, regular rhythm, normal heart sounds and normal pulses.   Pulmonary/Chest: Effort normal and breath sounds normal. She has no  wheezes.   Abdominal: Soft. She exhibits no distension. There is no tenderness. There is no rebound and no guarding.   Musculoskeletal: Normal range of motion. She exhibits no edema.        Right hip: She exhibits normal range of motion and no tenderness.        Left hip: She exhibits tenderness, swelling and deformity (Slightly shortened and rotated left leg ). She exhibits normal range of motion.        Left knee: She exhibits swelling (chronic per patient ). Tenderness (chronic per patient ) found.   Neurological: She is alert and oriented to person, place, and time. No sensory deficit.   Skin: Skin is warm and dry. No rash noted. No erythema.   Psychiatric: She has a normal mood and affect. Her speech is normal and behavior is normal.   Nursing note and vitals reviewed.      Procedures           ED Course  ED Course as of Feb 13 1635   Wed Feb 13, 2019   1133 Discussed patient with OR staff for Dr. Maynard.   [RT]   1157 Discussed admission with Dr. Pride.   [RT]      ED Course User Index  [RT] Soco Hancock PA      NOTE. Pt moving left hip with some discomfort on arrival to ED and xray negative for fracture. Nursing staff note while patient was in ED bed she was moving her left leg which she has been doing since arrival and suddenly felt and heard a pop. Left hip pain become worse. Morphine given for pain. CT ordered and noted a left hip fracture.     Discussed patient with Dr. Bartholomew who is agreeable with ED course.      Contacted anesthesia for left hip block and performing in ED.      Recent Results (from the past 24 hour(s))   Comprehensive Metabolic Panel    Collection Time: 02/13/19 10:09 AM   Result Value Ref Range    Glucose 95 70 - 100 mg/dL    BUN 24 (H) 9 - 23 mg/dL    Creatinine 0.86 0.60 - 1.30 mg/dL    Sodium 139 132 - 146 mmol/L    Potassium 3.8 3.5 - 5.5 mmol/L    Chloride 107 99 - 109 mmol/L    CO2 27.0 20.0 - 31.0 mmol/L    Calcium 9.9 8.7 - 10.4 mg/dL    Total Protein 6.3 5.7 - 8.2 g/dL     Albumin 3.96 3.20 - 4.80 g/dL    ALT (SGPT) 20 7 - 40 U/L    AST (SGOT) 21 0 - 33 U/L    Alkaline Phosphatase 71 25 - 100 U/L    Total Bilirubin 0.5 0.3 - 1.2 mg/dL    eGFR Non African Amer 61 >60 mL/min/1.73    Globulin 2.3 gm/dL    A/G Ratio 1.7 1.5 - 2.5 g/dL    BUN/Creatinine Ratio 27.9 (H) 7.0 - 25.0    Anion Gap 5.0 3.0 - 11.0 mmol/L   Protime-INR    Collection Time: 02/13/19 10:09 AM   Result Value Ref Range    Protime 13.4 11.2 - 14.3 Seconds    INR 1.07 0.85 - 1.16   CBC Auto Differential    Collection Time: 02/13/19 10:09 AM   Result Value Ref Range    WBC 10.76 3.50 - 10.80 10*3/mm3    RBC 3.61 (L) 3.89 - 5.14 10*6/mm3    Hemoglobin 10.7 (L) 11.5 - 15.5 g/dL    Hematocrit 34.1 (L) 34.5 - 44.0 %    MCV 94.5 80.0 - 99.0 fL    MCH 29.6 27.0 - 31.0 pg    MCHC 31.4 (L) 32.0 - 36.0 g/dL    RDW 14.6 (H) 11.3 - 14.5 %    RDW-SD 50.2 37.0 - 54.0 fl    MPV 9.3 6.0 - 12.0 fL    Platelets 312 150 - 450 10*3/mm3    Neutrophil % 79.4 (H) 41.0 - 71.0 %    Lymphocyte % 12.6 (L) 24.0 - 44.0 %    Monocyte % 6.2 0.0 - 12.0 %    Eosinophil % 1.5 0.0 - 3.0 %    Basophil % 0.3 0.0 - 1.0 %    Immature Grans % 0.5 0.0 - 0.6 %    Neutrophils, Absolute 8.54 (H) 1.50 - 8.30 10*3/mm3    Lymphocytes, Absolute 1.36 0.60 - 4.80 10*3/mm3    Monocytes, Absolute 0.67 0.00 - 1.00 10*3/mm3    Eosinophils, Absolute 0.16 0.00 - 0.30 10*3/mm3    Basophils, Absolute 0.03 0.00 - 0.20 10*3/mm3    Immature Grans, Absolute 0.05 0.00 - 0.05 10*3/mm3   Type & Screen    Collection Time: 02/13/19 10:37 AM   Result Value Ref Range    ABO Type O     RH type Positive     Antibody Screen Negative     T&S Expiration Date 2/16/2019 11:59:59 PM    ABO RH Specimen Verification    Collection Time: 02/13/19 11:58 AM   Result Value Ref Range    ABO Type O     RH type Positive      Note: In addition to lab results from this visit, the labs listed above may include labs taken at another facility or during a different encounter within the last 24 hours. Please  correlate lab times with ED admission and discharge times for further clarification of the services performed during this visit.    XR Chest 1 View   Preliminary Result   1. Large hiatal hernia.   2. Stable chronic appearing lung changes. No new heart or lung disease.   3. Questionable subluxation and trauma to the right humeral head as   noted. Please correlate with exam findings and patient complaint.       D:  02/13/2019   E:  02/13/2019                  CT Pelvis Without Contrast   Preliminary Result   1. Interval development of displaced comminuted intratrochanteric   fracture since previous plain film.   2. Associated discrete 6 cm hematoma and intramuscular and subcutaneous   hematoma as well.       D:  02/13/2019   E:  02/13/2019          CT Head Without Contrast   Preliminary Result   Age-appropriate generalized cerebral atrophy. No evidence of   acute intracranial disease. Incidentally noted left facial hematoma.   Separate facial bone CT scan has also been performed. Please see that   report.       D:  02/13/2019   E:  02/13/2019              XR Hip With or Without Pelvis 2 - 3 View Left   Preliminary Result   1.  Marked osteopenia.   2.  No pelvic or left hip fracture is identified.   3.  Lower lumbar degenerative disc disease and scoliosis.       D:  02/13/2019   E:  02/13/2019              CT Facial Bones Without Contrast   Preliminary Result   1. Superficial left periorbital hematoma.   2. No other evidence of acute soft tissue or bony trauma elsewhere.       D:  02/13/2019   E:  02/13/2019                Vitals:    02/13/19 1600 02/13/19 1609 02/13/19 1614 02/13/19 1628   BP:  125/64     BP Location:  Right arm     Patient Position:  Lying     Pulse: 89 81 86 90   Resp:  16     Temp:       TempSrc:       SpO2: 95% 93% 95% 93%   Weight:       Height:         Medications   sodium chloride 0.9 % flush 10 mL (not administered)   ropivacaine (NAROPIN) 0.2% peripheral nerve cath (moog) (8 mL/hr Peripheral  Nerve Currently Infusing 2/13/19 1150)   aspirin chewable tablet 81 mg (81 mg Oral Given 2/13/19 1600)   carvedilol (COREG) tablet 3.125 mg (not administered)   cholecalciferol (VITAMIN D3) tablet 1,000 Units (1,000 Units Oral Given 2/13/19 1600)   vitamin B-12 (CYANOCOBALAMIN) tablet 500 mcg (500 mcg Oral Given 2/13/19 1600)   escitalopram (LEXAPRO) tablet 10 mg (10 mg Oral Given 2/13/19 1600)   lansoprazole (FIRST) oral suspension 30 mg (30 mg Nasogastric Given 2/13/19 1601)   sodium chloride 0.9 % flush 3 mL (10 mL Intravenous Given 2/13/19 1605)   sodium chloride 0.9 % flush 3-10 mL (not administered)   sodium chloride 0.9 % infusion (50 mL/hr Intravenous New Bag 2/13/19 1605)   ondansetron (ZOFRAN) injection 4 mg (not administered)   Morphine sulfate (PF) injection 2 mg (2 mg Intravenous Not Given 2/13/19 1400)   HYDROcodone-acetaminophen (NORCO) 5-325 MG per tablet 1 tablet (not administered)   acetaminophen (TYLENOL) tablet 650 mg (650 mg Oral Given 2/13/19 1403)   metaxalone (SKELAXIN) tablet 400 mg (400 mg Oral Given 2/13/19 1432)   Morphine sulfate (PF) injection 4 mg (2 mg Intravenous Given 2/13/19 1016)   ondansetron (ZOFRAN) injection 4 mg (4 mg Intravenous Given 2/13/19 1013)   Morphine sulfate (PF) injection 2 mg (2 mg Intravenous Given 2/13/19 1107)     ECG/EMG Results (last 24 hours)     ** No results found for the last 24 hours. **        ECG 12 Lead                         MDM      Final diagnoses:   Closed fracture of left hip, initial encounter (CMS/[facility])   Injury of head, initial encounter   Contusion of left orbital tissues, initial encounter   Abrasion of face, initial encounter   Fall, initial encounter            Soco Hancock PA  02/13/19 1218

## 2019-02-13 NOTE — H&P
"    [facility] Medicine Services  HISTORY AND PHYSICAL    Patient Name: Debbie Baum  : 1923  MRN: [id number]  Primary Care Physician: Paula Scott MD  Date of admission: 2019      Subjective   Subjective     Chief Complaint:  Left hip pain    HPI:  Debbie Baum is a 89 y.o. female w/ hx afib (s/p prior ablation) on coreg and asa, multiple prior falls who was using her walker at nursing facility today when lost balance and fell to left side striking left hip and face. Brought to ER where initial xray hip/pelvix revealed no fracture. Later while moving legs heard a \"pop\" and had worsening pain and subsequent ct pelvis showed left intertrochanteric fracture and 6cm gluteal & subcutanoeus hematoma. Also has bruising left eye. Ct face negative for fractures. No preceding chest pain or palpitations. No dysuria, no fever, no n/v/d. No dyspnea    Review of Systems   No confusion, no chest pain or dyspnea    Otherwise complete ROS reviewed and is negative except as mentioned in the HPI.    Personal History     Past Medical History:   Diagnosis Date   • Cancer (CMS/HCC)     colon   • Coronary artery disease    • GERD (gastroesophageal reflux disease)    • H/O cardiac radiofrequency ablation        Past Surgical History:   Procedure Laterality Date   • CARDIAC ABLATION     • CHOLECYSTECTOMY     • COLON SURGERY      BOWEL RESECTION FOR HX COLON CANCER   • HYSTERECTOMY     • REPLACEMENT TOTAL KNEE         Family History: family history is not on file. Otherwise pertinent FHx was reviewed and unremarkable.     Social History:  reports that  has never smoked. she has never used smokeless tobacco. She reports that she does not drink alcohol or use drugs.  Social History     Social History Narrative   • Not on file       Medications:    Available home medication information reviewed.    (Not in a hospital admission)    Allergies   Allergen Reactions   • Crab (Diagnostic) Hives   • Lovenox " [Enoxaparin Sodium] Rash   • Penicillins Rash     unsure       Objective   Objective     Vital Signs:   Temp:  [97.7 °F (36.5 °C)] 97.7 °F (36.5 °C)  Heart Rate:  [67-79] 68  Resp:  [16-18] 16  BP: (122-184)/() 122/64        Physical Exam   Elderly, frail, nontoxic, ox3, left eye bruising noted  Upon opening left eye eomi, pupils equal  Neck supple  rrr  ctab  abd soft, nontender  No cce  No extremity rash  Let hip pain w/ palptation; 2 + dp pulse    Results Reviewed:  I have personally reviewed current lab, radiology, and data and agree.    Results from last 7 days   Lab Units 02/13/19  1009   WBC 10*3/mm3 10.76   HEMOGLOBIN g/dL 10.7*   HEMATOCRIT % 34.1*   PLATELETS 10*3/mm3 312   INR  1.07     Results from last 7 days   Lab Units 02/13/19  1009   SODIUM mmol/L 139   POTASSIUM mmol/L 3.8   CHLORIDE mmol/L 107   CO2 mmol/L 27.0   BUN mg/dL 24*   CREATININE mg/dL 0.86   GLUCOSE mg/dL 95   CALCIUM mg/dL 9.9   ALT (SGPT) U/L 20   AST (SGOT) U/L 21     Estimated Creatinine Clearance: 30.8 mL/min (by C-G formula based on SCr of 0.86 mg/dL).  Brief Urine Lab Results  (Last result in the past 365 days)      Color   Clarity   Blood   Leuk Est   Nitrite   Protein   CREAT   Urine HCG        10/06/18 1807 Yellow Clear Negative Negative Negative Negative             No results found for: BNP  Imaging Results (last 24 hours)     Procedure Component Value Units Date/Time    XR Chest 1 View [[id number]] Collected:  02/13/19 1247     Updated:  02/13/19 1247    Narrative:       EXAMINATION: XR CHEST 1 VW-02/13/2019:      INDICATION: Preop; S72.002A-Fracture of unspecified part of neck of left  femur, initial encounter for closed fracture; S09.90XA-Unspecified  injury of head, initial encounter; S05.12XA-Contusion of eyeball and  orbital tissues, left eye, initial encounter; S00.81XA-Abrasion of other  part of head, initial encounter; W19.XXXA-Unspecified fall, initial  encounter.      COMPARISON: 09/13/2018.     FINDINGS:  "There appears to be a large hiatal hernia. The heart shadow  itself appears normal in size. The vasculature is upper limits of normal  size. Relative elevation of the right hemidiaphragm compared to left is  unchanged. There are mild chronic appearing interstitial changes. No new  pulmonary parenchymal disease is seen. 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 On these images it appears the right humerus may be   subluxed or dislocated with more questionable suggestion of fracture or  avulsion of the humeral head.       Impression:       1. Large hiatal hernia.  2. Stable chronic appearing lung changes. No new heart or lung disease.  3. Questionable subluxation and trauma to the right humeral head as  noted. Please correlate with exam findings and patient complaint.     D:  02/13/2019  E:  02/13/2019             CT Pelvis Without Contrast [[id number]] Collected:  02/13/19 1122     Updated:  02/13/19 1122    Narrative:       EXAMINATION: CT PELVIS WO CONTRAST-      INDICATION: Left hip pain, negative x-ray.     TECHNIQUE: 2 mm axial images through the pelvis and proximal femurs with  sagittal and coronal 2 mm 2-D reconstructions.     The radiation dose reduction device was turned on for each scan per the  ALARA (As Low as Reasonably Achievable) protocol.     COMPARISON: Left hip plain film series of the same day.     FINDINGS: By history, the patient came to the ER this morning  complaining of left hip pain after fall, with the left hip making a loud  \"pop\" while the patient was in the ER.     There is a displaced intertrochanteric fracture of the left hip, not  appearing on previous plain films. Most likely, there was a nondisplaced  occult fracture present previously which progressed since the previous  study. There is relatively mild comminution along the fracture margins.  No underlying destructive lesion is identified. Remaining bony  structures appear anatomic and intact. There is an old healed " right  inferior pubic ramus fracture. Soft tissue window images show an  approximately 6 cm left gluteal hematoma, and some more diffuse  intramuscular hematoma of the gluteus.  There is some patchy diffuse  hematoma laterally as well. No intrapelvic hematoma is seen. No other  bony or soft tissue trauma is identified. Bowel loops are normal in  caliber. No ascites is seen. Bladder is moderately distended.       Impression:       1. Interval development of displaced comminuted intratrochanteric  fracture since previous plain film.  2. Associated discrete 6 cm hematoma and intramuscular and subcutaneous  hematoma as well.     D:  02/13/2019  E:  02/13/2019       CT Head Without Contrast [[id number]] Collected:  02/13/19 0946     Updated:  02/13/19 1030    Narrative:       EXAMINATION: CT HEAD WO CONTRAST- 02/13/2019      INDICATION: Head trauma, minor, GCS>=13, NOC/NEXUS/CCR neg, first study          TECHNIQUE: 5 mm unenhanced images through the brain     The radiation dose reduction device was turned on for each scan per the  ALARA (As Low as Reasonably Achievable) protocol.     COMPARISON: Head CT scan of 08/08/2018.     FINDINGS: The calvarium appears intact. Included paranasal sinuses and  mastoids appear clear. Soft tissue window images show no evidence of  hemorrhage, contusion, edema, mass or mass effect, infarct,  hydrocephalus, or abnormal extra-axial collection. There is expected  degree of generalized cerebral atrophy for age. Re windowing this study  for facial soft tissues shows superficial/preseptal hematoma of the left  periorbital region. Separate facial bone CT scan is also performed.       Impression:       Age-appropriate generalized cerebral atrophy. No evidence of  acute intracranial disease. Incidentally noted left facial hematoma.  Separate facial bone CT scan has also been performed. Please see that  report.     D:  02/13/2019  E:  02/13/2019          XR Hip With or Without Pelvis 2 - 3 View  Left [[id number]] Collected:  02/13/19 0949     Updated:  02/13/19 0949    Narrative:       EXAMINATION:  AP PELVIS, AP AND LEFT LATERAL HIP-02/13/2019     HISTORY: Fall this morning, left hip pain.     COMPARISON: 08/09/2019.     FINDINGS: The bony structures are markedly osteopenic, expected for age.  Lumbar scoliosis and advanced lower lumbar degenerative disc disease are  noted. Femoral head contours are generally well maintained. There is no  apparent left acetabular fracture, pubic ramus fracture, or proximal  left femoral fracture. With this degree of osteopenia, a nondisplaced  fracture could escape detection, but no interruption of the trabecular  pattern of the femur is seen. Irregularity of the upper margin of the  greater trochanter is probably stable, more prominent today due to the  hip previously internally rotated, today externally rotated.       Impression:       1.  Marked osteopenia.  2.  No pelvic or left hip fracture is identified.  3.  Lower lumbar degenerative disc disease and scoliosis.     D:  02/13/2019  E:  02/13/2019          CT Facial Bones Without Contrast [[id number]] Collected:  02/13/19 0947     Updated:  02/13/19 0947    Narrative:       EXAMINATION: CT FACIAL BONES WO CONTRAST-02/13/2019:      INDICATION: Maxface trauma, blunt.         TECHNIQUE: Spiral acquisition 2 mm axial images through the facial bones  with sagittal and coronal 2 mm 2-D reconstructions.      The radiation dose reduction device was turned on for each scan per the  ALARA (As Low as Reasonably Achievable) protocol.     COMPARISON: NONE.     FINDINGS: The facial bones and included portions of the calvarium appear  intact. In particular, no fracture of nasal bones, orbital or maxillary  sinus fracture or zygomatic arch fracture is seen. TM joints appear  anatomic, and show expected DJD for age. Paranasal sinuses and mastoids  are clear except for single opacified right ethmoid air cell. Nasal  passages are normally  patent. Allowing for streak artifact from the  patient's dental hardware, maxilla and mandible appear grossly normal.     Soft tissue window images show diffuse superficial/preseptal hematoma  around the left orbit, but no evidence of hematoma elsewhere and no  evidence of foreign body. The optic globes, extraocular muscles, and  optic nerves appear grossly normal and symmetric.       Impression:       1. Superficial left periorbital hematoma.  2. No other evidence of acute soft tissue or bony trauma elsewhere.     D:  02/13/2019  E:  02/13/2019              Results for orders placed during the hospital encounter of 09/17/18   Adult Transthoracic Echo Complete W/ Cont if Necessary Per Protocol    Narrative · Mild-to-moderate mitral valve regurgitation is present          Assessment/Plan   Assessment / Plan     Active Hospital Problems    Diagnosis Date Noted   • **Closed fracture of left hip (CMS/HCC) [S72.002A] 02/13/2019   • Falls [W19.XXXA] 02/13/2019   • Periorbital ecchymosis of left eye [S00.12XA] 02/13/2019   • Hematoma [T14.8XXA] 02/13/2019     Left gluteal hematoma     • Atrial fibrillation (CMS/HCC) [I48.91] 04/05/2018   • Essential hypertension [I10] 04/05/2018       Plan:  -npo  -dr. Maynard w/ orthopedic surgery plans surgery later today  -s/p nerve block  -ice to left eye  -analgesics  -pt/ot after surgery when ok w/ ortho    -cbc, bmp in a.m.    DVT prophylaxis:  Mechanical for now, start chemical per ortho surgery perioperatively    CODE STATUS:    Code Status and Medical Interventions:   Ordered at: 02/13/19 1302     Limited Support to NOT Include:    Intubation     Code Status:    No CPR     Medical Interventions (Level of Support Prior to Arrest):    Limited       Admission Status:  I believe this patient meets inpatient status    Electronically signed by Eleuterio Pride MD, 02/13/19, 1:04 PM.

## 2019-02-13 NOTE — ANESTHESIA PREPROCEDURE EVALUATION
Anesthesia Evaluation     Patient summary reviewed and Nursing notes reviewed   no history of anesthetic complications:  NPO Solid Status: > 8 hours  NPO Liquid Status: > 2 hours           Airway   Mallampati: II  TM distance: >3 FB  Neck ROM: full  No difficulty expected  Dental - normal exam     Pulmonary - negative pulmonary ROS and normal exam   (+) decreased breath sounds,   Cardiovascular - normal exam  Exercise tolerance: poor (<4 METS)    Rhythm: irregular  Rate: normal    (+) hypertension well controlled 2 medications or greater, CAD, dysrhythmias Atrial Fib,     ROS comment: Echo: normal EF, mild-mod MR, mild TR    Neuro/Psych- negative ROS  GI/Hepatic/Renal/Endo    (+)  GERD well controlled,      Musculoskeletal     Abdominal  - normal exam  (-) obese    Bowel sounds: normal.   Substance History - negative use     OB/GYN negative ob/gyn ROS         Other   (+) blood dyscrasia, arthritis   history of cancer remission    ROS/Med Hx Other: MILD ANEMIA                Anesthesia Plan    ASA 3     general and regional     intravenous induction   Anesthetic plan, all risks, benefits, and alternatives have been provided, discussed and informed consent has been obtained with: patient.

## 2019-02-13 NOTE — CONSULTS
Orthopedic Consult      Patient: Debbie Baum    Date of Admission: 2/13/2019  8:39 AM    YOB: 1923    Medical Record Number: [id number]    Attending Physician: Eleuterio Pride MD    Consulting Physician: Ariel Maynard MD      Chief Complaint(s): Closed fracture of left hip, initial encounter (CMS/Formerly [facility]) [S72.002A]      History of Present Illness: 89 y.o. female admitted to [facility] with Closed fracture of left hip, initial encounter (CMS/Formerly [facility]) [S72.002A]. I was consulted for further evaluation and treatment of left hip fracture.  This is a 89-year-old female who lives in assisted living facility, the [facility],  who sustained a mechanical fall in her bathroom early this morning.  She was unable to ambulate or weight-bear afterward secondary to pain in the hip.  She seen at [facility] ER for evaluation and diagnosed with a left intertrochanteric hip fracture.  She denies loss of consciousness.  Baseline, she is limited in mobility with a walker.  She is takes a daily 81 mg aspirin only.  She denies any antecedent pain in the hip prior to the fall.  She currently rates her pain a 0/10 on the pain scale.  There is a peripheral nerve catheter in place.  Movement of the hip increases her pain.  Immobilization improves her pain.  She denies numbness or tingling in the extremity.       Allergies   Allergen Reactions   • Crab (Diagnostic) Hives   • Lovenox [Enoxaparin Sodium] Rash   • Penicillins Rash     unsure        Home Medications:    (Not in a hospital admission)    Current Medications:  Scheduled Meds:   Continuous Infusions:  ropivacaine (NAROPIN) 0.2% peripheral nerve cath (moog) 8 mL/hr   sodium chloride 50 mL/hr     PRN Meds:.•  HYDROcodone-acetaminophen  •  Morphine  •  [COMPLETED] Insert peripheral IV **AND** sodium chloride    Past Medical History:   Diagnosis Date   • Cancer (CMS/Formerly [facility])     colon   • Coronary artery disease    • GERD (gastroesophageal reflux  disease)    • H/O cardiac radiofrequency ablation         Past Surgical History:   Procedure Laterality Date   • CARDIAC ABLATION     • CHOLECYSTECTOMY     • COLON SURGERY      BOWEL RESECTION FOR HX COLON CANCER   • HYSTERECTOMY     • REPLACEMENT TOTAL KNEE          Social History     Occupational History   • Not on file   Tobacco Use   • Smoking status: Never Smoker   • Smokeless tobacco: Never Used   Substance and Sexual Activity   • Alcohol use: No   • Drug use: No   • Sexual activity: Defer    Social History     Social History Narrative   • Not on file      History reviewed. No pertinent family history.    Review of Systems:   General: negative for - chills, fatigue, fever, malaise or night sweats  Psychological: negative for - behavioral disorder, hostility, irritability, mood swings, obsessive thoughts or suicidal ideation  Ophthalmic: negative for - blurry vision, double vision or eye pain  HEENT: negative for - headaches, hearing change, sinus pain, sore throat or visual changes  Hematological and Lymphatic: negative for - bleeding problems, jaundice, night sweats, pallor or swollen lymph nodes  Endocrine: negative for - malaise/lethargy, mood swings, palpitations, polydipsia/polyuria, skin changes or unexpected weight changes  Respiratory: negative for - cough, shortness of breath or wheezing  Cardiovascular: negative for - chest pain, dyspnea on exertion, palpitations or shortness of breath  Gastrointestinal: negative for - abdominal pain, change in bowel habits, change in stools, constipation, gas/bloating or stool incontinence  Genito-Urinary: negative for - dysuria, genital discharge, nocturia, pelvic pain or scrotal mass/pain  Musculoskeletal: positive for - pain in hip - left  Neurological: negative for - behavioral changes, headaches, speech problems or visual changes  Dermatological: negative for hair changes, lumps, pruritus and skin lesion changes    Physical Exam: 89 y.o. female    GENERAL  APPEARANCE: awake, alert & oriented x 3, in no acute distress and well developed, well nourished  Vitals:    02/13/19 1200 02/13/19 1201 02/13/19 1214 02/13/19 1221   BP: 122/64      BP Location:       Patient Position:       Pulse:  74 77 68   Resp:       Temp:       TempSrc:       SpO2:  92% 91% 92%   Weight:       Height:         PSYCH: normal affect  HEAD: Normocephalic, without obvious abnormality, atraumatic  EYES: No icterus, corneas clear, PERRLA  CARDIOVASCULAR: pulses palpable and equal bilaterally. Capillary refill less than 2 seconds  LUNGS:  breathing nonlabored  ABDOMEN: Soft, nontender, nondistended  BACK: No C-T- L spine tenderness  EXTREMITIES: no clubbing, cyanosis  MUSCULOSKELETAL:    Left upper extremity: Full painless range of motion of shoulder, elbow, wrist, and digits.  Nontender to palpation throughout the extremity.  Intact EPL, FPL, EDC, FDP, FDS, interosseous, wrist flexion, wrist extension, biceps, triceps, and deltoid. Sensation intact light touch to median, radial, ulnar, and axillary nerves. Palpable radial pulse.  Skin is intact without significant lesions or wounds.    Right upper extremity: Full painless range of motion of shoulder, elbow, wrist, and digits. Nontender to palpation throughout the extremity.  Intact EPL, FPL, EDC, FDP, FDS, interosseous, wrist flexion, wrist extension, biceps, triceps, and deltoid. Sensation intact light touch to median, radial, ulnar, and axillary nerves. Palpable radial pulse.  Skin is intact without significant lesions or wounds.    Pelvis: Stable to AP and lateral compression.    Left lower extremity: Full, painless range of motion of ankle, toes . Range of motion is limited at hip and kneery to pain In the hip . Pain with palpation of proximal femur and greater trochanter.  Positive pain with logroll.  Nontender to palpation from mid femur distally to the remainder of the extremity . Intact EHL, FHL, tibialis anterior, and gastrocsoleus.  Sensation intact to light touch to deep peroneal, superficial peroneal, sural, saphenous, tibial nerves. Palpable DP and PT pulses. Skin - small focal skin tears overlying the anterior tibia . Edema - none     Right lower extremity: Full, painless range of motion of hip, knee, ankle, toes.  Nontender to palpation throughout the extremity.  Negative pain with logroll.  Intact EHL, FHL, tibialis anterior, and gastrocsoleus. Sensation intact to light touch to deep peroneal, superficial peroneal, sural, saphenous, tibial nerves. Palpable DP and PT pulses. Skin - small focal skin tears and distal extremity . Edema - none      Diagnostic Tests:    Admission on 02/13/2019   Component Date Value Ref Range Status   • Glucose 02/13/2019 95  70 - 100 mg/dL Final   • BUN 02/13/2019 24* 9 - 23 mg/dL Final   • Creatinine 02/13/2019 0.86  0.60 - 1.30 mg/dL Final   • Sodium 02/13/2019 139  132 - 146 mmol/L Final   • Potassium 02/13/2019 3.8  3.5 - 5.5 mmol/L Final   • Chloride 02/13/2019 107  99 - 109 mmol/L Final   • CO2 02/13/2019 27.0  20.0 - 31.0 mmol/L Final   • Calcium 02/13/2019 9.9  8.7 - 10.4 mg/dL Final   • Total Protein 02/13/2019 6.3  5.7 - 8.2 g/dL Final   • Albumin 02/13/2019 3.96  3.20 - 4.80 g/dL Final   • ALT (SGPT) 02/13/2019 20  7 - 40 U/L Final   • AST (SGOT) 02/13/2019 21  0 - 33 U/L Final   • Alkaline Phosphatase 02/13/2019 71  25 - 100 U/L Final   • Total Bilirubin 02/13/2019 0.5  0.3 - 1.2 mg/dL Final   • eGFR Non African Amer 02/13/2019 61  >60 mL/min/1.73 Final   • Globulin 02/13/2019 2.3  gm/dL Final   • A/G Ratio 02/13/2019 1.7  1.5 - 2.5 g/dL Final   • BUN/Creatinine Ratio 02/13/2019 27.9* 7.0 - 25.0 Final   • Anion Gap 02/13/2019 5.0  3.0 - 11.0 mmol/L Final   • Protime 02/13/2019 13.4  11.2 - 14.3 Seconds Final   • INR 02/13/2019 1.07  0.85 - 1.16 Final   • ABO Type 02/13/2019 O   Final   • RH type 02/13/2019 Positive   Final   • Antibody Screen 02/13/2019 Negative   Final   • T&S Expiration Date  02/13/2019 2/16/2019 11:59:59 PM   Final   • WBC 02/13/2019 10.76  3.50 - 10.80 10*3/mm3 Final   • RBC 02/13/2019 3.61* 3.89 - 5.14 10*6/mm3 Final   • Hemoglobin 02/13/2019 10.7* 11.5 - 15.5 g/dL Final   • Hematocrit 02/13/2019 34.1* 34.5 - 44.0 % Final   • MCV 02/13/2019 94.5  80.0 - 99.0 fL Final   • MCH 02/13/2019 29.6  27.0 - 31.0 pg Final   • MCHC 02/13/2019 31.4* 32.0 - 36.0 g/dL Final   • RDW 02/13/2019 14.6* 11.3 - 14.5 % Final   • RDW-SD 02/13/2019 50.2  37.0 - 54.0 fl Final   • MPV 02/13/2019 9.3  6.0 - 12.0 fL Final   • Platelets 02/13/2019 312  150 - 450 10*3/mm3 Final   • Neutrophil % 02/13/2019 79.4* 41.0 - 71.0 % Final   • Lymphocyte % 02/13/2019 12.6* 24.0 - 44.0 % Final   • Monocyte % 02/13/2019 6.2  0.0 - 12.0 % Final   • Eosinophil % 02/13/2019 1.5  0.0 - 3.0 % Final   • Basophil % 02/13/2019 0.3  0.0 - 1.0 % Final   • Immature Grans % 02/13/2019 0.5  0.0 - 0.6 % Final   • Neutrophils, Absolute 02/13/2019 8.54* 1.50 - 8.30 10*3/mm3 Final   • Lymphocytes, Absolute 02/13/2019 1.36  0.60 - 4.80 10*3/mm3 Final   • Monocytes, Absolute 02/13/2019 0.67  0.00 - 1.00 10*3/mm3 Final   • Eosinophils, Absolute 02/13/2019 0.16  0.00 - 0.30 10*3/mm3 Final   • Basophils, Absolute 02/13/2019 0.03  0.00 - 0.20 10*3/mm3 Final   • Immature Grans, Absolute 02/13/2019 0.05  0.00 - 0.05 10*3/mm3 Final   • ABO Type 02/13/2019 O   Final   • RH type 02/13/2019 Positive   Final       Ct Head Without Contrast    Result Date: 2/13/2019  Narrative: EXAMINATION: CT HEAD WO CONTRAST- 02/13/2019  INDICATION: Head trauma, minor, GCS>=13, NOC/NEXUS/CCR neg, first study    TECHNIQUE: 5 mm unenhanced images through the brain  The radiation dose reduction device was turned on for each scan per the ALARA (As Low as Reasonably Achievable) protocol.  COMPARISON: Head CT scan of 08/08/2018.  FINDINGS: The calvarium appears intact. Included paranasal sinuses and mastoids appear clear. Soft tissue window images show no evidence of  "hemorrhage, contusion, edema, mass or mass effect, infarct, hydrocephalus, or abnormal extra-axial collection. There is expected degree of generalized cerebral atrophy for age. Re windowing this study for facial soft tissues shows superficial/preseptal hematoma of the left periorbital region. Separate facial bone CT scan is also performed.      Impression: Age-appropriate generalized cerebral atrophy. No evidence of acute intracranial disease. Incidentally noted left facial hematoma. Separate facial bone CT scan has also been performed. Please see that report.  D:  02/13/2019 E:  02/13/2019       Ct Pelvis Without Contrast    Result Date: 2/13/2019  Narrative: EXAMINATION: CT PELVIS WO CONTRAST-  INDICATION: Left hip pain, negative x-ray.  TECHNIQUE: 2 mm axial images through the pelvis and proximal femurs with sagittal and coronal 2 mm 2-D reconstructions.  The radiation dose reduction device was turned on for each scan per the ALARA (As Low as Reasonably Achievable) protocol.  COMPARISON: Left hip plain film series of the same day.  FINDINGS: By history, the patient came to the ER this morning complaining of left hip pain after fall, with the left hip making a loud \"pop\" while the patient was in the ER.  There is a displaced intertrochanteric fracture of the left hip, not appearing on previous plain films. Most likely, there was a nondisplaced occult fracture present previously which progressed since the previous study. There is relatively mild comminution along the fracture margins. No underlying destructive lesion is identified. Remaining bony structures appear anatomic and intact. There is an old healed right inferior pubic ramus fracture. Soft tissue window images show an approximately 6 cm left gluteal hematoma, and some more diffuse intramuscular hematoma of the gluteus.  There is some patchy diffuse hematoma laterally as well. No intrapelvic hematoma is seen. No other bony or soft tissue trauma is " identified. Bowel loops are normal in caliber. No ascites is seen. Bladder is moderately distended.      Impression: 1. Interval development of displaced comminuted intratrochanteric fracture since previous plain film. 2. Associated discrete 6 cm hematoma and intramuscular and subcutaneous hematoma as well.  D:  02/13/2019 E:  02/13/2019      Xr Chest 1 View    Result Date: 2/13/2019  Narrative: EXAMINATION: XR CHEST 1 VW-02/13/2019:  INDICATION: Preop; S72.002A-Fracture of unspecified part of neck of left femur, initial encounter for closed fracture; S09.90XA-Unspecified injury of head, initial encounter; S05.12XA-Contusion of eyeball and orbital tissues, left eye, initial encounter; S00.81XA-Abrasion of other part of head, initial encounter; W19.XXXA-Unspecified fall, initial encounter.  COMPARISON: 09/13/2018.  FINDINGS: There appears to be a large hiatal hernia. The heart shadow itself appears normal in size. The vasculature is upper limits of normal size. Relative elevation of the right hemidiaphragm compared to left is unchanged. There are mild chronic appearing interstitial changes. No new pulmonary parenchymal disease is seen. There is advanced bilateral shoulder joint DJD. On these images it appears the right humerus may be subluxed or dislocated with more questionable suggestion of fracture or avulsion of the humeral head.      Impression: 1. Large hiatal hernia. 2. Stable chronic appearing lung changes. No new heart or lung disease. 3. Questionable subluxation and trauma to the right humeral head as noted. Please correlate with exam findings and patient complaint.  D:  02/13/2019 E:  02/13/2019        Ct Facial Bones Without Contrast    Result Date: 2/13/2019  Narrative: EXAMINATION: CT FACIAL BONES WO CONTRAST-02/13/2019:  INDICATION: Maxface trauma, blunt.     TECHNIQUE: Spiral acquisition 2 mm axial images through the facial bones with sagittal and coronal 2 mm 2-D reconstructions.  The radiation dose  reduction device was turned on for each scan per the ALARA (As Low as Reasonably Achievable) protocol.  COMPARISON: NONE.  FINDINGS: The facial bones and included portions of the calvarium appear intact. In particular, no fracture of nasal bones, orbital or maxillary sinus fracture or zygomatic arch fracture is seen. TM joints appear anatomic, and show expected DJD for age. Paranasal sinuses and mastoids are clear except for single opacified right ethmoid air cell. Nasal passages are normally patent. Allowing for streak artifact from the patient's dental hardware, maxilla and mandible appear grossly normal.  Soft tissue window images show diffuse superficial/preseptal hematoma around the left orbit, but no evidence of hematoma elsewhere and no evidence of foreign body. The optic globes, extraocular muscles, and optic nerves appear grossly normal and symmetric.      Impression: 1. Superficial left periorbital hematoma. 2. No other evidence of acute soft tissue or bony trauma elsewhere.  D:  02/13/2019 E:  02/13/2019       Xr Hip With Or Without Pelvis 2 - 3 View Left    Result Date: 2/13/2019  Narrative: EXAMINATION:  AP PELVIS, AP AND LEFT LATERAL HIP-02/13/2019  HISTORY: Fall this morning, left hip pain.  COMPARISON: 08/09/2019.  FINDINGS: The bony structures are markedly osteopenic, expected for age. Lumbar scoliosis and advanced lower lumbar degenerative disc disease are noted. Femoral head contours are generally well maintained. There is no apparent left acetabular fracture, pubic ramus fracture, or proximal left femoral fracture. With this degree of osteopenia, a nondisplaced fracture could escape detection, but no interruption of the trabecular pattern of the femur is seen. Irregularity of the upper margin of the greater trochanter is probably stable, more prominent today due to the hip previously internally rotated, today externally rotated.      Impression: 1.  Marked osteopenia. 2.  No pelvic or left hip  fracture is identified. 3.  Lower lumbar degenerative disc disease and scoliosis.  D:  02/13/2019 E:  02/13/2019       Radiographs including CT and plain Radiographs of the pelvis were personally reviewed.  Radiographs are consistent with comminuted intertrochanteric left hip fracture     Assessment:  Patient Active Problem List   Diagnosis   • Hyponatremia   • Essential hypertension   • Coronary artery disease   • Weakness generalized   • GERD (gastroesophageal reflux disease)   • Atrial fibrillation (CMS/HCC)   • Somnolence   • Medication adverse effect   • Chronic pain   • Closed fracture of left hip (CMS/HCC)   • Falls   • Periorbital ecchymosis of left eye   • Hematoma           Plan:  The patient has clinical and radiographic evidence of left intertrochanteric hip fracture.  I had an extensive discussion with the patient/family regarding treatment options for this injury taking into account the nature of the diagnosis, the patient's prior level of cognitive and physical function, medical comorbidities, and goals for treatment.  Operative and nonoperative options as well as alternatives were presented and the risks and benefits of each option were discussed.  based on the fracture pattern, and she medullary nail fixation of the fracture was recommended.   After extensive discussion, the patient/family elected to proceed with a left intertrochanteric hip fracture fixation with a cephalo-medullary device with the goal to improve patient function, decrease pain, and restore mobility. The risks, benefits, potential complications, and alternatives were again discussed with the patient in detail. Risks included but were not limited to bleeding, infection, anesthesia risks, damage to neurovascular structures, nonunion, malunion, hardware failure, failure of fixation, pain, continued pain, iatrogenic fracture, possible need for future surgery including the potential for amputation, blood clots, myocardial infarction,  stroke, and death. Khushboo-operative blood management and the potential for blood transfusion were discussed with risks and options clearly outlined. Specific details of the surgical procedure, hospitalization, recovery, rehabilitation, and long-term precautions were also presented. Pre-operative teaching was provided. Implant/prosthesis and equipment selection was outlined, and the many options available were explained; the final choice will be made at the time of the procedure to match the anatomy and condition of the bone, ligaments, tendons, and muscles. Given this instruction, the patient elected to proceed with the operative fixation of left intertrochanteric hip fracture.    Discussion was had with the family regarding the 50% mortality at a year regardless of perioperative success due to the age and nature of the injury.    Nothing by mouth   Plan for OR today for operative fixation of hip fracture  Hold DVT chemoprophylaxis  Obtain informed consent.    Date: 2/13/2019    Ariel Maynard MD

## 2019-02-14 ENCOUNTER — APPOINTMENT (OUTPATIENT)
Dept: GENERAL RADIOLOGY | Facility: HOSPITAL | Age: 84
End: 2019-02-14

## 2019-02-14 ENCOUNTER — APPOINTMENT (OUTPATIENT)
Dept: CT IMAGING | Facility: HOSPITAL | Age: 84
End: 2019-02-14

## 2019-02-14 PROBLEM — D62 POSTOPERATIVE ANEMIA DUE TO ACUTE BLOOD LOSS: Status: ACTIVE | Noted: 2019-02-14

## 2019-02-14 PROBLEM — S42.293A HUMERAL HEAD FRACTURE: Status: ACTIVE | Noted: 2019-02-14

## 2019-02-14 PROBLEM — R13.10 DYSPHAGIA: Status: ACTIVE | Noted: 2019-02-14

## 2019-02-14 PROBLEM — M24.411 SHOULDER DISLOCATION, RECURRENT, RIGHT: Status: ACTIVE | Noted: 2019-02-14

## 2019-02-14 LAB
ANION GAP SERPL CALCULATED.3IONS-SCNC: 7 MMOL/L (ref 3–11)
BILIRUB UR QL STRIP: NEGATIVE
BUN BLD-MCNC: 19 MG/DL (ref 9–23)
BUN/CREAT SERPL: 24.1 (ref 7–25)
CALCIUM SPEC-SCNC: 9 MG/DL (ref 8.7–10.4)
CHLORIDE SERPL-SCNC: 106 MMOL/L (ref 99–109)
CLARITY UR: CLEAR
CO2 SERPL-SCNC: 24 MMOL/L (ref 20–31)
COLOR UR: ABNORMAL
CREAT BLD-MCNC: 0.79 MG/DL (ref 0.6–1.3)
DEPRECATED RDW RBC AUTO: 49.9 FL (ref 37–54)
ERYTHROCYTE [DISTWIDTH] IN BLOOD BY AUTOMATED COUNT: 14.4 % (ref 11.3–14.5)
GFR SERPL CREATININE-BSD FRML MDRD: 68 ML/MIN/1.73
GLUCOSE BLD-MCNC: 153 MG/DL (ref 70–100)
GLUCOSE UR STRIP-MCNC: NEGATIVE MG/DL
HCT VFR BLD AUTO: 23.3 % (ref 34.5–44)
HCT VFR BLD AUTO: 27.2 % (ref 34.5–44)
HGB BLD-MCNC: 7.4 G/DL (ref 11.5–15.5)
HGB BLD-MCNC: 8.9 G/DL (ref 11.5–15.5)
HGB UR QL STRIP.AUTO: NEGATIVE
KETONES UR QL STRIP: NEGATIVE
LEUKOCYTE ESTERASE UR QL STRIP.AUTO: NEGATIVE
MAGNESIUM SERPL-MCNC: 1.7 MG/DL (ref 1.3–2.7)
MAGNESIUM SERPL-MCNC: 1.8 MG/DL (ref 1.3–2.7)
MCH RBC QN AUTO: 30.2 PG (ref 27–31)
MCHC RBC AUTO-ENTMCNC: 31.8 G/DL (ref 32–36)
MCV RBC AUTO: 95.1 FL (ref 80–99)
NITRITE UR QL STRIP: NEGATIVE
PH UR STRIP.AUTO: 6.5 [PH] (ref 5–8)
PLATELET # BLD AUTO: 225 10*3/MM3 (ref 150–450)
PMV BLD AUTO: 9.2 FL (ref 6–12)
POTASSIUM BLD-SCNC: 3.2 MMOL/L (ref 3.5–5.5)
POTASSIUM BLD-SCNC: 3.3 MMOL/L (ref 3.5–5.5)
PROT UR QL STRIP: NEGATIVE
RBC # BLD AUTO: 2.45 10*6/MM3 (ref 3.89–5.14)
SODIUM BLD-SCNC: 137 MMOL/L (ref 132–146)
SP GR UR STRIP: 1.02 (ref 1–1.03)
UROBILINOGEN UR QL STRIP: ABNORMAL
WBC NRBC COR # BLD: 12.74 10*3/MM3 (ref 3.5–10.8)

## 2019-02-14 PROCEDURE — 92610 EVALUATE SWALLOWING FUNCTION: CPT

## 2019-02-14 PROCEDURE — 25010000003 CEFAZOLIN IN DEXTROSE 2-4 GM/100ML-% SOLUTION: Performed by: ORTHOPAEDIC SURGERY

## 2019-02-14 PROCEDURE — 85014 HEMATOCRIT: CPT | Performed by: INTERNAL MEDICINE

## 2019-02-14 PROCEDURE — 83735 ASSAY OF MAGNESIUM: CPT | Performed by: INTERNAL MEDICINE

## 2019-02-14 PROCEDURE — 81003 URINALYSIS AUTO W/O SCOPE: CPT | Performed by: INTERNAL MEDICINE

## 2019-02-14 PROCEDURE — 25010000002 ROPIVACAINE PER 1 MG: Performed by: NURSE ANESTHETIST, CERTIFIED REGISTERED

## 2019-02-14 PROCEDURE — 85027 COMPLETE CBC AUTOMATED: CPT | Performed by: INTERNAL MEDICINE

## 2019-02-14 PROCEDURE — 73030 X-RAY EXAM OF SHOULDER: CPT

## 2019-02-14 PROCEDURE — 73200 CT UPPER EXTREMITY W/O DYE: CPT

## 2019-02-14 PROCEDURE — 97166 OT EVAL MOD COMPLEX 45 MIN: CPT | Performed by: OCCUPATIONAL THERAPIST

## 2019-02-14 PROCEDURE — 86900 BLOOD TYPING SEROLOGIC ABO: CPT

## 2019-02-14 PROCEDURE — 84132 ASSAY OF SERUM POTASSIUM: CPT | Performed by: INTERNAL MEDICINE

## 2019-02-14 PROCEDURE — 99233 SBSQ HOSP IP/OBS HIGH 50: CPT | Performed by: INTERNAL MEDICINE

## 2019-02-14 PROCEDURE — 97162 PT EVAL MOD COMPLEX 30 MIN: CPT

## 2019-02-14 PROCEDURE — 80048 BASIC METABOLIC PNL TOTAL CA: CPT | Performed by: ORTHOPAEDIC SURGERY

## 2019-02-14 PROCEDURE — P9016 RBC LEUKOCYTES REDUCED: HCPCS

## 2019-02-14 PROCEDURE — 36430 TRANSFUSION BLD/BLD COMPNT: CPT

## 2019-02-14 PROCEDURE — 25010000002 HYDROMORPHONE PER 4 MG: Performed by: ORTHOPAEDIC SURGERY

## 2019-02-14 PROCEDURE — 73060 X-RAY EXAM OF HUMERUS: CPT

## 2019-02-14 PROCEDURE — 85018 HEMOGLOBIN: CPT | Performed by: INTERNAL MEDICINE

## 2019-02-14 PROCEDURE — 99024 POSTOP FOLLOW-UP VISIT: CPT | Performed by: ORTHOPAEDIC SURGERY

## 2019-02-14 RX ORDER — POTASSIUM CHLORIDE 750 MG/1
40 CAPSULE, EXTENDED RELEASE ORAL ONCE
Status: COMPLETED | OUTPATIENT
Start: 2019-02-14 | End: 2019-02-14

## 2019-02-14 RX ORDER — MAGNESIUM SULFATE HEPTAHYDRATE 40 MG/ML
2 INJECTION, SOLUTION INTRAVENOUS AS NEEDED
Status: DISCONTINUED | OUTPATIENT
Start: 2019-02-14 | End: 2019-02-22 | Stop reason: HOSPADM

## 2019-02-14 RX ORDER — MAGNESIUM SULFATE HEPTAHYDRATE 40 MG/ML
4 INJECTION, SOLUTION INTRAVENOUS AS NEEDED
Status: DISCONTINUED | OUTPATIENT
Start: 2019-02-14 | End: 2019-02-22 | Stop reason: HOSPADM

## 2019-02-14 RX ORDER — HYDROCODONE BITARTRATE AND ACETAMINOPHEN 5; 325 MG/1; MG/1
0.5 TABLET ORAL EVERY 6 HOURS PRN
Status: DISCONTINUED | OUTPATIENT
Start: 2019-02-14 | End: 2019-02-18

## 2019-02-14 RX ADMIN — METAXALONE 400 MG: 800 TABLET ORAL at 09:54

## 2019-02-14 RX ADMIN — ESCITALOPRAM OXALATE 10 MG: 10 TABLET ORAL at 09:08

## 2019-02-14 RX ADMIN — SODIUM CHLORIDE, PRESERVATIVE FREE 3 ML: 5 INJECTION INTRAVENOUS at 08:50

## 2019-02-14 RX ADMIN — MAGNESIUM SULFATE HEPTAHYDRATE 4 G: 40 INJECTION, SOLUTION INTRAVENOUS at 14:46

## 2019-02-14 RX ADMIN — ASPIRIN 81 MG CHEWABLE TABLET 81 MG: 81 TABLET CHEWABLE at 09:07

## 2019-02-14 RX ADMIN — METAXALONE 400 MG: 800 TABLET ORAL at 15:18

## 2019-02-14 RX ADMIN — SODIUM CHLORIDE, PRESERVATIVE FREE 3 ML: 5 INJECTION INTRAVENOUS at 21:02

## 2019-02-14 RX ADMIN — CYANOCOBALAMIN TAB 1000 MCG 500 MCG: 1000 TAB at 09:09

## 2019-02-14 RX ADMIN — ACETAMINOPHEN 650 MG: 325 TABLET ORAL at 11:26

## 2019-02-14 RX ADMIN — ACETAMINOPHEN 650 MG: 325 TABLET ORAL at 20:59

## 2019-02-14 RX ADMIN — HYDROMORPHONE HYDROCHLORIDE 0.5 MG: 1 INJECTION, SOLUTION INTRAMUSCULAR; INTRAVENOUS; SUBCUTANEOUS at 15:47

## 2019-02-14 RX ADMIN — SODIUM CHLORIDE 100 ML/HR: 9 INJECTION, SOLUTION INTRAVENOUS at 11:51

## 2019-02-14 RX ADMIN — CEFAZOLIN SODIUM 2 G: 2 INJECTION, SOLUTION INTRAVENOUS at 02:20

## 2019-02-14 RX ADMIN — HYDROMORPHONE HYDROCHLORIDE 0.5 MG: 1 INJECTION, SOLUTION INTRAMUSCULAR; INTRAVENOUS; SUBCUTANEOUS at 20:58

## 2019-02-14 RX ADMIN — POTASSIUM CHLORIDE 40 MEQ: 750 CAPSULE, EXTENDED RELEASE ORAL at 16:56

## 2019-02-14 RX ADMIN — CEFAZOLIN SODIUM 2 G: 2 INJECTION, SOLUTION INTRAVENOUS at 11:50

## 2019-02-14 RX ADMIN — HYDROCODONE BITARTRATE AND ACETAMINOPHEN 0.5 TABLET: 5; 325 TABLET ORAL at 13:32

## 2019-02-14 RX ADMIN — VITAMIN D, TAB 1000IU (100/BT) 1000 UNITS: 25 TAB at 09:08

## 2019-02-14 RX ADMIN — POTASSIUM CHLORIDE 40 MEQ: 750 CAPSULE, EXTENDED RELEASE ORAL at 13:07

## 2019-02-14 RX ADMIN — ASPIRIN 81 MG CHEWABLE TABLET 81 MG: 81 TABLET CHEWABLE at 20:58

## 2019-02-14 RX ADMIN — ROPIVACAINE HYDROCHLORIDE 8 ML/HR: 2 INJECTION, SOLUTION EPIDURAL; INFILTRATION at 15:19

## 2019-02-14 RX ADMIN — METAXALONE 400 MG: 800 TABLET ORAL at 20:58

## 2019-02-14 RX ADMIN — HYDROMORPHONE HYDROCHLORIDE 0.5 MG: 1 INJECTION, SOLUTION INTRAMUSCULAR; INTRAVENOUS; SUBCUTANEOUS at 07:18

## 2019-02-14 NOTE — PROGRESS NOTES
"Adult Nutrition  Assessment/PES    Patient Name:  Debbie Baum  YOB: 1923  MRN: [id number]  Admit Date:  2/13/2019    Assessment Date:  2/14/2019      Reason for Assessment     Row Name 02/14/19 1705          Reason for Assessment    Reason For Assessment  -- nsg adm database documentation of \"unsure\" as r/t wt changes prior to adm.     Diagnosis  -- L hip fx, s/p fall. S/p hip nailing 2/13.  Ecchymosis L eye, Afib. H/o HTN           Anthropometrics     Row Name 02/14/19 1706          Anthropometrics    Weight  -- ht=65in, wt= 110lb; BMI=18.3        Usual Body Weight (UBW)    Weight Gain  -- Dtr in law reports pt has been eating and gaining wt over the past month (wt gain amount not known). Plz obtain actual wt this adm. Noted per EPIC data that standing scale wt=75lb on 4/8/18 .         Labs/Tests/Procedures/Meds     Row Name 02/14/19 1710          Labs/Procedures/Meds    Lab Results Reviewed  reviewed        Diagnostic Tests/Procedures    Diagnostic Test/Procedure Reviewed  reviewed noted results of SLP eval today, and recs for mechanical soft texture foods with nectar thick liquids.              Nutrition Prescription Ordered     Row Name 02/14/19 1704          Nutrition Prescription PO    Current PO Diet  Dysphagia     Dysphagia Level  4  Mechanical soft no mixed consistencies     Fluid Consistency  Nectar/syrup thick         Evaluation of Received Nutrient/Fluid Intake     Row Name 02/14/19 1711          PO Evaluation    Number of Days PO Intake Evaluated  Insufficient Data               Problem/Interventions:  Problem 1     Row Name 02/14/19 1711          Nutrition Diagnoses Problem 1    Problem 1  Predicted Suboptimal Intake     Etiology (related to)  -- report of decreased appetite by fam; may be d/t aging process     Signs/Symptoms (evidenced by)  -- dtr in law states pt does not eat a lot overall, but states pt has had improved po intake during the past month prior to adm.      BMI  --     "             Intervention Goal     Row Name 02/14/19 1709          Intervention Goal    PO  Establish PO         Nutrition Intervention     Row Name 02/14/19 1709          Nutrition Intervention    RD/Tech Action  Follow Tx progress;Supplement provided;Interview for preference;Encourage intake pt states she likes boost supplements         Nutrition Prescription     Row Name 02/14/19 1710          Nutrition Prescription PO    PO Prescription  Begin/change supplement     Supplement  Boost Plus     Supplement Frequency  2 times a day         Education/Evaluation     Row Name 02/14/19 1710          Monitor/Evaluation    Monitor  Per protocol           Electronically signed by:  Citlali Martin MS,RD,LD  02/14/19 5:16 PM

## 2019-02-14 NOTE — THERAPY EVALUATION
Acute Care - Occupational Therapy Initial Evaluation  [facility]     Patient Name: Debbie Baum  : 1923  MRN: [id number]  Today's Date: 2019  Onset of Illness/Injury or Date of Surgery: 19  Date of Referral to OT: 19  Referring Physician: MD Caesar    Admit Date: 2019       ICD-10-CM ICD-9-CM   1. Closed fracture of left hip, initial encounter (CMS/[facility]) S72.002A 820.8   2. Injury of head, initial encounter S09.90XA 959.01   3. Contusion of left orbital tissues, initial encounter S05.12XA 921.2   4. Abrasion of face, initial encounter S00.81XA 910.0   5. Fall, initial encounter W19.XXXA E888.9   6. Impaired functional mobility, balance, gait, and endurance Z74.09 V49.89   7. Impaired mobility and ADLs Z74.09 799.89     Patient Active Problem List   Diagnosis   • Hyponatremia   • Essential hypertension   • Coronary artery disease   • Weakness generalized   • GERD (gastroesophageal reflux disease)   • Atrial fibrillation (CMS/HCC)   • Somnolence   • Medication adverse effect   • Chronic pain   • Closed fracture of left hip (CMS/HCC)   • Falls   • Periorbital ecchymosis of left eye   • Hematoma   • Humeral head fracture   • Shoulder dislocation, recurrent, right   • Postoperative anemia due to acute blood loss   • Dysphagia     Past Medical History:   Diagnosis Date   • Atrial fibrillation (CMS/HCC)    • Cancer (CMS/HCC)     colon   • Coronary artery disease    • GERD (gastroesophageal reflux disease)    • Hypertension      Past Surgical History:   Procedure Laterality Date   • CARDIAC ABLATION     • CHOLECYSTECTOMY     • COLON SURGERY      BOWEL RESECTION FOR HX COLON CANCER   • HIP INTERTROCHANTERIC NAILING Left 2019    Procedure: HIP INTERTROCHANTERIC NAILING LEFT;  Surgeon: Ariel Maynard MD;  Location: [facility];  Service: Orthopedics   • HYSTERECTOMY     • REPLACEMENT TOTAL KNEE            OT ASSESSMENT FLOWSHEET (last 72 hours)      Occupational Therapy Evaluation      "Row Name 02/14/19 7640                   OT Evaluation Time/Intention    Subjective Information  complains of;pain  -AR        Document Type  evaluation  -AR        Mode of Treatment  occupational therapy  -AR        Patient Effort  good  -AR        Symptoms Noted During/After Treatment  dizziness  -AR        Comment  Pt unable to tolerate pivot trtansfer d/t dizziness with upright sitting  -AR           General Information    Patient Profile Reviewed?  yes  -AR        Onset of Illness/Injury or Date of Surgery  02/13/19  -AR        Referring Physician  Dr. Maynard  -AR        Patient Observations  alert;cooperative;agree to therapy  -AR        Patient/Family Observations  pt reclined in chair, family at bedside  -AR        Prior Level of Function  mod assist:;ADL's;dependent:;community mobility;min assist:;all household mobility  -AR        Equipment Currently Used at Home  grab bar;wheelchair;walker, rolling  -AR        Pertinent History of Current Functional Problem  Pt is a 89 yof who presented to [facility] ED s/p fall at SNF with inability to WB on LLE. Initial XR L hip/pelvis (-). Pt later heard \"pop\" in LLE with worsening pain, imaging revealed left IT FX and gluteal hematoma. CT face (-). Pt is POD#1 left IT nailing, s/p 1 unit PRBC. Pt developed R shoulder pain and imaging pending. Pt declined sling to RUE, maintained NWB RUE pending clarification.     -AR        Existing Precautions/Restrictions  fall;oxygen therapy device and L/min;other (see comments)  (Significant)  dizziness, fascia iliaca, XR RUE pending- assume NWB   -AR        Risks Reviewed  patient:;LOB;nausea/vomiting;dizziness;increased discomfort;change in vital signs;increased drainage;lines disloged  -AR        Benefits Reviewed  patient:;improve function;increase independence;increase strength;increase balance;decrease pain;decrease risk of DVT;increase knowledge  -AR        Barriers to Rehab  previous functional deficit  -AR           " Relationship/Environment    Primary Source of Support/Comfort  child(savage)  -AR        Lives With  facility resident  -AR           Resource/Environmental Concerns    Current Living Arrangements  residential facility  -AR        Resource/Environmental Concerns  none  -AR           Cognitive Assessment/Interventions    Additional Documentation  Cognitive Assessment/Intervention (Group)  -AR           Cognitive Assessment/Intervention- PT/OT    Orientation Status (Cognition)  oriented x 4  -AR        Follows Commands (Cognition)  follows one step commands;75-90% accuracy  -AR        Cognitive Function (Cognitive)  safety deficit;memory deficit  -AR        Memory Deficit (Cognitive)  mild deficit  -AR        Safety Deficit (Cognitive)  mild deficit;judgment;safety precautions awareness;problem solving  -AR        Personal Safety Interventions  fall prevention program maintained  -AR           Safety Issues, Functional Mobility    Safety Issues Affecting Function (Mobility)  judgment;problem solving;safety precaution awareness  -AR        Impairments Affecting Function (Mobility)  balance;pain;strength;range of motion (ROM)  -AR           Mobility Assessment/Treatment    Extremity Weight-bearing Status  left lower extremity;right upper extremity  -AR        Right Upper Extremity (Weight-bearing Status)  non weight-bearing (NWB)  (Significant)  pending clearance from imaging  -AR        Left Lower Extremity (Weight-bearing Status)  weight-bearing as tolerated (WBAT)  -AR           Bed Mobility Assessment/Treatment    Bed Mobility Assessment/Treatment  rolling left;rolling right  -AR        Rolling Left Ray (Bed Mobility)  dependent (less than 25% patient effort);2 person assist  -AR        Rolling Right Ray (Bed Mobility)  dependent (less than 25% patient effort);2 person assist  -AR           Functional Mobility    Functional Mobility- Ind. Level  unable to perform  -AR           Transfer  Assessment/Treatment    Comment (Transfers)  Pt agreeable to attempt pivot transfer to bed and KI donned to LLE. Pt sat upright and c/o feeling faint, returned to reclined position and BP stable. Atttmepted again with same result. Sling placed under pt in chair and pt assisted to bed via overhead lift.   -AR           ADL Assessment/Intervention    BADL Assessment/Intervention  lower body dressing;upper body dressing  -AR           Upper Body Dressing Assessment/Training    Comment (Upper Body Dressing)  Pt c/o R shoulder pain. OT did not assess ROM and maintained NWB RUE. Pt declined sling for RUE with mobility.   -AR           Lower Body Dressing Assessment/Training    Lower Body Dressing Lawrence Level  doff;don;socks;dependent (less than 25% patient effort)  -AR        Lower Body Dressing Position  supported sitting  -AR           BADL Safety/Performance    Impairments, BADL Safety/Performance  balance;pain;strength dizziness  -AR           General ROM    GENERAL ROM COMMENTS  RUE deferred LUE WFL  -AR           MMT (Manual Muscle Testing)    General MMT Comments  RUE deferred, LUE 3/5  -AR           Motor Assessment/Interventions    Additional Documentation  Balance (Group);Balance Interventions (Group)  -AR           Balance    Balance  static sitting balance  -AR           Static Sitting Balance    Level of Lawrence (Unsupported Sitting, Static Balance)  maximal assist, 25 to 49% patient effort  -AR        Sitting Position (Unsupported Sitting, Static Balance)  sitting in chair  -AR        Time Able to Maintain Position (Unsupported Sitting, Static Balance)  30 to 45 seconds  -AR           Sensory Assessment/Intervention    Sensory General Assessment  no sensation deficits identified BUE  -AR           Positioning and Restraints    Pre-Treatment Position  sitting in chair/recliner  -AR        Post Treatment Position  bed  -AR        In Bed  notified nsg;supine;call light within reach;encouraged to  call for assist;exit alarm on;with family/caregiver;SCD pump applied  -AR           Pain Assessment    Additional Documentation  Pain Scale: Numbers Pre/Post-Treatment (Group);Pain Scale 2: Numbers Pre/Post-Treatment (Group)  -AR           Pain Scale: Numbers Pre/Post-Treatment    Pain Scale: Numbers, Pretreatment  10/10  -AR        Pain Scale: Numbers, Post-Treatment  10/10  -AR        Pain Location - Side  Left  -AR        Pain Location  hip  -AR        Pre/Post Treatment Pain Comment  RN notified and medicated during session  -AR        Pain Intervention(s)  Medication (See MAR);Repositioned  -AR           Pain Scale 2: Numbers Pre/Post-Treatment    Pain Scale 2: Numbers, Pretreatment  10/10  -AR        Pain Scale 2: Numbers, Post-Treatment  10/10  -AR        Pain Location 2 - Side  Right  -AR        Pain Location 2 - Orientation  upper  -AR        Pain Location 2  extremity  -AR        Pre/Post Treatment Pain 2 Comment  medicated during session  -AR        Pain Intervention(s) 2  Medication (See MAR);Repositioned  -AR           Wound 02/13/19 1019 Left eyebrow abrasion    Wound - Properties Group Date first assessed: 02/13/19  -CH Time first assessed: 1019  -CH Present On Admission : yes  -CH Side: Left  -CH Location: eyebrow  -CH Type: abrasion  -CH       Wound 02/13/19 1855 Left hip incision    Wound - Properties Group Date first assessed: 02/13/19  -SS Time first assessed: 1855  -SS Side: Left  -SS Location: hip  -SS Type: incision  -SS       Plan of Care Review    Plan of Care Reviewed With  patient;family  -AR           Clinical Impression (OT)    Date of Referral to OT  02/13/19  -AR        OT Diagnosis  decreased independence with ADLS  -AR        Patient/Family Goals Statement (OT Eval)  none stated  -AR        Criteria for Skilled Therapeutic Interventions Met (OT Eval)  yes;treatment indicated  -AR        Rehab Potential (OT Eval)  fair, will monitor progress closely  -AR        Therapy Frequency (OT  [place])  daily  -AR        Anticipated Discharge Disposition (OT)  skilled nursing facility  -AR           Vital Signs    Post Systolic BP Rehab  102  -AR        Post Treatment Diastolic BP  47  -AR        Pretreatment Heart Rate (beats/min)  102  -AR        Intratreatment Heart Rate (beats/min)  44  -AR        Pre Patient Position  Sitting reclined  -AR        Intra Patient Position  Sitting upright  -AR        Post Patient Position  Supine  -AR           OT Goals    Transfer Goal Selection (OT)  transfer, OT goal 1  -AR        Dressing Goal Selection (OT)  dressing, OT goal 1  -AR        Balance Goal Selection (OT)  balance, OT goal 1  -AR        Additional Documentation  Balance Goal Selection (OT) (Row)  -AR           Transfer Goal 1 (OT)    Activity/Assistive Device (Transfer Goal 1, OT)  sit-to-stand/stand-to-sit;toilet;commode, bedside without drop arms;walker, rolling  -AR        [place] Level/Cues Needed (Transfer Goal 1, OT)  moderate assist (50-74% patient effort);2 person assist  -AR        Time Frame (Transfer Goal 1, OT)  short term goal (STG);1 week  -AR        Progress/Outcome (Transfer Goal 1, OT)  goal ongoing  -AR           Dressing Goal 1 (OT)    Activity/Assistive Device (Dressing Goal 1, OT)  lower body dressing;sock-aid;reacher  -AR        [place]/Cues Needed (Dressing Goal 1, OT)  moderate assist (50-74% patient effort);verbal cues required  -AR        Time Frame (Dressing Goal 1, OT)  short term goal (STG);1 week  -AR        Progress/Outcome (Dressing Goal 1, OT)  goal ongoing  -AR           Balance Goal 1 (OT)    Activity/Assistive Device (Balance Goal 1, OT)  standing, static  -AR        [place] Level/Cues Needed (Balance Goal 1, OT)  minimum assist (75% or more patient effort);verbal cues required  -AR        Time Frame (Balance Goal 1, OT)  short term goal (STG);1 week  -AR        Progress/Outcomes (Balance Goal 1, OT)  goal ongoing  -AR           Living Environment    Home  Accessibility  wheelchair accessible  -AR          User Key  (r) = Recorded By, (t) = Taken By, (c) = Cosigned By    Initials Name Effective Dates    Kandi Villalobos OT 06/22/15 -     Cally Mancia RN 06/16/16 -     Bang Simon RN 01/15/18 -          Occupational Therapy Education     Title: PT OT SLP Therapies (In Progress)     Topic: Occupational Therapy (In Progress)     Point: ADL training (In Progress)     Description: Instruct learner(s) on proper safety adaptation and remediation techniques during self care or transfers.   Instruct in proper use of assistive devices.    Learning Progress Summary           Patient Eager, E,D, NR by AR at 2/14/2019  1:20 PM                   Point: Precautions (In Progress)     Description: Instruct learner(s) on prescribed precautions during self-care and functional transfers.    Learning Progress Summary           Patient Eager, E,D, NR by AR at 2/14/2019  1:20 PM                   Point: Body mechanics (In Progress)     Description: Instruct learner(s) on proper positioning and spine alignment during self-care, functional mobility activities and/or exercises.    Learning Progress Summary           Patient Eager, E,D, NR by AR at 2/14/2019  1:20 PM                               User Key     Initials Effective Dates Name Provider Type Discipline    AR 06/22/15 -  Kandi Merritt OT Occupational Therapist OT                  OT Recommendation and Plan  Outcome Summary/Treatment Plan (OT)  Anticipated Discharge Disposition (OT): skilled nursing facility  Therapy Frequency (OT Eval): daily  Plan of Care Review  Plan of Care Reviewed With: patient  Plan of Care Reviewed With: patient  Outcome Summary: Pt limited with dizziness in upright sitting position and unable to tolerate pivot transfer trial. OT maintained NWE RUE (P) imaging, pt declined sling. Recommend SNF-level rehab.     Outcome Measures     Row Name 02/14/19 1325 02/14/19 1326           How much help from another person do you currently need...    Turning from your back to your side while in flat bed without using bedrails?  1  -EJ  --     Moving from lying on back to sitting on the side of a flat bed without bedrails?  1  -EJ  --     Moving to and from a bed to a chair (including a wheelchair)?  1  -EJ  --     Standing up from a chair using your arms (e.g., wheelchair, bedside chair)?  1  -EJ  --     Climbing 3-5 steps with a railing?  1  -EJ  --     To walk in hospital room?  1  -EJ  --     AM-PAC 6 Clicks Score  6  -EJ  --        How much help from another is currently needed...    Putting on and taking off regular lower body clothing?  --  1  -AR     Bathing (including washing, rinsing, and drying)  --  1  -AR     Toileting (which includes using toilet bed pan or urinal)  --  1  -AR     Putting on and taking off regular upper body clothing  --  1  -AR     Taking care of personal grooming (such as brushing teeth)  --  2  -AR     Eating meals  --  2  -AR     Score  --  8  -AR        Functional Assessment    Outcome Measure Options  AM-PAC 6 Clicks Basic Mobility (PT)  -  AM-PAC 6 Clicks Daily Activity (OT)  -AR       User Key  (r) = Recorded By, (t) = Taken By, (c) = Cosigned By    Initials Name Provider Type    Nayana Kimball, PT Physical Therapist    Kandi Villalobos, OT Occupational Therapist          Time Calculation:   Time Calculation- OT     Row Name 02/14/19 1837 02/14/19 1320          Time Calculation- OT    OT Start Time  --  1320  -AR     Total Timed Code Minutes- OT  --  -AR  --     OT Received On  --  02/14/19  -AR     OT Goal Re-Cert Due Date  --  02/24/19  -AR       User Key  (r) = Recorded By, (t) = Taken By, (c) = Cosigned By    Initials Name Provider Type    Kandi Villalobos OT Occupational Therapist        Therapy Suggested Charges     Code   Minutes Charges    None           Therapy Charges for Today     Code Description Service Date Service Provider  Modifiers Qty    16522610705 HC OT EVAL MOD COMPLEXITY 4 2/14/2019 Kandi Merritt, OT GO 1               Kandi Merritt OT  2/14/2019

## 2019-02-14 NOTE — PROGRESS NOTES
"  SUBJECTIVE  Patient resting comfortably.  Pain well controlled.  Slightly confused this morning.  Otherwise doing well.    OBJECTIVE  Temp (24hrs), Av.7 °F (36.5 °C), Min:97.2 °F (36.2 °C), Max:98.8 °F (37.1 °C)    Blood pressure 105/61, pulse 90, temperature 97.5 °F (36.4 °C), temperature source Oral, resp. rate 18, height 165.1 cm (65\"), weight 49.9 kg (110 lb), SpO2 96 %, not currently breastfeeding.    Lab Results (last 24 hours)     Procedure Component Value Units Date/Time    Magnesium [[id number]]  (Normal) Collected:  19    Specimen:  Blood Updated:  19     Magnesium 1.8 mg/dL     Basic Metabolic Panel [[id number]]  (Abnormal) Collected:  19    Specimen:  Blood Updated:  19 0700     Glucose 153 mg/dL      BUN 19 mg/dL      Creatinine 0.79 mg/dL      Sodium 137 mmol/L      Potassium 3.2 mmol/L      Chloride 106 mmol/L      CO2 24.0 mmol/L      Calcium 9.0 mg/dL      eGFR Non African Amer 68 mL/min/1.73      BUN/Creatinine Ratio 24.1     Anion Gap 7.0 mmol/L     Narrative:       National [employer] Guidelines    Stage     Description        GFR  1         Normal or High     90+  2         Mild decrease      60-89  3         Moderate decrease  30-59  4         Severe decrease    15-29  5         Kidney failure     <15    The MDRD GFR formula is only valid for adults with stable renal function between ages 18 and 70.    CBC (No Diff) [[id number]]  (Abnormal) Collected:  19    Specimen:  Blood Updated:  19     WBC 12.74 10*3/mm3      RBC 2.45 10*6/mm3      Hemoglobin 7.4 g/dL      Hematocrit 23.3 %      MCV 95.1 fL      MCH 30.2 pg      MCHC 31.8 g/dL      RDW 14.4 %      RDW-SD 49.9 fl      MPV 9.2 fL      Platelets 225 10*3/mm3     Comprehensive Metabolic Panel [[id number]]  (Abnormal) Collected:  19 1009    Specimen:  Blood Updated:  19 1105     Glucose 95 mg/dL      BUN 24 mg/dL      Creatinine 0.86 mg/dL      Sodium 139 mmol/L  "     Potassium 3.8 mmol/L      Chloride 107 mmol/L      CO2 27.0 mmol/L      Calcium 9.9 mg/dL      Total Protein 6.3 g/dL      Albumin 3.96 g/dL      ALT (SGPT) 20 U/L      AST (SGOT) 21 U/L      Alkaline Phosphatase 71 U/L      Total Bilirubin 0.5 mg/dL      eGFR Non African Amer 61 mL/min/1.73      Globulin 2.3 gm/dL      A/G Ratio 1.7 g/dL      BUN/Creatinine Ratio 27.9     Anion Gap 5.0 mmol/L     Narrative:       National Kid[employer] Foundation Guidelines    Stage     Description        GFR  1         Normal or High     90+  2         Mild decrease      60-89  3         Moderate decrease  30-59  4         Severe decrease    15-29  5         Kidney failure     <15    The MDRD GFR formula is only valid for adults with stable renal function between ages 18 and 70.    Protime-INR [[id number]]  (Normal) Collected:  02/13/19 1009    Specimen:  Blood Updated:  02/13/19 1048     Protime 13.4 Seconds      INR 1.07    CBC & Differential [[id number]] Collected:  02/13/19 1009    Specimen:  Blood Updated:  02/13/19 1035    Narrative:       The following orders were created for panel order CBC & Differential.  Procedure                               Abnormality         Status                     ---------                               -----------         ------                     CBC Auto Differential[[id number]]        Abnormal            Final result                 Please view results for these tests on the individual orders.    CBC Auto Differential [[id number]]  (Abnormal) Collected:  02/13/19 1009    Specimen:  Blood Updated:  02/13/19 1035     WBC 10.76 10*3/mm3      RBC 3.61 10*6/mm3      Hemoglobin 10.7 g/dL      Hematocrit 34.1 %      MCV 94.5 fL      MCH 29.6 pg      MCHC 31.4 g/dL      RDW 14.6 %      RDW-SD 50.2 fl      MPV 9.3 fL      Platelets 312 10*3/mm3      Neutrophil % 79.4 %      Lymphocyte % 12.6 %      Monocyte % 6.2 %      Eosinophil % 1.5 %      Basophil % 0.3 %      Immature Grans % 0.5 %      Neutrophils,  Absolute 8.54 10*3/mm3      Lymphocytes, Absolute 1.36 10*3/mm3      Monocytes, Absolute 0.67 10*3/mm3      Eosinophils, Absolute 0.16 10*3/mm3      Basophils, Absolute 0.03 10*3/mm3      Immature Grans, Absolute 0.05 10*3/mm3             PHYSICAL EXAM  Left lower extremity:Dressing clean, dry and intact.  Mild pain with logroll of hip Intact EHL, FHL, tibialis anterior, and gastrocsoleus. Sensation intact to light touch to deep peroneal, superficial peroneal, sural, saphenous, tibial nerves. 2+ palpable DP and PT pulses.         Closed fracture of left hip (CMS/HCC)    Essential hypertension    Atrial fibrillation (CMS/HCC)    Falls    Periorbital ecchymosis of left eye    Hematoma      PLAN / DISPOSITION:  1 Day Post-Op left hip intertrochanteric fracture fixation    Protected weight bearing as tolerated left lower extremity, hip range of motion as tolerated   Pain control  Postop blood loss anemia-transfuse per primary team.  PT/OT for post op mobilization and medical equipment needs   23 hours perioperative antibiotic prophylaxis   SCD's bilateral lower extremities   Aspirin 81 mg twice a day for DVT prophylaxis   Social work for discharge planning.   Dressing to remain in place for 7 days. May remove on POD#7. If no drainage, may shower on POD#10. No submerging wound in water. If drainage is noted, sterile dressing should be placed and wound checked daily. No showering until wound has remained dry for 72 consecutive hours.   Follow up in 2 weeks for re-assessment.      Ariel Maynard MD  02/14/19  7:34 AM

## 2019-02-14 NOTE — PLAN OF CARE
Problem: Patient Care Overview  Goal: Plan of Care Review  Outcome: Ongoing (interventions implemented as appropriate)   02/14/19 6047   Coping/Psychosocial   Plan of Care Reviewed With patient   Plan of Care Review   Progress improving   OTHER   Outcome Summary Pt limited with dizziness in upright sitting position and unable to tolerate pivot transfer trial. OT maintained NWE MICAHE (P) imaging, pt declined sling. Recommend SNF-level rehab.        Problem: Fractured Hip (Adult)  Goal: Signs and Symptoms of Listed Potential Problems Will be Absent, Minimized or Managed (Fractured Hip)  Outcome: Ongoing (interventions implemented as appropriate)   02/14/19 1320   Goal/Outcome Evaluation   Problems Assessed (Fractured Hip) functional deficit/self-care deficit   Problems Present (Fractured Hip) functional deficit/self-care deficit

## 2019-02-14 NOTE — ANESTHESIA POSTPROCEDURE EVALUATION
Patient: Debbie Baum    Procedure Summary     Date:  02/13/19 [facility]   [facility]    Anesthesia Start:  1813 Anesthesia Stop:  2005    Procedure:  HIP INTERTROCHANTERIC NAILING (Left Hip) Diagnosis:       Closed intertrochanteric fracture of hip, left, initial encounter (CMS/[facility])      (left intertrochanteric hip fracture)    Surgeon:  Ariel Maynard MD Provider:  Colin Castro MD    Anesthesia Type:  general ASA Status:  3          Anesthesia Type: general  Last vitals  BP   137/84 (02/13/19 1652)   Temp   98.8 °F (37.1 °C) (02/13/19 1652)   Pulse   91 (02/13/19 1652)   Resp   16 (02/13/19 1652)     SpO2   96 % (02/13/19 1652)     Post Anesthesia Care and Evaluation    Patient location during evaluation: PACU  Patient participation: complete - patient participated  Level of consciousness: awake and alert  Pain score: 0  Pain management: adequate  Airway patency: patent  Anesthetic complications: No anesthetic complications  PONV Status: none  Cardiovascular status: hemodynamically stable and acceptable  Respiratory status: nonlabored ventilation, acceptable and nasal cannula  Hydration status: acceptable

## 2019-02-14 NOTE — PLAN OF CARE
Problem: Patient Care Overview  Goal: Plan of Care Review  Outcome: Ongoing (interventions implemented as appropriate)   02/14/19 8728   Coping/Psychosocial   Plan of Care Reviewed With patient;family   Plan of Care Review   Progress no change   OTHER   Outcome Summary PT w/ c/o right shoulder pain, x-rary revealed fx and dislocation; sling placed and MD notified; CT scan ordered. 1U PRBCs given, Hgb up to 8.9. Pain control with Lortab, Dilaudid, Ropivicaine, and Tylenol. PT cleared by SLP for modified dyspagia IV diet with meds in applesauce. Required I and O cath for retention and u/a. Continue to monitor.      Goal: Individualization and Mutuality  Outcome: Ongoing (interventions implemented as appropriate)    Goal: Discharge Needs Assessment  Outcome: Ongoing (interventions implemented as appropriate)    Goal: Interprofessional Rounds/Family Conf  Outcome: Ongoing (interventions implemented as appropriate)      Problem: Skin Injury Risk (Adult)  Goal: Identify Related Risk Factors and Signs and Symptoms  Outcome: Ongoing (interventions implemented as appropriate)    Goal: Skin Health and Integrity  Outcome: Ongoing (interventions implemented as appropriate)      Problem: Fall Risk (Adult)  Goal: Identify Related Risk Factors and Signs and Symptoms  Outcome: Ongoing (interventions implemented as appropriate)    Goal: Absence of Fall  Outcome: Ongoing (interventions implemented as appropriate)      Problem: Fractured Hip (Adult)  Goal: Signs and Symptoms of Listed Potential Problems Will be Absent, Minimized or Managed (Fractured Hip)  Outcome: Ongoing (interventions implemented as appropriate)      Problem: Surgery Nonspecified (Adult)  Goal: Signs and Symptoms of Listed Potential Problems Will be Absent, Minimized or Managed (Surgery Nonspecified)  Outcome: Ongoing (interventions implemented as appropriate)

## 2019-02-14 NOTE — THERAPY EVALUATION
Acute Care - Physical Therapy Initial Evaluation  [facility]     Patient Name: Debbie Baum  : 1923  MRN: [id number]  Today's Date: 2019   Onset of Illness/Injury or Date of Surgery: 19  Date of Referral to PT: 19  Referring Physician: MD Caesar      Admit Date: 2019    Visit Dx:     ICD-10-CM ICD-9-CM   1. Closed fracture of left hip, initial encounter (CMS/[facility]) S72.002A 820.8   2. Injury of head, initial encounter S09.90XA 959.01   3. Contusion of left orbital tissues, initial encounter S05.12XA 921.2   4. Abrasion of face, initial encounter S00.81XA 910.0   5. Fall, initial encounter W19.XXXA E888.9   6. Impaired functional mobility, balance, gait, and endurance Z74.09 V49.89     Patient Active Problem List   Diagnosis   • Hyponatremia   • Essential hypertension   • Coronary artery disease   • Weakness generalized   • GERD (gastroesophageal reflux disease)   • Atrial fibrillation (CMS/HCC)   • Somnolence   • Medication adverse effect   • Chronic pain   • Closed fracture of left hip (CMS/HCC)   • Falls   • Periorbital ecchymosis of left eye   • Hematoma   • Humeral head fracture   • Shoulder dislocation, recurrent, right   • Postoperative anemia due to acute blood loss   • Dysphagia     Past Medical History:   Diagnosis Date   • Atrial fibrillation (CMS/HCC)    • Cancer (CMS/HCC)     colon   • Coronary artery disease    • GERD (gastroesophageal reflux disease)    • Hypertension      Past Surgical History:   Procedure Laterality Date   • CARDIAC ABLATION     • CHOLECYSTECTOMY     • COLON SURGERY      BOWEL RESECTION FOR HX COLON CANCER   • HIP INTERTROCHANTERIC NAILING Left 2019    Procedure: HIP INTERTROCHANTERIC NAILING LEFT;  Surgeon: Ariel Maynard MD;  Location: [facility];  Service: Orthopedics   • HYSTERECTOMY     • REPLACEMENT TOTAL KNEE          PT ASSESSMENT (last 12 hours)      Physical Therapy Evaluation     Row Name 19 1325          PT Evaluation  Time/Intention    Subjective Information  no complaints  -EJ     Document Type  evaluation  -EJ     Mode of Treatment  individual therapy;physical therapy  -EJ     Patient Effort  good  -EJ     Symptoms Noted During/After Treatment  dizziness  -EJ     Row Name 02/14/19 1325          General Information    Patient Profile Reviewed?  yes  -EJ     Onset of Illness/Injury or Date of Surgery  02/13/19  -EJ     Referring Physician  MD Caesar  -EJ     Patient Observations  alert;cooperative  -EJ     Patient/Family Observations  family members present; nephew leaves during eval  -EJ     General Observations of Patient  pt reclined in chair, perfers to stay in chair but RN requested return to bed 2/2 pt will be in/out cathed  -EJ     Prior Level of Function  mod assist:;ADL's;independent:;all household mobility w/c for MD appointments; RWx for short tistances,  -EJ     Equipment Currently Used at Home  grab bar  -EJ     Pertinent History of Current Functional Problem  Pt presented to ED via EMS from NH, pt was using RWx to go to bathroomm, lost balance and fell to L side. Pt had pain in L hip, bruising around eye and was unable to bear weight. X-ray was iniitially (-) but pt felt /heard a pop with worsening pain while moveling LRE in bed. Pt is now s/p IM nailing.  -EJ     Existing Precautions/Restrictions  fall;oxygen therapy device and L/min  (Significant)  Protected WB with RWx. BP stable but low 104/47; dizziness  -EJ     Risks Reviewed  patient:;LOB;increased discomfort;dizziness;change in vital signs;increased drainage;lines disloged;nausea/vomiting  -EJ     Benefits Reviewed  patient:;improve function;increase independence;increase strength;increase balance;decrease pain;decrease risk of DVT;improve skin integrity;increase knowledge  -EJ     Barriers to Rehab  previous functional deficit;ineffective coping  -EJ     Row Name 02/14/19 1328          Relationship/Environment    Lives With  alone  -EJ     Row Name  02/14/19 1325          Resource/Environmental Concerns    Current Living Arrangements  residential facility  -[facility] Name 02/14/19 1325          Cognitive Assessment/Intervention- PT/OT    Orientation Status (Cognition)  oriented x 4  -     Follows Commands (Cognition)  follows one step commands;75-90% accuracy  -     Safety Deficit (Cognitive)  safety precautions awareness;safety precautions follow-through/compliance;insight into deficits/self awareness  -[facility] Name 02/14/19 1325          Safety Issues, Functional Mobility    Safety Issues Affecting Function (Mobility)  safety precaution awareness;safety precautions follow-through/compliance;insight into deficits/self awareness;judgment;awareness of need for assistance  -     Impairments Affecting Function (Mobility)  pain;strength;balance;endurance/activity tolerance  -     Comment, Safety Issues/Impairments (Mobility)  pt dizzy upon sitting up, assisted back to reclined position, assisted with sling to chair  -[facility] Name 02/14/19 1325          Mobility Assessment/Treatment    Extremity Weight-bearing Status  left lower extremity  -     Right Lower Extremity (Weight-bearing Status)  weight-bearing as tolerated (WBAT) protected  -[facility] Name 02/14/19 1325          Bed Mobility Assessment/Treatment    Bed Mobility Assessment/Treatment  rolling right;rolling left  -     Rolling Left [place] (Bed Mobility)  dependent (less than 25% patient effort)  -     Rolling Right [place] (Bed Mobility)  dependent (less than 25% patient effort)  -     Comment (Bed Mobility)  pt assisted with MAX A x2 for movement from reclined to sitting in chair. Pt became dizzy and was assisted back into reclined position, then flat on back for placement of sling and return to chair.,  -[facility] Name 02/14/19 1325          Transfer Assessment/Treatment    Comment (Transfers)  NA  -[facility] Name 02/14/19 1325          General ROM    GENERAL ROM  COMMENTS  WFL  -EJ     Row Name 02/14/19 1325          MMT (Manual Muscle Testing)    General MMT Comments  pt demonstrates SLR with LLE with MIN A; SAQ intact  -     Row Name 02/14/19 1325          Sensory Assessment/Intervention    Sensory General Assessment  no sensation deficits identified  -     Row Name 02/14/19 1325          Pain Assessment    Additional Documentation  Pain Scale: Numbers Pre/Post-Treatment (Group)  -     Row Name 02/14/19 1325          Pain Scale: Numbers Pre/Post-Treatment    Pain Scale: Numbers, Pretreatment  10/10  -EJ     Pain Scale: Numbers, Post-Treatment  9/10  -EJ     Pain Location - Side  Left  -EJ     Pain Location - Orientation  incisional  -EJ     Pain Location  hip  -EJ     Pre/Post Treatment Pain Comment  Rn notified; pt recieved pain medication reported pain had resloved then later asked if she had recieved medication  -     Row Name             Wound 02/13/19 1019 Left eyebrow abrasion    Wound - Properties Group Date first assessed: 02/13/19  -CH Time first assessed: 1019  -CH Present On Admission : yes  -CH Side: Left  -CH Location: eyebrow  -CH Type: abrasion  -CH    Row Name             Wound 02/13/19 1855 Left hip incision    Wound - Properties Group Date first assessed: 02/13/19  -SS Time first assessed: 1855  -SS Side: Left  -SS Location: hip  -SS Type: incision  -SS    Row Name 02/14/19 1325          Plan of Care Review    Plan of Care Reviewed With  patient;family  -     Row Name 02/14/19 1325          Physical Therapy Clinical Impression    Date of Referral to PT  02/13/19  -EJ     PT Diagnosis (PT Clinical Impression)  decreased functional mobility and independence with hip fx s/p ORIF.   -EJ     Criteria for Skilled Interventions Met (PT Clinical Impression)  yes;treatment indicated  -EJ     Rehab Potential (PT Clinical Summary)  fair, will monitor progress closely  -     Row Name 02/14/19 1325          Vital Signs    Post Systolic BP Rehab  104 this  was very close to BP taken just prior to PT  -EJ     Post Treatment Diastolic BP  47  -EJ     Row Name 02/14/19 1325          Physical Therapy Goals    Bed Mobility Goal Selection (PT)  bed mobility, PT goal 1  -EJ     Transfer Goal Selection (PT)  transfer, PT goal 1  -EJ     Gait Training Goal Selection (PT)  gait training, PT goal 1  -EJ     Row Name 02/14/19 1325          Bed Mobility Goal 1 (PT)    Activity/Assistive Device (Bed Mobility Goal 1, PT)  sit to supine/supine to sit  -EJ     Graves Level/Cues Needed (Bed Mobility Goal 1, PT)  moderate assist (50-74% patient effort)  -EJ     Time Frame (Bed Mobility Goal 1, PT)  long term goal (LTG);10 days  -EJ     Row Name 02/14/19 1325          Transfer Goal 1 (PT)    Activity/Assistive Device (Transfer Goal 1, PT)  sit-to-stand/stand-to-sit  -EJ     Graves Level/Cues Needed (Transfer Goal 1, PT)  moderate assist (50-74% patient effort);2 person assist  -EJ     Time Frame (Transfer Goal 1, PT)  long term goal (LTG);10 days  -EJ     Row Name 02/14/19 1325          Gait Training Goal 1 (PT)    Activity/Assistive Device (Gait Training Goal 1, PT)  gait (walking locomotion);assistive device use;walker, rolling  -EJ     Graves Level (Gait Training Goal 1, PT)  moderate assist (50-74% patient effort)  -EJ     Distance (Gait Goal 1, PT)  15  -EJ     Time Frame (Gait Training Goal 1, PT)  long term goal (LTG);10 days  -     Row Name 02/14/19 1325          Positioning and Restraints    Pre-Treatment Position  sitting in chair/recliner  -EJ     Post Treatment Position  bed  -EJ     In Bed  notified nsg;supine;call light within reach;encouraged to call for assist;exit alarm on;with family/caregiver;side rails up x2  -EJ       User Key  (r) = Recorded By, (t) = Taken By, (c) = Cosigned By    Initials Name Provider Type    Nayana Kimball, PT Physical Therapist    Cally Mancia, RN Registered Nurse    Bang Simon RN Registered Nurse         Physical Therapy Education     Title: PT OT SLP Therapies (In Progress)     Topic: Physical Therapy (In Progress)     Point: Mobility training (In Progress)     Learning Progress Summary           Patient Acceptance, E, NR by ABA at 2/14/2019  4:27 PM   Family Acceptance, E, NR by EJ at 2/14/2019  4:27 PM                   Point: Home exercise program (In Progress)     Learning Progress Summary           Patient Acceptance, E, NR by ABA at 2/14/2019  4:27 PM   Family Acceptance, E, NR by ABA at 2/14/2019  4:27 PM                   Point: Body mechanics (In Progress)     Learning Progress Summary           Patient Acceptance, E, NR by EJ at 2/14/2019  4:27 PM   Family Acceptance, E, NR by EJ at 2/14/2019  4:27 PM                   Point: Precautions (In Progress)     Learning Progress Summary           Patient Acceptance, E, NR by ABA at 2/14/2019  4:27 PM   Family Acceptance, E, NR by EJ at 2/14/2019  4:27 PM                               User Key     Initials Effective Dates Name Provider Type Discipline    ABA 11/20/18 -  Nayana Adams, PT Physical Therapist PT              PT Recommendation and Plan  Anticipated Discharge Disposition (PT): skilled nursing facility  Planned Therapy Interventions (PT Eval): balance training, bed mobility training, gait training, home exercise program, strengthening, stair training, ROM (range of motion), postural re-education, patient/family education, transfer training  Therapy Frequency (PT Clinical Impression): daily(dependent on tolerence of POD2)  Outcome Summary/Treatment Plan (PT)  Anticipated Discharge Disposition (PT): skilled nursing facility  Plan of Care Reviewed With: patient, family  Outcome Summary: Pt assisted to sitting upright in recliner and reported dizziness. Pt was assisted back to reclined position and had BP of 104/47 and was then assisted via sling to bed. Pt was limited by dizziness, PT to trial daily frequency initially. Pt likely to need SNF level  care at d/c.  Outcome Measures     Row Name 02/14/19 1325             How much help from another person do you currently need...    Turning from your back to your side while in flat bed without using bedrails?  1  -EJ      Moving from lying on back to sitting on the side of a flat bed without bedrails?  1  -EJ      Moving to and from a bed to a chair (including a wheelchair)?  1  -EJ      Standing up from a chair using your arms (e.g., wheelchair, bedside chair)?  1  -EJ      Climbing 3-5 steps with a railing?  1  -EJ      To walk in hospital room?  1  -EJ      AM-PAC 6 Clicks Score  6  -EJ         Functional Assessment    Outcome Measure Options  AM-PAC 6 Clicks Basic Mobility (PT)  -        User Key  (r) = Recorded By, (t) = Taken By, (c) = Cosigned By    Initials Name Provider Type    Nayana Kimball PT Physical Therapist         Time Calculation:   PT Charges     Row Name 02/14/19 1624             Time Calculation    Start Time  1325  -EJ      PT Received On  02/14/19  -      PT Goal Re-Cert Due Date  02/24/19  -         Time Calculation- PT    Total Timed Code Minutes- PT  0 minute(s)  -        User Key  (r) = Recorded By, (t) = Taken By, (c) = Cosigned By    Initials Name Provider Type    Nayana Kimball PT Physical Therapist        Therapy Suggested Charges     Code   Minutes Charges    None           Therapy Charges for Today     Code Description Service Date Service Provider Modifiers Qty    11639116698 HC PT EVAL MOD COMPLEXITY 4 2/14/2019 Nayana Adams PT GP 1          PT G-Codes  Outcome Measure Options: AM-PAC 6 Clicks Basic Mobility (PT)  AM-PAC 6 Clicks Score: 6      Nayana Laird, PT  2/14/2019

## 2019-02-14 NOTE — PLAN OF CARE
Problem: Patient Care Overview  Goal: Plan of Care Review  Outcome: Ongoing (interventions implemented as appropriate)   02/14/19 8471   Coping/Psychosocial   Plan of Care Reviewed With patient;family   OTHER   Outcome Summary Pt assisted to sitting upright in recliner and reported dizziness. Pt was assisted back to reclined position and had BP of 104/47 and was then assisted via sling to bed. Pt was limited by dizziness, PT to trial daily frequency initially. Pt likely to need SNF level care at d/c.

## 2019-02-14 NOTE — PROGRESS NOTES
Discharge Planning Assessment  [facility]     Patient Name: Debbie Baum  MRN: [id number]  Today's Date: 2/14/2019    Admit Date: 2/13/2019    Discharge Needs Assessment     Row Name 02/14/19 1532       Living Environment    Lives With  facility resident    Provides Primary Care For  no one    Quality of Family Relationships  supportive    Able to Return to Prior Arrangements  yes       Resource/Environmental Concerns    Transportation Concerns  car, none       Transition Planning    Patient/Family Anticipates Transition to  inpatient rehabilitation facility;long term care facility    Patient/Family Anticipated Services at Transition  skilled nursing       Discharge Needs Assessment    Readmission Within the Last 30 Days  no previous admission in last 30 days    Concerns to be Addressed  no discharge needs identified    Equipment Currently Used at Home  walker, rolling    Equipment Needed After Discharge  none    Discharge Facility/Level of Care Needs  rehabilitation facility;nursing facility, skilled        Discharge Plan     Row Name 02/14/19 2915       Plan    Plan  [facility]    Patient/Family in Agreement with Plan  yes    Plan Comments  Spoke with pt at bedside. Pt reports she has been at [facility] for rehab and that is where she will need to go back when ready for discharge. Pt reports she is unsure how llong she has been there and if she will need longer term. Pt reports that when she is ready for dicsharge, her daughter, Kecia may be able to provide transportation to [facility]. Kecia's phone number is [phone]. SWETHA called and spoke with Malena with the [facility]. Malena reports she will look and see if pt was there skilled or if she is a long term pt and the details on when she may be able to come back to the facility. SW awaiting call back from the [facility].     Final Discharge Disposition Code  03 - skilled nursing facility (SNF)        Destination      No service coordination in  this encounter.      Durable Medical Equipment      No service coordination in this encounter.      Dialysis/Infusion      No service coordination in this encounter.      Home Medical Care      No service coordination in this encounter.      Community Resources      No service coordination in this encounter.        Expected Discharge Date and Time     Expected Discharge Date Expected Discharge Time    Feb 16, 2019         Demographic Summary     Row Name 02/14/19 1531       General Information    Reason for Consult  discharge planning        Functional Status     Row Name 02/14/19 1531       Functional Status, IADL    Medications  independent    Meal Preparation  assistive equipment    Housekeeping  assistive equipment    Laundry  assistive equipment    Shopping  assistive equipment        Psychosocial    No documentation.       Abuse/Neglect    No documentation.       Legal    No documentation.       Substance Abuse    No documentation.       Patient Forms    No documentation.           OLGA Hillman

## 2019-02-14 NOTE — PLAN OF CARE
Problem: Pain, Acute (Adult)  Goal: Identify Related Risk Factors and Signs and Symptoms  Outcome: Ongoing (interventions implemented as appropriate)   02/14/19 0431   Pain, Acute (Adult)   Related Risk Factors (Acute Pain) positioning;patient perception;surgery   Signs and Symptoms (Acute Pain) fatigue/weakness;verbalization of pain descriptors     Goal: Acceptable Pain Control/Comfort Level  Outcome: Ongoing (interventions implemented as appropriate)   02/14/19 0431   Pain, Acute (Adult)   Acceptable Pain Control/Comfort Level making progress toward outcome       Problem: Patient Care Overview  Goal: Plan of Care Review  Outcome: Ongoing (interventions implemented as appropriate)   02/14/19 0431   Coping/Psychosocial   Plan of Care Reviewed With patient;daughter   Plan of Care Review   Progress no change   OTHER   Outcome Summary Pt transferred to floor from OR for Lt hip nailing. Pt drowsy from surgery. Arouses to voice. Alertx4. VSS. Hx of a-fib. In and out of a-fib on monitor, rate controlled. Pt denies pain at rest, but grimaces, moans with movement. Pain management discussed with pt, daughter at bedside. 2L NC. Pure wick in place. Waffle mattress applied to bed. Plan of care reviewed with pt and daughter. Verbalized understanding and voiced no concerns at this time.       Problem: Skin Injury Risk (Adult)  Goal: Identify Related Risk Factors and Signs and Symptoms  Outcome: Ongoing (interventions implemented as appropriate)   02/14/19 0431   Skin Injury Risk (Adult)   Related Risk Factors (Skin Injury Risk) advanced age;mechanical forces;mobility impaired;moisture     Goal: Skin Health and Integrity  Outcome: Ongoing (interventions implemented as appropriate)   02/14/19 0431   Skin Injury Risk (Adult)   Skin Health and Integrity making progress toward outcome       Problem: Fall Risk (Adult)  Goal: Identify Related Risk Factors and Signs and Symptoms  Outcome: Ongoing (interventions implemented as  appropriate)   02/14/19 0431   Fall Risk (Adult)   Related Risk Factors (Fall Risk) age-related changes;fatigue/slow reaction;gait/mobility problems;history of falls;slippery/uneven surfaces;environment unfamiliar   Signs and Symptoms (Fall Risk) presence of risk factors     Goal: Absence of Fall  Outcome: Ongoing (interventions implemented as appropriate)   02/14/19 0431   Fall Risk (Adult)   Absence of Fall making progress toward outcome       Problem: Fractured Hip (Adult)  Goal: Signs and Symptoms of Listed Potential Problems Will be Absent, Minimized or Managed (Fractured Hip)  Outcome: Ongoing (interventions implemented as appropriate)   02/14/19 0431   Goal/Outcome Evaluation   Problems Assessed (Fractured Hip) all   Problems Present (Fractured Hip) situational response;pain       Problem: Surgery Nonspecified (Adult)  Goal: Signs and Symptoms of Listed Potential Problems Will be Absent, Minimized or Managed (Surgery Nonspecified)  Outcome: Ongoing (interventions implemented as appropriate)   02/14/19 0431   Goal/Outcome Evaluation   Problems Assessed (Surgery) all   Problems Present (Surgery) situational response;pain

## 2019-02-14 NOTE — OP NOTE
OPERATIVE REPORT     DATE OF PROCEDURE: 2/13/19     SURGEON: Ariel Maynard M.D.     ASSISTANT(S): Circulator: Tracy Burleson RN; Bang Love RN  Scrub Person: Esperanza Iglesias; Artemio Crow  Assistant: Bernadine Cyr PA-C    Note-PA was utilized during the case to facilitate positioning the patient, exposure, retraction, placement of final components and definitive closure.      PREOPERATIVE DIAGNOSIS: Left closed intertrochanteric femur fracture     POSTOPERATIVE DIAGNOSIS: same     PROCEDURE: Operative fixation of left closed intertrochanteric femur fracture with cephalomedullary device     SURGICAL DETAILS:     APPROACH: Direct lateral     ANESTHESIA: General     PREOPERATIVE ANTIBIOTICS: Ancef 2 g IV     ESTIMATED BLOOD LOSS: 200 cc     SPECIMENS: None     IMPLANTS: Den Gamma 3 11 x 180 mm x 130° cephalo-medullary nail, 90 mm U blade set within end cap locking screw, distal interlocking screw ×1    DRAINS: None     LOCAL INJECTION: None     MODIFIER(S): None     COMPLICATIONS: None apparent     INDICATIONS FOR PROCEDURE: Ms. Baum is a  89-year-old female who sustained a mechanical fall resulting in a left intertrochanteric hip fracture.  She baseline lives at a assisted living facility and uses a walker for limited mobility.  She was unable to ambulate after the fall and seen at [facility] ER for evaluation.  She is diagnosed with a comminuted intertrochanteric hip fracture.  Discussion was had regarding treatment options for her injury.  Nonoperative and operative interventions were discussed.  Given the nature of the injury operative intervention would entail a cephalo-medullary nail fixation due to the comminution of the fracture site.  The risks, benefits, alternatives, and potential complications of the this surgery were discussed with the patient/family in detail to include but not limited to infection, bleeding, anesthesia risks, nerve injury, vessel injury, nonunion,  malunion, hardware failure, iatrogenic fracture, pain, blood clots, possible need for blood transfusion, possible need for further procedures, myocardial infarction, stroke, and death. Specific details of the procedure, hospitalization, recovery, rehabilitation, and long-term precautions were also provided. Pre-operative teaching was provided. Surgical device selection was outlined, and the many options available were explained; final choices will be made at the time of the procedure to match the anatomy and condition of the bone, and soft tissue structures. Understanding of all topics was conveyed to me by the patient/family, and consent was given to proceed with a operative fixation of left closed intertrochanteric femur fracture.     INTRAOPERATIVE FINDINGS: Comminuted left intertrochanteric hip fracture     PROCEDURE: The patient was identified in the preoperative holding area. The operative site was confirmed and marked. A GLENDY hose and sequential compression device were placed on the nonoperative leg. The risks, benefits, and alternatives to surgery were again confirmed with the patient/family and they wished to proceed. The patient was brought to the operating room, where a huddle was performed with the patient and all vital surgical team members to confirm the correct operative site, procedure, anesthesia type, and operative plan with the patient. After anesthesia was performed, the patient was positioned in the supine position on the fracture/[place] table. A traction boot was secured to the operative extremity and the nonoperative limb was placed in a well leg gibson in a flexed and abducted position. A peroneal post was placed. The ipsilateral arm was draped across his chest and padded and secured into position. All bony prominences were padded. A surgical time out was performed with all personnel in the operating room to confirm patient identity, the correct operative site and extremity, correct radiographic  studies, availability of appropriate surgical equipment and agreement on the planned procedure.     Provisional reduction of the fracture was then performed under fluoroscopic guidance. Using a combination of axial traction, abduction/adduction, internal/external rotation, and flexion/extension of the operative limb, fracture reduction was obtained and verified under orthogonal fluoroscopic imaging. Next, attention was turned to the operative portion of the case. Intravenous antibiotic prophylaxis was given and confirmed with the anesthesia team. The operative extremity was prepped and draped in the usual sterile fashion.     Attention was then turned to the operative hip. Under fluoroscopic guidance a guidepin was then placed at the modified medial trochanteric starting point. After confirming the position of the starting point with orthogonal fluoroscopic imaging, this guidepin was advanced into the intramedullary canal just distal to the lesser trochanter. A minimal incision was made at the skin around the guidepin and blunt dissection was used to open the soft tissue envelope down to the tip of the greater trochanter. An opening reamer was then placed over the guidepin and reamed to the level of the lesser trochanter. Maintenance of fracture reduction was verified using fluoroscopy while this occurred. The guidepin and opening reamer were then removed.     The selected nail was then opened and assembled on the back table. After verifying correct assembly, the nail was inserted into the intramedullary canal and impacted to the correct depth using a mallet. Placement was verified under fluoroscopy. The targeting arm was then attached to the impaction guide and a small incision was made laterally over the trochanter region to advance the aiming guide to the lateral cortex. Under fluoroscopic guidance, a guide pin was then placed across the fracture, and advanced to the center-center position in the femoral head.   Prior to placing this guidepin, a small lateral based incision was made at the level of the fracture and a bone hook was placed over the anterior cortex of the femur to assist with reduction of the femoral shaft to the femoral neck.  Manual pressure was utilized to hold the reduction while the guidepin was placed.  Orthogonal fluoroscopic imaging verified appropriate placement of the pin across the fracture, femoral neck, and the position in the femoral head. The length of the lag screw was then measured. A reamer was then utilized to create the pathway from the lateral cortex to the femoral head to allow for lag screw insertion.  A second guidepin was placed parallel to the first pin in order to prevent rotational malalignment while the lag screw was placed.  The lag screw was then inserted over the guidewire and advanced across the fracture into the femoral head.  This was then followed by U blade placement and the end cap.  The set screw was then tightened to lock the construct rotationally. The provisionally placed guidewire to maintain the reduction was then removed.  Manual compression at the fracture site then proceeded and was monitored using fluoroscopic imaging. Once compression was completed, the set screw was again checked and definitively tightened if indicated.  Traction was then released.  Fracture reduction was maintained.  Attention was then turned to the distal interlocking screw. Using the targeting guide, a percutaneous incision was made laterally on the thigh to allow for guide placement onto the lateral cortex. The distal interlock hole was then drilled, measured and filled with the appropriate length screw. Once the distal fixation was complete, the nail insertion guide was then removed and traction released. Final orthogonal fluoroscopic imaging was obtained to confirm appropriate hardware placement and fracture reduction.     The wounds were then irrigated copiously with saline followed by  layered closure using #1 Vicryl suture for the fascial and deep layers, #2-0 Vicryl for the subcuticular layer and 3-0 nylon for skin. Sterile dressings were then applied to the wounds and GLENDY hose and a sequential compression device to the operative extremity. Anesthesia was reversed, the patient was transferred to hospital bed, and then to the PACU in stable condition.     No apparent complications occurred during the procedure Instrument, sponge and needle counts were correct x 2.     POST OPERATIVE PLAN:   Weight Bearing: Protected weightbearing as tolerated with walker at all times   DVT prophylaxis: Aspirin 81 mg twice a day   23 hours perioperative antibiotic prophylaxis   Therapy/Activity: PT/OT for mobilization   Wound Care: Dressings to remain in place for 7 days postop   Pain control: Oral and IV pain medication as needed   Social work for discharge planning   Follow up: [place] 2 weeks

## 2019-02-14 NOTE — BRIEF OP NOTE
HIP INTERTROCHANTERIC NAILING  Progress Note    Debbie Baum  2/13/2019    Pre-op Diagnosis:   left intertrochanteric hip fracture       Post-Op Diagnosis Codes:     * Closed intertrochanteric fracture of hip, left, initial encounter (CMS/[facility]) [S72.142A]    Procedure/CPT® Codes:  TX OPEN FIX INTER/SUBTROCH FX,IMPLNT [30495]    Procedure(s):  HIP INTERTROCHANTERIC NAILING    Surgeon(s):  Ariel Maynard MD    Anesthesia: General with Block    Staff:   Circulator: Tracy Burleson RN; Bang Love RN  Scrub Person: Esperanza Iglesias; Artemio Crow  Assistant: Bernadine Cyr PA-C    Estimated Blood Loss: 200ml    Urine Voided: * No values recorded between 2/13/2019  6:13 PM and 2/13/2019  7:39 PM *    Specimens:                None      Drains:   External Urinary Catheter (Active)   Site Assessment Clean;Pink;Skin intact 2/13/2019 11:05 AM   Collection Container Wall suction 2/13/2019 11:05 AM   Wall suction (mmHG) 40 mmHG 2/13/2019 11:05 AM   Securement Method Other (Comment) 2/13/2019 11:05 AM       Findings: Comminuted intertrochanteric hip fracture    Complications: None apparent      Ariel Maynard MD     Date: 2/13/2019  Time: 7:39 PM

## 2019-02-14 NOTE — PLAN OF CARE
Problem: Patient Care Overview  Goal: Plan of Care Review  Outcome: Ongoing (interventions implemented as appropriate)   02/14/19 1128   Coping/Psychosocial   Plan of Care Reviewed With patient;family   Plan of Care Review   Progress (eval)   SLP evaluation completed. Will address dysphagia. Please see note for further details and recommendations.

## 2019-02-14 NOTE — PROGRESS NOTES
[facility]    Acute pain service Inpatient Progress Note    Patient Name: Debbie Baum  :  1923  MRN:  [id number]        Acute Pain  Service Inpatient Progress Note:    Analgesia:Poor  Pain Score:10/10  LOC: alert and awake  Resp Status: room air  Cardiac: VS stable  Side Effects:None  Catheter Site:clean, dressing intact and dry  Cath type: peripheral nerve cath with ON Q  Infusion rate: 10ml/hr  Catheter Plan:Catheter to remain Insitu and Continue catheter infusion rate unchanged  Comments: Pt was comfortable during the night. During rounding was complaining of 10/10. RN at bedside pump turned up to 14 for 2 hours

## 2019-02-14 NOTE — PROGRESS NOTES
[facility] Medicine Services  PROGRESS NOTE    Patient Name: eDbbie Baum  : 1923  MRN: [id number]    Date of Admission: 2019  Length of Stay: 1  Primary Care Physician: Paula Scott MD    Subjective   Subjective     CC:  Hip fracture, shoulder dislocation/possible fracture, anemia    HPI:  Right arm pain today; left hip pain improved. No dyspnea    Review of Systems  No headache    Otherwise ROS is negative except as mentioned in the HPI.    Objective   Objective     Vital Signs:   Temp:  [97.2 °F (36.2 °C)-98.8 °F (37.1 °C)] 97.5 °F (36.4 °C)  Heart Rate:  [] 88  Resp:  [16-18] 18  BP: ()/(47-84) 109/52        Physical Exam:  Elderly, frail, friendly and smiling, nontoxic appearing  [place], oroph clear  rrr  Lungs clear, normal effort  abd soft, nontender  No cce legs  Right arm pain, full rom elbow, unable to abduct shoulder; +radial pulse  Face symmetric, speech clear, equal   Appropriate affect    Results Reviewed:  I have personally reviewed current lab, radiology, and data and agree.    Results from last 7 days   Lab Units 19  1219 19  0519  1009   WBC 10*3/mm3  --  12.74* 10.76   HEMOGLOBIN g/dL 8.9* 7.4* 10.7*   HEMATOCRIT % 27.2* 23.3* 34.1*   PLATELETS 10*3/mm3  --  225 312   INR   --   --  1.07     Results from last 7 days   Lab Units 19  1219 19  0519  1009   SODIUM mmol/L  --  137 139   POTASSIUM mmol/L 3.3* 3.2* 3.8   CHLORIDE mmol/L  --  106 107   CO2 mmol/L  --  24.0 27.0   BUN mg/dL  --  19 24*   CREATININE mg/dL  --  0.79 0.86   GLUCOSE mg/dL  --  153* 95   CALCIUM mg/dL  --  9.0 9.9   ALT (SGPT) U/L  --   --  20   AST (SGOT) U/L  --   --  21     Estimated Creatinine Clearance: 33.1 mL/min (by C-G formula based on SCr of 0.79 mg/dL).    No results found for: BNP    Microbiology Results Abnormal     None          Imaging Results (last 24 hours)     Procedure Component Value Units Date/Time    XR  Humerus Right [[id number]] Collected:  02/14/19 1422     Updated:  02/14/19 1435    Narrative:       EXAMINATION: XR HUMERUS, RIGHT-02/14/2019:      INDICATION: Fall, pain; S72.002A-Fracture of unspecified part of neck of  left femur, initial encounter for closed fracture; S09.90XA-Unspecified  injury of head, initial encounter; S05.12XA-Contusion of eyeball and  orbital tissues, left eye, initial encounter; S00.81XA-Abrasion of other  part of head, initial encounter; W19.XXXA-Unspecified fall, initial  encounter.      COMPARISON: 09/08/2018.     FINDINGS: Findings to suggest fracture or dislocation identified of the  right humeral head. There is anterior dislocation identified with likely  fracture of the humeral head. There is osteopenia of the bony  structures. The cortex of the humeral shaft is intact.       Impression:       Findings suggesting dislocation anteriorly of the humeral  head with likely fracture of the humeral head itself.     D:  02/14/2019  E:  02/14/2019     This report was finalized on 2/14/2019 2:33 PM by Dr. Sylvia Wood MD.       XR Shoulder 2+ View Right [[id number]] Collected:  02/14/19 1413     Updated:  02/14/19 1435    Narrative:       EXAMINATION: XR SHOULDER 2+ VW RIGHT- 02/14/2019      INDICATION: rule out fracture, dislocation. fall, pain;  S72.002A-Fracture of unspecified part of neck of left femur, initial  encounter for closed fracture; S09.90XA-Unspecified injury of head,  initial encounter; S05.12XA-Contusion of eyeball and orbital tissues,  left eye, initial encounter; S00.81XA-Abrasion of other part of head,  initial encounter; W19.XXXA-Unspecified fall, initial encounter      COMPARISON: 02/14/2019     FINDINGS: 2 views of the right shoulder reveal fracture dislocation  identified of the humeral head. The fracture is seen of the humeral head  with anterior dislocation identified. The acromioclavicular joint  reveals degenerative change. The scapula visualized is intact.            Impression:       Fracture dislocation seen of the right humeral head.     D:  02/14/2019  E:  02/14/2019     This report was finalized on 2/14/2019 2:33 PM by Dr. Sylvia Wood MD.       XR Hip With or Without Pelvis 2 - 3 View Left [[id number]] Collected:  02/14/19 0837     Updated:  02/14/19 1258    Narrative:       EXAMINATION: AP VIEW OF THE PELVIS AND OBLIQUE VIEW OF THE LEFT HIP -  02/13/2019     HISTORY: Trauma pain     FINDINGS: There is a slight medial prosthesis stabilizing a comminuted  intertrochanteric fracture of the left femur. The bony pelvis is intact.  There is old fracture of the right inferior pubic ramus.       Impression:       Expected postoperative findings.     D:  02/14/2019  E:  02/14/2019     This report was finalized on 2/14/2019 12:56 PM by Dr. Chester Escoto MD.       FL C Arm During Surgery [[id number]] Collected:  02/14/19 0825     Updated:  02/14/19 1017    Narrative:       EXAMINATION: FLUOROSCOPY C-ARM DURING SURGERY-     INDICATION: Hip intertrochanteric nailing left; S72.002A-Fracture of  unspecified part of neck of left femur, initial encounter for closed  fracture; S09.90XA-Unspecified injury of head, initial encounter;  S05.12XA-Contusion of eyeball and orbital tissues, left eye, initial  encounter; S00.81XA-Abrasion of other part of head, initial encounter;  W19.XXXA-Unspecified fall, initial encounter.      TECHNIQUE: Intraoperative fluoroscopy for improved localization and  treatment planning.     COMPARISON: None.     FINDINGS: Intraoperative fluoroscopy with total fluoroscopic time usage  1 minute 45 seconds and 12 representative images saved during left hip  intratrochanteric nailing.       Impression:       Intraoperative fluoroscopy utilized during left hip nailing.     D:  02/14/2019  E:  02/14/2019     This report was finalized on 2/14/2019 10:15 AM by Dr. Jae Baumann.       CT Head Without Contrast [[id number]] Collected:  02/13/19 0946     Updated:   02/13/19 2244    Narrative:       EXAMINATION: CT HEAD WO CONTRAST- 02/13/2019      INDICATION: Head trauma, minor, GCS>=13, NOC/NEXUS/CCR neg, first study          TECHNIQUE: 5 mm unenhanced images through the brain     The radiation dose reduction device was turned on for each scan per the  ALARA (As Low as Reasonably Achievable) protocol.     COMPARISON: Head CT scan of 08/08/2018.     FINDINGS: The calvarium appears intact. Included paranasal sinuses and  mastoids appear clear. Soft tissue window images show no evidence of  hemorrhage, contusion, edema, mass or mass effect, infarct,  hydrocephalus, or abnormal extra-axial collection. There is expected  degree of generalized cerebral atrophy for age. Re windowing this study  for facial soft tissues shows superficial/preseptal hematoma of the left  periorbital region. Separate facial bone CT scan is also performed.       Impression:       Age-appropriate generalized cerebral atrophy. No evidence of  acute intracranial disease. Incidentally noted left facial hematoma.  Separate facial bone CT scan has also been performed. Please see that  report.     D:  02/13/2019  E:  02/13/2019        This report was finalized on 2/13/2019 10:42 PM by DR. Sushil Epstein MD.       XR Chest 1 View [[id number]] Collected:  02/13/19 1247     Updated:  02/13/19 2234    Narrative:       EXAMINATION: XR CHEST 1 VW-02/13/2019:      INDICATION: Preop; S72.002A-Fracture of unspecified part of neck of left  femur, initial encounter for closed fracture; S09.90XA-Unspecified  injury of head, initial encounter; S05.12XA-Contusion of eyeball and  orbital tissues, left eye, initial encounter; S00.81XA-Abrasion of other  part of head, initial encounter; W19.XXXA-Unspecified fall, initial  encounter.      COMPARISON: 09/13/2018.     FINDINGS: There appears to be a large hiatal hernia. The heart shadow  itself appears normal in size. The vasculature is upper limits of normal  size. Relative elevation  of the right hemidiaphragm compared to left is  unchanged. There are mild chronic appearing interstitial changes. No new  pulmonary parenchymal disease is seen. 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 On these images it appears the right humerus may be   subluxed or dislocated with more questionable suggestion of fracture or  avulsion of the humeral head.       Impression:       1. Large hiatal hernia.  2. Stable chronic appearing lung changes. No new heart or lung disease.  3. Questionable subluxation and trauma to the right humeral head as  noted. Please correlate with exam findings and patient complaint.     D:  02/13/2019  E:  02/13/2019     This report was finalized on 2/13/2019 10:31 PM by DR. Sushil Epstein MD.       XR Hip With or Without Pelvis 2 - 3 View Left [[id number]] Collected:  02/13/19 0949     Updated:  02/13/19 2228    Narrative:       EXAMINATION:  AP PELVIS, AP AND LEFT LATERAL HIP-02/13/2019     HISTORY: Fall this morning, left hip pain.     COMPARISON: 08/09/2019.     FINDINGS: The bony structures are markedly osteopenic, expected for age.  Lumbar scoliosis and advanced lower lumbar degenerative disc disease are  noted. Femoral head contours are generally well maintained. There is no  apparent left acetabular fracture, pubic ramus fracture, or proximal  left femoral fracture. With this degree of osteopenia, a nondisplaced  fracture could escape detection, but no interruption of the trabecular  pattern of the femur is seen. Irregularity of the upper margin of the  greater trochanter is probably stable, more prominent today due to the  hip previously internally rotated, today externally rotated.       Impression:       1.  Marked osteopenia.  2.  No pelvic or left hip fracture is identified.  3.  Lower lumbar degenerative disc disease and scoliosis.     D:  02/13/2019  E:  02/13/2019     This report was finalized on 2/13/2019 10:25 PM by DR. Sushil Epstein MD.             Results for orders  placed during the hospital encounter of 09/17/18   Adult Transthoracic Echo Complete W/ Cont if Necessary Per Protocol    Narrative · Mild-to-moderate mitral valve regurgitation is present          I have reviewed the medications:    Current Facility-Administered Medications:   •  acetaminophen (TYLENOL) tablet 650 mg, 650 mg, Oral, Q4H PRN **OR** acetaminophen (TYLENOL) 160 MG/5ML solution 650 mg, 650 mg, Oral, Q4H PRN **OR** acetaminophen (TYLENOL) suppository 650 mg, 650 mg, Rectal, Q4H PRN, Ariel Maynard MD  •  acetaminophen (TYLENOL) tablet 650 mg, 650 mg, Oral, Q8H, Babita Harmon CRNA, 650 mg at 02/14/19 1126  •  aspirin chewable tablet 81 mg, 81 mg, Oral, BID, Ariel Maynard MD, 81 mg at 02/14/19 0907  •  bisacodyl (DULCOLAX) EC tablet 10 mg, 10 mg, Oral, Daily PRN, Ariel Maynard MD  •  bisacodyl (DULCOLAX) suppository 10 mg, 10 mg, Rectal, Daily PRN, Ariel Maynard MD  •  carvedilol (COREG) tablet 3.125 mg, 3.125 mg, Oral, BID With Meals, Eleuterio Pride MD, Stopped at 02/14/19 0908  •  cholecalciferol (VITAMIN D3) tablet 1,000 Units, 1,000 Units, Oral, Daily, Eleuterio Pried MD, 1,000 Units at 02/14/19 0908  •  docusate sodium (COLACE) capsule 100 mg, 100 mg, Oral, BID PRN, Ariel Maynard MD  •  escitalopram (LEXAPRO) tablet 10 mg, 10 mg, Oral, Daily, Eleuterio Pride MD, 10 mg at 02/14/19 0908  •  HYDROcodone-acetaminophen (NORCO) 5-325 MG per tablet 0.5 tablet, 0.5 tablet, Oral, Q6H PRN, Eleuterio Pride MD, 0.5 tablet at 02/14/19 1332  •  HYDROmorphone (DILAUDID) injection 0.5 mg, 0.5 mg, Intravenous, Q2H PRN, 0.5 mg at 02/14/19 1547 **AND** naloxone (NARCAN) injection 0.1 mg, 0.1 mg, Intravenous, Q5 Min PRN, Ariel Maynard MD  •  lactated ringers infusion, 9 mL/hr, Intravenous, Continuous PRN, Colin Castro MD, Last Rate: 9 mL/hr at 02/13/19 1649  •  lansoprazole (FIRST) oral suspension 30 mg, 30 mg, Nasogastric, Bigg WATKINS Christopher R, MD,  Stopped at 02/14/19 0908  •  Magnesium Sulfate 2 gram Bolus, followed by 8 gram infusion (total Mg dose 10 grams)- Mg less than or equal to 1mg/dL, 2 g, Intravenous, PRN **OR** Magnesium Sulfate 2 gram / 50mL Infusion (GIVE X 3 BAGS TO EQUAL 6GM TOTAL DOSE) - Mg 1.1 - 1.5 mg/dl, 2 g, Intravenous, PRN **OR** Magnesium Sulfate 4 gram infusion- Mg 1.6-1.9 mg/dL, 4 g, Intravenous, PRN, Eleuterio Pride MD, Last Rate: 25 mL/hr at 02/14/19 1446, 4 g at 02/14/19 1446  •  metaxalone (SKELAXIN) tablet 400 mg, 400 mg, Oral, 4x Daily PRN, Babita Harmon CRNA, 400 mg at 02/14/19 1518  •  Morphine sulfate (PF) injection 2 mg, 2 mg, Intravenous, Q4H PRN, Eleuterio Pride MD, 2 mg at 02/13/19 1751  •  ondansetron (ZOFRAN) injection 4 mg, 4 mg, Intravenous, Q6H PRN, Eleuterio Pride MD, 4 mg at 02/13/19 1924  •  ondansetron (ZOFRAN) tablet 4 mg, 4 mg, Oral, Q6H PRN **OR** ondansetron (ZOFRAN) injection 4 mg, 4 mg, Intravenous, Q6H PRN, Ariel Maynard MD  •  potassium chloride (MICRO-K) CR capsule 40 mEq, 40 mEq, Oral, Once, Eleuterio Pride MD  •  ropivacaine (NAROPIN) 0.2% peripheral nerve cath (moog), 8 mL/hr, Peripheral Nerve, Continuous, Colin Georges, DAMON, Last Rate: 8 mL/hr at 02/14/19 1519, 8 mL/hr at 02/14/19 1519  •  [COMPLETED] Insert peripheral IV, , , Once **AND** sodium chloride 0.9 % flush 10 mL, 10 mL, Intravenous, PRN, Soco Hancock PA  •  sodium chloride 0.9 % flush 3 mL, 3 mL, Intravenous, Q12H, Eleuterio Pride MD, 3 mL at 02/14/19 0850  •  sodium chloride 0.9 % flush 3-10 mL, 3-10 mL, Intravenous, PRN, Eleuterio Pride MD  •  sodium chloride 0.9 % infusion, 50 mL/hr, Intravenous, Continuous, Eleuterio Pride MD, Last Rate: 50 mL/hr at 02/13/19 1605, 50 mL/hr at 02/13/19 1605  •  sodium chloride 0.9 % infusion, 100 mL/hr, Intravenous, Continuous, Ariel Maynard MD, Last Rate: 100 mL/hr at 02/14/19 1151, 100 mL/hr at 02/14/19 1151  •  vitamin B-12 (CYANOCOBALAMIN) tablet  "500 mcg, 500 mcg, Oral, Daily, Eleuterio Pride MD, 500 mcg at 02/14/19 0909      Assessment/Plan   Assessment / Plan     Active Hospital Problems    Diagnosis Date Noted   • **Closed fracture of left hip (CMS/[facility]) [S72.002A] 02/13/2019   • Humeral head fracture [S42.293A] 02/14/2019     Priority: Medium   • Shoulder dislocation, recurrent, right [M24.411] 02/14/2019     Priority: Medium   • Postoperative anemia due to acute blood loss [D62] 02/14/2019     Priority: Medium   • Dysphagia [R13.10] 02/14/2019   • Falls [W19.XXXA] 02/13/2019   • Periorbital ecchymosis of left eye [S00.12XA] 02/13/2019   • Hematoma [T14.8XXA] 02/13/2019     Left gluteal hematoma     • Atrial fibrillation (CMS/[facility]) [I48.91] 04/05/2018   • Essential hypertension [I10] 04/05/2018          Brief Hospital Course to date:  Debbie Baum is a 89 y.o. female w/ hx afib (s/p prior ablation) on coreg and asa, prior right shoulder dislocations, multiple prior falls who was using her walker at nursing facility today when lost balance and fell to left side striking left hip and face. Patient was brought to ER where initial xray hip/pelvis revealed no fracture. However, later while moving legs heard a \"pop\" and had worsening pain and subsequent ct pelvis showed left intertrochanteric fracture and 6cm gluteal & subcutanoeus hematoma. Also has bruising left eye. Ct face negative for fractures. No preceding chest pain or palpitations. Patient went for left intertrochanteric nail the evening of admission (2/13/19) and did will post-operatively. POD #1 hgb dropped to 7.4 (from 10.7 on admission) and received 1 unit prbc, and also developed right shoulder/arm pain (same shoulder that patient has apparently dislocated in the past). X-ray shoulder and humerus showed anterior dislocation right humeral head and possible humeral head fracture.    *left hip (intertrochanteric) fracture      -s/p left intertrochanteric nail 2/13/19 by dr. Maynard  *left " gluteal/subcutaneous hematoma  *post op anemia (s/p 1 unit prbc 2/14/19)   *right shoulder dislocation/possible fracture  *Hx afib  *left periorbital ecchymoses       -ct facial bones no fractures  *hx htn  *hx multiple falls    -------------------------------------------------------------  Plan:    -post op (s/p left intertrochanteric nail2/13/19) care per dr. Maynard; asa 81mg bid for dvt prophylaxis  -s/p 1 unit prbc 2/14/19 for post op anemia; monitor hgb  -xray right shoulder and humerus suggest anterior dislocation humeral head w/ possible humeral head fracture; d/w dr. Maynard, he will discuss with colleague dr. Berg regarding management right shoulder;in meantime will give sling  -requesting prior radiology reports from Dr. Saran Seaman (prior orthopedic surgeon)  -replace elctrolytes prn  -on level 4 dysphagia diet per SLP recs    -cbc, bmp, mag, iron profile in a.m.    -discussed w/ patient, family via phone (grandson and daughter in law)    Addendum at 7:17pm:  -d/w dr. Maynard earlier. He has asked Dr. ritter to come by tomorrow about noon. These right shoulder changes appear chronic after the CT scan obtained per d/w dr. Maynard.    DVT Prophylaxis:  Asa 81mg bid       CODE STATUS:   Code Status and Medical Interventions:   Ordered at: 02/13/19 1302     Limited Support to NOT Include:    Intubation     Code Status:    No CPR     Medical Interventions (Level of Support Prior to Arrest):    Limited         Electronically signed by Eleuterio Pride MD, 02/14/19, 3:52 PM.

## 2019-02-14 NOTE — THERAPY EVALUATION
Acute Care - Speech Language Pathology   Swallow Initial Evaluation [facility]   Clinical Swallow Evaluation       Patient Name: Debbie Baum  : 1923  MRN: [id number]  Today's Date: 2019               Admit Date: 2019    Visit Dx:     ICD-10-CM ICD-9-CM   1. Closed fracture of left hip, initial encounter (CMS/[facility]) S72.002A 820.8   2. Injury of head, initial encounter S09.90XA 959.01   3. Contusion of left orbital tissues, initial encounter S05.12XA 921.2   4. Abrasion of face, initial encounter S00.81XA 910.0   5. Fall, initial encounter W19.XXXA E888.9     Patient Active Problem List   Diagnosis   • Hyponatremia   • Essential hypertension   • Coronary artery disease   • Weakness generalized   • GERD (gastroesophageal reflux disease)   • Atrial fibrillation (CMS/HCC)   • Somnolence   • Medication adverse effect   • Chronic pain   • Closed fracture of left hip (CMS/HCC)   • Falls   • Periorbital ecchymosis of left eye   • Hematoma     Past Medical History:   Diagnosis Date   • Atrial fibrillation (CMS/HCC)    • Cancer (CMS/HCC)     colon   • Coronary artery disease    • GERD (gastroesophageal reflux disease)    • Hypertension      Past Surgical History:   Procedure Laterality Date   • CARDIAC ABLATION     • CHOLECYSTECTOMY     • COLON SURGERY      BOWEL RESECTION FOR HX COLON CANCER   • HIP INTERTROCHANTERIC NAILING Left 2019    Procedure: HIP INTERTROCHANTERIC NAILING LEFT;  Surgeon: Ariel Maynard MD;  Location: [facility];  Service: Orthopedics   • HYSTERECTOMY     • REPLACEMENT TOTAL KNEE          SWALLOW EVALUATION (last 72 hours)      [facility] Adult Swallow Evaluation     Row Name 19 0930                   Rehab Evaluation    Document Type  evaluation  -AV        Subjective Information  no complaints  -AV        Patient Observations  alert;cooperative  -AV        Patient/Family Observations  daughter in law present   -AV        Patient Effort  good  -AV           General  Information    Patient Profile Reviewed  yes  -AV        Pertinent History Of Current Problem  fall, hip fracture   -AV        Current Method of Nutrition  NPO  -AV        Precautions/Limitations, Vision  WFL;for purposes of eval  -AV        Precautions/Limitations, Hearing  WFL;for purposes of eval  -AV        Prior Level of Function-Communication  WFL  -AV        Prior Level of Function-Swallowing  no diet consistency restrictions  -AV        Plans/Goals Discussed with  patient;family  -AV        Barriers to Rehab  medically complex  -AV        Patient's Goals for Discharge  return to PO diet  -AV        Family Goals for Discharge  patient able to return to PO diet  -AV           Pain Assessment    Additional Documentation  Pain Scale: FACES Pre/Post-Treatment (Group)  -AV           Pain Scale: FACES Pre/Post-Treatment    Pain: FACES Scale, Pretreatment  2-->hurts little bit  -AV        Pain: FACES Scale, Post-Treatment  2-->hurts little bit  -AV           Oral Motor and Function    Dentition Assessment  natural, present and adequate  -AV        Secretion Management  WNL/WFL  -AV        Mucosal Quality  moist, healthy  -AV        Volitional Swallow  WFL  -AV        Volitional Cough  WFL  -AV           Oral Musculature and Cranial Nerve Assessment    Oral Motor General Assessment  mandibular impairment patient with very tonic jaw opening   -AV           General Eating/Swallowing Observations    Respiratory Support Currently in Use  nasal cannula  -AV        Eating/Swallowing Skills  fed by SLP  -AV        Positioning During Eating  upright 90 degree;upright in chair  -AV        Utensils Used  spoon;cup;straw  -AV        Consistencies Trialed  regular textures;pureed;thin liquids;nectar/syrup-thick liquids  -AV           Clinical Swallow Eval    Oral Prep Phase  impaired  -AV        Oral Transit  WFL  -AV        Oral Residue  WFL  -AV        Pharyngeal Phase  suspected pharyngeal impairment  -AV        Esophageal  Phase  unremarkable  -AV        Clinical Swallow Evaluation Summary  Bedside swallow eval compelted this am:  cough, hard swallow, audible swallow with thins. Patient reports pain and difficulty.  No overt s/s with nectar, pudding, and solids.  patient with tonic like jaw opening attempts.  Patient unable to completed MBS or FEES at this time, will rec level 4 and nectar and follow up for repeat bedside tomororw. patient and family in agreement.    -AV           Clinical Impression    SLP Swallowing Diagnosis  suspected pharyngeal dysfunction  -AV        Functional Impact  risk of aspiration/pneumonia  -AV        Rehab Potential/Prognosis, Swallowing  good, to achieve stated therapy goals  -AV        Swallow Criteria for Skilled Therapeutic Interventions Met  demonstrates skilled criteria  -AV           Recommendations    Therapy Frequency (Swallow)  evaluation only  -AV        SLP Diet Recommendation  mechanical soft with no mixed consistencies;nectar thick liquids  -AV        Recommended Diagnostics  reassess via clinical swallow evaluation  -AV        Recommended Precautions and Strategies  small bites of food and sips of liquid  -AV        SLP Rec. for Method of Medication Administration  meds whole;with pudding or applesauce  -AV        Monitor for Signs of Aspiration  yes;notify SLP if any concerns  -AV        Anticipated Dischage Disposition  unknown  -AV          User Key  (r) = Recorded By, (t) = Taken By, (c) = Cosigned By    Initials Name Effective Dates    AV Queenie Rojo, MS CCC-SLP 04/03/18 -           EDUCATION  The patient has been educated in the following areas:   Dysphagia (Swallowing Impairment) Oral Care/Hydration.    SLP Recommendation and Plan  SLP Swallowing Diagnosis: suspected pharyngeal dysfunction  SLP Diet Recommendation: mechanical soft with no mixed consistencies, nectar thick liquids  Recommended Precautions and Strategies: small bites of food and sips of liquid     Monitor for  Signs of Aspiration: yes, notify SLP if any concerns  Recommended Diagnostics: reassess via clinical swallow evaluation  Swallow Criteria for Skilled Therapeutic Interventions Met: demonstrates skilled criteria  Anticipated Dischage Disposition: unknown  Rehab Potential/Prognosis, Swallowing: good, to achieve stated therapy goals  Therapy Frequency (Swallow): evaluation only          Plan of Care Reviewed With: patient, family  Plan of Care Review  Plan of Care Reviewed With: patient, family  Progress: (eval)           Time Calculation:   Time Calculation- SLP     Row Name 02/14/19 1129             Time Calculation- SLP    SLP Start Time  0930  -AV        User Key  (r) = Recorded By, (t) = Taken By, (c) = Cosigned By    Initials Name Provider Type    AV Queenie Rojo, MS CCC-SLP Speech and Language Pathologist          Therapy Charges for Today     Code Description Service Date Service Provider Modifiers Qty    [id number] HC ST EVAL ORAL PHARYNG SWALLOW 3 2/14/2019 Queenie Rojo, MS CCC-SLP GN 1               Queenie Rojo MS CCC-SLP  2/14/2019

## 2019-02-14 NOTE — DISCHARGE INSTRUCTIONS
"DISCHARGE INSTRUCTIONS   Dr. Maynard     Operative fixation left intertrochanteric hip fracture     Wound Care   1) Keep wound / incision area clean and dry.   2) Dressing to remain in place until post-operative day 7. If a splint or cast is present, leave in place until next appointment. Upon dressing removal (if no splint/cast present), assess for wound drainage. If no drainage is present, keep wound / incision area open to air as much as possible. If drainage is present, place sterile dressing to cover wound and assess daily. If drainage continues to occur after post-operative day 10, call the office for an urgent appointment. (You should be seen in the clinic within 1-2 days of calling).   3) No baths or swimming until otherwise instructed. The wound must remain dry for 10 days after surgery. After 10 days, you may begin to shower only if no drainage is present. No submerging the wound under standing water until cleared by your physician (no baths, hot tubs, swimming pools, etc). Sponge baths are the best way to perform personal hygiene while at the same time protecting the wound from moisture. If splint or cast is present, do not allow it to get wet.   4) Prior to showering, the wound must remain dry for 72 consecutive hours (no drainage whatsoever) prior to showering. If the wound drains or spots, the clock \"resets\" - make sure the wound has been drainage-free for 72 consecutive hours.   5) Once you are allowed to get the wound wet, please use gentle soap to wash the wound area. DO NOT aggressively scrub the wound with a washcloth or bath sponge. Please visually inspect your wound(s) at least once daily. If the wound(s) are in a difficult to see location, please use a mirror or have someone else assist with visual inspection.   6) No scrubbing the wound. You may \"pad dry\" the wound, but do not rub, as this may open up the wound and pre-dispose to wound infection.   7) Do not apply lotions or creams to incision " "site, unless instructed otherwise.   8) Observe for redness, swelling, or drainage. Please call the clinic immediately if you have fevers, chills with warmth/redness surrounding wound site or if you notice pus drainage from the wound site     Activity   No heavy lifting objects greater than 10 pounds.   No driving while on narcotic pain medication.   No submerging wound under standing water (pool, bath tub, etc.) until otherwise instructed.   You may be protected weightbearing as tolerated on your operative (left lower) extremity   Use crutches or a walker for ambulation as instructed.   Hip range of motion as tolerated     Blood Clot Prophylaxis   (Aspirin vs. Lovenox administration is determined by your surgeon and tailored to your specific risk profile. You will be discharged with either of these medications, but not both.) You will need to complete a total 4 week course of enteric coated aspirin 325 mg twice daily, in order to minimize your risk of blood clots following surgery. You will be supplied with a prescription to obtain this. You will need to compete a total 2 week course of Lovenox after surgery, in order to minimize the risk of blood clots following surgery. This requires a single shot in the abdomen, to be taken once daily. You will be supplied with the prescription to obtain this. Prior to your discharge from the hospital, the nursing staff will instruct you on self-administration of the Lovenox, if you will be returning directly home from the hospital.     Discharge Pain Medications   You will be given a prescription for pain medication. You should start taking this the same day after your surgery. Wean off as tolerated. Do not wait to take the pain medication until the pain is severe, as it will be difficult to \"catch up\" once this occurs. The pain medication usually reaches its full effect ~1 hour after ingesting. If you have been sent home on Valium, this medication works well for muscle spasms. " If you have been sent home on Colace, this medication should be taken until you are off all narcotic (i.e. Norco, Percocet, Oxycodone, etc) pain medications, in order to prevent constipation. Percocet or Norco have Tylenol in their ingredient lists. You must be careful not to exceed 4,000mg (4 grams) of Tylenol, from all sources, within a single 24-hr period. This means that you may not take more than 12 Percocets or Norcos within a 24-hr period. Do NOT take Regular or Extra Strength Tylenol when taking your Percocet or Norco medications. Some common side effects of the narcotic pain medications (Percocet, Oxycodone, Norco, etc) include nausea and itching. Benadryl is a great over the counter medication that helps calm your stomach, decreases your anxiety levels, and minimizes the itching. You can easily purchase this at your local pharmacy as an over-the-counter medication. Please abide by the instructions as printed on the bottle. If your nausea persists, make sure to take small amounts of crackers or other lighter foods.     Follow-Up   Follow-up with Dr. Maynard's office in 3 weeks from the surgery date for a post-operative evaluation. Have the following xrays done upon arrival to the follow-up appointment: AP and lateral left hip/femur. Please call Dr. Maynard's office at [phone] for orthopaedic appointments or questions.

## 2019-02-15 LAB
ABO + RH BLD: NORMAL
ANION GAP SERPL CALCULATED.3IONS-SCNC: 5 MMOL/L (ref 3–11)
BH BB BLOOD EXPIRATION DATE: NORMAL
BH BB BLOOD TYPE BARCODE: 5100
BH BB DISPENSE STATUS: NORMAL
BH BB PRODUCT CODE: NORMAL
BH BB UNIT NUMBER: NORMAL
BUN BLD-MCNC: 23 MG/DL (ref 9–23)
BUN/CREAT SERPL: 29.5 (ref 7–25)
CALCIUM SPEC-SCNC: 8.7 MG/DL (ref 8.7–10.4)
CHLORIDE SERPL-SCNC: 106 MMOL/L (ref 99–109)
CO2 SERPL-SCNC: 24 MMOL/L (ref 20–31)
CREAT BLD-MCNC: 0.78 MG/DL (ref 0.6–1.3)
DEPRECATED RDW RBC AUTO: 54.7 FL (ref 37–54)
ERYTHROCYTE [DISTWIDTH] IN BLOOD BY AUTOMATED COUNT: 16.1 % (ref 11.3–14.5)
GFR SERPL CREATININE-BSD FRML MDRD: 69 ML/MIN/1.73
GLUCOSE BLD-MCNC: 113 MG/DL (ref 70–100)
HCT VFR BLD AUTO: 24.4 % (ref 34.5–44)
HCT VFR BLD AUTO: 30.2 % (ref 34.5–44)
HCT VFR BLD AUTO: 30.6 % (ref 34.5–44)
HGB BLD-MCNC: 10 G/DL (ref 11.5–15.5)
HGB BLD-MCNC: 10 G/DL (ref 11.5–15.5)
HGB BLD-MCNC: 7.7 G/DL (ref 11.5–15.5)
IRON 24H UR-MRATE: 12 MCG/DL (ref 50–175)
IRON SATN MFR SERPL: 6 % (ref 15–50)
MAGNESIUM SERPL-MCNC: 2.6 MG/DL (ref 1.3–2.7)
MCH RBC QN AUTO: 29.8 PG (ref 27–31)
MCHC RBC AUTO-ENTMCNC: 31.6 G/DL (ref 32–36)
MCV RBC AUTO: 94.6 FL (ref 80–99)
PLATELET # BLD AUTO: 165 10*3/MM3 (ref 150–450)
PMV BLD AUTO: 9.4 FL (ref 6–12)
POTASSIUM BLD-SCNC: 4 MMOL/L (ref 3.5–5.5)
RBC # BLD AUTO: 2.58 10*6/MM3 (ref 3.89–5.14)
SODIUM BLD-SCNC: 135 MMOL/L (ref 132–146)
TIBC SERPL-MCNC: 196 MCG/DL (ref 250–450)
UNIT  ABO: NORMAL
UNIT  RH: NORMAL
WBC NRBC COR # BLD: 10.92 10*3/MM3 (ref 3.5–10.8)

## 2019-02-15 PROCEDURE — 92610 EVALUATE SWALLOWING FUNCTION: CPT

## 2019-02-15 PROCEDURE — 85018 HEMOGLOBIN: CPT | Performed by: INTERNAL MEDICINE

## 2019-02-15 PROCEDURE — 25010000002 ROPIVACAINE PER 1 MG: Performed by: NURSE ANESTHETIST, CERTIFIED REGISTERED

## 2019-02-15 PROCEDURE — P9016 RBC LEUKOCYTES REDUCED: HCPCS

## 2019-02-15 PROCEDURE — 99233 SBSQ HOSP IP/OBS HIGH 50: CPT | Performed by: INTERNAL MEDICINE

## 2019-02-15 PROCEDURE — 25010000002 MORPHINE SULFATE (PF) 2 MG/ML SOLUTION: Performed by: INTERNAL MEDICINE

## 2019-02-15 PROCEDURE — 99024 POSTOP FOLLOW-UP VISIT: CPT | Performed by: ORTHOPAEDIC SURGERY

## 2019-02-15 PROCEDURE — 36430 TRANSFUSION BLD/BLD COMPNT: CPT

## 2019-02-15 PROCEDURE — 86900 BLOOD TYPING SEROLOGIC ABO: CPT

## 2019-02-15 PROCEDURE — 83550 IRON BINDING TEST: CPT | Performed by: INTERNAL MEDICINE

## 2019-02-15 PROCEDURE — 85014 HEMATOCRIT: CPT | Performed by: INTERNAL MEDICINE

## 2019-02-15 PROCEDURE — 97110 THERAPEUTIC EXERCISES: CPT

## 2019-02-15 PROCEDURE — 85027 COMPLETE CBC AUTOMATED: CPT | Performed by: INTERNAL MEDICINE

## 2019-02-15 PROCEDURE — 25010000002 HYDROMORPHONE PER 4 MG: Performed by: ORTHOPAEDIC SURGERY

## 2019-02-15 PROCEDURE — 80048 BASIC METABOLIC PNL TOTAL CA: CPT | Performed by: ORTHOPAEDIC SURGERY

## 2019-02-15 PROCEDURE — 83540 ASSAY OF IRON: CPT | Performed by: INTERNAL MEDICINE

## 2019-02-15 PROCEDURE — 83735 ASSAY OF MAGNESIUM: CPT | Performed by: INTERNAL MEDICINE

## 2019-02-15 RX ORDER — LIDOCAINE 50 MG/G
1 PATCH TOPICAL
Status: DISCONTINUED | OUTPATIENT
Start: 2019-02-15 | End: 2019-02-22 | Stop reason: HOSPADM

## 2019-02-15 RX ORDER — LANSOPRAZOLE
30 KIT EVERY MORNING
Status: DISCONTINUED | OUTPATIENT
Start: 2019-02-16 | End: 2019-02-22 | Stop reason: HOSPADM

## 2019-02-15 RX ORDER — MELOXICAM 7.5 MG/1
7.5 TABLET ORAL DAILY
Status: DISCONTINUED | OUTPATIENT
Start: 2019-02-15 | End: 2019-02-20

## 2019-02-15 RX ORDER — SODIUM CHLORIDE 9 MG/ML
50 INJECTION, SOLUTION INTRAVENOUS CONTINUOUS
Status: DISCONTINUED | OUTPATIENT
Start: 2019-02-15 | End: 2019-02-17

## 2019-02-15 RX ADMIN — ACETAMINOPHEN 650 MG: 325 TABLET ORAL at 21:55

## 2019-02-15 RX ADMIN — HYDROMORPHONE HYDROCHLORIDE 0.5 MG: 1 INJECTION, SOLUTION INTRAMUSCULAR; INTRAVENOUS; SUBCUTANEOUS at 15:00

## 2019-02-15 RX ADMIN — ROPIVACAINE HYDROCHLORIDE 8 ML/HR: 2 INJECTION, SOLUTION EPIDURAL; INFILTRATION at 19:38

## 2019-02-15 RX ADMIN — HYDROMORPHONE HYDROCHLORIDE 0.5 MG: 1 INJECTION, SOLUTION INTRAMUSCULAR; INTRAVENOUS; SUBCUTANEOUS at 03:49

## 2019-02-15 RX ADMIN — LIDOCAINE 1 PATCH: 50 PATCH CUTANEOUS at 17:54

## 2019-02-15 RX ADMIN — ASPIRIN 81 MG CHEWABLE TABLET 81 MG: 81 TABLET CHEWABLE at 09:36

## 2019-02-15 RX ADMIN — ASPIRIN 81 MG CHEWABLE TABLET 81 MG: 81 TABLET CHEWABLE at 20:55

## 2019-02-15 RX ADMIN — HYDROMORPHONE HYDROCHLORIDE 0.5 MG: 1 INJECTION, SOLUTION INTRAMUSCULAR; INTRAVENOUS; SUBCUTANEOUS at 08:16

## 2019-02-15 RX ADMIN — METAXALONE 400 MG: 800 TABLET ORAL at 16:37

## 2019-02-15 RX ADMIN — HYDROCODONE BITARTRATE AND ACETAMINOPHEN 0.5 TABLET: 5; 325 TABLET ORAL at 17:57

## 2019-02-15 RX ADMIN — ACETAMINOPHEN 650 MG: 325 TABLET ORAL at 15:00

## 2019-02-15 RX ADMIN — HYDROMORPHONE HYDROCHLORIDE 0.5 MG: 1 INJECTION, SOLUTION INTRAMUSCULAR; INTRAVENOUS; SUBCUTANEOUS at 05:24

## 2019-02-15 RX ADMIN — HYDROCODONE BITARTRATE AND ACETAMINOPHEN 0.5 TABLET: 5; 325 TABLET ORAL at 03:50

## 2019-02-15 RX ADMIN — METAXALONE 400 MG: 800 TABLET ORAL at 05:01

## 2019-02-15 RX ADMIN — METAXALONE 400 MG: 800 TABLET ORAL at 12:55

## 2019-02-15 RX ADMIN — CARVEDILOL 3.12 MG: 3.12 TABLET, FILM COATED ORAL at 09:36

## 2019-02-15 RX ADMIN — CYANOCOBALAMIN TAB 1000 MCG 500 MCG: 1000 TAB at 09:36

## 2019-02-15 RX ADMIN — MORPHINE SULFATE 2 MG: 2 INJECTION, SOLUTION INTRAMUSCULAR; INTRAVENOUS at 10:27

## 2019-02-15 RX ADMIN — HYDROMORPHONE HYDROCHLORIDE 0.5 MG: 1 INJECTION, SOLUTION INTRAMUSCULAR; INTRAVENOUS; SUBCUTANEOUS at 17:50

## 2019-02-15 RX ADMIN — METAXALONE 400 MG: 800 TABLET ORAL at 21:55

## 2019-02-15 RX ADMIN — CARVEDILOL 3.12 MG: 3.12 TABLET, FILM COATED ORAL at 17:51

## 2019-02-15 RX ADMIN — HYDROCODONE BITARTRATE AND ACETAMINOPHEN 0.5 TABLET: 5; 325 TABLET ORAL at 10:17

## 2019-02-15 RX ADMIN — LANSOPRAZOLE 30 MG: KIT at 06:16

## 2019-02-15 RX ADMIN — VITAMIN D, TAB 1000IU (100/BT) 1000 UNITS: 25 TAB at 09:36

## 2019-02-15 RX ADMIN — MELOXICAM 7.5 MG: 7.5 TABLET ORAL at 09:36

## 2019-02-15 RX ADMIN — SODIUM CHLORIDE 50 ML/HR: 9 INJECTION, SOLUTION INTRAVENOUS at 13:29

## 2019-02-15 RX ADMIN — ACETAMINOPHEN 650 MG: 325 TABLET ORAL at 05:01

## 2019-02-15 RX ADMIN — HYDROMORPHONE HYDROCHLORIDE 0.5 MG: 1 INJECTION, SOLUTION INTRAMUSCULAR; INTRAVENOUS; SUBCUTANEOUS at 22:36

## 2019-02-15 NOTE — THERAPY RE-EVALUATION
Acute Care - Speech Language Pathology   Swallow Re-Evaluation [facility]   Clinical Swallow Evaluation     Patient Name: Debbie Baum  : 1923  MRN: [id number]  Today's Date: 2/15/2019  Onset of Illness/Injury or Date of Surgery: 19     Referring Physician: MD Caesar      Admit Date: 2019    Visit Dx:     ICD-10-CM ICD-9-CM   1. Closed fracture of left hip, initial encounter (CMS/[facility]) S72.002A 820.8   2. Injury of head, initial encounter S09.90XA 959.01   3. Contusion of left orbital tissues, initial encounter S05.12XA 921.2   4. Abrasion of face, initial encounter S00.81XA 910.0   5. Fall, initial encounter W19.XXXA E888.9   6. Impaired functional mobility, balance, gait, and endurance Z74.09 V49.89   7. Impaired mobility and ADLs Z74.09 799.89     Patient Active Problem List   Diagnosis   • Hyponatremia   • Essential hypertension   • Coronary artery disease   • Weakness generalized   • GERD (gastroesophageal reflux disease)   • Atrial fibrillation (CMS/HCC)   • Somnolence   • Medication adverse effect   • Chronic pain   • Closed fracture of left hip (CMS/HCC)   • Falls   • Periorbital ecchymosis of left eye   • Hematoma   • Humeral head fracture   • Shoulder dislocation, recurrent, right   • Postoperative anemia due to acute blood loss   • Dysphagia     Past Medical History:   Diagnosis Date   • Atrial fibrillation (CMS/HCC)    • Cancer (CMS/HCC)     colon   • Coronary artery disease    • GERD (gastroesophageal reflux disease)    • Hypertension      Past Surgical History:   Procedure Laterality Date   • CARDIAC ABLATION     • CHOLECYSTECTOMY     • COLON SURGERY      BOWEL RESECTION FOR HX COLON CANCER   • HIP INTERTROCHANTERIC NAILING Left 2019    Procedure: HIP INTERTROCHANTERIC NAILING LEFT;  Surgeon: Ariel Maynard MD;  Location: [facility];  Service: Orthopedics   • HYSTERECTOMY     • REPLACEMENT TOTAL KNEE          SWALLOW EVALUATION (last 72 hours)      SLP Adult Swallow  "Evaluation     Row Name 02/15/19 1000 02/14/19 0930                Rehab Evaluation    Document Type  re-evaluation  -AC  evaluation  -AV       Subjective Information  complains of;pain  -AC  no complaints  -AV       Patient Observations  alert;cooperative  -AC  alert;cooperative  -AV       Patient/Family Observations  No family present.  -AC  daughter in law present   -AV       Patient Effort  adequate  -AC  good  -AV          General Information    Patient Profile Reviewed  yes  -AC  yes  -AV       Pertinent History Of Current Problem  89yo adm from LTC w/ hip fx, arm fx, dislocated shoulder after fall. S/p hip sx 2/13, was briefly intubated during sx. Hx GERD, esophageal dilation in past per consult. CXR: large HH, no change.   -AC  fall, hip fracture   -AV       Current Method of Nutrition  mechanical soft, no mixed consistencies;nectar/syrup-thick liquids  -AC  NPO  -AV       Precautions/Limitations, Vision  --  WFL;for purposes of eval  -AV       Precautions/Limitations, Hearing  --  WFL;for purposes of eval  -AV       Prior Level of Function-Communication  --  WFL  -AV       Prior Level of Function-Swallowing  no diet consistency restrictions;esophageal concerns  -AC  no diet consistency restrictions  -AV       Plans/Goals Discussed with  patient;agreed upon  -AC  patient;family  -AV       Barriers to Rehab  medically complex  -AC  medically complex  -AV       Patient's Goals for Discharge  -- \"I don't want to get pna.\"  -AC  return to PO diet  -AV       Family Goals for Discharge  --  patient able to return to PO diet  -AV          Pain Assessment    Additional Documentation  --  Pain Scale: FACES Pre/Post-Treatment (Group)  -AV          Pain Scale: FACES Pre/Post-Treatment    Pain: FACES Scale, Pretreatment  8-->hurts whole lot  -AC  2-->hurts little bit  -AV       Pain: FACES Scale, Post-Treatment  8-->hurts whole lot  -AC  2-->hurts little bit  -AV       Pre/Post Treatment Pain Comment  Intermittent waves " of pain all over--RN aware & administered medication.  -AC  --          Oral Motor and Function    Dentition Assessment  natural, present and adequate;poor oral hygiene  -AC  natural, present and adequate  -AV       Secretion Management  WNL/WFL  -AC  WNL/WFL  -AV       Mucosal Quality  dry;sticky  -AC  moist, healthy  -AV       Volitional Swallow  --  WFL  -AV       Volitional Cough  weak  -AC  WFL  -AV          Oral Musculature and Cranial Nerve Assessment    Oral Motor General Assessment  generalized oral motor weakness  -AC  mandibular impairment patient with very tonic jaw opening   -AV          General Eating/Swallowing Observations    Respiratory Support Currently in Use  nasal cannula  -AC  nasal cannula  -AV       Eating/Swallowing Skills  fed by SLP  -AC  fed by SLP  -AV       Positioning During Eating  upright in bed;semi-reclined;other (see comments) pt u/a to tolerate sitting @ 90' due to pain  -AC  upright 90 degree;upright in chair  -AV       Utensils Used  spoon  -AC  spoon;cup;straw  -AV       Consistencies Trialed  thin liquids;nectar/syrup-thick liquids;pureed  -AC  regular textures;pureed;thin liquids;nectar/syrup-thick liquids  -AV          Clinical Swallow Eval    Oral Prep Phase  WFL  -AC  impaired  -AV       Oral Transit  WFL  -AC  WFL  -AV       Oral Residue  WFL  -AC  WFL  -AV       Pharyngeal Phase  suspected pharyngeal impairment  -AC  suspected pharyngeal impairment  -AV       Esophageal Phase  --  unremarkable  -AV       Clinical Swallow Evaluation Summary  --  Bedside swallow eval compelted this am:  cough, hard swallow, audible swallow with thins. Patient reports pain and difficulty.  No overt s/s with nectar, pudding, and solids.  patient with tonic like jaw opening attempts.  Patient unable to completed MBS or FEES at this time, will rec level 4 and nectar and follow up for repeat bedside tomororw. patient and family in agreement.    -AV          Pharyngeal Phase Concerns     Pharyngeal Phase Concerns  cough;multiple swallows  -AC  --       Multiple Swallows  thin;nectar;pudding  -AC  --       Cough  thin;nectar;pudding  -AC  --       Pharyngeal Phase Concerns, Comment  Oral phase appeared WFL for thin via tsp, nectar via tsp, and puree.     Overt clinical s/sxs aspiration noted, including: coughing w/ all consistencies trialed and multiple swallows. Pt does have hx of esophageal dysphagia, but given generalized weakness, high risk for pharyngeal dysphagia w/ aspiration @ this time, as well.     U/a to make safe diet recommendation @ this time. Pt would have difficulty participating in MBS @ this time 2' difficulty sitting upright/repositioning and would also have difficulty completing FEES 2' facial pain. Discussed s/sxs aspiration, risks of aspiration, and options considering comfort/safety w/ pt. Pt reported she doesn't want pna, but is not sure if she would want a feeding tube. She is DNR. No family present.     If comfort diet is desired considering her advanced age and code status, may consider small sips of thin vs nectar, as tolerated, and dysphagia level IV soft diet. SLP will f/u for further assessment/modifications/edu, as appropriate pending POC.  -AC  --          Clinical Impression    SLP Swallowing Diagnosis  functional oral phase;suspected pharyngeal dysfunction  -AC  suspected pharyngeal dysfunction  -AV       Functional Impact  risk of aspiration/pneumonia;risk of malnutrition;risk of dehydration  -AC  risk of aspiration/pneumonia  -AV       Rehab Potential/Prognosis, Swallowing  re-evaluate goals as necessary  -AC  good, to achieve stated therapy goals  -AV       Swallow Criteria for Skilled Therapeutic Interventions Met  demonstrates skilled criteria  -AC  demonstrates skilled criteria  -AV          Recommendations    Therapy Frequency (Swallow)  PRN  -AC  evaluation only  -AV       Predicted Duration Therapy Intervention (Days)  until discharge  -AC  --       SLP  Diet Recommendation  other (see comments) consider safest (NPO) vs comfort diet (details in summary)  -AC  mechanical soft with no mixed consistencies;nectar thick liquids  -AV       Recommended Diagnostics  --  reassess via clinical swallow evaluation  -AV       Recommended Precautions and Strategies  --  small bites of food and sips of liquid  -AV       SLP Rec. for Method of Medication Administration  meds via alternate route vs crushed in puree if on comfort diet  -AC  meds whole;with pudding or applesauce  -AV       Monitor for Signs of Aspiration  --  yes;notify SLP if any concerns  -AV       Anticipated Dischage Disposition  unknown  -AC  unknown  -AV         User Key  (r) = Recorded By, (t) = Taken By, (c) = Cosigned By    Initials Name Effective Dates    AC Cyndi Torrez, MS CCC-SLP 07/27/17 -     AV Queenie Rojo, MS CCC-SLP 04/03/18 -           EDUCATION  The patient has been educated in the following areas:   Dysphagia (Swallowing Impairment) Oral Care/Hydration NPO rationale Modified Diet Instruction.    SLP Recommendation and Plan  SLP Swallowing Diagnosis: functional oral phase, suspected pharyngeal dysfunction  SLP Diet Recommendation: other (see comments)(consider safest (NPO) vs comfort diet (details in summary))            Swallow Criteria for Skilled Therapeutic Interventions Met: demonstrates skilled criteria  Anticipated Dischage Disposition: unknown  Rehab Potential/Prognosis, Swallowing: re-evaluate goals as necessary  Therapy Frequency (Swallow): PRN  Predicted Duration Therapy Intervention (Days): until discharge       Plan of Care Reviewed With: patient  Plan of Care Review  Plan of Care Reviewed With: patient    SLP GOALS     Row Name 02/15/19 1000             Oral Nutrition/Hydration Goal 1 (SLP)    Oral Nutrition/Hydration Goal 1, SLP  LTG: Pt will tolerate soft diet & nectar-thick liquids w/o s/sxs distress/discomfort w/ 100% acc w/o cues.  -AC      Time Frame (Oral  Nutrition/Hydration Goal 1, SLP)  by discharge  -AC         Oral Nutrition/Hydration Goal 2 (SLP)    Oral Nutrition/Hydration Goal 2, SLP  Pt will tolerate trials of nectar-thick liquid & soft solid w/o s/sxs distress/discomfort w/ 100% acc w/o cues.  -AC      Time Frame (Oral Nutrition/Hydration Goal 2, SLP)  short term goal (STG);by discharge  -        User Key  (r) = Recorded By, (t) = Taken By, (c) = Cosigned By    Initials Name Provider Type    Cyndi Alanis MS CCC-SLP Speech and Language Pathologist             Time Calculation:   Time Calculation- SLP     Row Name 02/15/19 1104             Time Calculation- SLP    SLP Start Time  1000  -      SLP Received On  02/15/19  -        User Key  (r) = Recorded By, (t) = Taken By, (c) = Cosigned By    Initials Name Provider Type    Cyndi Alanis MS CCC-SLP Speech and Language Pathologist          Therapy Charges for Today     Code Description Service Date Service Provider Modifiers Qty    [id number]  ST EVAL ORAL PHARYNG SWALLOW 4 2/15/2019 Cyndi Torrez MS CCC-SLP GN 1           Cyndi Torrez MS CCC-SLP  2/15/2019

## 2019-02-15 NOTE — THERAPY TREATMENT NOTE
Acute Care - Physical Therapy Treatment Note  [facility]     Patient Name: Debbie Baum  : 1923  MRN: [id number]  Today's Date: 2/15/2019  Onset of Illness/Injury or Date of Surgery: 19  Date of Referral to PT: 19  Referring Physician: MD Caesar    Admit Date: 2019    Visit Dx:    ICD-10-CM ICD-9-CM   1. Closed fracture of left hip, initial encounter (CMS/[facility]) S72.002A 820.8   2. Injury of head, initial encounter S09.90XA 959.01   3. Contusion of left orbital tissues, initial encounter S05.12XA 921.2   4. Abrasion of face, initial encounter S00.81XA 910.0   5. Fall, initial encounter W19.XXXA E888.9   6. Impaired functional mobility, balance, gait, and endurance Z74.09 V49.89   7. Impaired mobility and ADLs Z74.09 799.89     Patient Active Problem List   Diagnosis   • Hyponatremia   • Essential hypertension   • Coronary artery disease   • Weakness generalized   • GERD (gastroesophageal reflux disease)   • Atrial fibrillation (CMS/[facility])   • Somnolence   • Medication adverse effect   • Chronic pain   • Closed fracture of left hip (CMS/[facility])   • Falls   • Periorbital ecchymosis of left eye   • Hematoma   • Humeral head fracture   • Shoulder dislocation, recurrent, right   • Postoperative anemia due to acute blood loss   • Dysphagia       Therapy Treatment    Rehabilitation Treatment Summary     Row Name 02/15/19 1303             Treatment Time/Intention    Discipline  physical therapy assistant  -AS      Document Type  therapy note (daily note)  -AS      Subjective Information  complains of;pain  -AS      Mode of Treatment  physical therapy  -AS      Patient/Family Observations  daughter at bedside, patient supine and agreed to therapy.  -AS      Patient Effort  good  -AS      Existing Precautions/Restrictions  fall;oxygen therapy device and L/min;other (see comments);right;hip Fascia iliaca nerve catheter , R LE WBAT hip fx  -AS      Treatment Considerations/Comments  RUE h/o shoulder  dislocation, in sling encouraged patient to be NWB but would attempt to use during transfer, no pain in RUE  -AS      Recorded by [AS] Dara Clark, PTA 02/15/19 1357      Row Name 02/15/19 1309             Cognitive Assessment/Intervention- PT/OT    Orientation Status (Cognition)  oriented to;person;place  -AS      Follows Commands (Cognition)  follows one step commands;75-90% accuracy;repetition of directions required;verbal cues/prompting required  -AS      Cognitive Function (Cognitive)  safety deficit;memory deficit  -AS      Safety Deficit (Cognitive)  moderate deficit;awareness of need for assistance;ability to follow commands;judgment;safety precautions follow-through/compliance  -AS      Personal Safety Interventions  fall prevention program maintained;gait belt;nonskid shoes/slippers when out of bed;other (see comments) exit alarm  -AS      Recorded by [AS] Dara Clark, PTA 02/15/19 1357      Row Name 02/15/19 6183             Bed Mobility Assessment/Treatment    Supine-Sit [place] (Bed Mobility)  verbal cues;maximum assist (25% patient effort);2 person assist  -AS      Sit-Supine [place] (Bed Mobility)  not tested  -AS      Bed Mobility, Safety Issues  decreased use of arms for pushing/pulling;decreased use of legs for bridging/pushing  -AS      Assistive Device (Bed Mobility)  draw sheet;head of bed elevated  -AS      Comment (Bed Mobility)  patient attempted to scoot B LE to EOB, needed max assist x2 to reach EOB.  -AS      Recorded by [AS] Dara Clark, PTA 02/15/19 1357      Row Name 02/15/19 1303             Bed-Chair Transfer    Bed-Chair Horatio (Transfers)  verbal cues;maximum assist (25% patient effort);2 person assist  -AS      Assistive Device (Bed-Chair Transfers)  other (see comments) B UE support  -AS      Recorded by [AS] Dara Clark, PTA 02/15/19 1357      Row Name 02/15/19 130             Sit-Stand Transfer    Sit-Stand Horatio  (Transfers)  verbal cues;maximum assist (25% patient effort);2 person assist  -AS      Assistive Device (Sit-Stand Transfers)  other (see comments) B UE support  -AS      Recorded by [AS] Dara Clark, PTA 02/15/19 1357      Row Name 02/15/19 1303             Stand-Sit Transfer    Stand-Sit [place] (Transfers)  verbal cues;maximum assist (25% patient effort);2 person assist  -AS      Assistive Device (Stand-Sit Transfers)  other (see comments) B UE support  -AS      Recorded by [AS] Dara Clark, PTA 02/15/19 1357      Row Name 02/15/19 1303             Gait/Stairs Assessment/Training    38451 - Gait Training Minutes   15 pre-gait activity  -AS      Comment (Gait/Stairs)  patient stood from elevated bed with max assist x2, assist to block B LE from sliding, L UE support, R UE support in sling. Patient stood with cues to correct posterior lean. Marcial shoes prior to OOB.  -AS      Recorded by [AS] Dara Clark, PTA 02/15/19 1357      Row Name 02/15/19 1305             Motor Skills Assessment/Interventions    Additional Documentation  Therapeutic Exercise (Group)  -AS      Recorded by [AS] Dara Clark, PTA 02/15/19 1357      Row Name 02/15/19 1303             Therapeutic Exercise    66159 - PT Therapeutic Exercise Minutes  8  -AS      Recorded by [AS] Dara Clark, PTA 02/15/19 1357      Row Name 02/15/19 1303             Therapeutic Exercise    Lower Extremity (Therapeutic Exercise)  quad sets, left  -AS      Lower Extremity Range of Motion (Therapeutic Exercise)  ankle dorsiflexion/plantar flexion, bilateral;hip abduction/adduction, left  -AS      Exercise Type (Therapeutic Exercise)  AAROM (active assistive range of motion)  -AS      Position (Therapeutic Exercise)  supine  -AS      Sets/Reps (Therapeutic Exercise)  1/10  -AS      Comment (Therapeutic Exercise)  cues for technique  -AS      Recorded by [AS] Dara Clark, PTA 02/15/19 1357      Row Name 02/15/19 1307              Static Sitting Balance    Level of [place] (Unsupported Sitting, Static Balance)  maximal assist, 25 to 49% patient effort progressed to Min assist  -AS      Sitting Position (Unsupported Sitting, Static Balance)  sitting on edge of bed  -AS      Time Able to Maintain Position (Unsupported Sitting, Static Balance)  more than 5 minutes  -AS      Recorded by [AS] Dara Clark, PTA 02/15/19 1357      Row Name 02/15/19 1303             Positioning and Restraints    Pre-Treatment Position  in bed  -AS      Post Treatment Position  chair  -AS      In Chair  reclined;call light within reach;encouraged to call for assist;exit alarm on;with family/caregiver;on mechanical lift sling;RUE elevated;LUE elevated  -AS      Recorded by [AS] Dara Clark, PTA 02/15/19 1357      Row Name 02/15/19 1303             Pain Scale: FACES Pre/Post-Treatment    Pain: FACES Scale, Pretreatment  2-->hurts little bit  -AS      Pain: FACES Scale, Post-Treatment  2-->hurts little bit  -AS      Recorded by [AS] Dara Clark, PTA 02/15/19 1357      Row Name                Wound 02/13/19 1019 Left eyebrow abrasion    Wound - Properties Group Date first assessed: 02/13/19 [CH] Time first assessed: 1019 [CH] Present On Admission : yes [CH] Side: Left [CH] Location: eyebrow [CH] Type: abrasion [CH] Recorded by:  [CH] Cally Swanson RN 02/13/19 1020    Row Name                Wound 02/13/19 1855 Left hip incision    Wound - Properties Group Date first assessed: 02/13/19 [SS] Time first assessed: 1855 [SS] Side: Left [SS] Location: hip [SS] Type: incision [SS] Recorded by:  [SS] Bang Love RN 02/13/19 1855      User Key  (r) = Recorded By, (t) = Taken By, (c) = Cosigned By    Initials Name Effective Dates Discipline    AS Dara Clark, PTA 06/22/15 -  PT    CH Cally Swanson RN 06/16/16 -  Nurse    Bang Simon RN 01/15/18 -  --          Wound 02/13/19 1019 Left eyebrow abrasion (Active)    Dressing Appearance open to air 2/14/2019  8:58 PM   Closure Open to air 2/14/2019  8:58 PM   Base clean;dry;scab 2/14/2019  8:58 PM   Drainage Amount none 2/14/2019  8:58 PM   Dressing Care, Wound open to air 2/14/2019  8:58 PM       Wound 02/13/19 1855 Left hip incision (Active)   Dressing Appearance intact;dried drainage 2/14/2019  8:58 PM   Closure INOCENTE 2/14/2019  8:58 PM   Base dressing in place, unable to visualize 2/14/2019  8:58 PM   Drainage Amount moderate 2/14/2019  8:58 PM       Rehab Goal Summary     Row Name 02/15/19 1000             Swallow Goals (SLP)    Oral Nutrition/Hydration Goal Selection (SLP)  oral nutrition/hydration, SLP goal 1;oral nutrition/hydration, SLP goal 2  -AC         Oral Nutrition/Hydration Goal 1 (SLP)    Oral Nutrition/Hydration Goal 1, SLP  LTG: Pt will tolerate soft diet & nectar-thick liquids w/o s/sxs distress/discomfort w/ 100% acc w/o cues.  -AC      Time Frame (Oral Nutrition/Hydration Goal 1, SLP)  by discharge  -AC         Oral Nutrition/Hydration Goal 2 (SLP)    Oral Nutrition/Hydration Goal 2, SLP  Pt will tolerate trials of nectar-thick liquid & soft solid w/o s/sxs distress/discomfort w/ 100% acc w/o cues.  -AC      Time Frame (Oral Nutrition/Hydration Goal 2, SLP)  short term goal (STG);by discharge  -AC        User Key  (r) = Recorded By, (t) = Taken By, (c) = Cosigned By    Initials Name Provider Type Discipline    AC Cyndi Torrez MS CCC-SLP Speech and Language Pathologist SLP          Physical Therapy Education     Title: PT OT SLP Therapies (In Progress)     Topic: Physical Therapy (In Progress)     Point: Mobility training (In Progress)     Learning Progress Summary           Patient Acceptance, E, NR by AS at 2/15/2019  1:57 PM    Acceptance, E, NR by ABA at 2/14/2019  4:27 PM   Family Acceptance, E, NR by ABA at 2/14/2019  4:27 PM                   Point: Home exercise program (In Progress)     Learning Progress Summary           Patient Acceptance, E, NR  by AS at 2/15/2019  1:57 PM    Acceptance, E, NR by EJ at 2/14/2019  4:27 PM   Family Acceptance, E, NR by EJ at 2/14/2019  4:27 PM                   Point: Body mechanics (In Progress)     Learning Progress Summary           Patient Acceptance, E, NR by AS at 2/15/2019  1:57 PM    Acceptance, E, NR by EJ at 2/14/2019  4:27 PM   Family Acceptance, E, NR by EJ at 2/14/2019  4:27 PM                   Point: Precautions (In Progress)     Learning Progress Summary           Patient Acceptance, E, NR by AS at 2/15/2019  1:57 PM    Acceptance, E, NR by EJ at 2/14/2019  4:27 PM   Family Acceptance, E, NR by EJ at 2/14/2019  4:27 PM                               User Key     Initials Effective Dates Name Provider Type Discipline     11/20/18 -  Nayana Adams, PT Physical Therapist PT    AS 06/22/15 -  Dara Clark, PTA Physical Therapy Assistant PT                PT Recommendation and Plan     Plan of Care Reviewed With: patient  Progress: improving  Outcome Summary: patient stood from elevated bed with max assist x2, assist to block B LE from sliding, L UE support, R UE support in sling. Patient stood with cues to correct posterior lean. SPS to chair with max assist. Marcial shoes prior to OOB. Improved sitting balance from max assist to Min assist with cues.  Outcome Measures     Row Name 02/15/19 1303 02/14/19 1325 02/14/19 1320       How much help from another person do you currently need...    Turning from your back to your side while in flat bed without using bedrails?  2  -AS  1  -EJ  --    Moving from lying on back to sitting on the side of a flat bed without bedrails?  2  -AS  1  -EJ  --    Moving to and from a bed to a chair (including a wheelchair)?  2  -AS  1  -EJ  --    Standing up from a chair using your arms (e.g., wheelchair, bedside chair)?  2  -AS  1  -EJ  --    Climbing 3-5 steps with a railing?  1  -AS  1  -EJ  --    To walk in hospital room?  1  -AS  1  -EJ  --    AM-PAC 6 Clicks Score  10   -AS  6  -EJ  --       How much help from another is currently needed...    Putting on and taking off regular lower body clothing?  --  --  1  -AR    Bathing (including washing, rinsing, and drying)  --  --  1  -AR    Toileting (which includes using toilet bed pan or urinal)  --  --  1  -AR    Putting on and taking off regular upper body clothing  --  --  1  -AR    Taking care of personal grooming (such as brushing teeth)  --  --  2  -AR    Eating meals  --  --  2  -AR    Score  --  --  8  -AR       Functional Assessment    Outcome Measure Options  AM-PAC 6 Clicks Basic Mobility (PT)  -AS  AM-PAC 6 Clicks Basic Mobility (PT)  -EJ  AM-PAC 6 Clicks Daily Activity (OT)  -AR      User Key  (r) = Recorded By, (t) = Taken By, (c) = Cosigned By    Initials Name Provider Type    EJ Nayana Adams, PT Physical Therapist    AR Kandi Merritt, OT Occupational Therapist    AS Dara Clark, FAISAL Physical Therapy Assistant         Time Calculation:   PT Charges     Row Name 02/15/19 1303             Time Calculation    Start Time  1303  -AS      PT Received On  02/15/19  -AS      PT Goal Re-Cert Due Date  02/24/19  -AS         Timed Charges    47611 - PT Therapeutic Exercise Minutes  8  -AS      50210 - Gait Training Minutes   15 pre-gait activity  -AS        User Key  (r) = Recorded By, (t) = Taken By, (c) = Cosigned By    Initials Name Provider Type    AS Dara Clark, FAISAL Physical Therapy Assistant        Therapy Suggested Charges     Code   Minutes Charges    66096 (CPT®) Hc Pt Neuromusc Re Education Ea 15 Min      65355 (CPT®) Hc Pt Ther Proc Ea 15 Min 8 1    96995 (CPT®) Hc Gait Training Ea 15 Min 15 1    57352 (CPT®) Hc Pt Therapeutic Act Ea 15 Min      24758 (CPT®) Hc Pt Manual Therapy Ea 15 Min      52263 (CPT®) Hc Pt Iontophoresis Ea 15 Min      53816 (CPT®) Hc Pt Elec Stim Ea-Per 15 Min      38082 (CPT®) Hc Pt Ultrasound Ea 15 Min      48268 (CPT®) Hc Pt Self Care/Mgmt/Train Ea 15 Min      94744  (CPT®) Hc Pt Prosthetic (S) Train Initial Encounter, Each 15 Min      93772 (CPT®) Hc Pt Orthotic(S)/Prosthetic(S) Encounter, Each 15 Min      38004 (CPT®) Hc Orthotic(S) Mgmt/Train Initial Encounter, Each 15min      Total  23 2        Therapy Charges for Today     Code Description Service Date Service Provider Modifiers Qty    41354647246 HC PT THER PROC EA 15 MIN 2/15/2019 Dara Clark, PTA GP 2    07834954658 HC PT THER SUPP EA 15 MIN 2/15/2019 Dara Clark, PTA GP 2          PT G-Codes  Outcome Measure Options: AM-PAC 6 Clicks Basic Mobility (PT)  AM-PAC 6 Clicks Score: 10  Score: 8    Dara Clark PTA  2/15/2019

## 2019-02-15 NOTE — PROGRESS NOTES
Continued Stay Note  [facility]     Patient Name: Debbie Baum  MRN: [id number]  Today's Date: 2/15/2019    Admit Date: 2/13/2019    Discharge Plan     Row Name 02/15/19 0855       Plan    Plan Comments  SW heard back from Mal[facility] with [facility]. [facility] was at [facility] at [facility] personal care.         Discharge Codes    No documentation.       Expected Discharge Date and Time     Expected Discharge Date Expected Discharge Time    Feb 16, 2019             OLGA Hillman

## 2019-02-15 NOTE — NURSING NOTE
Dr. Pride paged for patients morning H/H. Orders given to stop maintenance fluids and give patient one unit of blood. Watch for any respiratory distress.

## 2019-02-15 NOTE — PROGRESS NOTES
[facility] Medicine Services  PROGRESS NOTE    Patient Name: Debbie Bamu  : 1923  MRN: [id number]    Date of Admission: 2019  Length of Stay: 2  Primary Care Physician: Paula Scott MD    Subjective   Subjective     CC:  Hip fracture, shoulder dislocation/possible fracture, anemia    HPI:  Slightly drowsy, easily arouses. Left hip pain noted (no right arm pain today). No dyspnea. Hasn't eaten much    Review of Systems  No headache    Otherwise ROS is negative except as mentioned in the HPI.    Objective   Objective     Vital Signs:   Temp:  [97.4 °F (36.3 °C)-98.5 °F (36.9 °C)] 97.4 °F (36.3 °C)  Heart Rate:  [72-88] 79  Resp:  [16-18] 16  BP: ()/(40-67) 104/67        Physical Exam:  Elderly, frail, slightly drowsy and confused as I woke her up, arouses and opens eyes and states her left hip hurts. No right arm pain.  Ncat, oroph clear  rrr  Lungs clear, normal effort  abd soft, nontender  No cce legs  Right arm in sling, +radial pulse  Left dp pulse palpable, sq bruising noted  Face symmetric, speech clear, equal   Appropriate affect given circumstances    Results Reviewed:  I have personally reviewed current lab, radiology, and data and agree.    Results from last 7 days   Lab Units 02/15/19  0608 19  12119  1009   WBC 10*3/mm3 10.92*  --  12.74* 10.76   HEMOGLOBIN g/dL 7.7* 8.9* 7.4* 10.7*   HEMATOCRIT % 24.4* 27.2* 23.3* 34.1*   PLATELETS 10*3/mm3 165  --  225 312   INR   --   --   --  1.07     Results from last 7 days   Lab Units 02/15/19  0608 19  12119  0519  1009   SODIUM mmol/L 135  --  137 139   POTASSIUM mmol/L 4.0 3.3* 3.2* 3.8   CHLORIDE mmol/L 106  --  106 107   CO2 mmol/L 24.0  --  24.0 27.0   BUN mg/dL 23  --  19 24*   CREATININE mg/dL 0.78  --  0.79 0.86   GLUCOSE mg/dL 113*  --  153* 95   CALCIUM mg/dL 8.7  --  9.0 9.9   ALT (SGPT) U/L  --   --   --  20   AST (SGOT) U/L  --   --   --  21      Estimated Creatinine Clearance: 33.1 mL/min (by C-G formula based on SCr of 0.78 mg/dL).    No results found for: BNP    Microbiology Results Abnormal     None          Imaging Results (last 24 hours)     Procedure Component Value Units Date/Time    CT Facial Bones Without Contrast [[id number]] Collected:  02/13/19 0947     Updated:  02/14/19 2241    Narrative:       EXAMINATION: CT FACIAL BONES WO CONTRAST-02/13/2019:      INDICATION: Maxface trauma, blunt.         TECHNIQUE: Spiral acquisition 2 mm axial images through the facial bones  with sagittal and coronal 2 mm 2-D reconstructions.      The radiation dose reduction device was turned on for each scan per the  ALARA (As Low as Reasonably Achievable) protocol.     COMPARISON: NONE.     FINDINGS: The facial bones and included portions of the calvarium appear  intact. In particular, no fracture of nasal bones, orbital or maxillary  sinus fracture or zygomatic arch fracture is seen. TM joints appear  anatomic, and show expected DJD for age. Paranasal sinuses and mastoids  are clear except for single opacified right ethmoid air cell. Nasal  passages are normally patent. Allowing for streak artifact from the  patient's dental hardware, maxilla and mandible appear grossly normal.     Soft tissue window images show diffuse superficial/preseptal hematoma  around the left orbit, but no evidence of hematoma elsewhere and no  evidence of foreign body. The optic globes, extraocular muscles, and  optic nerves appear grossly normal and symmetric.       Impression:       1. Superficial left periorbital hematoma.  2. No other evidence of acute soft tissue or bony trauma elsewhere.     D:  02/13/2019  E:  02/13/2019     This report was finalized on 2/14/2019 10:38 PM by DR. Sushil Epstein MD.       CT Pelvis Without Contrast [[id number]] Collected:  02/13/19 1122     Updated:  02/14/19 1935    Narrative:       EXAMINATION: CT PELVIS WO CONTRAST-      INDICATION: Left hip pain,  "negative x-ray.     TECHNIQUE: 2 mm axial images through the pelvis and proximal femurs with  sagittal and coronal 2 mm 2-D reconstructions.     The radiation dose reduction device was turned on for each scan per the  ALARA (As Low as Reasonably Achievable) protocol.     COMPARISON: Left hip plain film series of the same day.     FINDINGS: By history, the patient came to the ER this morning  complaining of left hip pain after fall, with the left hip making a loud  \"pop\" while the patient was in the ER.     There is a displaced intertrochanteric fracture of the left hip, not  appearing on previous plain films. Most likely, there was a nondisplaced  occult fracture present previously which progressed since the previous  study. There is relatively mild comminution along the fracture margins.  No underlying destructive lesion is identified. Remaining bony  structures appear anatomic and intact. There is an old healed right  inferior pubic ramus fracture. Soft tissue window images show an  approximately 6 cm left gluteal hematoma, and some more diffuse  intramuscular hematoma of the gluteus.  There is some patchy diffuse  hematoma laterally as well. No intrapelvic hematoma is seen. No other  bony or soft tissue trauma is identified. Bowel loops are normal in  caliber. No ascites is seen. Bladder is moderately distended.       Impression:       1. Interval development of displaced comminuted intratrochanteric  fracture since previous plain film.  2. Associated discrete 6 cm hematoma and intramuscular and subcutaneous  hematoma as well.     D:  02/13/2019  E:  02/13/2019     This report was finalized on 2/14/2019 7:33 PM by DR. Sushil Epstein MD.       CT Upper Extremity Right Without Contrast [[id number]] Collected:  02/14/19 1737     Updated:  02/14/19 8950    Narrative:       EXAMINATION: CT RIGHT UPPER EXTREMITY WO CONTRAST - 2/14/2019      INDICATION:  S72.002A-Fracture of unspecified part of neck of left  femur, initial " encounter for closed fracture; S09.90XA-Unspecified  injury of head, initial encounter; S05.12XA-Contusion of eyeball and  orbital tissues, left eye, initial encounter; S00.81XA-Abrasion of other  part of head, initial encounter; W19.XXXA-Unspecified fall, initial  encounter; Z74.09-Other reduced mobility.      TECHNIQUE: CT right upper extremity without intravenous contrast.     The radiation dose reduction device was turned on for each scan per the  ALARA (As Low as Reasonably Achievable) protocol.     COMPARISON: Radiographs earlier same day.     FINDINGS: Fracture dislocation of the right shoulder with anterior  dislocation of the humeral head demonstrating multipart mildly  comminuted fracture with at least 3 fracture fragments involving the  anteromedial humeral head. Background cortical irregularity of moderate  to severe DJD additionally noted. Additionally noted fracture of the  anterior superior glenoid and medial margin glenoid with adjacent  fracture fragments intervening as well as osteophytosis present.  Surgical neck of the humerus is preserved. AC joint space interval  within normal limits demonstrating mild DJD. Right chest partially  visualized with prior healed right rib injuries however no displaced  acute fracture of the ribs noted and right hemithorax grossly clear.       Impression:       Multipart mildly comminuted anterior fracture/dislocation of  the right humeral head in relationship to the osseous glenoid with acute  fractures of the anterior superior margin glenoid and medial glenoid  adjacent to the spine of the scapula. Extensive degenerative change is  noted in the background with cortical irregularity may mask subtle  cortical defects, however, surgical neck is preserved. Prior healed  right rib fractures are noted without displaced acute rib fracture or  pneumothorax.     DICTATED:   2/14/2019  EDITED/ls :   2/14/2019        XR Humerus Right [[id number]] Collected:  02/14/19 1422      Updated:  02/14/19 1435    Narrative:       EXAMINATION: XR HUMERUS, RIGHT-02/14/2019:      INDICATION: Fall, pain; S72.002A-Fracture of unspecified part of neck of  left femur, initial encounter for closed fracture; S09.90XA-Unspecified  injury of head, initial encounter; S05.12XA-Contusion of eyeball and  orbital tissues, left eye, initial encounter; S00.81XA-Abrasion of other  part of head, initial encounter; W19.XXXA-Unspecified fall, initial  encounter.      COMPARISON: 09/08/2018.     FINDINGS: Findings to suggest fracture or dislocation identified of the  right humeral head. There is anterior dislocation identified with likely  fracture of the humeral head. There is osteopenia of the bony  structures. The cortex of the humeral shaft is intact.       Impression:       Findings suggesting dislocation anteriorly of the humeral  head with likely fracture of the humeral head itself.     D:  02/14/2019  E:  02/14/2019     This report was finalized on 2/14/2019 2:33 PM by Dr. Sylvia Wood MD.       XR Shoulder 2+ View Right [[id number]] Collected:  02/14/19 1413     Updated:  02/14/19 1435    Narrative:       EXAMINATION: XR SHOULDER 2+ VW RIGHT- 02/14/2019      INDICATION: rule out fracture, dislocation. fall, pain;  S72.002A-Fracture of unspecified part of neck of left femur, initial  encounter for closed fracture; S09.90XA-Unspecified injury of head,  initial encounter; S05.12XA-Contusion of eyeball and orbital tissues,  left eye, initial encounter; S00.81XA-Abrasion of other part of head,  initial encounter; W19.XXXA-Unspecified fall, initial encounter      COMPARISON: 02/14/2019     FINDINGS: 2 views of the right shoulder reveal fracture dislocation  identified of the humeral head. The fracture is seen of the humeral head  with anterior dislocation identified. The acromioclavicular joint  reveals degenerative change. The scapula visualized is intact.           Impression:       Fracture dislocation seen  of the right humeral head.     D:  02/14/2019  E:  02/14/2019     This report was finalized on 2/14/2019 2:33 PM by Dr. Sylvia Wood MD.       XR Hip With or Without Pelvis 2 - 3 View Left [[id number]] Collected:  02/14/19 0837     Updated:  02/14/19 1258    Narrative:       EXAMINATION: AP VIEW OF THE PELVIS AND OBLIQUE VIEW OF THE LEFT HIP -  02/13/2019     HISTORY: Trauma pain     FINDINGS: There is a slight medial prosthesis stabilizing a comminuted  intertrochanteric fracture of the left femur. The bony pelvis is intact.  There is old fracture of the right inferior pubic ramus.       Impression:       Expected postoperative findings.     D:  02/14/2019  E:  02/14/2019     This report was finalized on 2/14/2019 12:56 PM by Dr. Chester Escoto MD.       FL C Arm During Surgery [[id number]] Collected:  02/14/19 0825     Updated:  02/14/19 1017    Narrative:       EXAMINATION: FLUOROSCOPY C-ARM DURING SURGERY-     INDICATION: Hip intertrochanteric nailing left; S72.002A-Fracture of  unspecified part of neck of left femur, initial encounter for closed  fracture; S09.90XA-Unspecified injury of head, initial encounter;  S05.12XA-Contusion of eyeball and orbital tissues, left eye, initial  encounter; S00.81XA-Abrasion of other part of head, initial encounter;  W19.XXXA-Unspecified fall, initial encounter.      TECHNIQUE: Intraoperative fluoroscopy for improved localization and  treatment planning.     COMPARISON: None.     FINDINGS: Intraoperative fluoroscopy with total fluoroscopic time usage  1 minute 45 seconds and 12 representative images saved during left hip  intratrochanteric nailing.       Impression:       Intraoperative fluoroscopy utilized during left hip nailing.     D:  02/14/2019  E:  02/14/2019     This report was finalized on 2/14/2019 10:15 AM by Dr. Jae Baumann.             Results for orders placed during the hospital encounter of 09/17/18   Adult Transthoracic Echo Complete W/ Cont if Necessary  Per Protocol    Narrative · Mild-to-moderate mitral valve regurgitation is present          I have reviewed the medications:    Current Facility-Administered Medications:   •  acetaminophen (TYLENOL) tablet 650 mg, 650 mg, Oral, Q4H PRN **OR** acetaminophen (TYLENOL) 160 MG/5ML solution 650 mg, 650 mg, Oral, Q4H PRN **OR** acetaminophen (TYLENOL) suppository 650 mg, 650 mg, Rectal, Q4H PRN, Ariel Maynard MD  •  acetaminophen (TYLENOL) tablet 650 mg, 650 mg, Oral, Q8H, Babita Harmon CRNA, 650 mg at 02/15/19 0501  •  aspirin chewable tablet 81 mg, 81 mg, Oral, BID, Ariel Maynard MD, 81 mg at 02/14/19 2058  •  bisacodyl (DULCOLAX) EC tablet 10 mg, 10 mg, Oral, Daily PRN, Ariel Maynard MD  •  bisacodyl (DULCOLAX) suppository 10 mg, 10 mg, Rectal, Daily PRN, Ariel Maynard MD  •  carvedilol (COREG) tablet 3.125 mg, 3.125 mg, Oral, BID With Meals, Eleuterio Pride MD, Stopped at 02/14/19 0908  •  cholecalciferol (VITAMIN D3) tablet 1,000 Units, 1,000 Units, Oral, Daily, Eleuterio Pride MD, 1,000 Units at 02/14/19 0908  •  docusate sodium (COLACE) capsule 100 mg, 100 mg, Oral, BID PRN, Ariel Maynard MD  •  HYDROcodone-acetaminophen (NORCO) 5-325 MG per tablet 0.5 tablet, 0.5 tablet, Oral, Q6H PRN, Eleuterio Pride MD, 0.5 tablet at 02/15/19 0350  •  HYDROmorphone (DILAUDID) injection 0.5 mg, 0.5 mg, Intravenous, Q2H PRN, 0.5 mg at 02/15/19 0524 **AND** naloxone (NARCAN) injection 0.1 mg, 0.1 mg, Intravenous, Q5 Min PRN, Ariel Maynard MD  •  lactated ringers infusion, 9 mL/hr, Intravenous, Continuous PRN, Colin Castro MD, Last Rate: 9 mL/hr at 02/13/19 1649  •  [START ON 2/16/2019] lansoprazole (FIRST) oral suspension 30 mg, 30 mg, Oral, QAMBigg Christopher R, MD  •  Magnesium Sulfate 2 gram Bolus, followed by 8 gram infusion (total Mg dose 10 grams)- Mg less than or equal to 1mg/dL, 2 g, Intravenous, PRN **OR** Magnesium Sulfate 2 gram / 50mL Infusion (GIVE X 3  BAGS TO EQUAL 6GM TOTAL DOSE) - Mg 1.1 - 1.5 mg/dl, 2 g, Intravenous, PRN **OR** Magnesium Sulfate 4 gram infusion- Mg 1.6-1.9 mg/dL, 4 g, Intravenous, PRN, Eleuterio Pride MD, Last Rate: 25 mL/hr at 02/14/19 1446, 4 g at 02/14/19 1446  •  meloxicam (MOBIC) tablet 7.5 mg, 7.5 mg, Oral, Daily, Eleuterio Pride MD  •  metaxalone (SKELAXIN) tablet 400 mg, 400 mg, Oral, 4x Daily PRN, Babita Harmon CRNA, 400 mg at 02/15/19 0501  •  Morphine sulfate (PF) injection 2 mg, 2 mg, Intravenous, Q4H PRN, Eleuterio Pride MD, 2 mg at 02/13/19 1751  •  ondansetron (ZOFRAN) injection 4 mg, 4 mg, Intravenous, Q6H PRN, Eleuterio Pride MD, 4 mg at 02/13/19 1924  •  ondansetron (ZOFRAN) tablet 4 mg, 4 mg, Oral, Q6H PRN **OR** ondansetron (ZOFRAN) injection 4 mg, 4 mg, Intravenous, Q6H PRN, Ariel Maynard MD  •  ropivacaine (NAROPIN) 0.2% peripheral nerve cath (moog), 8 mL/hr, Peripheral Nerve, Continuous, Colin Georges CRNA, Last Rate: 8 mL/hr at 02/14/19 1519, 8 mL/hr at 02/14/19 1519  •  [COMPLETED] Insert peripheral IV, , , Once **AND** sodium chloride 0.9 % flush 10 mL, 10 mL, Intravenous, PRN, Soco Hancock PA  •  sodium chloride 0.9 % flush 3 mL, 3 mL, Intravenous, Q12H, Eleuterio Pride MD, 3 mL at 02/14/19 2102  •  sodium chloride 0.9 % flush 3-10 mL, 3-10 mL, Intravenous, PRN, Eleuterio Pride MD  •  sodium chloride 0.9 % infusion, 50 mL/hr, Intravenous, Continuous, Eleuterio Pride MD, Last Rate: 50 mL/hr at 02/13/19 1605, 50 mL/hr at 02/13/19 1605  •  sodium chloride 0.9 % infusion, 50 mL/hr, Intravenous, Continuous, Eleuterio Pride MD  •  vitamin B-12 (CYANOCOBALAMIN) tablet 500 mcg, 500 mcg, Oral, Daily, Eleuterio Pride MD, 500 mcg at 02/14/19 0909      Assessment/Plan   Assessment / Plan     Active Hospital Problems    Diagnosis Date Noted   • **Closed fracture of left hip (CMS/[facility]) [S72.002A] 02/13/2019   • Humeral head fracture [S42.293A] 02/14/2019     Priority: Medium  "  • Shoulder dislocation, recurrent, right [M24.411] 02/14/2019     Priority: Medium   • Postoperative anemia due to acute blood loss [D62] 02/14/2019     Priority: Medium   • Dysphagia [R13.10] 02/14/2019   • Falls [W19.XXXA] 02/13/2019   • Periorbital ecchymosis of left eye [S00.12XA] 02/13/2019   • Hematoma [T14.8XXA] 02/13/2019     Left gluteal hematoma     • Atrial fibrillation (CMS/HCC) [I48.91] 04/05/2018   • Essential hypertension [I10] 04/05/2018          Brief Hospital Course to date:  Debbie Baum is a 89 y.o. female w/ hx afib (s/p prior ablation) on coreg and asa, prior right shoulder dislocations, multiple prior falls who was using her walker at nursing facility today when lost balance and fell to left side striking left hip and face. Patient was brought to ER where initial xray hip/pelvis revealed no fracture. However, later while moving legs heard a \"pop\" and had worsening pain and subsequent ct pelvis showed left intertrochanteric fracture and 6cm gluteal & subcutanoeus hematoma. Also has bruising left eye. Ct face negative for fractures. No preceding chest pain or palpitations. Patient went for left intertrochanteric nail the evening of admission (2/13/19) and did will post-operatively. POD #1 hgb dropped to 7.4 (from 10.7 on admission) and received 1 unit prbc, and also developed right shoulder/arm pain (same shoulder that patient has apparently dislocated in the past). X-ray shoulder and humerus showed anterior dislocation right humeral head and possible humeral head fracture. Subsequent CT right upper extremity showed extensive degenerative changes and mildly comminuted anterior fracture/dislocation of right humeral head and glenoid fractures which, per discussion w/ dr. Maynard, appeared chronic in nature.     *left hip (intertrochanteric) fracture      -s/p left intertrochanteric nail 2/13/19 by dr. Maynard  *left gluteal/subcutaneous hematoma and post op anemia      -s/p 1 unit prbc " 2/14/19     -receiving 2nd unit 2/15/19  *right shoulder dislocation/possible fracture  *encephalopathy (due to age, polypharmacy, etc)  *Hx afib  *left periorbital ecchymoses       -ct facial bones no fractures; ice tid  *hx htn  *hx multiple falls    --------------------------------------------------------------------------------------  Plan:    -s/p left intertrochanteric nail 2/13/19 by dr. Maynard; asa 81mg bid for dvt prophylaxis  -s/p 1 unit prbc 2/14/19 for post op anemia; monitor hgb  -give 1 unit (2nd total) prbc today for post op anemia (oozing post op site)  -post transfusion H&H today  -dr. ritter to come by today and assess right shoulder, changes appear chronic on ct scan per d/w dr. Maynard  -add mobic 7.5 (low dose) for antiinflammatory effect, tylenol as has some mild sedation currently  -replace elctrolytes prn  -on level 4 dysphagia diet per SLP recs    -cbc, bmp in a.m. (nurse to call if hgb <8)    -called and left message with Kecia Baum on 2/15/19 (daughter) on voice mail, plan to d/w grandson (dr. Taco Baum) later today    -likely benefit from rehab at discharge, currently appears to be multiple days away, once more interactive, po intake adequate, working w/ p.t., etc      DVT Prophylaxis:  Asa 81mg bid       CODE STATUS:   Code Status and Medical Interventions:   Ordered at: 02/13/19 1302     Limited Support to NOT Include:    Intubation     Code Status:    No CPR     Medical Interventions (Level of Support Prior to Arrest):    Limited         Electronically signed by Eleuterio Pride MD, 02/15/19, 8:13 AM.

## 2019-02-15 NOTE — PROGRESS NOTES
Continued Stay Note  [facility]     Patient Name: Debbie Baum  MRN: [id number]  Today's Date: 2/15/2019    Admit Date: 2/13/2019    Discharge Plan     Row Name 02/15/19 1534       Plan    Plan  SNF- [facility]    Patient/Family in Agreement with Plan  yes    Plan Comments  Case mgt con't to follow. Plan is transfer to a skilled bed at [facility] when medically ready. Discussed with daughter, she is in agreement with plan. Case mgt will f/u Monday        Discharge Codes    No documentation.       Expected Discharge Date and Time     Expected Discharge Date Expected Discharge Time    Feb 16, 2019             Sonja C Kellerman, RN

## 2019-02-15 NOTE — CONSULTS
Orthopedic Consult      Patient: Debbie Baum    Date of Admission: 2/13/2019  8:39 AM    YOB: 1923    Medical Record Number: [id number]    Attending Physician: Eleuterio Pride MD    Consulting Physician: Leonid Jacinto MD      Chief Complaints: Closed fracture of left hip, initial encounter (CMS/[facility]) [S72.002A]   Right shoulder pain      History of Present Illness: 89 y.o. female admitted to [facility] with Closed fracture of left hip, initial encounter (CMS/[facility]) [S72.002A]. I was consulted for further evaluation and treatment of right shoulder pain and concern for fracture/dislocation. Onset of symptoms was abrupt starting a few days ago.  Symptoms are associated with pain.  Symptoms are aggravated by movement.   Symptoms improve with rest.  She complains of right shoulder pain worse since she had a fall 2 days ago and broke her left hip.  She does note a history of a dislocation to her shoulder a month or so ago.  She does have a long-standing history of right shoulder pain that has been treated with a salve.  The sling does not make her more comfortable.  She does note crepitus in her shoulder but that is not new.  She at baseline resides in a nursing home and uses a walker for ambulation.       Allergies   Allergen Reactions   • Crab (Diagnostic) Hives   • Lovenox [Enoxaparin Sodium] Rash   • Penicillins Rash     unsure        Home Medications:  Medications Prior to Admission   Medication Sig Dispense Refill Last Dose   • acetaminophen (TYLENOL) 650 MG 8 hr tablet Take 1,300 mg by mouth Every Night.   2/12/2019 at Unknown time   • aspirin 81 MG chewable tablet Chew 81 mg Daily.   2/12/2019 at Unknown time   • carvedilol (COREG) 3.125 MG tablet Take 3.125 mg by mouth 2 (Two) Times a Day With Meals.   2/12/2019 at Unknown time   • cholecalciferol (VITAMIN D3) 1000 units tablet Take 1,000 Units by mouth Daily.   2/12/2019 at Unknown time   • Cyanocobalamin (B-12 PO) Take 1 tablet  by mouth Daily.   2/12/2019 at Unknown time   • escitalopram (LEXAPRO) 10 MG tablet Take 10 mg by mouth Daily.   2/12/2019 at Unknown time   • esomeprazole (nexIUM) 40 MG capsule Take 40 mg by mouth Every Morning Before Breakfast.   2/12/2019 at Unknown time   • ibuprofen (ADVIL,MOTRIN) 200 MG tablet Take 400 mg by mouth Every 8 (Eight) Hours As Needed for Mild Pain .   2/12/2019 at Unknown time   • triamterene-hydrochlorothiazide (MAXZIDE) 75-50 MG per tablet Take 1 tablet by mouth Daily As Needed.   2/12/2019 at Unknown time   • uribel (URO-MP) 118 MG capsule capsule Take 1 capsule by mouth every night at bedtime.   2/12/2019 at Unknown time         Past Medical History:   Diagnosis Date   • Atrial fibrillation (CMS/HCC)    • Cancer (CMS/HCC)     colon   • Coronary artery disease    • GERD (gastroesophageal reflux disease)    • Hypertension         Past Surgical History:   Procedure Laterality Date   • CARDIAC ABLATION     • CHOLECYSTECTOMY     • COLON SURGERY      BOWEL RESECTION FOR HX COLON CANCER   • HIP INTERTROCHANTERIC NAILING Left 2/13/2019    Procedure: HIP INTERTROCHANTERIC NAILING LEFT;  Surgeon: Ariel Maynard MD;  Location: [facility];  Service: Orthopedics   • HYSTERECTOMY     • REPLACEMENT TOTAL KNEE          Social History     Occupational History   • Not on file   Tobacco Use   • Smoking status: Never Smoker   • Smokeless tobacco: Never Used   Substance and Sexual Activity   • Alcohol use: No   • Drug use: No   • Sexual activity: Defer      Social History     Social History Narrative   • Not on file      History reviewed. No pertinent family history.      Review of Systems:   Per H/P    Physical Exam: 89 y.o. female  General Appearance:    Alert, cooperative, in no acute distress Elderly, frail                Vitals:    02/15/19 1205 02/15/19 1230 02/15/19 1245 02/15/19 1300   BP: 123/55 120/53  134/76   BP Location:       Patient Position:       Pulse: 78 56 79 77   Resp: 14 16  16   Temp: 97  °F (36.1 °C) 97 °F (36.1 °C)     TempSrc: Oral Oral     SpO2: 96% 98%  94%   Weight:       Height:            Head:    Normocephalic, without obvious abnormality, atraumatic      Right Upper Extremity:  Obvious deformity in shoulder, no swelling, no ecchymosis, painless ROM shoulder, elbow, wrist, no joint instability, normal distal strength and sensation to light touch, skin intact without cyanosis, clubbing, edema; +2 radial pulse    +crepitus with shoulder ROM  Active FE to 60, ER to 10,            Diagnostic Tests:    I have reviewed the labs, radiology results and diagnostic studies:yes    Results from last 7 days   Lab Units 02/15/19  0608   WBC 10*3/mm3 10.92*   HEMOGLOBIN g/dL 7.7*   PLATELETS 10*3/mm3 165     Results from last 7 days   Lab Units 02/15/19  0608   SODIUM mmol/L 135   POTASSIUM mmol/L 4.0   CO2 mmol/L 24.0   CREATININE mg/dL 0.78   GLUCOSE mg/dL 113*     CT Scan R shoulder: By my review, she has a chronic anterior dislocation with extensive degenerative changes consistent with osteoarthritis and 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  Her glenoid is extensively worn anteriorly with significant bone loss.      Assessment:  Patient Active Problem List   Diagnosis   • Hyponatremia   • Essential hypertension   • Coronary artery disease   • Weakness generalized   • GERD (gastroesophageal reflux disease)   • Atrial fibrillation (CMS/HCC)   • Somnolence   • Medication adverse effect   • Chronic pain   • Closed fracture of left hip (CMS/HCC)   • Falls   • Periorbital ecchymosis of left eye   • Hematoma   • Humeral head fracture   • Shoulder dislocation, recurrent, right   • Postoperative anemia due to acute blood loss   • Dysphagia     Right shoulder pain with glenohumeral arthritis and chronic anterior dislocation      Plan:  I discussed with her as well as her daughter that all the findings on the CT scan appears to be chronic in nature  and do not require acute treatment.  She has an extensively worn glenoid and reconstruction options would be limited anyway.  From my standpoint, she can discontinue her sling as it did not make her feel more comfortable.  She can use the arm as tolerated including for weightbearing as necessary using a walker.  If she continues to complain of pain in the next few weeks consideration of a cortisone injection would be an option.  I will be happy to see her back as needed.          Leonid Jacinto MD  02/15/19  1:42 PM

## 2019-02-15 NOTE — PROGRESS NOTES
"  SUBJECTIVE  Patient resting comfortably.  Complains of soreness all over.  Right upper extremity in sling.  Otherwise doing well.    OBJECTIVE  Temp (24hrs), Av.8 °F (36.6 °C), Min:97.4 °F (36.3 °C), Max:98.5 °F (36.9 °C)    Blood pressure 104/67, pulse 79, temperature 97.4 °F (36.3 °C), temperature source Axillary, resp. rate 16, height 165.1 cm (65\"), weight 49.9 kg (110 lb), SpO2 97 %, not currently breastfeeding.    Lab Results (last 24 hours)     Procedure Component Value Units Date/Time    CBC (No Diff) [[id number]]  (Abnormal) Collected:  02/15/19 0608    Specimen:  Blood Updated:  02/15/19 0708     WBC 10.92 10*3/mm3      RBC 2.58 10*6/mm3      Hemoglobin 7.7 g/dL      Hematocrit 24.4 %      MCV 94.6 fL      MCH 29.8 pg      MCHC 31.6 g/dL      RDW 16.1 %      RDW-SD 54.7 fl      MPV 9.4 fL      Platelets 165 10*3/mm3     Basic Metabolic Panel [[id number]] Collected:  02/15/19 0608    Specimen:  Blood Updated:  02/15/19 0656    Magnesium [[id number]] Collected:  02/15/19 0608    Specimen:  Blood Updated:  02/15/19 0656    Iron Profile [[id number]] Collected:  02/15/19 0608    Specimen:  Blood Updated:  02/15/19 0656    Urinalysis With Culture If Indicated - Urine, Catheter In/Out [[id number]]  (Abnormal) Collected:  19 1453    Specimen:  Urine, Catheter In/Out Updated:  19 1522     Color, UA Other     Appearance, UA Clear     pH, UA 6.5     Specific Gravity, UA 1.016     Glucose, UA Negative     Ketones, UA Negative     Bilirubin, UA Negative     Blood, UA Negative     Protein, UA Negative     Leuk Esterase, UA Negative     Nitrite, UA Negative     Urobilinogen, UA 0.2 E.U./dL    Narrative:       Urine microscopic not indicated.    Magnesium [[id number]]  (Normal) Collected:  19 1219    Specimen:  Blood Updated:  19 1257     Magnesium 1.7 mg/dL     Potassium [[id number]]  (Abnormal) Collected:  19 1219    Specimen:  Blood Updated:  19 1257     Potassium 3.3 mmol/L     " Hemoglobin & Hematocrit, Blood [[id number]]  (Abnormal) Collected:  02/14/19 1219    Specimen:  Blood Updated:  02/14/19 1237     Hemoglobin 8.9 g/dL      Hematocrit 27.2 %             PHYSICAL EXAM  Right upper extremity: Patient has diffuse soft tissue tenderness about the shoulder.  Patient does demonstrate slight pain with range of motion.    Left lower extremity:Dressing clean, dry and intact.  Mild pain with logroll of hip Intact EHL, FHL, tibialis anterior, and gastrocsoleus. Sensation intact to light touch to deep peroneal, superficial peroneal, sural, saphenous, tibial nerves. 2+ palpable DP and PT pulses.         Closed fracture of left hip (CMS/HCC)    Essential hypertension    Atrial fibrillation (CMS/HCC)    Falls    Periorbital ecchymosis of left eye    Hematoma    Humeral head fracture    Shoulder dislocation, recurrent, right    Postoperative anemia due to acute blood loss    Dysphagia      PLAN / DISPOSITION:  2 Day Post-Op left hip intertrochanteric fracture fixation    Protected weight bearing as tolerated left lower extremity, hip range of motion as tolerated   Pain control  Postop blood loss anemia-transfuse per primary team.  PT/OT for post op mobilization and medical equipment needs   Right upper extremity: CT of shoulder demonstrates chronic changes with anterior chronic dislocation. Dr. Jacinto will see patient later today to discuss options.  SCD's bilateral lower extremities   Aspirin 81 mg twice a day for DVT prophylaxis   Social work for discharge planning.   Dressing to remain in place for 7 days. May remove on POD#7. If no drainage, may shower on POD#10. No submerging wound in water. If drainage is noted, sterile dressing should be placed and wound checked daily. No showering until wound has remained dry for 72 consecutive hours.   Follow up in 2 weeks for re-assessment.      Ariel Maynard MD  02/15/19  7:25 AM

## 2019-02-15 NOTE — PLAN OF CARE
Problem: Patient Care Overview  Goal: Plan of Care Review  Outcome: Ongoing (interventions implemented as appropriate)   02/15/19 1358   Coping/Psychosocial   Plan of Care Reviewed With patient   Plan of Care Review   Progress improving   OTHER   Outcome Summary patient stood from elevated bed with max assist x2, assist to block B LE from sliding, L UE support, R UE support in sling. Patient stood with cues to correct posterior lean. Marcial shoes prior to OOB. Improved sitting balance from max assist to Min assist with cues.      02/15/19 1358   Coping/Psychosocial   Plan of Care Reviewed With patient   Plan of Care Review   Progress improving   OTHER   Outcome Summary patient stood from elevated bed with max assist x2, assist to block B LE from sliding, L UE support, R UE support in sling. Patient stood with cues to correct posterior lean. SPS to chair with max assist. Marcial shoes prior to OOB. Improved sitting balance from max assist to Min assist with cues.

## 2019-02-15 NOTE — PLAN OF CARE
Problem: Pain, Acute (Adult)  Goal: Identify Related Risk Factors and Signs and Symptoms  Outcome: Ongoing (interventions implemented as appropriate)    Goal: Acceptable Pain Control/Comfort Level  Outcome: Ongoing (interventions implemented as appropriate)      Problem: Patient Care Overview  Goal: Plan of Care Review  Outcome: Ongoing (interventions implemented as appropriate)   02/15/19 0542   Coping/Psychosocial   Plan of Care Reviewed With patient   Plan of Care Review   Progress no change   OTHER   Outcome Summary Pt remains A&O w moments of forgetfulness noted. Pt has had an uneventful shift. VSS throughout shift, and pt has slept on and off during shift. Pt pain has been somewhat controlled w PO pain meds, and IV pain meds. Pt continues to take meds whole in applesauce, and tolerates well. Pt has had urinary retention noted, and req'd I&O cath. Will continue to monitor.        Problem: Skin Injury Risk (Adult)  Goal: Identify Related Risk Factors and Signs and Symptoms  Outcome: Ongoing (interventions implemented as appropriate)    Goal: Skin Health and Integrity  Outcome: Ongoing (interventions implemented as appropriate)      Problem: Fall Risk (Adult)  Goal: Identify Related Risk Factors and Signs and Symptoms  Outcome: Ongoing (interventions implemented as appropriate)    Goal: Absence of Fall  Outcome: Ongoing (interventions implemented as appropriate)      Problem: Fractured Hip (Adult)  Goal: Signs and Symptoms of Listed Potential Problems Will be Absent, Minimized or Managed (Fractured Hip)  Outcome: Ongoing (interventions implemented as appropriate)      Problem: Surgery Nonspecified (Adult)  Goal: Signs and Symptoms of Listed Potential Problems Will be Absent, Minimized or Managed (Surgery Nonspecified)  Outcome: Ongoing (interventions implemented as appropriate)

## 2019-02-15 NOTE — PROGRESS NOTES
[facility]    Acute pain service Inpatient Progress Note    Patient Name: Debbie Baum  :  1923  MRN:  [id number]        Acute Pain  Service Inpatient Progress Note:    Analgesia:Poor  Pain Score:8/10  LOC: alert and awake  Resp Status: supplemental oxygen  Cardiac: VS stable  Side Effects:None  Catheter Site:clean  Cath type: peripheral nerve cath(MOOG pump)  Infusion rate: 8ml/hr  Catheter Plan:Catheter to remain Insitu and Continue catheter infusion rate unchanged  Comments: IM starting scheduled tylenol and mobic today. Lortab made her very drowsy last night.

## 2019-02-15 NOTE — PLAN OF CARE
Problem: Patient Care Overview  Goal: Plan of Care Review  Outcome: Ongoing (interventions implemented as appropriate)   02/15/19 1103   Coping/Psychosocial   Plan of Care Reviewed With patient   SLP re-evaluation completed. Will f/u for further assessment/edu/modifications, as appropriate pending POC. Please see note for further details and recommendations.

## 2019-02-16 LAB
ABO + RH BLD: NORMAL
ANION GAP SERPL CALCULATED.3IONS-SCNC: 2 MMOL/L (ref 3–11)
BH BB BLOOD EXPIRATION DATE: NORMAL
BH BB BLOOD TYPE BARCODE: 5100
BH BB DISPENSE STATUS: NORMAL
BH BB PRODUCT CODE: NORMAL
BH BB UNIT NUMBER: NORMAL
BUN BLD-MCNC: 18 MG/DL (ref 9–23)
BUN/CREAT SERPL: 30 (ref 7–25)
CALCIUM SPEC-SCNC: 8.6 MG/DL (ref 8.7–10.4)
CHLORIDE SERPL-SCNC: 99 MMOL/L (ref 99–109)
CO2 SERPL-SCNC: 26 MMOL/L (ref 20–31)
CREAT BLD-MCNC: 0.6 MG/DL (ref 0.6–1.3)
DEPRECATED RDW RBC AUTO: 48.9 FL (ref 37–54)
ERYTHROCYTE [DISTWIDTH] IN BLOOD BY AUTOMATED COUNT: 14.9 % (ref 11.3–14.5)
GFR SERPL CREATININE-BSD FRML MDRD: 93 ML/MIN/1.73
GLUCOSE BLD-MCNC: 98 MG/DL (ref 70–100)
HCT VFR BLD AUTO: 27.6 % (ref 34.5–44)
HGB BLD-MCNC: 9.2 G/DL (ref 11.5–15.5)
MCH RBC QN AUTO: 30.1 PG (ref 27–31)
MCHC RBC AUTO-ENTMCNC: 33.3 G/DL (ref 32–36)
MCV RBC AUTO: 90.2 FL (ref 80–99)
PLATELET # BLD AUTO: 171 10*3/MM3 (ref 150–450)
PMV BLD AUTO: 9.5 FL (ref 6–12)
POTASSIUM BLD-SCNC: 3.7 MMOL/L (ref 3.5–5.5)
RBC # BLD AUTO: 3.06 10*6/MM3 (ref 3.89–5.14)
SODIUM BLD-SCNC: 127 MMOL/L (ref 132–146)
UNIT  ABO: NORMAL
UNIT  RH: NORMAL
WBC NRBC COR # BLD: 10.5 10*3/MM3 (ref 3.5–10.8)

## 2019-02-16 PROCEDURE — 25010000002 MORPHINE SULFATE (PF) 2 MG/ML SOLUTION: Performed by: INTERNAL MEDICINE

## 2019-02-16 PROCEDURE — 92610 EVALUATE SWALLOWING FUNCTION: CPT

## 2019-02-16 PROCEDURE — 85027 COMPLETE CBC AUTOMATED: CPT | Performed by: INTERNAL MEDICINE

## 2019-02-16 PROCEDURE — 25010000002 HYDROMORPHONE PER 4 MG: Performed by: ORTHOPAEDIC SURGERY

## 2019-02-16 PROCEDURE — 80048 BASIC METABOLIC PNL TOTAL CA: CPT | Performed by: INTERNAL MEDICINE

## 2019-02-16 PROCEDURE — 25010000002 ROPIVACAINE PER 1 MG: Performed by: NURSE ANESTHETIST, CERTIFIED REGISTERED

## 2019-02-16 PROCEDURE — 99024 POSTOP FOLLOW-UP VISIT: CPT | Performed by: ORTHOPAEDIC SURGERY

## 2019-02-16 PROCEDURE — 25010000002 ONDANSETRON PER 1 MG: Performed by: INTERNAL MEDICINE

## 2019-02-16 PROCEDURE — 97110 THERAPEUTIC EXERCISES: CPT

## 2019-02-16 PROCEDURE — 99233 SBSQ HOSP IP/OBS HIGH 50: CPT | Performed by: INTERNAL MEDICINE

## 2019-02-16 RX ORDER — DOCUSATE SODIUM 50 MG/5 ML
100 LIQUID (ML) ORAL 2 TIMES DAILY PRN
Status: DISCONTINUED | OUTPATIENT
Start: 2019-02-16 | End: 2019-02-22 | Stop reason: HOSPADM

## 2019-02-16 RX ORDER — SENNA AND DOCUSATE SODIUM 50; 8.6 MG/1; MG/1
2 TABLET, FILM COATED ORAL NIGHTLY PRN
Status: DISCONTINUED | OUTPATIENT
Start: 2019-02-16 | End: 2019-02-22 | Stop reason: HOSPADM

## 2019-02-16 RX ADMIN — SODIUM CHLORIDE, PRESERVATIVE FREE 3 ML: 5 INJECTION INTRAVENOUS at 11:21

## 2019-02-16 RX ADMIN — MELOXICAM 7.5 MG: 7.5 TABLET ORAL at 11:19

## 2019-02-16 RX ADMIN — METAXALONE 400 MG: 800 TABLET ORAL at 03:58

## 2019-02-16 RX ADMIN — ACETAMINOPHEN 650 MG: 325 TABLET ORAL at 21:02

## 2019-02-16 RX ADMIN — HYDROCODONE BITARTRATE AND ACETAMINOPHEN 0.5 TABLET: 5; 325 TABLET ORAL at 12:49

## 2019-02-16 RX ADMIN — DOCUSATE SODIUM 100 MG: 50 LIQUID ORAL at 20:53

## 2019-02-16 RX ADMIN — VITAMIN D, TAB 1000IU (100/BT) 1000 UNITS: 25 TAB at 11:18

## 2019-02-16 RX ADMIN — ASPIRIN 81 MG CHEWABLE TABLET 81 MG: 81 TABLET CHEWABLE at 11:19

## 2019-02-16 RX ADMIN — CARVEDILOL 3.12 MG: 3.12 TABLET, FILM COATED ORAL at 11:18

## 2019-02-16 RX ADMIN — HYDROMORPHONE HYDROCHLORIDE 0.5 MG: 1 INJECTION, SOLUTION INTRAMUSCULAR; INTRAVENOUS; SUBCUTANEOUS at 01:33

## 2019-02-16 RX ADMIN — ASPIRIN 81 MG CHEWABLE TABLET 81 MG: 81 TABLET CHEWABLE at 20:53

## 2019-02-16 RX ADMIN — LANSOPRAZOLE 30 MG: KIT at 05:42

## 2019-02-16 RX ADMIN — METAXALONE 400 MG: 800 TABLET ORAL at 21:02

## 2019-02-16 RX ADMIN — LIDOCAINE 1 PATCH: 50 PATCH CUTANEOUS at 11:19

## 2019-02-16 RX ADMIN — MORPHINE SULFATE 2 MG: 2 INJECTION, SOLUTION INTRAMUSCULAR; INTRAVENOUS at 23:52

## 2019-02-16 RX ADMIN — CYANOCOBALAMIN TAB 1000 MCG 500 MCG: 1000 TAB at 11:19

## 2019-02-16 RX ADMIN — MORPHINE SULFATE 2 MG: 2 INJECTION, SOLUTION INTRAMUSCULAR; INTRAVENOUS at 17:37

## 2019-02-16 RX ADMIN — HYDROCODONE BITARTRATE AND ACETAMINOPHEN 0.5 TABLET: 5; 325 TABLET ORAL at 20:53

## 2019-02-16 RX ADMIN — CARVEDILOL 3.12 MG: 3.12 TABLET, FILM COATED ORAL at 18:07

## 2019-02-16 RX ADMIN — ACETAMINOPHEN 650 MG: 325 TABLET, FILM COATED ORAL at 15:47

## 2019-02-16 RX ADMIN — ONDANSETRON 4 MG: 2 INJECTION INTRAMUSCULAR; INTRAVENOUS at 17:44

## 2019-02-16 RX ADMIN — SENNOSIDES AND DOCUSATE SODIUM 2 TABLET: 8.6; 5 TABLET ORAL at 20:53

## 2019-02-16 RX ADMIN — ROPIVACAINE HYDROCHLORIDE 12 ML/HR: 2 INJECTION, SOLUTION EPIDURAL; INFILTRATION at 12:53

## 2019-02-16 NOTE — PLAN OF CARE
Problem: Patient Care Overview  Goal: Plan of Care Review  Outcome: Ongoing (interventions implemented as appropriate)   02/16/19 1530   Coping/Psychosocial   Plan of Care Reviewed With patient   Plan of Care Review   Progress improving   OTHER   Outcome Summary patient stood x5 sit<>stands from recliner with max assist x2, assist to block B LE from sliding. Stood ~ 15-20 seconds each stand, improvement in upright posture with each stand. Rest breaks between sets. Cues and assist to prevent posterior lean. Progress limited by weakness and pain.      02/16/19 1530   Coping/Psychosocial   Plan of Care Reviewed With patient   Plan of Care Review   Progress improving   OTHER   Outcome Summary patient stood x5 attempts sit<>stands from recliner with max assist x2, assist to block B LE from sliding. Stood ~ 15-20 seconds each stand, improvement in upright posture with each stand. Rest breaks between sets. Cues and assist to prevent posterior lean. Progress limited by weakness and pain.

## 2019-02-16 NOTE — THERAPY TREATMENT NOTE
Acute Care - Physical Therapy Treatment Note  [facility]     Patient Name: Debbie Baum  : 1923  MRN: [id number]  Today's Date: 2019  Onset of Illness/Injury or Date of Surgery: 19  Date of Referral to PT: 19  Referring Physician: MD Caesar    Admit Date: 2019    Visit Dx:    ICD-10-CM ICD-9-CM   1. Closed fracture of left hip, initial encounter (CMS/Formerly [facility]) S72.002A 820.8   2. Injury of head, initial encounter S09.90XA 959.01   3. Contusion of left orbital tissues, initial encounter S05.12XA 921.2   4. Abrasion of face, initial encounter S00.81XA 910.0   5. Fall, initial encounter W19.XXXA E888.9   6. Impaired functional mobility, balance, gait, and endurance Z74.09 V49.89   7. Impaired mobility and ADLs Z74.09 799.89     Patient Active Problem List   Diagnosis   • Hyponatremia   • Essential hypertension   • Coronary artery disease   • Weakness generalized   • GERD (gastroesophageal reflux disease)   • Atrial fibrillation (CMS/Formerly [facility])   • Somnolence   • Medication adverse effect   • Chronic pain   • Closed fracture of left hip (CMS/Formerly [facility])   • Falls   • Periorbital ecchymosis of left eye   • Hematoma   • Humeral head fracture   • Shoulder dislocation, recurrent, right   • Postoperative anemia due to acute blood loss   • Dysphagia       Therapy Treatment    Rehabilitation Treatment Summary     Row Name 19 1445             Treatment Time/Intention    Discipline  physical therapy assistant  -AS      Document Type  therapy note (daily note)  -AS      Subjective Information  complains of;pain  -AS      Mode of Treatment  physical therapy  -AS      Patient/Family Observations  family at bedside, patient sitting UIC and agreed to therapy.  -AS      Patient Effort  good  -AS      Existing Precautions/Restrictions  fall;other (see comments) left hip fx, R UE h/o disclocation (WB status unknown , NWB)  -AS      Recorded by [AS] Dara Clark, PTA 19 4290      Row Name 19 1440              Cognitive Assessment/Intervention- PT/OT    Affect/Mental Status (Cognitive)  confused  -AS      Orientation Status (Cognition)  oriented to;person  -AS      Follows Commands (Cognition)  follows one step commands;75-90% accuracy;repetition of directions required;verbal cues/prompting required  -AS      Safety Deficit (Cognitive)  moderate deficit;awareness of need for assistance;judgment;insight into deficits/self awareness;safety precautions follow-through/compliance  -AS      Personal Safety Interventions  fall prevention program maintained;gait belt;nonskid shoes/slippers when out of bed;other (see comments) exit alarm  -AS      Recorded by [AS] Dara Clark, [facility] 02/16/19 1530      Row Name 02/16/19 1445             Bed Mobility Assessment/Treatment    Comment (Bed Mobility)  not tested, patient UIC  -AS      Recorded by [AS] Dara Clark, [facility] 02/16/19 1530      Row Name 02/16/19 1445             Sit-Stand Transfer    Sit-Stand [place] (Transfers)  verbal cues;maximum assist (25% patient effort);2 person assist  -AS      Assistive Device (Sit-Stand Transfers)  other (see comments) B UE support adn gait belt  -AS      Recorded by [AS] Dara Clark, [facility] 02/16/19 1530      Row Name 02/16/19 1445             Stand-Sit Transfer    Stand-Sit [place] (Transfers)  verbal cues;maximum assist (25% patient effort);2 person assist  -AS      Assistive Device (Stand-Sit Transfers)  other (see comments) B UE support and gait belt  -AS      Recorded by [AS] Dara Clark, [facility] 02/16/19 1530      Row Name 02/16/19 1445             Gait/Stairs Assessment/Training    24549 - Gait Training Minutes   15  -AS      Comment (Gait/Stairs)  patient stood x5 sit<>stands from recliner with max assist x2, assist to block B LE from sliding. Stood ~ 15-20 seconds each stand, improvement in upright posture with each stand. Rest breaks between sets. Cues and assist to prevent posterior lean.  -AS       Recorded by [AS] Dara Clark, [facility] 02/16/19 1530      Row Name 02/16/19 1445             Motor Skills Assessment/Interventions    Additional Documentation  Therapeutic Exercise (Group)  -AS      Recorded by [AS] Dara Clark, [facility] 02/16/19 1530      Row Name 02/16/19 1445             Therapeutic Exercise    36059 - PT Therapeutic Exercise Minutes  15  -AS      Recorded by [AS] Dara Clark, [facility] 02/16/19 1530      Row Name 02/16/19 1445             Therapeutic Exercise    Lower Extremity (Therapeutic Exercise)  LAQ (long arc quad), left  -AS      Lower Extremity Range of Motion (Therapeutic Exercise)  ankle dorsiflexion/plantar flexion, left  -AS      Exercise Type (Therapeutic Exercise)  AAROM (active assistive range of motion)  -AS      Position (Therapeutic Exercise)  seated  -AS      Sets/Reps (Therapeutic Exercise)  1/10  -AS      Comment (Therapeutic Exercise)  verbal cues for technique  -AS      Recorded by [AS] Dara Clark, [facility] 02/16/19 1530      Row Name 02/16/19 1445             Static Sitting Balance    Level of [place] (Unsupported Sitting, Static Balance)  maximal assist, 25 to 49% patient effort;other (see comments) progressed to min sitting edge of chair  -AS      Sitting Position (Unsupported Sitting, Static Balance)  sitting in chair  -AS      Time Able to Maintain Position (Unsupported Sitting, Static Balance)  more than 5 minutes  -AS      Recorded by [AS] Dara Clark, [facility] 02/16/19 1530      Row Name 02/16/19 1445             Positioning and Restraints    Pre-Treatment Position  sitting in chair/recliner  -AS      Post Treatment Position  chair  -AS      In Chair  reclined;call light within reach;encouraged to call for assist;exit alarm on;with family/caregiver;waffle cushion;on mechanical lift sling;pillow between legs;compression device  -AS      Recorded by [AS] Dara Clark, [facility] 02/16/19 1530      Row Name 02/16/19 1449             Pain Scale:  FACES Pre/Post-Treatment    Pain: FACES Scale, Pretreatment  2-->hurts little bit  -AS      Pain: FACES Scale, Post-Treatment  4-->hurts little more  -AS      Recorded by [AS] Dara Clark, PTA 02/16/19 1530      Row Name                Wound 02/13/19 1019 Left eyebrow abrasion    Wound - Properties Group Date first assessed: 02/13/19 [CH] Time first assessed: 1019 [CH] Present On Admission : yes [CH] Side: Left [CH] Location: eyebrow [CH] Type: abrasion [CH] Recorded by:  [CH] Cally Swanson RN 02/13/19 1020    Row Name                Wound 02/13/19 1855 Left hip incision    Wound - Properties Group Date first assessed: 02/13/19 [SS] Time first assessed: 1855 [SS] Side: Left [SS] Location: hip [SS] Type: incision [SS] Recorded by:  [SS] Bang Love RN 02/13/19 1855      User Key  (r) = Recorded By, (t) = Taken By, (c) = Cosigned By    Initials Name Effective Dates Discipline    AS Dara Clark, PTA 06/22/15 -  PT    CH Cally Swanson RN 06/16/16 -  Nurse    Bang Simon RN 01/15/18 -  --          Wound 02/13/19 1019 Left eyebrow abrasion (Active)   Dressing Appearance open to air 2/16/2019  2:02 PM   Base scab 2/16/2019  7:45 AM   Periwound Temperature warm 2/16/2019  7:45 AM   Periwound Skin Turgor firm 2/16/2019  7:45 AM   Edges irregular 2/16/2019  7:45 AM   Drainage Amount none 2/16/2019  7:45 AM   Dressing Care, Wound open to air 2/15/2019  8:30 PM       Wound 02/13/19 1855 Left hip incision (Active)   Dressing Appearance dried drainage 2/16/2019  2:02 PM   Closure INOCENTE 2/16/2019  2:02 PM           Physical Therapy Education     Title: PT OT SLP Therapies (In Progress)     Topic: Physical Therapy (In Progress)     Point: Mobility training (In Progress)     Learning Progress Summary           Patient Acceptance, E, NR by AS at 2/16/2019  3:30 PM    Acceptance, E, NR by AS at 2/15/2019  1:57 PM    Acceptance, E, NR by ABA at 2/14/2019  4:27 PM   Family Acceptance, E, NR by ABA  at 2/14/2019  4:27 PM                   Point: Home exercise program (In Progress)     Learning Progress Summary           Patient Acceptance, E, NR by AS at 2/16/2019  3:30 PM    Acceptance, E, NR by AS at 2/15/2019  1:57 PM    Acceptance, E, NR by EJ at 2/14/2019  4:27 PM   Family Acceptance, E, NR by EJ at 2/14/2019  4:27 PM                   Point: Body mechanics (In Progress)     Learning Progress Summary           Patient Acceptance, E, NR by AS at 2/16/2019  3:30 PM    Acceptance, E, NR by AS at 2/15/2019  1:57 PM    Acceptance, E, NR by EJ at 2/14/2019  4:27 PM   Family Acceptance, E, NR by EJ at 2/14/2019  4:27 PM                   Point: Precautions (In Progress)     Learning Progress Summary           Patient Acceptance, E, NR by AS at 2/16/2019  3:30 PM    Acceptance, E, NR by AS at 2/15/2019  1:57 PM    Acceptance, E, NR by EJ at 2/14/2019  4:27 PM   Family Acceptance, E, NR by EJ at 2/14/2019  4:27 PM                               User Key     Initials Effective Dates Name Provider Type Discipline    EJ 11/20/18 -  Nayana Adams, PT Physical Therapist PT    AS 06/22/15 -  Dara Clark, FAISAL Physical Therapy Assistant PT                PT Recommendation and Plan     Plan of Care Reviewed With: patient  Progress: improving  Outcome Summary: patient stood x5 attempts sit<>stands from recliner with max assist x2, assist to block B LE from sliding. Stood ~ 15-20 seconds each stand, improvement in upright posture with each stand. Rest breaks between sets. Cues and assist to prevent posterior lean. Progress limited by weakness and pain.  Outcome Measures     Row Name 02/16/19 1445 02/15/19 1303 02/14/19 1325       How much help from another person do you currently need...    Turning from your back to your side while in flat bed without using bedrails?  2  -AS  2  -AS  1  -EJ    Moving from lying on back to sitting on the side of a flat bed without bedrails?  2  -AS  2  -AS  1  -EJ    Moving to and  from a bed to a chair (including a wheelchair)?  2  -AS  2  -AS  1  -EJ    Standing up from a chair using your arms (e.g., wheelchair, bedside chair)?  2  -AS  2  -AS  1  -EJ    Climbing 3-5 steps with a railing?  1  -AS  1  -AS  1  -EJ    To walk in hospital room?  1  -AS  1  -AS  1  -EJ    AM-PAC 6 Clicks Score  10  -AS  10  -AS  6  -EJ       Functional Assessment    Outcome Measure Options  AM-PAC 6 Clicks Basic Mobility (PT)  -AS  AM-PAC 6 Clicks Basic Mobility (PT)  -AS  AM-PAC 6 Clicks Basic Mobility (PT)  -    Row Name 02/14/19 1320             How much help from another is currently needed...    Putting on and taking off regular lower body clothing?  1  -AR      Bathing (including washing, rinsing, and drying)  1  -AR      Toileting (which includes using toilet bed pan or urinal)  1  -AR      Putting on and taking off regular upper body clothing  1  -AR      Taking care of personal grooming (such as brushing teeth)  2  -AR      Eating meals  2  -AR      Score  8  -AR         Functional Assessment    Outcome Measure Options  AM-PAC 6 Clicks Daily Activity (OT)  -AR        User Key  (r) = Recorded By, (t) = Taken By, (c) = Cosigned By    Initials Name Provider Type     Nayana Adams, PT Physical Therapist    Kandi Villalobos, OT Occupational Therapist    AS Dara Clark, FAISAL Physical Therapy Assistant         Time Calculation:   PT Charges     Row Name 02/16/19 1445             Time Calculation    Start Time  1445  -AS      PT Received On  02/16/19  -AS      PT Goal Re-Cert Due Date  02/24/19  -AS         Timed Charges    66150 - PT Therapeutic Exercise Minutes  15  -AS      26024 - Gait Training Minutes   15  -AS        User Key  (r) = Recorded By, (t) = Taken By, (c) = Cosigned By    Initials Name Provider Type    AS Dara Clark, FAISAL Physical Therapy Assistant        Therapy Suggested Charges     Code   Minutes Charges    32034 (CPT®) Hc Pt Neuromusc Re Education Ea 15 Min       72885 (CPT®) Hc Pt Ther Proc Ea 15 Min 15 1    28532 (CPT®) Hc Gait Training Ea 15 Min 15 1    20969 (CPT®) Hc Pt Therapeutic Act Ea 15 Min      29220 (CPT®) Hc Pt Manual Therapy Ea 15 Min      49464 (CPT®) Hc Pt Iontophoresis Ea 15 Min      37421 (CPT®) Hc Pt Elec Stim Ea-Per 15 Min      44649 (CPT®) Hc Pt Ultrasound Ea 15 Min      10209 (CPT®) Hc Pt Self Care/Mgmt/Train Ea 15 Min      43787 (CPT®) Hc Pt Prosthetic (S) Train Initial Encounter, Each 15 Min      90866 (CPT®) Hc Pt Orthotic(S)/Prosthetic(S) Encounter, Each 15 Min      95244 (CPT®) Hc Orthotic(S) Mgmt/Train Initial Encounter, Each 15min      Total  30 2        Therapy Charges for Today     Code Description Service Date Service Provider Modifiers Qty    10881291884 HC PT THER PROC EA 15 MIN 2/15/2019 Dara Clark, PTA GP 2    91781530882 HC PT THER SUPP EA 15 MIN 2/15/2019 Dara Clark, PTA GP 2    36653919826 HC PT THER PROC EA 15 MIN 2/16/2019 Draa Clark, PTA GP 2    58953837995 HC PT THER SUPP EA 15 MIN 2/16/2019 Dara Clark, PTA GP 2          PT G-Codes  Outcome Measure Options: AM-PAC 6 Clicks Basic Mobility (PT)  AM-PAC 6 Clicks Score: 10  Score: 8    Dara Clark PTA  2/16/2019

## 2019-02-16 NOTE — PLAN OF CARE
Problem: Patient Care Overview  Goal: Plan of Care Review  Outcome: Ongoing (interventions implemented as appropriate)   02/16/19 7618   Coping/Psychosocial   Plan of Care Reviewed With patient   Plan of Care Review   Progress no change   OTHER   Outcome Summary pt experiencing moderate amt of pain this shift. c/o pain iin her left hip, back, and right shouldrer. some relief with pain meds given and repositioning. pt in and out cathed 400ccs of blue green urine noted. left hip coverderm intact and stained. denies any numbness or tingling,.

## 2019-02-16 NOTE — THERAPY RE-EVALUATION
Acute Care - Speech Language Pathology   Swallow Re-Evaluation [facility]   Clinical Swallow Re-Evaluation     Patient Name: Debbie Baum  : 1923  MRN: [id number]  Today's Date: 2019  Onset of Illness/Injury or Date of Surgery: 19     Referring Physician: MD Caesar      Admit Date: 2019    Visit Dx:     ICD-10-CM ICD-9-CM   1. Closed fracture of left hip, initial encounter (CMS/[facility]) S72.002A 820.8   2. Injury of head, initial encounter S09.90XA 959.01   3. Contusion of left orbital tissues, initial encounter S05.12XA 921.2   4. Abrasion of face, initial encounter S00.81XA 910.0   5. Fall, initial encounter W19.XXXA E888.9   6. Impaired functional mobility, balance, gait, and endurance Z74.09 V49.89   7. Impaired mobility and ADLs Z74.09 799.89     Patient Active Problem List   Diagnosis   • Hyponatremia   • Essential hypertension   • Coronary artery disease   • Weakness generalized   • GERD (gastroesophageal reflux disease)   • Atrial fibrillation (CMS/HCC)   • Somnolence   • Medication adverse effect   • Chronic pain   • Closed fracture of left hip (CMS/HCC)   • Falls   • Periorbital ecchymosis of left eye   • Hematoma   • Humeral head fracture   • Shoulder dislocation, recurrent, right   • Postoperative anemia due to acute blood loss   • Dysphagia     Past Medical History:   Diagnosis Date   • Atrial fibrillation (CMS/HCC)    • Cancer (CMS/HCC)     colon   • Coronary artery disease    • GERD (gastroesophageal reflux disease)    • Hypertension      Past Surgical History:   Procedure Laterality Date   • CARDIAC ABLATION     • CHOLECYSTECTOMY     • COLON SURGERY      BOWEL RESECTION FOR HX COLON CANCER   • HIP INTERTROCHANTERIC NAILING Left 2019    Procedure: HIP INTERTROCHANTERIC NAILING LEFT;  Surgeon: Ariel Maynard MD;  Location: [facility];  Service: Orthopedics   • HYSTERECTOMY     • REPLACEMENT TOTAL KNEE          SWALLOW EVALUATION (last 72 hours)      SLP Adult Swallow  Evaluation     Row Name 02/16/19 1020 02/15/19 1000 02/14/19 0930             Rehab Evaluation    Document Type  re-evaluation  -MP  re-evaluation  -AC  evaluation  -AV      Subjective Information  no complaints  -MP  complains of;pain  -AC  no complaints  -AV      Patient Observations  alert;cooperative  -MP  alert;cooperative  -AC  alert;cooperative  -AV      Patient/Family Observations  No family present  -MP  No family present.  -AC  daughter in law present   -AV      Patient Effort  good  -MP  adequate  -AC  good  -AV         General Information    Patient Profile Reviewed  yes  -MP  yes  -AC  yes  -AV      Pertinent History Of Current Problem  Admitted 2/13 from [facility] w/ hip fx, arm fx, dislocated shoulder after fall. S/p hip sx 2/13. Briefly intubated for sx. PMH: GERD, esophageal dilation.  -MP  89yo adm from [facility] w/ hip fx, arm fx, dislocated shoulder after fall. S/p hip sx 2/13, was briefly intubated during sx. Hx GERD, esophageal dilation in past per consult. CXR: large HH, no change.   -AC  fall, hip fracture   -AV      Current Method of Nutrition  mechanical soft, no mixed consistencies;nectar/syrup-thick liquids  -MP  mechanical soft, no mixed consistencies;nectar/syrup-thick liquids  -AC  NPO  -AV      Precautions/Limitations, Vision  WFL;for purposes of eval  -MP  --  WFL;for purposes of eval  -AV      Precautions/Limitations, Hearing  WFL;for purposes of eval  -MP  --  WFL;for purposes of eval  -AV      Prior Level of Function-Communication  WFL  -MP  --  WFL  -AV      Prior Level of Function-Swallowing  no diet consistency restrictions;esophageal concerns  -MP  no diet consistency restrictions;esophageal concerns  -AC  no diet consistency restrictions  -AV      Plans/Goals Discussed with  patient;agreed upon  -MP  patient;agreed upon  -AC  patient;family  -AV      Barriers to Rehab  medically complex  -MP  medically complex  -AC  medically complex  -AV      Patient's Goals for Discharge  patient did  "not state  -MP  -- \"I don't want to get pna.\"  -AC  return to PO diet  -AV      Family Goals for Discharge  --  --  patient able to return to PO diet  -AV         Pain Assessment    Additional Documentation  Pain Scale: FACES Pre/Post-Treatment (Group)  -MP  --  Pain Scale: FACES Pre/Post-Treatment (Group)  -AV         Pain Scale: FACES Pre/Post-Treatment    Pain: FACES Scale, Pretreatment  4-->hurts little more  -MP  8-->hurts whole lot  -AC  2-->hurts little bit  -AV      Pain: FACES Scale, Post-Treatment  4-->hurts little more  -MP  8-->hurts whole lot  -AC  2-->hurts little bit  -AV      Pre/Post Treatment Pain Comment  --  Intermittent waves of pain all over--RN aware & administered medication.  -AC  --         Oral Motor and Function    Dentition Assessment  natural, present and adequate;poor oral hygiene  -MP  natural, present and adequate;poor oral hygiene  -AC  natural, present and adequate  -AV      Secretion Management  WNL/WFL  -MP  WNL/WFL  -AC  WNL/WFL  -AV      Mucosal Quality  dry;sticky  -MP  dry;sticky  -AC  moist, healthy  -AV      Volitional Swallow  --  --  WFL  -AV      Volitional Cough  --  weak  -AC  WFL  -AV         Oral Musculature and Cranial Nerve Assessment    Oral Motor General Assessment  generalized oral motor weakness  -MP  generalized oral motor weakness  -AC  mandibular impairment patient with very tonic jaw opening   -AV         General Eating/Swallowing Observations    Respiratory Support Currently in Use  nasal cannula  -MP  nasal cannula  -AC  nasal cannula  -AV      Eating/Swallowing Skills  fed by SLP  -MP  fed by SLP  -AC  fed by SLP  -AV      Positioning During Eating  upright in bed  -MP  upright in bed;semi-reclined;other (see comments) pt u/a to tolerate sitting @ 90' due to pain  -AC  upright 90 degree;upright in chair  -AV      Utensils Used  spoon;cup;straw  -MP  spoon  -AC  spoon;cup;straw  -AV      Consistencies Trialed  thin liquids;nectar/syrup-thick " liquids;pureed;regular textures  -MP  thin liquids;nectar/syrup-thick liquids;pureed  -AC  regular textures;pureed;thin liquids;nectar/syrup-thick liquids  -AV         Clinical Swallow Eval    Oral Prep Phase  WFL  -MP  WFL  -AC  impaired  -AV      Oral Transit  WFL  -MP  WFL  -AC  WFL  -AV      Oral Residue  WFL  -MP  WFL  -AC  WFL  -AV      Pharyngeal Phase  suspected pharyngeal impairment  -MP  suspected pharyngeal impairment  -AC  suspected pharyngeal impairment  -AV      Esophageal Phase  --  --  unremarkable  -AV      Clinical Swallow Evaluation Summary  Consult received for clinical swallow re-evaluation 2' anterior loss when taking sips of NTL. Clinical swallow re-evaluation completed. Pt c/o of nausea, so limited PO trials were given. Pt given trials of ice chipx1, thin via tsp/cup, NTL via tsp/cup/straw, puree, and regular solid. Wet vocal qualtiy and throat clear observed following trials of ice chip and thin. Multiple swallows observed w/ NTL and puree. Throat clear following solid trial. Recommend downgrade to level III diet and continue NTL. Meds crushed in puree or via alternate route. Pt continues to be high risk of aspiration w/ all consistencies, considering advanced age and generalized weakness. However, pt would likely have difficulty participating in MBS 2' upright positioning/repositioning in chair and would also have difficulty completing FEES 2' facial pain. Pt has previously expressed does not wish for FT. No family present to discuss POC. SLP will continue to follow and re-assess as appropriate.  -MP  --  Bedside swallow eval compelted this am:  cough, hard swallow, audible swallow with thins. Patient reports pain and difficulty.  No overt s/s with nectar, pudding, and solids.  patient with tonic like jaw opening attempts.  Patient unable to completed MBS or FEES at this time, will rec level 4 and nectar and follow up for repeat bedside tomororw. patient and family in agreement.    -AV          Pharyngeal Phase Concerns    Pharyngeal Phase Concerns  throat clear;wet vocal quality;multiple swallows  -MP  cough;multiple swallows  -AC  --      Wet Vocal Quality  thin  -MP  --  --      Multiple Swallows  nectar;pudding  -MP  thin;nectar;pudding  -AC  --      Cough  --  thin;nectar;pudding  -AC  --      Throat Clear  thin;regular consistencies  -MP  --  --      Pharyngeal Phase Concerns, Comment  --  Oral phase appeared WFL for thin via tsp, nectar via tsp, and puree. Overt clinical s/sxs aspiration noted, including: coughing w/ all consistencies trialed and multiple swallows. Pt does have hx of esophageal dysphagia, but given generalized weakness, high risk for pharyngeal dysphagia w/ aspiration @ this time, as well. U/a to make safe diet recommendation @ this time. Pt would have difficulty participating in MBS @ this time 2' difficulty sitting upright/repositioning and would also have difficulty completing FEES 2' facial pain. Discussed s/sxs aspiration, risks of aspiration, and options considering comfort/safety w/ pt. Pt reported she doesn't want pna, but is not sure if she would want a feeding tube. She is DNR. No family present. If comfort diet is desired considering her advanced age and code status, may consider small sips of thin vs nectar, as tolerated, and dysphagia level IV soft diet. SLP will f/u for further assessment/modifications/edu, as appropriate pending POC.  -AC  --         Clinical Impression    SLP Swallowing Diagnosis  suspected pharyngeal dysfunction  -MP  functional oral phase;suspected pharyngeal dysfunction  -AC  suspected pharyngeal dysfunction  -AV      Functional Impact  risk of aspiration/pneumonia;risk of malnutrition;risk of dehydration  -MP  risk of aspiration/pneumonia;risk of malnutrition;risk of dehydration  -AC  risk of aspiration/pneumonia  -AV      Rehab Potential/Prognosis, Swallowing  re-evaluate goals as necessary  -MP  re-evaluate goals as necessary  -AC  good, to  achieve stated therapy goals  -AV      Swallow Criteria for Skilled Therapeutic Interventions Met  demonstrates skilled criteria  -MP  demonstrates skilled criteria  -AC  demonstrates skilled criteria  -AV         Recommendations    Therapy Frequency (Swallow)  PRN  -MP  PRN  -AC  evaluation only  -AV      Predicted Duration Therapy Intervention (Days)  until discharge  -MP  until discharge  -AC  --      SLP Diet Recommendation  puree with some mashed;nectar thick liquids;other (see comments) consider NPO if demonstrating s/sxs aspiration  -MP  other (see comments) consider safest (NPO) vs comfort diet (details in summary)  -AC  mechanical soft with no mixed consistencies;nectar thick liquids  -AV      Recommended Diagnostics  reassess via clinical swallow evaluation  -MP  --  reassess via clinical swallow evaluation  -AV      Recommended Precautions and Strategies  upright posture during/after eating;small bites of food and sips of liquid  -MP  --  small bites of food and sips of liquid  -AV      SLP Rec. for Method of Medication Administration  meds crushed;with pudding or applesauce;meds via alternate route  -MP  meds via alternate route vs crushed in puree if on comfort diet  -AC  meds whole;with pudding or applesauce  -AV      Monitor for Signs of Aspiration  yes;notify SLP if any concerns  -MP  --  yes;notify SLP if any concerns  -AV      Anticipated Dischage Disposition  unknown;anticipate therapy at next level of care  -MP  unknown  -AC  unknown  -AV        User Key  (r) = Recorded By, (t) = Taken By, (c) = Cosigned By    Initials Name Effective Dates    AC Cyndi Torrez, MS CCC-SLP 07/27/17 -     AV Queenie Rojo, MS CCC-SLP 04/03/18 -     MP Jae Crystal MS, [facility]-SLP 08/15/18 -           EDUCATION  The patient has been educated in the following areas:   Dysphagia (Swallowing Impairment) Modified Diet Instruction.    SLP Recommendation and Plan  SLP Swallowing Diagnosis: suspected pharyngeal  dysfunction  SLP Diet Recommendation: puree with some mashed, nectar thick liquids, other (see comments)(consider NPO if demonstrating s/sxs aspiration)  Recommended Precautions and Strategies: upright posture during/after eating, small bites of food and sips of liquid     Monitor for Signs of Aspiration: yes, notify SLP if any concerns  Recommended Diagnostics: reassess via clinical swallow evaluation  Swallow Criteria for Skilled Therapeutic Interventions Met: demonstrates skilled criteria  Anticipated Dischage Disposition: unknown, anticipate therapy at next level of care  Rehab Potential/Prognosis, Swallowing: re-evaluate goals as necessary  Therapy Frequency (Swallow): PRN  Predicted Duration Therapy Intervention (Days): until discharge       Plan of Care Reviewed With: patient  Plan of Care Review  Plan of Care Reviewed With: patient    SLP GOALS     Row Name 02/15/19 1000             Oral Nutrition/Hydration Goal 1 (SLP)    Oral Nutrition/Hydration Goal 1, SLP  LTG: Pt will tolerate soft diet & nectar-thick liquids w/o s/sxs distress/discomfort w/ 100% acc w/o cues.  -AC      Time Frame (Oral Nutrition/Hydration Goal 1, SLP)  by discharge  -AC         Oral Nutrition/Hydration Goal 2 (SLP)    Oral Nutrition/Hydration Goal 2, SLP  Pt will tolerate trials of nectar-thick liquid & soft solid w/o s/sxs distress/discomfort w/ 100% acc w/o cues.  -AC      Time Frame (Oral Nutrition/Hydration Goal 2, SLP)  short term goal (STG);by discharge  -AC        User Key  (r) = Recorded By, (t) = Taken By, (c) = Cosigned By    Initials Name Provider Type    AC Cyndi Torrez MS CCC-SLP Speech and Language Pathologist             Time Calculation:   Time Calculation- SLP     Row Name 02/16/19 1104             Time Calculation- SLP    SLP Start Time  1020  -MP      SLP Received On  02/16/19  -        User Key  (r) = Recorded By, (t) = Taken By, (c) = Cosigned By    Initials Name Provider Type    Jae Aponte MS,  CFY-SLP Speech and Language Pathologist          Therapy Charges for Today     Code Description Service Date Service Provider Modifiers Qty    [id number]  ST EVAL ORAL PHARYNG SWALLOW 4 2/16/2019 Jae Crystal MS, CFY-SLP GN 1               Jae Crystal MS, CFY-SLP  2/16/2019

## 2019-02-16 NOTE — PROGRESS NOTES
[facility] Medicine Services  PROGRESS NOTE    Patient Name: Debbie Baum  : 1923  MRN: [id number]    Date of Admission: 2019  Length of Stay: 3  Primary Care Physician: Paula Scott MD    Subjective   Subjective     CC:  Hip fracture, shoulder dislocation/possible fracture, anemia    HPI:  Awake, appears uncomfortable.  Complains of pain in arm and leg.  No changes overnight.    Review of Systems  No headache    Otherwise ROS is negative except as mentioned in the HPI.    Objective   Objective     Vital Signs:   Temp:  [97 °F (36.1 °C)-98.1 °F (36.7 °C)] 98.1 °F (36.7 °C)  Heart Rate:  [56-91] 86  Resp:  [14-20] 18  BP: (120-139)/(53-86) 139/70        Physical Exam:  Constitutional: frail, age appropriate  HENT: NCAT, mucous membranes moist. Bruising to left eye  Respiratory: Clear to auscultation bilaterally, respiratory effort normal   Cardiovascular: RRR, no murmurs, rubs, or gallops  Gastrointestinal: Positive bowel sounds, soft, nontender, nondistended  Musculoskeletal: No bilateral ankle edema  Psychiatric: Appropriate affect, cooperative  Neurologic: Oriented x 3, strength assessment limited by fractures  Skin: No rashes      Results Reviewed:  I have personally reviewed current lab, radiology, and data and agree.    Results from last 7 days   Lab Units 19  0707 02/15/19  1701 02/15/19  1346 02/15/19  0608  19  0514 19  1009   WBC 10*3/mm3 10.50  --   --  10.92*  --  12.74* 10.76   HEMOGLOBIN g/dL 9.2* 10.0* 10.0* 7.7*   < > 7.4* 10.7*   HEMATOCRIT % 27.6* 30.2* 30.6* 24.4*   < > 23.3* 34.1*   PLATELETS 10*3/mm3 171  --   --  165  --  225 312   INR   --   --   --   --   --   --  1.07    < > = values in this interval not displayed.     Results from last 7 days   Lab Units 19  0707 02/15/19  0608 19  1219 19  0514 19  1009   SODIUM mmol/L 127* 135  --  137 139   POTASSIUM mmol/L 3.7 4.0 3.3* 3.2* 3.8   CHLORIDE mmol/L 99 106   --  106 107   CO2 mmol/L 26.0 24.0  --  24.0 27.0   BUN mg/dL 18 23  --  19 24*   CREATININE mg/dL 0.60 0.78  --  0.79 0.86   GLUCOSE mg/dL 98 113*  --  153* 95   CALCIUM mg/dL 8.6* 8.7  --  9.0 9.9   ALT (SGPT) U/L  --   --   --   --  20   AST (SGOT) U/L  --   --   --   --  21     Estimated Creatinine Clearance: 33.1 mL/min (by C-G formula based on SCr of 0.6 mg/dL).    No results found for: BNP    Microbiology Results Abnormal     None          Imaging Results (last 24 hours)     ** No results found for the last 24 hours. **          Results for orders placed during the hospital encounter of 09/17/18   Adult Transthoracic Echo Complete W/ Cont if Necessary Per Protocol    Narrative · Mild-to-moderate mitral valve regurgitation is present          I have reviewed the medications:    Current Facility-Administered Medications:   •  acetaminophen (TYLENOL) tablet 650 mg, 650 mg, Oral, Q4H PRN **OR** acetaminophen (TYLENOL) 160 MG/5ML solution 650 mg, 650 mg, Oral, Q4H PRN **OR** acetaminophen (TYLENOL) suppository 650 mg, 650 mg, Rectal, Q4H PRN, Ariel Maynard MD  •  acetaminophen (TYLENOL) tablet 650 mg, 650 mg, Oral, Q8H, Babita Harmon CRNA, 650 mg at 02/15/19 2155  •  aspirin chewable tablet 81 mg, 81 mg, Oral, BID, Ariel Maynard MD, 81 mg at 02/15/19 2055  •  bisacodyl (DULCOLAX) EC tablet 10 mg, 10 mg, Oral, Daily PRN, Ariel Maynard MD  •  bisacodyl (DULCOLAX) suppository 10 mg, 10 mg, Rectal, Daily PRN, Ariel Myanard MD  •  carvedilol (COREG) tablet 3.125 mg, 3.125 mg, Oral, BID With Meals, Eleuterio Pride MD, 3.125 mg at 02/15/19 1751  •  cholecalciferol (VITAMIN D3) tablet 1,000 Units, 1,000 Units, Oral, Daily, Eleuterio Pride MD, 1,000 Units at 02/15/19 0936  •  docusate sodium (COLACE) capsule 100 mg, 100 mg, Oral, BID PRN, Ariel Maynard MD  •  HYDROcodone-acetaminophen (NORCO) 5-325 MG per tablet 0.5 tablet, 0.5 tablet, Oral, Q6H PRN, Eleuterio Pride MD, 0.5  tablet at 02/15/19 1757  •  HYDROmorphone (DILAUDID) injection 0.5 mg, 0.5 mg, Intravenous, Q2H PRN, 0.5 mg at 02/16/19 0133 **AND** naloxone (NARCAN) injection 0.1 mg, 0.1 mg, Intravenous, Q5 Min PRN, Ariel Maynard MD  •  lactated ringers infusion, 9 mL/hr, Intravenous, Continuous PRN, Colin Castro MD, Last Rate: 9 mL/hr at 02/13/19 1649  •  lansoprazole (FIRST) oral suspension 30 mg, 30 mg, Oral, QAM, Eleuterio Pride MD, 30 mg at 02/16/19 0542  •  lidocaine (LIDODERM) 5 % 1 patch, 1 patch, Transdermal, Q24H, Eleuterio Pride MD, 1 patch at 02/15/19 1754  •  Magnesium Sulfate 2 gram Bolus, followed by 8 gram infusion (total Mg dose 10 grams)- Mg less than or equal to 1mg/dL, 2 g, Intravenous, PRN **OR** Magnesium Sulfate 2 gram / 50mL Infusion (GIVE X 3 BAGS TO EQUAL 6GM TOTAL DOSE) - Mg 1.1 - 1.5 mg/dl, 2 g, Intravenous, PRN **OR** Magnesium Sulfate 4 gram infusion- Mg 1.6-1.9 mg/dL, 4 g, Intravenous, PRN, Eleuterio Pride MD, Last Rate: 25 mL/hr at 02/14/19 1446, 4 g at 02/14/19 1446  •  meloxicam (MOBIC) tablet 7.5 mg, 7.5 mg, Oral, Daily, Eleuterio Pride MD, 7.5 mg at 02/15/19 0936  •  metaxalone (SKELAXIN) tablet 400 mg, 400 mg, Oral, 4x Daily PRN, Babita Harmon CRNA, 400 mg at 02/16/19 0358  •  Morphine sulfate (PF) injection 2 mg, 2 mg, Intravenous, Q4H PRN, Eleuterio Pride MD, 2 mg at 02/15/19 1027  •  ondansetron (ZOFRAN) injection 4 mg, 4 mg, Intravenous, Q6H PRN, Eleuterio Pride MD, 4 mg at 02/13/19 1924  •  ondansetron (ZOFRAN) tablet 4 mg, 4 mg, Oral, Q6H PRN **OR** ondansetron (ZOFRAN) injection 4 mg, 4 mg, Intravenous, Q6H PRN, Ariel Maynard MD  •  ropivacaine (NAROPIN) 0.2% peripheral nerve cath (moog), 8 mL/hr, Peripheral Nerve, Continuous, Colin Georges, CRNA, Last Rate: 8 mL/hr at 02/15/19 1938, 8 mL/hr at 02/15/19 1938  •  [COMPLETED] Insert peripheral IV, , , Once **AND** sodium chloride 0.9 % flush 10 mL, 10 mL, Intravenous, PRN, Soco Hancock  "L, PA  •  sodium chloride 0.9 % flush 3 mL, 3 mL, Intravenous, Q12H, Eleuterio Pride MD, 3 mL at 02/14/19 2102  •  sodium chloride 0.9 % flush 3-10 mL, 3-10 mL, Intravenous, PRN, Eleuterio Pride MD  •  sodium chloride 0.9 % infusion, 50 mL/hr, Intravenous, Continuous, Eleuterio Pride MD, Last Rate: 50 mL/hr at 02/15/19 1329, 50 mL/hr at 02/15/19 1329  •  vitamin B-12 (CYANOCOBALAMIN) tablet 500 mcg, 500 mcg, Oral, Daily, Eleuterio Pride MD, 500 mcg at 02/15/19 0936      Assessment/Plan   Assessment / Plan     Active Hospital Problems    Diagnosis Date Noted   • **Closed fracture of left hip (CMS/HCC) [S72.002A] 02/13/2019   • Humeral head fracture [S42.293A] 02/14/2019   • Shoulder dislocation, recurrent, right [M24.411] 02/14/2019   • Postoperative anemia due to acute blood loss [D62] 02/14/2019   • Dysphagia [R13.10] 02/14/2019   • Falls [W19.XXXA] 02/13/2019   • Periorbital ecchymosis of left eye [S00.12XA] 02/13/2019   • Hematoma [T14.8XXA] 02/13/2019     Left gluteal hematoma     • Atrial fibrillation (CMS/HCC) [I48.91] 04/05/2018   • Essential hypertension [I10] 04/05/2018          Brief Hospital Course to date:  Debbie Baum is a 89 y.o. female w/ hx afib (s/p prior ablation) on coreg and asa, prior right shoulder dislocations, multiple prior falls who was using her walker at nursing facility today when lost balance and fell to left side striking left hip and face. Patient was brought to ER where initial xray hip/pelvis revealed no fracture. However, later while moving legs heard a \"pop\" and had worsening pain and subsequent ct pelvis showed left intertrochanteric fracture and 6cm gluteal & subcutanoeus hematoma. Also has bruising left eye. Ct face negative for fractures. No preceding chest pain or palpitations. Patient went for left intertrochanteric nail the evening of admission (2/13/19) and did will post-operatively. POD #1 hgb dropped to 7.4 (from 10.7 on admission) and received 1 " unit prbc, and also developed right shoulder/arm pain (same shoulder that patient has apparently dislocated in the past). X-ray shoulder and humerus showed anterior dislocation right humeral head and possible humeral head fracture. Subsequent CT right upper extremity showed extensive degenerative changes and mildly comminuted anterior fracture/dislocation of right humeral head and glenoid fractures which, per discussion w/ dr. Maynard, appeared chronic in nature.     *left hip (intertrochanteric) fracture      -s/p left intertrochanteric nail 2/13/19 by dr. Maynard  *left gluteal/subcutaneous hematoma and post op anemia      -s/p 1 unit prbc 2/14/19     -receiving 2nd unit 2/15/19  *right shoulder dislocation/possible fracture  *encephalopathy (due to age, polypharmacy, etc)  *Hx afib  *left periorbital ecchymoses       -ct facial bones no fractures; ice tid  *hx htn  *hx multiple falls    --------------------------------------------------------------------------------------  Plan:    -s/p left intertrochanteric nail 2/13/19 by dr. Maynard; asa 81mg bid for dvt prophylaxis.  F/u with him 2 weeks  -s/p 1 unit prbc 2/14/19 for post op anemia and another unit given 2/15 with good response, will follow  - Reviewed Dr. ritter note re: right shoulder, changes appear to be chronic. Sling for comfort as needed.  WBAT   - sodium worse today  -replace elctrolytes prn  -on level 3 dysphagia diet per SLP recs    - plan will be SNF at discharge.  Given age and extent of injuries, overall prognosis appears poor.  Hopefully will rally some while here and PO intake will , but we will see.  Control pain as best as possible without over sedation.  I will touch base with her grandson Dr. Baum today.      DVT Prophylaxis:  Asa 81mg bid       CODE STATUS:   Code Status and Medical Interventions:   Ordered at: 02/13/19 1302     Limited Support to NOT Include:    Intubation     Code Status:    No CPR     Medical Interventions  (Level of Support Prior to Arrest):    Limited         Electronically signed by Fadi Muir MD, 02/16/19, 10:39 AM.

## 2019-02-16 NOTE — PLAN OF CARE
Problem: Patient Care Overview  Goal: Plan of Care Review  Outcome: Ongoing (interventions implemented as appropriate)      Problem: Fall Risk (Adult)  Goal: Identify Related Risk Factors and Signs and Symptoms  Outcome: Ongoing (interventions implemented as appropriate)   02/16/19 1630   Fall Risk (Adult)   Related Risk Factors (Fall Risk) age-related changes;bladder function altered;confusion/agitation;fatigue/slow reaction;gait/mobility problems;history of falls;polypharmacy;environment unfamiliar       Problem: Fractured Hip (Adult)  Goal: Signs and Symptoms of Listed Potential Problems Will be Absent, Minimized or Managed (Fractured Hip)  Outcome: Ongoing (interventions implemented as appropriate)   02/16/19 1630   Goal/Outcome Evaluation   Problems Assessed (Fractured Hip) acute cognitive dysfunction;pain;pneumonia   Problems Present (Fractured Hip) acute cognitive dysfunction;pain

## 2019-02-16 NOTE — PROGRESS NOTES
[facility]    Acute pain service Inpatient Progress Note    Patient Name: Debbie Baum  :  1923  MRN:  [id number]        Acute Pain  Service Inpatient Progress Note:    Analgesia:Good  LOC: alert and awake  Resp Status: room air  Cardiac: VS stable  Side Effects:None  Catheter Site:clean, dressing intact and dry  Cath type: peripheral nerve cath with ON Q  Infusion rate: 10ml/hr  Catheter Plan:Catheter to remain Insitu and Continue catheter infusion rate unchanged  Comments: Increased ratew to 12 ml/hr for increase in pain

## 2019-02-16 NOTE — PLAN OF CARE
Problem: Patient Care Overview  Goal: Plan of Care Review  Outcome: Ongoing (interventions implemented as appropriate)   02/16/19 1103   Coping/Psychosocial   Plan of Care Reviewed With patient   SLP re-evaluation completed. Will continue to address dysphagia. Please see note for further details and recommendations.

## 2019-02-16 NOTE — PROGRESS NOTES
"  SUBJECTIVE  Patient complaining of pain in hip, back and shoulder this morning.  Appears confused.  States she \"feels she is going to fall\" despite resting in the bed.      OBJECTIVE  Temp (24hrs), Av.7 °F (36.5 °C), Min:97 °F (36.1 °C), Max:98.1 °F (36.7 °C)    Blood pressure 139/70, pulse 86, temperature 98.1 °F (36.7 °C), temperature source Oral, resp. rate 18, height 165.1 cm (65\"), weight 49.9 kg (110 lb), SpO2 (!) 88 %, not currently breastfeeding.    Lab Results (last 24 hours)     Procedure Component Value Units Date/Time    CBC (No Diff) [[id number]] Collected:  19    Specimen:  Blood Updated:  19 0751    Basic Metabolic Panel [[id number]] Collected:  19 0707    Specimen:  Blood Updated:  19 0750    Hemoglobin & Hematocrit, Blood [[id number]]  (Abnormal) Collected:  02/15/19 1701    Specimen:  Blood Updated:  02/15/19 1714     Hemoglobin 10.0 g/dL      Hematocrit 30.2 %     Hemoglobin & Hematocrit, Blood [[id number]]  (Abnormal) Collected:  02/15/19 1346    Specimen:  Blood Updated:  02/15/19 1417     Hemoglobin 10.0 g/dL      Hematocrit 30.6 %             PHYSICAL EXAM  Right upper extremity: Patient has diffuse soft tissue tenderness about the shoulder.  Patient does demonstrate slight pain with range of motion.    Left lower extremity:Dressing intact with slight serosanguinous drainage.  Mild pain with logroll of hip. Intact EHL, FHL, tibialis anterior, and gastrocsoleus. Sensation intact to light touch to deep peroneal, superficial peroneal, sural, saphenous, tibial nerves. 2+ palpable DP and PT pulses.         Closed fracture of left hip (CMS/HCC)    Essential hypertension    Atrial fibrillation (CMS/HCC)    Falls    Periorbital ecchymosis of left eye    Hematoma    Humeral head fracture    Shoulder dislocation, recurrent, right    Postoperative anemia due to acute blood loss    Dysphagia      PLAN / DISPOSITION:  3 Day Post-Op left hip intertrochanteric fracture " fixation    Protected weight bearing as tolerated left lower extremity, hip range of motion as tolerated   Pain control  Postop blood loss anemia-transfuse per primary team.  PT/OT for post op mobilization and medical equipment needs   Right upper extremity: CT of shoulder demonstrates chronic changes with anterior chronic dislocation.   SCD's bilateral lower extremities   Aspirin 81 mg twice a day for DVT prophylaxis   Social work for discharge planning.   Dressing to remain in place for 7 days. May remove on POD#7. If no drainage, may shower on POD#10. No submerging wound in water. If drainage is noted, sterile dressing should be placed and wound checked daily. No showering until wound has remained dry for 72 consecutive hours.   Follow up in 2 weeks for re-assessment.      Ariel Maynard MD  02/16/19  8:04 AM

## 2019-02-17 LAB
ANION GAP SERPL CALCULATED.3IONS-SCNC: 5 MMOL/L (ref 3–11)
BUN BLD-MCNC: 14 MG/DL (ref 9–23)
BUN/CREAT SERPL: 21.5 (ref 7–25)
CALCIUM SPEC-SCNC: 8.9 MG/DL (ref 8.7–10.4)
CHLORIDE SERPL-SCNC: 106 MMOL/L (ref 99–109)
CO2 SERPL-SCNC: 25 MMOL/L (ref 20–31)
CREAT BLD-MCNC: 0.65 MG/DL (ref 0.6–1.3)
GFR SERPL CREATININE-BSD FRML MDRD: 85 ML/MIN/1.73
GLUCOSE BLD-MCNC: 94 MG/DL (ref 70–100)
HCT VFR BLD AUTO: 30.4 % (ref 34.5–44)
HGB BLD-MCNC: 10.1 G/DL (ref 11.5–15.5)
POTASSIUM BLD-SCNC: 3.4 MMOL/L (ref 3.5–5.5)
POTASSIUM BLD-SCNC: 4.2 MMOL/L (ref 3.5–5.5)
SODIUM BLD-SCNC: 136 MMOL/L (ref 132–146)

## 2019-02-17 PROCEDURE — 92610 EVALUATE SWALLOWING FUNCTION: CPT

## 2019-02-17 PROCEDURE — 99233 SBSQ HOSP IP/OBS HIGH 50: CPT | Performed by: INTERNAL MEDICINE

## 2019-02-17 PROCEDURE — 97530 THERAPEUTIC ACTIVITIES: CPT

## 2019-02-17 PROCEDURE — 25010000002 MORPHINE SULFATE (PF) 2 MG/ML SOLUTION: Performed by: INTERNAL MEDICINE

## 2019-02-17 PROCEDURE — 84132 ASSAY OF SERUM POTASSIUM: CPT | Performed by: INTERNAL MEDICINE

## 2019-02-17 PROCEDURE — 25010000002 ROPIVACAINE PER 1 MG: Performed by: NURSE ANESTHETIST, CERTIFIED REGISTERED

## 2019-02-17 PROCEDURE — 80048 BASIC METABOLIC PNL TOTAL CA: CPT | Performed by: INTERNAL MEDICINE

## 2019-02-17 PROCEDURE — 85014 HEMATOCRIT: CPT | Performed by: INTERNAL MEDICINE

## 2019-02-17 PROCEDURE — 25010000002 ONDANSETRON PER 1 MG: Performed by: ORTHOPAEDIC SURGERY

## 2019-02-17 PROCEDURE — 99024 POSTOP FOLLOW-UP VISIT: CPT | Performed by: ORTHOPAEDIC SURGERY

## 2019-02-17 PROCEDURE — 85018 HEMOGLOBIN: CPT | Performed by: INTERNAL MEDICINE

## 2019-02-17 RX ORDER — POTASSIUM CHLORIDE 7.45 MG/ML
10 INJECTION INTRAVENOUS
Status: DISCONTINUED | OUTPATIENT
Start: 2019-02-17 | End: 2019-02-22 | Stop reason: HOSPADM

## 2019-02-17 RX ORDER — ONDANSETRON 4 MG/1
4 TABLET, FILM COATED ORAL EVERY 6 HOURS SCHEDULED
Status: DISPENSED | OUTPATIENT
Start: 2019-02-17 | End: 2019-02-18

## 2019-02-17 RX ORDER — POTASSIUM CHLORIDE 1.5 G/1.77G
40 POWDER, FOR SOLUTION ORAL AS NEEDED
Status: DISCONTINUED | OUTPATIENT
Start: 2019-02-17 | End: 2019-02-22 | Stop reason: HOSPADM

## 2019-02-17 RX ORDER — POTASSIUM CHLORIDE 750 MG/1
40 CAPSULE, EXTENDED RELEASE ORAL AS NEEDED
Status: DISCONTINUED | OUTPATIENT
Start: 2019-02-17 | End: 2019-02-22 | Stop reason: HOSPADM

## 2019-02-17 RX ADMIN — SODIUM CHLORIDE, PRESERVATIVE FREE 3 ML: 5 INJECTION INTRAVENOUS at 21:18

## 2019-02-17 RX ADMIN — ROPIVACAINE HYDROCHLORIDE 10 ML/HR: 2 INJECTION, SOLUTION EPIDURAL; INFILTRATION at 00:02

## 2019-02-17 RX ADMIN — SODIUM CHLORIDE, PRESERVATIVE FREE 3 ML: 5 INJECTION INTRAVENOUS at 08:30

## 2019-02-17 RX ADMIN — ACETAMINOPHEN 650 MG: 325 TABLET ORAL at 14:45

## 2019-02-17 RX ADMIN — POTASSIUM CHLORIDE 40 MEQ: 1.5 POWDER, FOR SOLUTION ORAL at 18:18

## 2019-02-17 RX ADMIN — METAXALONE 400 MG: 800 TABLET ORAL at 12:37

## 2019-02-17 RX ADMIN — CYANOCOBALAMIN TAB 1000 MCG 500 MCG: 1000 TAB at 08:31

## 2019-02-17 RX ADMIN — HYDROCODONE BITARTRATE AND ACETAMINOPHEN 0.5 TABLET: 5; 325 TABLET ORAL at 05:22

## 2019-02-17 RX ADMIN — ONDANSETRON 4 MG: 2 INJECTION INTRAMUSCULAR; INTRAVENOUS at 11:02

## 2019-02-17 RX ADMIN — HYDROCODONE BITARTRATE AND ACETAMINOPHEN 0.5 TABLET: 5; 325 TABLET ORAL at 12:37

## 2019-02-17 RX ADMIN — LIDOCAINE 1 PATCH: 50 PATCH CUTANEOUS at 08:31

## 2019-02-17 RX ADMIN — CARVEDILOL 3.12 MG: 3.12 TABLET, FILM COATED ORAL at 08:31

## 2019-02-17 RX ADMIN — POTASSIUM CHLORIDE 40 MEQ: 1.5 POWDER, FOR SOLUTION ORAL at 14:24

## 2019-02-17 RX ADMIN — LANSOPRAZOLE 30 MG: KIT at 05:24

## 2019-02-17 RX ADMIN — MORPHINE SULFATE 2 MG: 2 INJECTION, SOLUTION INTRAMUSCULAR; INTRAVENOUS at 10:53

## 2019-02-17 RX ADMIN — ACETAMINOPHEN 650 MG: 325 TABLET ORAL at 04:26

## 2019-02-17 RX ADMIN — VITAMIN D, TAB 1000IU (100/BT) 1000 UNITS: 25 TAB at 08:31

## 2019-02-17 RX ADMIN — HYDROCODONE BITARTRATE AND ACETAMINOPHEN 0.5 TABLET: 5; 325 TABLET ORAL at 18:47

## 2019-02-17 RX ADMIN — ASPIRIN 81 MG CHEWABLE TABLET 81 MG: 81 TABLET CHEWABLE at 21:18

## 2019-02-17 RX ADMIN — ASPIRIN 81 MG CHEWABLE TABLET 81 MG: 81 TABLET CHEWABLE at 08:31

## 2019-02-17 RX ADMIN — MELOXICAM 7.5 MG: 7.5 TABLET ORAL at 08:31

## 2019-02-17 RX ADMIN — ACETAMINOPHEN 650 MG: 325 TABLET ORAL at 21:18

## 2019-02-17 RX ADMIN — ACETAMINOPHEN 650 MG: 325 TABLET, FILM COATED ORAL at 14:50

## 2019-02-17 NOTE — TREATMENT PLAN
"Rcvd call from pts nurse, Linn.  She states pt is having intermittent nausea and decreased appetite related to nausea.  States pts Zofran PRN helps but her son insists on her getting the zofran \"scheduled\" as he feels this will help her remain less nauseated and hopefully improve her appetite.  She has been tolerating zofran without difficulty.  No QT prolongation per EKG on file.  I agreed to schedule her po zofran 4 mg q6 hrs x 24 hours only for now.  Nurse notifed.  Will defer to rounding provider to address further tomorrow.      Vitals:    02/17/19 1548   BP: 120/62   Pulse: 73   Resp: 20   Temp: 97 °F (36.1 °C)   SpO2: 96%     EKG: ekg findings- \"normal EKG, normal sinus rhythm\",\"unchanged from previous tracings\".  Vent. Rate : 077 BPM     Atrial Rate : 083 BPM     P-R Int : 000 ms          QRS Dur : 076 ms      QT Int : 288 ms       P-R-T Axes : 000 037 -61 degrees     QTc Int : 325 ms  "

## 2019-02-17 NOTE — PLAN OF CARE
Problem: Patient Care Overview  Goal: Plan of Care Review  Outcome: Ongoing (interventions implemented as appropriate)   02/17/19 8965   Coping/Psychosocial   Plan of Care Reviewed With patient   SLP re-evaluation completed. Will continue to address dysphagia concerns through diet tolerance/POC follow-up/education. Please see note for further details and recommendations.

## 2019-02-17 NOTE — PLAN OF CARE
"Problem: Patient Care Overview  Goal: Plan of Care Review   02/17/19 0025   Coping/Psychosocial   Plan of Care Reviewed With patient   Plan of Care Review   Progress no change   OTHER   Outcome Summary C/o incisional pain managed with PRN norco 1/2 tab, ice pack, ropivocaine at 10ml/hr per anesthesia's note, and IV morphine as needed. Pt still retaining urine, required to be i/o cath twice this shift. Unable to lift LLE, moderate sergei/plantar flexion +2 pulse. Coverderm dressing in place, dried drainage noted. VSS, room air, NSR on tele. Alert and oriented x4, Elim IRA at times, forgetful. BMP/HH in AM. [facility] and Hospitalist following. Cristiana \"Amparo\" Alina [place] contacted regarding PRN bowel meds not able to be crushed, pt unable to swallow pills whole in applesauce, senna-colby added, colace changed to liquid (thickenerr added when administered, pt c/o the medication burned), dulculax PO d/c'd. Possible d/c monday to the [place].          "

## 2019-02-17 NOTE — PLAN OF CARE
Problem: Patient Care Overview  Goal: Plan of Care Review  Outcome: Ongoing (interventions implemented as appropriate)   02/17/19 1410   Coping/Psychosocial   Plan of Care Reviewed With patient   Plan of Care Review   Progress improving   OTHER   Outcome Summary Pt able to perform STS t/f x 6 trials with max A x 2 for first 4 and mod A x2 for last 2. Pt required L UE support and feet and knees blocked as well as verbal cues to encourage forward weight shift and increased hip extension to achieve full, upright standing. Pt limited by pain and weakness. Continue per IPPT POC.

## 2019-02-17 NOTE — THERAPY TREATMENT NOTE
Acute Care - Physical Therapy Treatment Note  [facility]     Patient Name: Debbie Baum  : 1923  MRN: [id number]  Today's Date: 2019  Onset of Illness/Injury or Date of Surgery: 19  Date of Referral to PT: 19  Referring Physician: MD Caesar    Admit Date: 2019    Visit Dx:    ICD-10-CM ICD-9-CM   1. Closed fracture of left hip, initial encounter (CMS/[facility]) S72.002A 820.8   2. Injury of head, initial encounter S09.90XA 959.01   3. Contusion of left orbital tissues, initial encounter S05.12XA 921.2   4. Abrasion of face, initial encounter S00.81XA 910.0   5. Fall, initial encounter W19.XXXA E888.9   6. Impaired functional mobility, balance, gait, and endurance Z74.09 V49.89   7. Impaired mobility and ADLs Z74.09 799.89     Patient Active Problem List   Diagnosis   • Hyponatremia   • Essential hypertension   • Coronary artery disease   • Weakness generalized   • GERD (gastroesophageal reflux disease)   • Atrial fibrillation (CMS/[facility])   • Somnolence   • Medication adverse effect   • Chronic pain   • Closed fracture of left hip (CMS/[facility])   • Falls   • Periorbital ecchymosis of left eye   • Hematoma   • Humeral head fracture   • Shoulder dislocation, recurrent, right   • Postoperative anemia due to acute blood loss   • Dysphagia       Therapy Treatment    Rehabilitation Treatment Summary     Row Name 19 1031 19 1015          Treatment Time/Intention    Discipline  occupational therapist  -AC  physical therapist  -ES     Document Type  therapy note (daily note)  -AC  therapy note (daily note)  -ES     Subjective Information  complains of;pain  -AC  complains of;pain  -ES     Mode of Treatment  occupational therapy  -AC  physical therapy  -ES     Patient/Family Observations  Pt in bed. nerve cath intact  -AC  Pt supine in bed, nerve cath intact, agreeable to therapy  -ES     Patient Effort  good  -AC  good  -ES     Existing Precautions/Restrictions  fall S/P L hip nailing, nerve  cath  -AC  fall;other (see comments) s/p L hip nailing, nerve cath, h/o R shoulder dislocation  -ES     Treatment Considerations/Comments  R anterior fracture/dislocation of humeral head ;NWB RUE, sling when up  (Significant)   -AC  NWB R UE, sling when up  -ES     Recorded by [AC] Chitra Thomas, OT 02/17/19 1118 [ES] Aan Paul, PT 02/17/19 1410     Row Name 02/17/19 1015             Vital Signs    Pre Systolic BP Rehab  155 RN cleared for tx  -ES      Pre Treatment Diastolic BP  65  -ES      Pretreatment Heart Rate (beats/min)  78  -ES      Pre SpO2 (%)  96  -ES      Pre Patient Position  Supine  -ES      Intra Patient Position  Standing  -ES      Post Patient Position  Sitting  -ES      Recorded by [ES] Ana Paul, PT 02/17/19 1410      Row Name 02/17/19 1031 02/17/19 1015          Cognitive Assessment/Intervention- PT/OT    Orientation Status (Cognition)  oriented x 4  -AC  oriented x 4  -ES     Follows Commands (Cognition)  follows one step commands;75-90% accuracy;repetition of directions required;verbal cues/prompting required  -AC  follows one step commands;75-90% accuracy;repetition of directions required;verbal cues/prompting required  -ES     Safety Deficit (Cognitive)  moderate deficit;at risk behavior observed;judgment;problem solving;safety precautions awareness  -AC  moderate deficit;judgment;problem solving;safety precautions awareness;safety precautions follow-through/compliance  -ES     Personal Safety Interventions  fall prevention program maintained;gait belt;nonskid shoes/slippers when out of bed prefers shoes on with attempt to stand  -AC  fall prevention program maintained;gait belt;muscle strengthening facilitated;nonskid shoes/slippers when out of bed prefers shoes on with standing  -ES     Recorded by [AC] Chitra Thomas, OT 02/17/19 1118 [ES] Ana Paul, PT 02/17/19 1410     Row Name 02/17/19 1031             Safety Issues, Functional Mobility    Safety Issues Affecting  Function (Mobility)  at risk behavior observed;judgment;problem solving;safety precaution awareness  -AC      Recorded by [AC] Chitra Thomas, OT 02/17/19 1118      Row Name 02/17/19 1031 02/17/19 1015          Mobility Assessment/Intervention    Extremity Weight-bearing Status  right upper extremity  (Significant)   -AC  --     Right Upper Extremity (Weight-bearing Status)  non weight-bearing (NWB)  (Significant)   -AC  non weight-bearing (NWB)  -ES     Left Lower Extremity (Weight-bearing Status)  weight-bearing as tolerated (WBAT) protected WB  -AC  weight-bearing as tolerated (WBAT) protected WB  -ES     Recorded by [AC] Chitra Thomas, OT 02/17/19 1118 [ES] Ana Paul, PT 02/17/19 1410     Row Name 02/17/19 1015             Bed Mobility Assessment/Treatment    Bed Mobility Assessment/Treatment  rolling left;rolling right  -ES      Rolling Left [place] (Bed Mobility)  dependent (less than 25% patient effort);2 person assist  -ES      Rolling Right [place] (Bed Mobility)  dependent (less than 25% patient effort);2 person assist  -ES      Supine-Sit [place] (Bed Mobility)  not tested  -ES      Sit-Supine [place] (Bed Mobility)  not tested  -ES      Bed Mobility, Safety Issues  decreased use of arms for pushing/pulling;decreased use of legs for bridging/pushing  -ES      Assistive Device (Bed Mobility)  draw sheet;bed rails  -ES      Recorded by [ES] Ana Paul, PT 02/17/19 1410      Row Name 02/17/19 1031             Functional Mobility    Functional Mobility- Ind. Level  not appropriate to assess  -AC      Recorded by [AC] Chitra Thomas, OT 02/17/19 1118      Row Name 02/17/19 1031 02/17/19 1015          Transfer Assessment/Treatment    Transfer Assessment/Treatment  bed-chair transfer;sit-stand transfer;stand-sit transfer  -AC  bed-chair transfer;sit-stand transfer;stand-sit transfer  -ES     Comment (Transfers)  performed 6 STS;  first 4 STS  max A x 2,  last 2 STS mod a x2 arm in  sling, feet and knees blocked  -AC  Performed 6 STS t/f's with max A x 2 for first 4 and then mod A x2 for remaining 2.  V/c's for increased hip extension to achieve upright standing and for forward weight shift due to pt exhibiting posterior lean in standing.  R arm in sling, feet and knees blocked, L UE support on PT  -ES     Recorded by [AC] Chitra Thomas, OT 02/17/19 1118 [ES] Ana Pual, PT 02/17/19 1410     Row Name 02/17/19 1031 02/17/19 1015          Bed-Chair Transfer    Bed-Chair [place] (Transfers)  dependent (less than 25% patient effort)  -AC  dependent (less than 25% patient effort);2 person assist  -ES     Assistive Device (Bed-Chair Transfers)  mechanical lift/aid  -AC  mechanical lift/aid  -ES     Recorded by [AC] Chitra Thomas, OT 02/17/19 1118 [ES] Ana Paul, PT 02/17/19 1410     Row Name 02/17/19 1031 02/17/19 1015          Sit-Stand Transfer    Sit-Stand [place] (Transfers)  verbal cues;maximum assist (25% patient effort);2 person assist mod a x2 last 2 STS  -AC  verbal cues;maximum assist (25% patient effort);2 person assist Progressed to Mod A x 2  -ES     Assistive Device (Sit-Stand Transfers)  other (see comments) gait belt and LUE support, pt using R arm to A despite sling  -AC  other (see comments) gait belt and L UE support  -ES     Recorded by [AC] Chitra Thomas, OT 02/17/19 1118 [ES] Ana Paul, PT 02/17/19 1410     Row Name 02/17/19 1031 02/17/19 1015          Stand-Sit Transfer    Stand-Sit [place] (Transfers)  verbal cues;maximum assist (25% patient effort);2 person assist  -AC  verbal cues;maximum assist (25% patient effort);2 person assist  -ES     Assistive Device (Stand-Sit Transfers)  other (see comments) L UE support and gait belt  -AC  other (see comments) L UE support and gait belt  -ES     Recorded by [AC] Chitra Thomas, OT 02/17/19 1118 [ES] Ana Paul, PT 02/17/19 1410     Row Name 02/17/19 1015             Gait/Stairs  Assessment/Training    [place] Level (Gait)  unable to assess  -ES      Recorded by [ES] Ana Paul, PT 02/17/19 1410      Row Name 02/17/19 1031             ADL Assessment/Intervention    BADL Assessment/Intervention  lower body dressing  -AC      Recorded by [AC] Chitra Thomas, OT 02/17/19 1118      Row Name 02/17/19 1031             Lower Body Dressing Assessment/Training    Lower Body Dressing [place] Level  doff;don;socks;shoes/slippers;dependent (less than 25% patient effort)  -AC      Lower Body Dressing Position  supported sitting  -AC      Recorded by [AC] Chitra Thomas, OT 02/17/19 1118      Row Name 02/17/19 1031             BADL Safety/Performance    Impairments, BADL Safety/Performance  balance;pain;strength;trunk/postural control  -AC      Recorded by [AC] Chitra Thomas, OT 02/17/19 1118      Row Name 02/17/19 1031 02/17/19 1015          Therapeutic Exercise    76766 - PT Therapeutic Exercise Minutes  --  5  -ES     64086 - PT Therapeutic Activity Minutes  --  25  -ES     33739 - OT Therapeutic Exercise Minutes  5  -AC  --     98349 - OT Therapeutic Activity Minutes  10  -AC  --     Recorded by [AC] Chitra Thomas, OT 02/17/19 1118 [ES] Ana Paul, PT 02/17/19 1410     Row Name 02/17/19 1031 02/17/19 1015          Therapeutic Exercise    Upper Extremity Range of Motion (Therapeutic Exercise)  shoulder flexion/extension, left;shoulder horizontal abduction/adduction, left;elbow flexion/extension, bilateral;forearm supination/pronation, bilateral;wrist flexion/extension, bilateral did not range R shld d/t dislocation/fracture  -AC  --     Hand (Therapeutic Exercise)  finger flexion/extension, bilateral  -AC  --     Lower Extremity (Therapeutic Exercise)  --  quad sets, bilateral  -ES     Lower Extremity Range of Motion (Therapeutic Exercise)  --  ankle dorsiflexion/plantar flexion, bilateral  -ES     Exercise Type (Therapeutic Exercise)  AAROM (active assistive range of motion);AROM  (active range of motion)  -AC  AROM (active range of motion);isometric contraction, static  -ES     Position (Therapeutic Exercise)  seated  -AC  seated reclined in chair  -ES     Sets/Reps (Therapeutic Exercise)  1/10  -AC  1/10  -ES     Recorded by [AC] Chitra Thomas, OT 02/17/19 1118 [ES] Ana Paul, PT 02/17/19 1410     Row Name 02/17/19 1015             Balance    Balance  static sitting balance;static standing balance  -ES      Recorded by [ES] Ana Paul, PT 02/17/19 1410      Row Name 02/17/19 1015             Static Sitting Balance    Level of [place] (Unsupported Sitting, Static Balance)  moderate assist, 50 to 74% patient effort progressed to min A sitting edge of chair  -ES      Sitting Position (Unsupported Sitting, Static Balance)  sitting in chair  -ES      Time Able to Maintain Position (Unsupported Sitting, Static Balance)  more than 5 minutes  -ES      Recorded by [ES] Ana Paul, PT 02/17/19 1410      Row Name 02/17/19 1015             Static Standing Balance    Level of [place] (Supported Standing, Static Balance)  maximal assist, 25 to 49% patient effort;2 person assist  -ES      Time Able to Maintain Position (Supported Standing, Static Balance)  15 to 30 seconds  -ES      Assistive Device Utilized (Supported Standing, Static Balance)  other (see comments) knees blocked, gait belt, L UE support  -ES      Recorded by [ES] Ana Paul, PT 02/17/19 1410      Row Name 02/17/19 1031 02/17/19 1015          Positioning and Restraints    Pre-Treatment Position  in bed  -AC  in bed  -ES     Post Treatment Position  chair  -AC  chair  -ES     In Chair  reclined;call light within reach;encouraged to call for assist;exit alarm on;RUE elevated;LLE elevated  -AC  reclined;call light within reach;encouraged to call for assist;exit alarm on;with family/caregiver;with nsg;on mechanical lift sling;LLE elevated;RUE elevated  -ES     Recorded by [AC] Chitra Thomas, OT 02/17/19 1118  [ES] Ana Paul, PT 02/17/19 1410     Row Name 02/17/19 1031 02/17/19 1015          Pain Scale: Numbers Pre/Post-Treatment    Pain Scale: Numbers, Pretreatment  0/10 - no pain  -AC  0/10 - no pain  -ES     Pain Scale: Numbers, Post-Treatment  5/10 10/10 with movement  -AC  5/10  -ES     Pain Location - Side  Left  -AC  Left  -ES     Pain Location - Orientation  --  generalized  -ES     Pain Location  hip  -AC  hip  -ES     Pain Intervention(s)  Repositioned RN gave meds at end ot tx  -AC  Repositioned;Medication (See MAR)  -ES     Recorded by [AC] Chitra Thomas, OT 02/17/19 1118 [ES] Ana Paul, PT 02/17/19 1410     Row Name 02/17/19 1031             Pain Scale: FACES Pre/Post-Treatment    Pain: FACES Scale, Pretreatment  --  -AC      Pain: FACES Scale, Post-Treatment  --  -AC      Recorded by [AC] Chitra Thomas, OT 02/17/19 1118      Row Name                Wound 02/13/19 1019 Left eyebrow abrasion    Wound - Properties Group Date first assessed: 02/13/19 [CH] Time first assessed: 1019 [CH] Present On Admission : yes [CH] Side: Left [CH] Location: eyebrow [CH] Type: abrasion [CH] Recorded by:  [CH] Cally Swanson RN 02/13/19 1020    Row Name                Wound 02/13/19 1855 Left hip incision    Wound - Properties Group Date first assessed: 02/13/19 [SS] Time first assessed: 1855 [SS] Side: Left [SS] Location: hip [SS] Type: incision [SS] Recorded by:  [SS] Bang Love RN 02/13/19 1855    Row Name                Wound 02/16/19 2000 Left lower leg skin tear    Wound - Properties Group Date first assessed: 02/16/19 [JR] Time first assessed: 2000 [JR] Present On Admission : yes [JR] Side: Left [JR] Orientation: lower [JR] Location: leg [JR] Type: skin tear [JR] Additional Comments: steri strips X 4 and mepliex in place, ST V shaped [JR] Recorded by:  [JR] Breonna Bruce RN 02/17/19 0226    Row Name 02/17/19 1031             Outcome Summary/Treatment Plan (OT)    Daily Summary of Progress  (OT)  progress toward functional goals is gradual  -AC      Barriers to Overall Progress (OT)  pain, weakness  -AC      Anticipated Discharge Disposition (OT)  skilled nursing facility  -AC      Recorded by [AC] Chitra Thomas, OT 02/17/19 1118        User Key  (r) = Recorded By, (t) = Taken By, (c) = Cosigned By    Initials Name Effective Dates Discipline    AC Chitra Thomas, OT 06/23/15 -  OT    Cally Mancia RN 06/16/16 -  Nurse    Bang Simon RN 01/15/18 -  --    Breonna Rice RN 09/18/16 -  Nurse    Ana Eden, PT 11/13/17 -  PT          Wound 02/13/19 1019 Left eyebrow abrasion (Active)   Dressing Appearance open to air 2/17/2019 12:37 PM   Base scab 2/17/2019  8:30 AM   Periwound ecchymotic;swelling 2/17/2019  8:30 AM   Periwound Temperature warm 2/17/2019  8:30 AM   Periwound Skin Turgor firm 2/17/2019  8:30 AM   Edges irregular 2/17/2019  8:30 AM   Dressing Care, Wound open to air 2/16/2019  8:53 PM       Wound 02/13/19 1855 Left hip incision (Active)   Dressing Appearance dry;intact 2/17/2019 12:37 PM   Closure INOCENTE 2/17/2019  8:30 AM   Drainage Characteristics/Odor serosanguineous 2/17/2019 12:37 PM   Drainage Amount moderate 2/17/2019 12:37 PM   Dressing Care, Wound border dressing 2/16/2019  8:53 PM       Wound 02/16/19 2000 Left lower leg skin tear (Active)   Dressing Appearance dry;intact 2/17/2019 12:37 PM   Closure Adhesive closure strips 2/17/2019  8:30 AM   Base scab 2/17/2019  8:30 AM   Periwound ecchymotic;redness 2/17/2019  8:30 AM   Periwound Temperature warm 2/17/2019  8:30 AM   Periwound Skin Turgor firm 2/17/2019  8:30 AM   Edges irregular 2/17/2019  8:30 AM   Wound Depth (cm) 0 cm 2/16/2019  8:53 PM   Drainage Amount none 2/17/2019  8:30 AM   Dressing Care, Wound dressing changed;foam;low-adherent 2/17/2019  2:00 AM   Number of Adhesive Closure Strips 4 2/16/2019  8:53 PM           Physical Therapy Education     Title: PT OT SLP Therapies (In Progress)      Topic: Physical Therapy (In Progress)     Point: Mobility training (In Progress)     Learning Progress Summary           Patient Acceptance, E, NR by ES at 2/17/2019  2:10 PM    Acceptance, E, NR by AS at 2/16/2019  3:30 PM    Acceptance, E, NR by AS at 2/15/2019  1:57 PM    Acceptance, E, NR by EJ at 2/14/2019  4:27 PM   Family Acceptance, E, NR by EJ at 2/14/2019  4:27 PM                   Point: Home exercise program (In Progress)     Learning Progress Summary           Patient Acceptance, E, NR by ES at 2/17/2019  2:10 PM    Acceptance, E, NR by AS at 2/16/2019  3:30 PM    Acceptance, E, NR by AS at 2/15/2019  1:57 PM    Acceptance, E, NR by EJ at 2/14/2019  4:27 PM   Family Acceptance, E, NR by EJ at 2/14/2019  4:27 PM                   Point: Body mechanics (In Progress)     Learning Progress Summary           Patient Acceptance, E, NR by ES at 2/17/2019  2:10 PM    Acceptance, E, NR by AS at 2/16/2019  3:30 PM    Acceptance, E, NR by AS at 2/15/2019  1:57 PM    Acceptance, E, NR by EJ at 2/14/2019  4:27 PM   Family Acceptance, E, NR by EJ at 2/14/2019  4:27 PM                   Point: Precautions (In Progress)     Learning Progress Summary           Patient Acceptance, E, NR by ES at 2/17/2019  2:10 PM    Acceptance, E, NR by AS at 2/16/2019  3:30 PM    Acceptance, E, NR by AS at 2/15/2019  1:57 PM    Acceptance, E, NR by EJ at 2/14/2019  4:27 PM   Family Acceptance, E, NR by EJ at 2/14/2019  4:27 PM                               User Key     Initials Effective Dates Name Provider Type Discipline    EJ 11/20/18 -  Nayana Adams, PT Physical Therapist PT    AS 06/22/15 -  Dara Clark, PTA Physical Therapy Assistant PT    ES 11/13/17 -  Ana Paul, PT Physical Therapist PT                PT Recommendation and Plan     Plan of Care Reviewed With: patient  Progress: improving  Outcome Summary: Pt able to perform STS t/f x 6 trials with max A x 2 for first 4 and mod A x2 for last 2.  Pt required  L UE support and feet and knees blocked as well as verbal cues to encourage forward weight shift and increased hip extension to achieve full, upright standing.  Pt limited by pain and weakness.  Continue per IPPT POC.  Outcome Measures     Row Name 02/17/19 1031 02/17/19 1015 02/16/19 1445       How much help from another person do you currently need...    Turning from your back to your side while in flat bed without using bedrails?  --  2  -ES  2  -AS    Moving from lying on back to sitting on the side of a flat bed without bedrails?  --  2  -ES  2  -AS    Moving to and from a bed to a chair (including a wheelchair)?  --  2  -ES  2  -AS    Standing up from a chair using your arms (e.g., wheelchair, bedside chair)?  --  2  -ES  2  -AS    Climbing 3-5 steps with a railing?  --  1  -ES  1  -AS    To walk in hospital room?  --  1  -ES  1  -AS    AM-PAC 6 Clicks Score  --  10  -ES  10  -AS       How much help from another is currently needed...    Putting on and taking off regular lower body clothing?  1  -AC  --  --    Bathing (including washing, rinsing, and drying)  2  -AC  --  --    Toileting (which includes using toilet bed pan or urinal)  1  -AC  --  --    Putting on and taking off regular upper body clothing  2  -AC  --  --    Taking care of personal grooming (such as brushing teeth)  2  -AC  --  --    Eating meals  2  -AC  --  --    Score  10  -AC  --  --       Functional Assessment    Outcome Measure Options  AM-PAC 6 Clicks Daily Activity (OT)  -AC  AM-PAC 6 Clicks Basic Mobility (PT)  -ES  AM-PAC 6 Clicks Basic Mobility (PT)  -AS    Row Name 02/15/19 1303             How much help from another person do you currently need...    Turning from your back to your side while in flat bed without using bedrails?  2  -AS      Moving from lying on back to sitting on the side of a flat bed without bedrails?  2  -AS      Moving to and from a bed to a chair (including a wheelchair)?  2  -AS      Standing up from a chair  using your arms (e.g., wheelchair, bedside chair)?  2  -AS      Climbing 3-5 steps with a railing?  1  -AS      To walk in hospital room?  1  -AS      AM-PAC 6 Clicks Score  10  -AS         Functional Assessment    Outcome Measure Options  AM-PAC 6 Clicks Basic Mobility (PT)  -AS        User Key  (r) = Recorded By, (t) = Taken By, (c) = Cosigned By    Initials Name Provider Type    AC Chitra Thomas, OT Occupational Therapist    AS Dara Clark, PTA Physical Therapy Assistant    ES Ana Paul, PT Physical Therapist         Time Calculation:   PT Charges     Row Name 02/17/19 1015             Time Calculation    Start Time  1015  -ES      PT Received On  02/17/19  -ES      PT Goal Re-Cert Due Date  02/24/19  -ES         Timed Charges    52234 - PT Therapeutic Exercise Minutes  5  -ES      99860 - PT Therapeutic Activity Minutes  25  -ES        User Key  (r) = Recorded By, (t) = Taken By, (c) = Cosigned By    Initials Name Provider Type    ES Ana Paul, PT Physical Therapist        Therapy Suggested Charges     Code   Minutes Charges    34100 (CPT®) Hc Pt Neuromusc Re Education Ea 15 Min      22655 (CPT®) Hc Pt Ther Proc Ea 15 Min 5     63706 (CPT®) Hc Gait Training Ea 15 Min      20773 (CPT®) Hc Pt Therapeutic Act Ea 15 Min 25 2    36833 (CPT®) Hc Pt Manual Therapy Ea 15 Min      84276 (CPT®) Hc Pt Iontophoresis Ea 15 Min      28416 (CPT®) Hc Pt Elec Stim Ea-Per 15 Min      47503 (CPT®) Hc Pt Ultrasound Ea 15 Min      74325 (CPT®) Hc Pt Self Care/Mgmt/Train Ea 15 Min      49901 (CPT®) Hc Pt Prosthetic (S) Train Initial Encounter, Each 15 Min      64767 (CPT®) Hc Pt Orthotic(S)/Prosthetic(S) Encounter, Each 15 Min      85344 (CPT®) Hc Orthotic(S) Mgmt/Train Initial Encounter, Each 15min      Total  30 2        Therapy Charges for Today     Code Description Service Date Service Provider Modifiers Qty    35820802991 HC PT THERAPEUTIC ACT EA 15 MIN 2/17/2019 Ana Paul, PT GP 2          PT  G-Codes  Outcome Measure Options: AM-PAC 6 Clicks Daily Activity (OT)  AM-PAC 6 Clicks Score: 10  Score: 10    Ana Paul, PT  2/17/2019

## 2019-02-17 NOTE — PLAN OF CARE
Problem: Patient Care Overview  Goal: Plan of Care Review  Outcome: Ongoing (interventions implemented as appropriate)   02/17/19 1031   Coping/Psychosocial   Plan of Care Reviewed With patient   Plan of Care Review   Progress improving   OTHER   Outcome Summary Pt is oriented x 4, following ~ 75% direction. Attempted to educate pt in use of sling, and NWB RUE, but pt wanting to use R arm to push up when standing. Pt dependent lift to chair, performed 6 STS max A x 2 first 4, mod a x2 last 2 given LUE support each time. Pt limited by pain and weakness.

## 2019-02-17 NOTE — THERAPY TREATMENT NOTE
Acute Care - Occupational Therapy Treatment Note  [facility]     Patient Name: Debbie Baum  : 1923  MRN: [id number]  Today's Date: 2019  Onset of Illness/Injury or Date of Surgery: 19  Date of Referral to OT: 19  Referring Physician: MD Caesar    Admit Date: 2019       ICD-10-CM ICD-9-CM   1. Closed fracture of left hip, initial encounter (CMS/[facility]) S72.002A 820.8   2. Injury of head, initial encounter S09.90XA 959.01   3. Contusion of left orbital tissues, initial encounter S05.12XA 921.2   4. Abrasion of face, initial encounter S00.81XA 910.0   5. Fall, initial encounter W19.XXXA E888.9   6. Impaired functional mobility, balance, gait, and endurance Z74.09 V49.89   7. Impaired mobility and ADLs Z74.09 799.89     Patient Active Problem List   Diagnosis   • Hyponatremia   • Essential hypertension   • Coronary artery disease   • Weakness generalized   • GERD (gastroesophageal reflux disease)   • Atrial fibrillation (CMS/HCC)   • Somnolence   • Medication adverse effect   • Chronic pain   • Closed fracture of left hip (CMS/HCC)   • Falls   • Periorbital ecchymosis of left eye   • Hematoma   • Humeral head fracture   • Shoulder dislocation, recurrent, right   • Postoperative anemia due to acute blood loss   • Dysphagia     Past Medical History:   Diagnosis Date   • Atrial fibrillation (CMS/HCC)    • Cancer (CMS/HCC)     colon   • Coronary artery disease    • GERD (gastroesophageal reflux disease)    • Hypertension      Past Surgical History:   Procedure Laterality Date   • CARDIAC ABLATION     • CHOLECYSTECTOMY     • COLON SURGERY      BOWEL RESECTION FOR HX COLON CANCER   • HIP INTERTROCHANTERIC NAILING Left 2019    Procedure: HIP INTERTROCHANTERIC NAILING LEFT;  Surgeon: Ariel Maynard MD;  Location: [facility];  Service: Orthopedics   • HYSTERECTOMY     • REPLACEMENT TOTAL KNEE         Therapy Treatment    Rehabilitation Treatment Summary     Row Name 19 1031              Treatment Time/Intention    Discipline  occupational therapist  -AC      Document Type  therapy note (daily note)  -AC      Subjective Information  complains of;pain  -AC      Mode of Treatment  occupational therapy  -AC      Patient/Family Observations  Pt in bed. nerve cath intact  -AC      Patient Effort  good  -AC      Existing Precautions/Restrictions  fall S/P L hip nailing, nerve cath  -AC      Treatment Considerations/Comments  R anterior fracture/dislocation of humeral head ;NWB RUE, sling when up  (Significant)   -AC      Recorded by [AC] Chitra Thomas, OT 02/17/19 1118      Row Name 02/17/19 1031             Cognitive Assessment/Intervention- PT/OT    Orientation Status (Cognition)  oriented x 4  -AC      Follows Commands (Cognition)  follows one step commands;75-90% accuracy;repetition of directions required;verbal cues/prompting required  -AC      Safety Deficit (Cognitive)  moderate deficit;at risk behavior observed;judgment;problem solving;safety precautions awareness  -AC      Personal Safety Interventions  fall prevention program maintained;gait belt;nonskid shoes/slippers when out of bed prefers shoes on with attempt to stand  -AC      Recorded by [AC] Chitra Thomas, OT 02/17/19 1118      Row Name 02/17/19 1031             Safety Issues, Functional Mobility    Safety Issues Affecting Function (Mobility)  at risk behavior observed;judgment;problem solving;safety precaution awareness  -AC      Recorded by [AC] Chitra Thomas, OT 02/17/19 1118      Row Name 02/17/19 1031             Mobility Assessment/Intervention    Extremity Weight-bearing Status  right upper extremity  (Significant)   -AC      Right Upper Extremity (Weight-bearing Status)  non weight-bearing (NWB)  (Significant)   -AC      Left Lower Extremity (Weight-bearing Status)  weight-bearing as tolerated (WBAT) protected WB  -AC      Recorded by [AC] Chitra Thomas, OT 02/17/19 1118      Row Name 02/17/19 1031              Functional Mobility    Functional Mobility- Ind. Level  not appropriate to assess  -AC      Recorded by [AC] Chitra Thomas, OT 02/17/19 1118      Row Name 02/17/19 1031             Transfer Assessment/Treatment    Transfer Assessment/Treatment  bed-chair transfer;sit-stand transfer;stand-sit transfer  -AC      Comment (Transfers)  performed 6 STS;  first 4 STS  max A x 2,  last 2 STS mod a x2 arm in sling, feet and knees blocked  -AC      Recorded by [AC] Chitra Thomas, OT 02/17/19 1118      Row Name 02/17/19 1031             Bed-Chair Transfer    Bed-Chair Bureau (Transfers)  dependent (less than 25% patient effort)  -AC      Assistive Device (Bed-Chair Transfers)  mechanical lift/aid  -AC      Recorded by [AC] Chitra Thomas, OT 02/17/19 1118      Row Name 02/17/19 1031             Sit-Stand Transfer    Sit-Stand Bureau (Transfers)  verbal cues;maximum assist (25% patient effort);2 person assist mod a x2 last 2 STS  -AC      Assistive Device (Sit-Stand Transfers)  other (see comments) gait belt and LUE support, pt using R arm to A despite sling  -AC      Recorded by [AC] Chitra Thomas, OT 02/17/19 1118      Row Name 02/17/19 1031             Stand-Sit Transfer    Stand-Sit Bureau (Transfers)  verbal cues;maximum assist (25% patient effort);2 person assist  -AC      Assistive Device (Stand-Sit Transfers)  other (see comments) L UE support and gait belt  -AC      Recorded by [AC] Chitra Thomas, OT 02/17/19 1118      Row Name 02/17/19 1031             ADL Assessment/Intervention    BADL Assessment/Intervention  lower body dressing  -AC      Recorded by [AC] Chitra Thomas, OT 02/17/19 1118      Row Name 02/17/19 1031             Lower Body Dressing Assessment/Training    Lower Body Dressing Bureau Level  doff;don;socks;shoes/slippers;dependent (less than 25% patient effort)  -AC      Lower Body Dressing Position  supported sitting  -AC      Recorded by [AC] Chitra Thomas, OT 02/17/19 1118       Row Name 02/17/19 1031             BADL Safety/Performance    Impairments, BADL Safety/Performance  balance;pain;strength;trunk/postural control  -AC      Recorded by [AC] Chitra Thomas, OT 02/17/19 1118      Row Name 02/17/19 1031             Therapeutic Exercise    14570 - OT Therapeutic Exercise Minutes  5  -AC      37498 - OT Therapeutic Activity Minutes  10  -AC      Recorded by [AC] Chitra Thomas, OT 02/17/19 1118      Row Name 02/17/19 1031             Therapeutic Exercise    Upper Extremity Range of Motion (Therapeutic Exercise)  shoulder flexion/extension, left;shoulder horizontal abduction/adduction, left;elbow flexion/extension, bilateral;forearm supination/pronation, bilateral;wrist flexion/extension, bilateral did not range R shld d/t dislocation/fracture  -AC      Hand (Therapeutic Exercise)  finger flexion/extension, bilateral  -AC      Exercise Type (Therapeutic Exercise)  AAROM (active assistive range of motion);AROM (active range of motion)  -AC      Position (Therapeutic Exercise)  seated  -AC      Sets/Reps (Therapeutic Exercise)  1/10  -AC      Recorded by [AC] Chitra Thomas, OT 02/17/19 1118      Row Name 02/17/19 1031             Positioning and Restraints    Pre-Treatment Position  in bed  -AC      Post Treatment Position  chair  -AC      In Chair  reclined;call light within reach;encouraged to call for assist;exit alarm on;RUE elevated;LLE elevated  -AC      Recorded by [AC] Chitra Thomas, OT 02/17/19 1118      Row Name 02/17/19 1031             Pain Scale: Numbers Pre/Post-Treatment    Pain Scale: Numbers, Pretreatment  0/10 - no pain  -AC      Pain Scale: Numbers, Post-Treatment  5/10 10/10 with movement  -AC      Pain Location - Side  Left  -AC      Pain Location  hip  -AC      Pain Intervention(s)  Repositioned RN gave meds at end ot tx  -AC      Recorded by [AC] Chitra Thomas, OT 02/17/19 1118      Row Name 02/17/19 1031             Pain Scale: FACES Pre/Post-Treatment     Pain: FACES Scale, Pretreatment  --  -AC      Pain: FACES Scale, Post-Treatment  --  -AC      Recorded by [AC] Chitra Thomas, OT 02/17/19 1118      Row Name                Wound 02/13/19 1019 Left eyebrow abrasion    Wound - Properties Group Date first assessed: 02/13/19 [CH] Time first assessed: 1019 [CH] Present On Admission : yes [CH] Side: Left [CH] Location: eyebrow [CH] Type: abrasion [CH] Recorded by:  [CH] Cally Swanson RN 02/13/19 1020    Row Name                Wound 02/13/19 1855 Left hip incision    Wound - Properties Group Date first assessed: 02/13/19 [SS] Time first assessed: 1855 [SS] Side: Left [SS] Location: hip [SS] Type: incision [SS] Recorded by:  [SS] Bang Love RN 02/13/19 1855    Row Name                Wound 02/16/19 2000 Left lower leg skin tear    Wound - Properties Group Date first assessed: 02/16/19 [JR] Time first assessed: 2000 [JR] Present On Admission : yes [JR] Side: Left [JR] Orientation: lower [JR] Location: leg [JR] Type: skin tear [JR] Additional Comments: steri strips X 4 and mepliex in place, ST V shaped [JR] Recorded by:  [] Breonna Bruce RN 02/17/19 0226    Row Name 02/17/19 1031             Outcome Summary/Treatment Plan (OT)    Daily Summary of Progress (OT)  progress toward functional goals is gradual  -AC      Barriers to Overall Progress (OT)  pain, weakness  -AC      Anticipated Discharge Disposition (OT)  skilled nursing facility  -AC      Recorded by [AC] Chitra Thomas, OT 02/17/19 1118        User Key  (r) = Recorded By, (t) = Taken By, (c) = Cosigned By    Initials Name Effective Dates Discipline    AC Chitra Thomas, OT 06/23/15 -  OT    Cally Mancia RN 06/16/16 -  Nurse    Bang Smion RN 01/15/18 -  --    Breonna Rice RN 09/18/16 -  Nurse        Wound 02/13/19 1019 Left eyebrow abrasion (Active)   Dressing Appearance open to air 2/16/2019  8:53 PM   Base scab 2/16/2019  8:53 PM   Periwound ecchymotic;swelling  2/16/2019  8:53 PM   Periwound Temperature warm 2/16/2019  8:53 PM   Periwound Skin Turgor firm 2/16/2019  8:53 PM   Edges irregular 2/16/2019  8:53 PM   Dressing Care, Wound open to air 2/16/2019  8:53 PM       Wound 02/13/19 1855 Left hip incision (Active)   Dressing Appearance dried drainage 2/16/2019  8:53 PM   Closure INOCENTE 2/16/2019  8:53 PM   Drainage Characteristics/Odor serosanguineous 2/16/2019  8:53 PM   Drainage Amount moderate 2/16/2019  8:53 PM   Dressing Care, Wound border dressing 2/16/2019  8:53 PM       Wound 02/16/19 2000 Left lower leg skin tear (Active)   Dressing Appearance dry;intact 2/16/2019  8:53 PM   Closure Adhesive closure strips 2/16/2019  8:53 PM   Base scab 2/16/2019  8:53 PM   Periwound ecchymotic;redness 2/16/2019  8:53 PM   Periwound Temperature warm 2/16/2019  8:53 PM   Periwound Skin Turgor firm 2/16/2019  8:53 PM   Edges irregular 2/16/2019  8:53 PM   Wound Depth (cm) 0 cm 2/16/2019  8:53 PM   Drainage Amount none 2/16/2019  8:53 PM   Dressing Care, Wound dressing changed;foam;low-adherent 2/17/2019  2:00 AM   Number of Adhesive Closure Strips 4 2/16/2019  8:53 PM       Occupational Therapy Education     Title: PT OT SLP Therapies (In Progress)     Topic: Occupational Therapy (In Progress)     Point: ADL training (In Progress)     Description: Instruct learner(s) on proper safety adaptation and remediation techniques during self care or transfers.   Instruct in proper use of assistive devices.    Learning Progress Summary           Patient Acceptance, E, NR by AC at 2/17/2019 11:18 AM    Comment:  use of RUE sling with transfers, NWB RUE    [place], E,D, NR by AR at 2/14/2019  1:20 PM                   Point: Precautions (In Progress)     Description: Instruct learner(s) on prescribed precautions during self-care and functional transfers.    Learning Progress Summary           Patient Eager, E,D, NR by AR at 2/14/2019  1:20 PM                   Point: Body mechanics (In Progress)      Description: Instruct learner(s) on proper positioning and spine alignment during self-care, functional mobility activities and/or exercises.    Learning Progress Summary           Patient Tylerer, E,D, NR by AR at 2/14/2019  1:20 PM                               User Key     Initials Effective Dates Name Provider Type Discipline     06/23/15 -  Chitra Thomas, OT Occupational Therapist OT    AR 06/22/15 -  Kandi Merritt, OT Occupational Therapist OT                OT Recommendation and Plan  Outcome Summary/Treatment Plan (OT)  Daily Summary of Progress (OT): progress toward functional goals is gradual  Barriers to Overall Progress (OT): pain, weakness  Anticipated Discharge Disposition (OT): skilled nursing facility  Daily Summary of Progress (OT): progress toward functional goals is gradual  Plan of Care Review  Plan of Care Reviewed With: patient  Plan of Care Reviewed With: patient  Outcome Summary: Pt is oriented x 4, following ~ 75% direction. Attempted to educate pt in use of sling, and NWB RUE, but pt wanting to use R arm to push up when standing.  Pt dependent lift to chair, performed 6 STS max A x 2 first 4,  mod a x2 last 2 given LUE support each time. Pt limited by pain and weakness.   Outcome Measures     Row Name 02/17/19 1031 02/16/19 1445 02/15/19 1303       How much help from another person do you currently need...    Turning from your back to your side while in flat bed without using bedrails?  --  2  -AS  2  -AS    Moving from lying on back to sitting on the side of a flat bed without bedrails?  --  2  -AS  2  -AS    Moving to and from a bed to a chair (including a wheelchair)?  --  2  -AS  2  -AS    Standing up from a chair using your arms (e.g., wheelchair, bedside chair)?  --  2  -AS  2  -AS    Climbing 3-5 steps with a railing?  --  1  -AS  1  -AS    To walk in hospital room?  --  1  -AS  1  -AS    AM-PAC 6 Clicks Score  --  10  -AS  10  -AS       How much help from another is  currently needed...    Putting on and taking off regular lower body clothing?  1  -AC  --  --    Bathing (including washing, rinsing, and drying)  2  -AC  --  --    Toileting (which includes using toilet bed pan or urinal)  1  -AC  --  --    Putting on and taking off regular upper body clothing  2  -AC  --  --    Taking care of personal grooming (such as brushing teeth)  2  -AC  --  --    Eating meals  2  -AC  --  --    Score  10  -AC  --  --       Functional Assessment    Outcome Measure Options  AM-[facility] 6 Clicks Daily Activity (OT)  -  AM-[facility] 6 Clicks Basic Mobility (PT)  -AS  AM-[facility] 6 Clicks Basic Mobility (PT)  -AS    Row Name 02/14/19 1325 02/14/19 1320          How much help from another person do you currently need...    Turning from your back to your side while in flat bed without using bedrails?  1  -EJ  --     Moving from lying on back to sitting on the side of a flat bed without bedrails?  1  -EJ  --     Moving to and from a bed to a chair (including a wheelchair)?  1  -EJ  --     Standing up from a chair using your arms (e.g., wheelchair, bedside chair)?  1  -EJ  --     Climbing 3-5 steps with a railing?  1  -EJ  --     To walk in hospital room?  1  -EJ  --     AM-[facility] 6 Clicks Score  6  -EJ  --        How much help from another is currently needed...    Putting on and taking off regular lower body clothing?  --  1  -AR     Bathing (including washing, rinsing, and drying)  --  1  -AR     Toileting (which includes using toilet bed pan or urinal)  --  1  -AR     Putting on and taking off regular upper body clothing  --  1  -AR     Taking care of personal grooming (such as brushing teeth)  --  2  -AR     Eating meals  --  2  -AR     Score  --  8  -AR        Functional Assessment    Outcome Measure Options  AM-[facility] 6 Clicks Basic Mobility (PT)  -EJ  -[facility] 6 Clicks Daily Activity (OT)  -AR       User Key  (r) = Recorded By, (t) = Taken By, (c) = Cosigned By    Initials Name Provider Type    Chitra Rodarte,  OT Occupational Therapist    Nayana Kimball, PT Physical Therapist    Kandi Villalobos, OT Occupational Therapist    AS Dara Clark, PTA Physical Therapy Assistant           Time Calculation:   Time Calculation- OT     Row Name 02/17/19 1031             Time Calculation- OT    OT Start Time  1031  -AC      OT Received On  02/17/19  -      OT Goal Re-Cert Due Date  02/24/19  -         Timed Charges    22796 - OT Therapeutic Exercise Minutes  5  -AC      59087 - OT Therapeutic Activity Minutes  10  -AC        User Key  (r) = Recorded By, (t) = Taken By, (c) = Cosigned By    Initials Name Provider Type    AC Chitra Thomas, OT Occupational Therapist           Therapy Suggested Charges     Code   Minutes Charges    49827 (CPT®) Hc Ot Neuromusc Re Education Ea 15 Min      63470 (CPT®) Hc Ot Ther Proc Ea 15 Min 5     56608 (CPT®) Hc Ot Therapeutic Act Ea 15 Min 10 1    66702 (CPT®) Hc Ot Manual Therapy Ea 15 Min      84213 (CPT®) Hc Ot Iontophoresis Ea 15 Min      66687 (CPT®) Hc Ot Elec Stim Ea-Per 15 Min      31699 (CPT®) Hc Ot Ultrasound Ea 15 Min      66806 (CPT®) Hc Ot Self Care/Mgmt/Train Ea 15 Min      Total  15 1        Therapy Charges for Today     Code Description Service Date Service Provider Modifiers Qty    03014938029 HC OT THERAPEUTIC ACT EA 15 MIN 2/17/2019 Chitra Thomas, OT GO 1               Chitra Thomas OT  2/17/2019

## 2019-02-17 NOTE — PROGRESS NOTES
"  SUBJECTIVE  No complaints this morning.  Resting comfortably in bed.  Getting ready to eat breakfast.    OBJECTIVE  Temp (24hrs), Av.5 °F (36.9 °C), Min:97.3 °F (36.3 °C), Max:99.5 °F (37.5 °C)    Blood pressure (!) 183/93, pulse 80, temperature 98.4 °F (36.9 °C), temperature source Oral, resp. rate 20, height 165.1 cm (65\"), weight 49.9 kg (110 lb), SpO2 93 %, not currently breastfeeding.    Lab Results (last 24 hours)     Procedure Component Value Units Date/Time    Basic Metabolic Panel [[id number]]  (Abnormal) Collected:  19    Specimen:  Blood Updated:  19     Glucose 94 mg/dL      BUN 14 mg/dL      Creatinine 0.65 mg/dL      Sodium 136 mmol/L      Potassium 3.4 mmol/L      Chloride 106 mmol/L      CO2 25.0 mmol/L      Calcium 8.9 mg/dL      eGFR Non African Amer 85 mL/min/1.73      BUN/Creatinine Ratio 21.5     Anion Gap 5.0 mmol/L     Narrative:       [employer] Guidelines    Stage     Description        GFR  1         Normal or High     90+  2         Mild decrease      60-89  3         Moderate decrease  30-59  4         Severe decrease    15-29  5         Kidney failure     <15    The MDRD GFR formula is only valid for adults with stable renal function between ages 18 and 70.    Hemoglobin & Hematocrit, Blood [[id number]]  (Abnormal) Collected:  19    Specimen:  Blood Updated:  19     Hemoglobin 10.1 g/dL      Hematocrit 30.4 %             PHYSICAL EXAM  Left lower extremity: Dressing is clean and dry.  Sensation and motor intact in the foot.         Closed fracture of left hip (CMS/HCC)    Essential hypertension    Atrial fibrillation (CMS/HCC)    Falls    Periorbital ecchymosis of left eye    Hematoma    Humeral head fracture    Shoulder dislocation, recurrent, right    Postoperative anemia due to acute blood loss    Dysphagia      PLAN / DISPOSITION:  4 Day Post-Op left hip intertrochanteric fracture fixation    Protected weight bearing as " tolerated left lower extremity, hip range of motion as tolerated   Pain control  Postop blood loss anemia-responded to transfusion.  PT/OT for post op mobilization and medical equipment needs   Right upper extremity: CT of shoulder demonstrates chronic changes with anterior chronic dislocation.   SCD's bilateral lower extremities   Aspirin 81 mg twice a day for DVT prophylaxis   Social work for discharge planning.   Dressing to remain in place for 7 days. May remove on POD#7. If no drainage, may shower on POD#10. No submerging wound in water. If drainage is noted, sterile dressing should be placed and wound checked daily. No showering until wound has remained dry for 72 consecutive hours.   Follow up in 2 weeks for re-assessment.    Covering for Dr Caesar Lubin MD  02/17/19  8:55 AM

## 2019-02-17 NOTE — PROGRESS NOTES
[facility] Medicine Services  PROGRESS NOTE    Patient Name: Debbie Baum  : 1923  MRN: [id number]    Date of Admission: 2019  Length of Stay: 4  Primary Care Physician: Paula Scott MD    Subjective   Subjective     CC:  Hip fracture, shoulder dislocation/possible fracture, anemia    HPI:  Son and daughter at bedside.  Only eating few bites of meals.  Drinking a little.  Pain at time and gets meds.  Currently sleeping.    Review of Systems  Gen- No fevers, chills  CV- No chest pain, palpitations  Resp- No cough, dyspnea  GI- No N/V/D, abd pain    Otherwise ROS is negative except as mentioned in the HPI.    Objective   Objective     Vital Signs:   Temp:  [97.3 °F (36.3 °C)-99.5 °F (37.5 °C)] 97.9 °F (36.6 °C)  Heart Rate:  [75-83] 75  Resp:  [16-20] 20  BP: (112-194)/() 151/94        Physical Exam:  Constitutional: frail, age appropriate, sleeping but arousable  HENT: NCAT, mucous membranes moist. Bruising to left eye  Respiratory: Clear to auscultation bilaterally, respiratory effort normal   Cardiovascular: RRR, no murmurs, rubs, or gallops  Gastrointestinal: Positive bowel sounds, soft, nontender, nondistended  Musculoskeletal: No bilateral ankle edema  Psychiatric: Appropriate affect, cooperative  Neurologic: resting, did not wake, no deficits noted  Skin: No rashes      Results Reviewed:  I have personally reviewed current lab, radiology, and data and agree.    Results from last 7 days   Lab Units 19  0625 19  0707 02/15/19  1701  02/15/19  0608  19  0514 19  1009   WBC 10*3/mm3  --  10.50  --   --  10.92*  --  12.74* 10.76   HEMOGLOBIN g/dL 10.1* 9.2* 10.0*   < > 7.7*   < > 7.4* 10.7*   HEMATOCRIT % 30.4* 27.6* 30.2*   < > 24.4*   < > 23.3* 34.1*   PLATELETS 10*3/mm3  --  171  --   --  165  --  225 312   INR   --   --   --   --   --   --   --  1.07    < > = values in this interval not displayed.     Results from last 7 days   Lab Units  02/17/19  0625 02/16/19  0707 02/15/19  0608  02/13/19  1009   SODIUM mmol/L 136 127* 135   < > 139   POTASSIUM mmol/L 3.4* 3.7 4.0   < > 3.8   CHLORIDE mmol/L 106 99 106   < > 107   CO2 mmol/L 25.0 26.0 24.0   < > 27.0   BUN mg/dL 14 18 23   < > 24*   CREATININE mg/dL 0.65 0.60 0.78   < > 0.86   GLUCOSE mg/dL 94 98 113*   < > 95   CALCIUM mg/dL 8.9 8.6* 8.7   < > 9.9   ALT (SGPT) U/L  --   --   --   --  20   AST (SGOT) U/L  --   --   --   --  21    < > = values in this interval not displayed.     Estimated Creatinine Clearance: 33.1 mL/min (by C-G formula based on SCr of 0.65 mg/dL).    No results found for: BNP    Microbiology Results Abnormal     None          Imaging Results (last 24 hours)     ** No results found for the last 24 hours. **          Results for orders placed during the hospital encounter of 09/17/18   Adult Transthoracic Echo Complete W/ Cont if Necessary Per Protocol    Narrative · Mild-to-moderate mitral valve regurgitation is present          I have reviewed the medications:    Current Facility-Administered Medications:   •  acetaminophen (TYLENOL) tablet 650 mg, 650 mg, Oral, Q4H PRN, 650 mg at 02/17/19 1450 **OR** acetaminophen (TYLENOL) 160 MG/5ML solution 650 mg, 650 mg, Oral, Q4H PRN **OR** acetaminophen (TYLENOL) suppository 650 mg, 650 mg, Rectal, Q4H PRN, Ariel Maynard MD  •  acetaminophen (TYLENOL) tablet 650 mg, 650 mg, Oral, Q8H, Babita Harmon CRNA, 650 mg at 02/17/19 0426  •  aspirin chewable tablet 81 mg, 81 mg, Oral, BID, Ariel Maynard MD, 81 mg at 02/17/19 0831  •  bisacodyl (DULCOLAX) suppository 10 mg, 10 mg, Rectal, Daily PRN, Ariel Maynard MD  •  carvedilol (COREG) tablet 3.125 mg, 3.125 mg, Oral, BID With Meals, Eleuterio Pride MD, 3.125 mg at 02/17/19 0831  •  cholecalciferol (VITAMIN D3) tablet 1,000 Units, 1,000 Units, Oral, Daily, Eleuterio Pride MD, 1,000 Units at 02/17/19 0831  •  docusate sodium (COLACE) liquid 100 mg, 100 mg, Oral, BID  PRN, Cristiana Cruz, APRN, 100 mg at 02/16/19 2053  •  HYDROcodone-acetaminophen (NORCO) 5-325 MG per tablet 0.5 tablet, 0.5 tablet, Oral, Q6H PRN, Eleuterio Pride MD, 0.5 tablet at 02/17/19 1237  •  HYDROmorphone (DILAUDID) injection 0.5 mg, 0.5 mg, Intravenous, Q2H PRN, 0.5 mg at 02/16/19 0133 **AND** naloxone (NARCAN) injection 0.1 mg, 0.1 mg, Intravenous, Q5 Min PRN, Ariel Maynard MD  •  lactated ringers infusion, 9 mL/hr, Intravenous, Continuous PRN, Colin Castro MD, Last Rate: 9 mL/hr at 02/13/19 1649  •  lansoprazole (FIRST) oral suspension 30 mg, 30 mg, Oral, QAM, Eleuterio Pride MD, 30 mg at 02/17/19 0524  •  lidocaine (LIDODERM) 5 % 1 patch, 1 patch, Transdermal, Q24H, Eleuterio Pride MD, 1 patch at 02/17/19 0831  •  Magnesium Sulfate 2 gram Bolus, followed by 8 gram infusion (total Mg dose 10 grams)- Mg less than or equal to 1mg/dL, 2 g, Intravenous, PRN **OR** Magnesium Sulfate 2 gram / 50mL Infusion (GIVE X 3 BAGS TO EQUAL 6GM TOTAL DOSE) - Mg 1.1 - 1.5 mg/dl, 2 g, Intravenous, PRN **OR** Magnesium Sulfate 4 gram infusion- Mg 1.6-1.9 mg/dL, 4 g, Intravenous, PRN, Eleuterio Pride MD, Last Rate: 25 mL/hr at 02/14/19 1446, 4 g at 02/14/19 1446  •  meloxicam (MOBIC) tablet 7.5 mg, 7.5 mg, Oral, Daily, Eleuterio Pride MD, 7.5 mg at 02/17/19 0831  •  metaxalone (SKELAXIN) tablet 400 mg, 400 mg, Oral, 4x Daily PRN, Babita Harmon CRNA, 400 mg at 02/17/19 1237  •  Morphine sulfate (PF) injection 2 mg, 2 mg, Intravenous, Q4H PRN, Eleuterio Pride MD, 2 mg at 02/17/19 1053  •  ondansetron (ZOFRAN) injection 4 mg, 4 mg, Intravenous, Q6H PRN, Eleuterio Pride MD, 4 mg at 02/16/19 1744  •  ondansetron (ZOFRAN) tablet 4 mg, 4 mg, Oral, Q6H PRN **OR** ondansetron (ZOFRAN) injection 4 mg, 4 mg, Intravenous, Q6H PRN, Ariel Maynard MD, 4 mg at 02/17/19 1102  •  potassium chloride (MICRO-K) CR capsule 40 mEq, 40 mEq, Oral, PRN **OR** potassium chloride (KLOR-CON)  packet 40 mEq, 40 mEq, Oral, PRN, 40 mEq at 02/17/19 1424 **OR** potassium chloride 10 mEq in 100 mL IVPB, 10 mEq, Intravenous, Q1H PRN, Fadi Muir MD  •  ropivacaine (NAROPIN) 0.2% peripheral nerve cath (moog), 8 mL/hr, Peripheral Nerve, Continuous, Colin Georges, CRNA, Last Rate: 10 mL/hr at 02/17/19 0002, 10 mL/hr at 02/17/19 0002  •  sennosides-docusate sodium (SENOKOT-S) 8.6-50 MG tablet 2 tablet, 2 tablet, Oral, Nightly PRN, Cristiana Cruz APRN, 2 tablet at 02/16/19 2053  •  [COMPLETED] Insert peripheral IV, , , Once **AND** sodium chloride 0.9 % flush 10 mL, 10 mL, Intravenous, PRN, Soco Hancock PA  •  sodium chloride 0.9 % flush 3 mL, 3 mL, Intravenous, Q12H, Eleuterio Pride MD, 3 mL at 02/17/19 0830  •  sodium chloride 0.9 % flush 3-10 mL, 3-10 mL, Intravenous, PRN, Eleuterio Pride MD  •  vitamin B-12 (CYANOCOBALAMIN) tablet 500 mcg, 500 mcg, Oral, Daily, Eleuterio Pride MD, 500 mcg at 02/17/19 0831      Assessment/Plan   Assessment / Plan     Active Hospital Problems    Diagnosis Date Noted   • **Closed fracture of left hip (CMS/HCC) [S72.002A] 02/13/2019   • Humeral head fracture [S42.293A] 02/14/2019   • Shoulder dislocation, recurrent, right [M24.411] 02/14/2019   • Postoperative anemia due to acute blood loss [D62] 02/14/2019   • Dysphagia [R13.10] 02/14/2019   • Falls [W19.XXXA] 02/13/2019   • Periorbital ecchymosis of left eye [S00.12XA] 02/13/2019   • Hematoma [T14.8XXA] 02/13/2019     Left gluteal hematoma     • Atrial fibrillation (CMS/HCC) [I48.91] 04/05/2018   • Essential hypertension [I10] 04/05/2018          Brief Hospital Course to date:  Debbie Baum is a 89 y.o. female w/ hx afib (s/p prior ablation) on coreg and asa, prior right shoulder dislocations, multiple prior falls who was using her walker at nursing facility today when lost balance and fell to left side striking left hip and face. Patient was brought to ER where initial xray hip/pelvis revealed no fracture.  "However, later while moving legs heard a \"pop\" and had worsening pain and subsequent ct pelvis showed left intertrochanteric fracture and 6cm gluteal & subcutanoeus hematoma. Also has bruising left eye. Ct face negative for fractures. No preceding chest pain or palpitations. Patient went for left intertrochanteric nail the evening of admission (2/13/19) and did will post-operatively. POD #1 hgb dropped to 7.4 (from 10.7 on admission) and received 1 unit prbc, and also developed right shoulder/arm pain (same shoulder that patient has apparently dislocated in the past). X-ray shoulder and humerus showed anterior dislocation right humeral head and possible humeral head fracture. Subsequent CT right upper extremity showed extensive degenerative changes and mildly comminuted anterior fracture/dislocation of right humeral head and glenoid fractures which, per discussion w/ dr. Maynard, appeared chronic in nature.     *left hip (intertrochanteric) fracture      -s/p left intertrochanteric nail 2/13/19 by dr. Maynard  *left gluteal/subcutaneous hematoma and post op anemia      -s/p 1 unit prbc 2/14/19     -receiving 2nd unit 2/15/19  *right shoulder dislocation/possible fracture  *encephalopathy (due to age, polypharmacy, etc)  *Hx afib  *left periorbital ecchymoses       -ct facial bones no fractures; ice tid  *hx htn  *hx multiple falls    --------------------------------------------------------------------------------------  Plan:    -s/p left intertrochanteric nail 2/13/19 by dr. Maynard; asa 81mg bid for dvt prophylaxis.  F/u with him 2 weeks  -s/p 1 unit prbc 2/14/19 for post op anemia and another unit given 2/15 with good response, will follow  - Reviewed Dr. ritter note re: right shoulder, changes appear to be chronic. Sling for comfort as needed.  WBAT   - sodium normalized today today  -replace elctrolytes prn    - d/w SLP/  [place] not safe for any PO.  Discussed with family, they are agreeable with comfort diet " with nectar thick liquids.  Discussed at length with family re: prognosis today.  PO intake remains poor, high aspiration risk.  Not candidate for feeding tube.  My concern is that she will continue to worsen over next couple days.  Discussed options such as palliative care and/or hospice.  Suspect we will have better idea in next 24-48 hours, if no better tomorrow will plan to consult palliative to assist with goals of care.      DVT Prophylaxis:  Asa 81mg bid       CODE STATUS:   Code Status and Medical Interventions:   Ordered at: 02/13/19 1302     Limited Support to NOT Include:    Intubation     Code Status:    No CPR     Medical Interventions (Level of Support Prior to Arrest):    Limited         Electronically signed by Fadi Muir MD, 02/17/19, 3:16 PM.

## 2019-02-17 NOTE — THERAPY RE-EVALUATION
Acute Care - Speech Language Pathology   Swallow Re-Evaluation [facility]     Patient Name: Debbie Baum  : 1923  MRN: [id number]  Today's Date: 2019  Onset of Illness/Injury or Date of Surgery: 19     Referring Physician: MD Caesar      Admit Date: 2019    Visit Dx:     ICD-10-CM ICD-9-CM   1. Closed fracture of left hip, initial encounter (CMS/[facility]) S72.002A 820.8   2. Injury of head, initial encounter S09.90XA 959.01   3. Contusion of left orbital tissues, initial encounter S05.12XA 921.2   4. Abrasion of face, initial encounter S00.81XA 910.0   5. Fall, initial encounter W19.XXXA E888.9   6. Impaired functional mobility, balance, gait, and endurance Z74.09 V49.89   7. Impaired mobility and ADLs Z74.09 799.89     Patient Active Problem List   Diagnosis   • Hyponatremia   • Essential hypertension   • Coronary artery disease   • Weakness generalized   • GERD (gastroesophageal reflux disease)   • Atrial fibrillation (CMS/HCC)   • Somnolence   • Medication adverse effect   • Chronic pain   • Closed fracture of left hip (CMS/HCC)   • Falls   • Periorbital ecchymosis of left eye   • Hematoma   • Humeral head fracture   • Shoulder dislocation, recurrent, right   • Postoperative anemia due to acute blood loss   • Dysphagia     Past Medical History:   Diagnosis Date   • Atrial fibrillation (CMS/HCC)    • Cancer (CMS/HCC)     colon   • Coronary artery disease    • GERD (gastroesophageal reflux disease)    • Hypertension      Past Surgical History:   Procedure Laterality Date   • CARDIAC ABLATION     • CHOLECYSTECTOMY     • COLON SURGERY      BOWEL RESECTION FOR HX COLON CANCER   • HIP INTERTROCHANTERIC NAILING Left 2019    Procedure: HIP INTERTROCHANTERIC NAILING LEFT;  Surgeon: Ariel Maynard MD;  Location: [facility];  Service: Orthopedics   • HYSTERECTOMY     • REPLACEMENT TOTAL KNEE          SWALLOW EVALUATION (last 72 hours)      SLP Adult Swallow Evaluation     Row Name 19  0945 02/16/19 1020 02/15/19 1000             Rehab Evaluation    Document Type  re-evaluation;other (see comments) POC f/u  -VO  re-evaluation  -MP  re-evaluation  -AC      Subjective Information  no complaints  -VO  no complaints  -MP  complains of;pain  -AC      Patient Observations  alert;cooperative  -VO  alert;cooperative  -MP  alert;cooperative  -AC      Patient/Family Observations  No family present  -VO  No family present  -MP  No family present.  -AC      Patient Effort  good  -VO  good  -MP  adequate  -AC         General Information    Patient Profile Reviewed  yes  -VO  yes  -MP  yes  -AC      Pertinent History Of Current Problem  hip fracture  -VO  Admitted 2/13 from [facility] w/ hip fx, arm fx, dislocated shoulder after fall. S/p hip sx 2/13. Briefly intubated for sx. PMH: GERD, esophageal dilation.  -MP  89yo adm from [facility] w/ hip fx, arm fx, dislocated shoulder after fall. S/p hip sx 2/13, was briefly intubated during sx. Hx GERD, esophageal dilation in past per consult. CXR: large HH, no change.   -AC      Current Method of Nutrition  mechanical soft, no mixed consistencies;nectar/syrup-thick liquids  -VO  mechanical soft, no mixed consistencies;nectar/syrup-thick liquids  -MP  mechanical soft, no mixed consistencies;nectar/syrup-thick liquids  -AC      Precautions/Limitations, Vision  WFL;for purposes of eval  -VO  WFL;for purposes of eval  -MP  --      Precautions/Limitations, Hearing  WFL;for purposes of eval  -VO  WFL;for purposes of eval  -MP  --      Prior Level of Function-Communication  WFL  -VO  WFL  -MP  --      Prior Level of Function-Swallowing  no diet consistency restrictions;esophageal concerns  -VO  no diet consistency restrictions;esophageal concerns  -MP  no diet consistency restrictions;esophageal concerns  -AC      Plans/Goals Discussed with  patient;agreed upon  -VO  patient;agreed upon  -MP  patient;agreed upon  -AC      Barriers to Rehab  medically complex  -VO  medically complex  -MP   "medically complex  -AC      Patient's Goals for Discharge  eat/drink without coughing/choking  -VO  patient did not state  -MP  -- \"I don't want to get pna.\"  -AC      Family Goals for Discharge  other (see comments) no family present  -VO  --  --         Pain Assessment    Additional Documentation  --  Pain Scale: FACES Pre/Post-Treatment (Group)  -MP  --         Pain Scale: Numbers Pre/Post-Treatment    Pain Scale: Numbers, Pretreatment  0/10 - no pain  -VO  --  --      Pain Scale: Numbers, Post-Treatment  0/10 - no pain  -VO  --  --         Pain Scale: FACES Pre/Post-Treatment    Pain: FACES Scale, Pretreatment  --  4-->hurts little more  -MP  8-->hurts whole lot  -AC      Pain: FACES Scale, Post-Treatment  --  4-->hurts little more  -MP  8-->hurts whole lot  -AC      Pre/Post Treatment Pain Comment  --  --  Intermittent waves of pain all over--RN aware & administered medication.  -AC         Oral Motor and Function    Dentition Assessment  natural, present and adequate;poor oral hygiene  -VO  natural, present and adequate;poor oral hygiene  -MP  natural, present and adequate;poor oral hygiene  -AC      Secretion Management  WNL/WFL  -VO  WNL/WFL  -MP  WNL/WFL  -AC      Mucosal Quality  moist, healthy  -VO  dry;sticky  -MP  dry;sticky  -AC      Volitional Swallow  WFL  -VO  --  --      Volitional Cough  weak  -VO  --  weak  -AC         Oral Musculature and Cranial Nerve Assessment    Oral Motor General Assessment  generalized oral motor weakness  -VO  generalized oral motor weakness  -MP  generalized oral motor weakness  -AC         General Eating/Swallowing Observations    Respiratory Support Currently in Use  nasal cannula  -VO  nasal cannula  -MP  nasal cannula  -AC      Eating/Swallowing Skills  fed by SLP  -VO  fed by SLP  -MP  fed by SLP  -AC      Positioning During Eating  upright in bed  -VO  upright in bed  -MP  upright in bed;semi-reclined;other (see comments) pt u/a to tolerate sitting @ 90' due to " pain  -AC      Utensils Used  cup  -VO  spoon;cup;straw  -MP  spoon  -AC      Consistencies Trialed  thin liquids;nectar/syrup-thick liquids  -VO  thin liquids;nectar/syrup-thick liquids;pureed;regular textures  -MP  thin liquids;nectar/syrup-thick liquids;pureed  -AC         Clinical Swallow Eval    Oral Prep Phase  --  WFL  -MP  WFL  -AC      Oral Transit  WFL  -VO  WFL  -MP  WFL  -AC      Oral Residue  WFL  -VO  WFL  -MP  WFL  -AC      Pharyngeal Phase  suspected pharyngeal impairment  -VO  suspected pharyngeal impairment  -MP  suspected pharyngeal impairment  -AC      Esophageal Phase  suspected esophageal impairment  -VO  --  --      Clinical Swallow Evaluation Summary  Re-evaluation/follow-up completed this date in regards to POC. Patient currently on nectar thick liquid diet as a conservative comfort diet given patient's advanced age and overall poor prognosis. Trialed thin liquids (small single sips) and nectar thick liquids. Pt w/ audible, gurgly swallow w/ thins w/ cough and burping following. Inc'rd control w/ nectar thick liquids, however multiple swallows noted. Pt reports she doesn't mind drinking thickened liquids but does not want a feeding tube despite possible aspiration risks. No family to discuss POC. Spoke w/ RN who reported MD was to be talking with family and did not want to push tube feed. At this point, pt would likely aspirate tube feed as well. Pt not a candidate for instrumental testing given positioning/facial pain. Recommend continue conservative/comfort diet given no complaints w/ NTL from patient. SLP will f/u for diet tolerance/POC edu.   -VO  Consult received for clinical swallow re-evaluation 2' anterior loss when taking sips of NTL. Clinical swallow re-evaluation completed. Pt c/o of nausea, so limited PO trials were given. Pt given trials of ice chipx1, thin via tsp/cup, NTL via tsp/cup/straw, puree, and regular solid. Wet vocal qualtiy and throat clear observed following trials  of ice chip and thin. Multiple swallows observed w/ NTL and puree. Throat clear following solid trial. Recommend downgrade to level III diet and continue NTL. Meds crushed in puree or via alternate route. Pt continues to be high risk of aspiration w/ all consistencies, considering advanced age and generalized weakness. However, pt would likely have difficulty participating in MBS 2' upright positioning/repositioning in chair and would also have difficulty completing FEES 2' facial pain. Pt has previously expressed does not wish for FT. No family present to discuss POC. SLP will continue to follow and re-assess as appropriate.  -MP  --         Pharyngeal Phase Concerns    Pharyngeal Phase Concerns  cough;multiple swallows  -VO  throat clear;wet vocal quality;multiple swallows  -MP  cough;multiple swallows  -AC      Wet Vocal Quality  --  thin  -MP  --      Multiple Swallows  nectar  -VO  nectar;pudding  -MP  thin;nectar;pudding  -AC      Cough  thin  -VO  --  thin;nectar;pudding  -AC      Throat Clear  --  thin;regular consistencies  -MP  --      Pharyngeal Phase Concerns, Comment  --  --  Oral phase appeared WFL for thin via tsp, nectar via tsp, and puree. Overt clinical s/sxs aspiration noted, including: coughing w/ all consistencies trialed and multiple swallows. Pt does have hx of esophageal dysphagia, but given generalized weakness, high risk for pharyngeal dysphagia w/ aspiration @ this time, as well. U/a to make safe diet recommendation @ this time. Pt would have difficulty participating in MBS @ this time 2' difficulty sitting upright/repositioning and would also have difficulty completing FEES 2' facial pain. Discussed s/sxs aspiration, risks of aspiration, and options considering comfort/safety w/ pt. Pt reported she doesn't want pna, but is not sure if she would want a feeding tube. She is DNR. No family present. If comfort diet is desired considering her advanced age and code status, may consider small  sips of thin vs nectar, as tolerated, and dysphagia level IV soft diet. SLP will f/u for further assessment/modifications/edu, as appropriate pending POC.  -AC         Clinical Impression    SLP Swallowing Diagnosis  suspected pharyngeal dysfunction  -VO  suspected pharyngeal dysfunction  -MP  functional oral phase;suspected pharyngeal dysfunction  -AC      Functional Impact  risk of aspiration/pneumonia  -VO  risk of aspiration/pneumonia;risk of malnutrition;risk of dehydration  -MP  risk of aspiration/pneumonia;risk of malnutrition;risk of dehydration  -AC      Rehab Potential/Prognosis, Swallowing  adequate, monitor progress closely  -VO  re-evaluate goals as necessary  -MP  re-evaluate goals as necessary  -AC      Swallow Criteria for Skilled Therapeutic Interventions Met  demonstrates skilled criteria  -VO  demonstrates skilled criteria  -MP  demonstrates skilled criteria  -         Recommendations    Therapy Frequency (Swallow)  PRN  -VO  PRN  -MP  PRN  -AC      Predicted Duration Therapy Intervention (Days)  until discharge  -VO  until discharge  -MP  until discharge  -AC      SLP Diet Recommendation  puree with some mashed;nectar thick liquids  -VO  puree with some mashed;nectar thick liquids;other (see comments) consider NPO if demonstrating s/sxs aspiration  -MP  other (see comments) consider safest (NPO) vs comfort diet (details in summary)  -AC      Recommended Diagnostics  --  reassess via clinical swallow evaluation  -MP  --      Recommended Precautions and Strategies  upright posture during/after eating;small bites of food and sips of liquid  -VO  upright posture during/after eating;small bites of food and sips of liquid  -MP  --      SLP Rec. for Method of Medication Administration  meds crushed;with pudding or applesauce;meds via alternate route  -VO  meds crushed;with pudding or applesauce;meds via alternate route  -MP  meds via alternate route vs crushed in puree if on comfort diet  -AC       Monitor for Signs of Aspiration  yes  -VO  yes;notify SLP if any concerns  -MP  --      Anticipated Dischage Disposition  skilled nursing facility;anticipate therapy at next level of care  -VO  unknown;anticipate therapy at next level of care  -MP  unknown  -        User Key  (r) = Recorded By, (t) = Taken By, (c) = Cosigned By    Initials Name Effective Dates    AC Torrez, Cyndi S, MS CCC-SLP 07/27/17 -     VO Lydia Hui MA,CCC-SLP 10/24/18 -     MP Jae Crystal MS, CF-SLP 08/15/18 -           EDUCATION  The patient has been educated in the following areas:   Dysphagia (Swallowing Impairment) Oral Care/Hydration Modified Diet Instruction.    SLP Recommendation and Plan  SLP Swallowing Diagnosis: suspected pharyngeal dysfunction  SLP Diet Recommendation: puree with some mashed, nectar thick liquids  Recommended Precautions and Strategies: upright posture during/after eating, small bites of food and sips of liquid     Monitor for Signs of Aspiration: yes     Swallow Criteria for Skilled Therapeutic Interventions Met: demonstrates skilled criteria  Anticipated Dischage Disposition: skilled nursing facility, anticipate therapy at next level of care  Rehab Potential/Prognosis, Swallowing: adequate, monitor progress closely  Therapy Frequency (Swallow): PRN  Predicted Duration Therapy Intervention (Days): until discharge       Plan of Care Reviewed With: patient  Plan of Care Review  Plan of Care Reviewed With: patient    SLP GOALS     Row Name 02/15/19 1000             Oral Nutrition/Hydration Goal 1 (SLP)    Oral Nutrition/Hydration Goal 1, SLP  LTG: Pt will tolerate soft diet & nectar-thick liquids w/o s/sxs distress/discomfort w/ 100% acc w/o cues.  -AC      Time Frame (Oral Nutrition/Hydration Goal 1, SLP)  by discharge  -AC         Oral Nutrition/Hydration Goal 2 (SLP)    Oral Nutrition/Hydration Goal 2, SLP  Pt will tolerate trials of nectar-thick liquid & soft solid w/o s/sxs distress/discomfort  w/ 100% acc w/o cues.  -AC      Time Frame (Oral Nutrition/Hydration Goal 2, SLP)  short term goal (STG);by discharge  -AC        User Key  (r) = Recorded By, (t) = Taken By, (c) = Cosigned By    Initials Name Provider Type    AC Cyndi Torrez MS CCC-SLP Speech and Language Pathologist             Time Calculation:   Time Calculation- SLP     Row Name 02/17/19 1149             Time Calculation- SLP    SLP Start Time  0945  -VO      SLP Received On  02/17/19  -VO        User Key  (r) = Recorded By, (t) = Taken By, (c) = Cosigned By    Initials Name Provider Type    VO Lydia Hui MA,CCC-SLP Speech and Language Pathologist          Therapy Charges for Today     Code Description Service Date Service Provider Modifiers Qty    [id number]  ST EVAL ORAL PHARYNG SWALLOW 3 2/17/2019 Lydia Hui MA,CCC-SLP GN 1               Lydia Hui MA,CCC-SLP  2/17/2019

## 2019-02-18 ENCOUNTER — APPOINTMENT (OUTPATIENT)
Dept: GENERAL RADIOLOGY | Facility: HOSPITAL | Age: 84
End: 2019-02-18

## 2019-02-18 ENCOUNTER — DOCUMENTATION (OUTPATIENT)
Dept: ORTHOPEDIC SURGERY | Facility: CLINIC | Age: 84
End: 2019-02-18

## 2019-02-18 DIAGNOSIS — M17.12 PRIMARY OSTEOARTHRITIS OF LEFT KNEE: Primary | ICD-10-CM

## 2019-02-18 PROCEDURE — 20610 DRAIN/INJ JOINT/BURSA W/O US: CPT | Performed by: ORTHOPAEDIC SURGERY

## 2019-02-18 PROCEDURE — 99233 SBSQ HOSP IP/OBS HIGH 50: CPT | Performed by: INTERNAL MEDICINE

## 2019-02-18 PROCEDURE — 97110 THERAPEUTIC EXERCISES: CPT

## 2019-02-18 PROCEDURE — 73501 X-RAY EXAM HIP UNI 1 VIEW: CPT

## 2019-02-18 PROCEDURE — 25010000002 MORPHINE SULFATE (PF) 2 MG/ML SOLUTION: Performed by: INTERNAL MEDICINE

## 2019-02-18 PROCEDURE — 99024 POSTOP FOLLOW-UP VISIT: CPT | Performed by: ORTHOPAEDIC SURGERY

## 2019-02-18 PROCEDURE — 3E0U33Z INTRODUCTION OF ANTI-INFLAMMATORY INTO JOINTS, PERCUTANEOUS APPROACH: ICD-10-PCS | Performed by: ORTHOPAEDIC SURGERY

## 2019-02-18 PROCEDURE — 99232 SBSQ HOSP IP/OBS MODERATE 35: CPT | Performed by: ORTHOPAEDIC SURGERY

## 2019-02-18 PROCEDURE — 25010000002 HYDROMORPHONE PER 4 MG: Performed by: ORTHOPAEDIC SURGERY

## 2019-02-18 PROCEDURE — 3E0U3BZ INTRODUCTION OF ANESTHETIC AGENT INTO JOINTS, PERCUTANEOUS APPROACH: ICD-10-PCS | Performed by: ORTHOPAEDIC SURGERY

## 2019-02-18 PROCEDURE — 73560 X-RAY EXAM OF KNEE 1 OR 2: CPT

## 2019-02-18 PROCEDURE — 25010000002 ROPIVACAINE PER 1 MG: Performed by: NURSE ANESTHETIST, CERTIFIED REGISTERED

## 2019-02-18 PROCEDURE — 25010000002 MORPHINE SULFATE (PF) 2 MG/ML SOLUTION: Performed by: NURSE PRACTITIONER

## 2019-02-18 RX ORDER — HYDROCODONE BITARTRATE AND ACETAMINOPHEN 5; 325 MG/1; MG/1
1 TABLET ORAL EVERY 6 HOURS PRN
Status: DISCONTINUED | OUTPATIENT
Start: 2019-02-18 | End: 2019-02-18

## 2019-02-18 RX ORDER — MORPHINE SULFATE 2 MG/ML
1 INJECTION, SOLUTION INTRAMUSCULAR; INTRAVENOUS EVERY 4 HOURS PRN
Status: DISCONTINUED | OUTPATIENT
Start: 2019-02-18 | End: 2019-02-19

## 2019-02-18 RX ORDER — HYDROCODONE BITARTRATE AND ACETAMINOPHEN 5; 325 MG/1; MG/1
0.5 TABLET ORAL EVERY 6 HOURS PRN
Status: DISCONTINUED | OUTPATIENT
Start: 2019-02-18 | End: 2019-02-19

## 2019-02-18 RX ORDER — TROLAMINE SALICYLATE 10 G/100G
CREAM TOPICAL 4 TIMES DAILY
Status: DISCONTINUED | OUTPATIENT
Start: 2019-02-18 | End: 2019-02-19

## 2019-02-18 RX ADMIN — CARVEDILOL 3.12 MG: 3.12 TABLET, FILM COATED ORAL at 17:18

## 2019-02-18 RX ADMIN — SENNOSIDES AND DOCUSATE SODIUM 2 TABLET: 8.6; 5 TABLET ORAL at 01:43

## 2019-02-18 RX ADMIN — VITAMIN D, TAB 1000IU (100/BT) 1000 UNITS: 25 TAB at 08:00

## 2019-02-18 RX ADMIN — MORPHINE SULFATE 2 MG: 2 INJECTION, SOLUTION INTRAMUSCULAR; INTRAVENOUS at 02:22

## 2019-02-18 RX ADMIN — HYDROCODONE BITARTRATE AND ACETAMINOPHEN 0.5 TABLET: 5; 325 TABLET ORAL at 01:44

## 2019-02-18 RX ADMIN — MORPHINE SULFATE 1 MG: 2 INJECTION, SOLUTION INTRAMUSCULAR; INTRAVENOUS at 22:54

## 2019-02-18 RX ADMIN — SODIUM CHLORIDE, PRESERVATIVE FREE 3 ML: 5 INJECTION INTRAVENOUS at 09:45

## 2019-02-18 RX ADMIN — MORPHINE SULFATE 2 MG: 2 INJECTION, SOLUTION INTRAMUSCULAR; INTRAVENOUS at 09:40

## 2019-02-18 RX ADMIN — TRIAMCINOLONE ACETONIDE 80 MG: 40 INJECTION, SUSPENSION INTRA-ARTICULAR; INTRAMUSCULAR at 16:43

## 2019-02-18 RX ADMIN — ACETAMINOPHEN 650 MG: 325 TABLET ORAL at 14:37

## 2019-02-18 RX ADMIN — CYANOCOBALAMIN TAB 1000 MCG 500 MCG: 1000 TAB at 08:00

## 2019-02-18 RX ADMIN — ACETAMINOPHEN 650 MG: 325 TABLET ORAL at 06:39

## 2019-02-18 RX ADMIN — ROPIVACAINE HYDROCHLORIDE 10 ML/HR: 2 INJECTION, SOLUTION EPIDURAL; INFILTRATION at 00:36

## 2019-02-18 RX ADMIN — LIDOCAINE 1 PATCH: 50 PATCH CUTANEOUS at 08:00

## 2019-02-18 RX ADMIN — ONDANSETRON 4 MG: 4 TABLET, FILM COATED ORAL at 02:22

## 2019-02-18 RX ADMIN — LIDOCAINE HYDROCHLORIDE 3 ML: 10 INJECTION, SOLUTION INFILTRATION; PERINEURAL at 16:43

## 2019-02-18 RX ADMIN — MELOXICAM 7.5 MG: 7.5 TABLET ORAL at 08:00

## 2019-02-18 RX ADMIN — ACETAMINOPHEN 650 MG: 325 TABLET ORAL at 21:16

## 2019-02-18 RX ADMIN — CARVEDILOL 3.12 MG: 3.12 TABLET, FILM COATED ORAL at 07:59

## 2019-02-18 RX ADMIN — ASPIRIN 81 MG CHEWABLE TABLET 81 MG: 81 TABLET CHEWABLE at 21:16

## 2019-02-18 RX ADMIN — TROLAMINE SALICYLATE: 10 CREAM TOPICAL at 21:16

## 2019-02-18 RX ADMIN — BUPIVACAINE HYDROCHLORIDE 3 ML: 2.5 INJECTION, SOLUTION INFILTRATION; PERINEURAL at 16:43

## 2019-02-18 RX ADMIN — LANSOPRAZOLE 30 MG: KIT at 07:59

## 2019-02-18 RX ADMIN — ASPIRIN 81 MG CHEWABLE TABLET 81 MG: 81 TABLET CHEWABLE at 08:00

## 2019-02-18 RX ADMIN — HYDROCODONE BITARTRATE AND ACETAMINOPHEN 0.5 TABLET: 5; 325 TABLET ORAL at 07:20

## 2019-02-18 RX ADMIN — METAXALONE 400 MG: 800 TABLET ORAL at 01:43

## 2019-02-18 RX ADMIN — HYDROCODONE BITARTRATE AND ACETAMINOPHEN 0.5 TABLET: 5; 325 TABLET ORAL at 21:15

## 2019-02-18 RX ADMIN — HYDROMORPHONE HYDROCHLORIDE 0.5 MG: 1 INJECTION, SOLUTION INTRAMUSCULAR; INTRAVENOUS; SUBCUTANEOUS at 03:55

## 2019-02-18 RX ADMIN — METAXALONE 400 MG: 800 TABLET ORAL at 07:21

## 2019-02-18 NOTE — PLAN OF CARE
Problem: Patient Care Overview  Goal: Plan of Care Review  Outcome: Ongoing (interventions implemented as appropriate)   02/18/19 6719   Coping/Psychosocial   Plan of Care Reviewed With patient   Plan of Care Review   Progress no change   OTHER   Outcome Summary Pt A&Ox4 with episodes of confusion at times. Was up in chair for about an hour today, mechanical lift utilized. Left hip covaderm scant old dried drainage. Pt c/o left knee pain/swelling this shift, xray obtained and Dr. Maynard performed steriod injection. NWB RURIVERA. Palliative consulted today. Pain meds adjusted for pt to remain more alert during the day. Nutritionist consulted to help with calorie dense options per family request. Pt will need feeding assistance. Pt remains on Dysphagia III diet, nectar thick. VSS this shift, sinus arrhytmia on moniotr. Incontinent of bowel/bladder. Pt hasnt participated much in care today, slept most of shift, with occasional awakenings for pain medicine requests. Plan for SNF at d/c. Family seems to believe pt will bounce back to normal eventually.

## 2019-02-18 NOTE — PROGRESS NOTES
"  SUBJECTIVE  Patient continues to complain of diffuse pain all over her body. Specifically, she is complaining of left knee pain. She isolates the pain to the knee.  It is worse with movement of the knee.  Immobilization improves the pain.  She is complaining of swelling and stiffness in the knee as well.  She is unable to describe the pain and cannot rate it due to mental status.      OBJECTIVE  Temp (24hrs), Av.9 °F (36.6 °C), Min:97 °F (36.1 °C), Max:98.4 °F (36.9 °C)    Blood pressure 137/62, pulse 93, temperature 98.2 °F (36.8 °C), temperature source Temporal, resp. rate 16, height 165.1 cm (65\"), weight 49.9 kg (110 lb), SpO2 94 %, not currently breastfeeding.    Lab Results (last 24 hours)     Procedure Component Value Units Date/Time    Potassium [[id number]]  (Normal) Collected:  19    Specimen:  Blood Updated:  19     Potassium 4.2 mmol/L     Basic Metabolic Panel [[id number]]  (Abnormal) Collected:  19    Specimen:  Blood Updated:  19     Glucose 94 mg/dL      BUN 14 mg/dL      Creatinine 0.65 mg/dL      Sodium 136 mmol/L      Potassium 3.4 mmol/L      Chloride 106 mmol/L      CO2 25.0 mmol/L      Calcium 8.9 mg/dL      eGFR Non African Amer 85 mL/min/1.73      BUN/Creatinine Ratio 21.5     Anion Gap 5.0 mmol/L     Narrative:       [employer] Guidelines    Stage     Description        GFR  1         Normal or High     90+  2         Mild decrease      60-89  3         Moderate decrease  30-59  4         Severe decrease    15-29  5         Kidney failure     <15    The MDRD GFR formula is only valid for adults with stable renal function between ages 18 and 70.    Hemoglobin & Hematocrit, Blood [[id number]]  (Abnormal) Collected:  19    Specimen:  Blood Updated:  19     Hemoglobin 10.1 g/dL      Hematocrit 30.4 %             PHYSICAL EXAM  Left lower extremity:Dressing intact with slight serosanguinous drainage.  Mild pain " with logroll of hip. Intact EHL, FHL, tibialis anterior, and gastrocsoleus. Sensation intact to light touch to deep peroneal, superficial peroneal, sural, saphenous, tibial nerves. 2+ palpable DP and PT pulses.    ----------  Knee Exam  ----------  ALIGNMENT: Slight valgus alignment     RANGE OF MOTION:  Decreased (20 - 80 degrees) with no extensor lag  LIGAMENTOUS STABILITY:   stable to varus and valgus stress at terminal extension and 30 degrees; slight retensioning of the LCL is appreciated with varus stress at 30 degrees consistent with lateral compartment degeneration     STRENGTH:  4/5 knee flexion, extension. 5/5 ankle dorsiflexion and plantarflexion.     PAIN WITH PALPATION: global  KNEE EFFUSION: yes, mild effusion  PAIN WITH KNEE ROM: yes, global   PATELLAR CREPITUS: yes, painful and symptomatic  SPECIAL EXAM FINDINGS:  painful patellar compression    REFLEXES:  PATELLAR 2+/4  ACHILLES 2+/4    CLONUS: no  STRAIGHT LEG TEST:   negative    SENSATION TO LIGHT TOUCH:  DEEP PERONEAL/SUPERFICIAL PERONEAL/SURAL/SAPHENOUS/TIBIAL:   intact    EDEMA:  no  ERYTHEMA:  no  WOUNDS/INCISIONS: no      Radiographs the left knee were personally reviewed.  Radiographs demonstrate severe tricompartmental arthritis of the left knee.  No acute bony injury or fracture.       Closed fracture of left hip (CMS/HCC)    Essential hypertension    Atrial fibrillation (CMS/HCC)    Falls    Periorbital ecchymosis of left eye    Hematoma    Humeral head fracture    Shoulder dislocation, recurrent, right    Postoperative anemia due to acute blood loss    Dysphagia  Left knee osteoarthritis     PLAN / DISPOSITION:  5 Day Post-Op left hip intertrochanteric fracture fixation    Protected weight bearing as tolerated left lower extremity, hip range of motion as tolerated   Pain control  Postop blood loss anemia-stable.  PT/OT for post op mobilization and medical equipment needs   Right upper extremity: CT of shoulder demonstrates chronic  changes with anterior chronic dislocation.   SCD's bilateral lower extremities   Aspirin 81 mg twice a day for DVT prophylaxis   Social work for discharge planning.   Dressing to remain in place for 7 days. May remove on POD#7. If no drainage, may shower on POD#10. No submerging wound in water. If drainage is noted, sterile dressing should be placed and wound checked daily. No showering until wound has remained dry for 72 consecutive hours.   Follow up in 2 weeks for re-assessment.    Left knee osteoarthritis    Patient's complaints are consistent with osteoarthritis left knee.  I recommended we proceed with a cortisone injection into the left knee to alleviate inflammation in the knee.  Hopefully this will allow her to mobilize better postoperatively.  Family is agreeable to this plan.    Procedure Note:  I discussed with the patient the potential benefits of performing a therapeutic injection of the left knee as well as potential risks including but not limited to infection, swelling, pain, bleeding, bruising, nerve/vessel damage, skin color changes, transient elevation in blood glucose levels, and fat atrophy. After informed consent and after the area was prepped with alcohol, ethyl chloride was used to numb the skin. Via the superolateral approach, 3cc of 1% lidocaine, 3cc of 0.25% marcaine and 2 cc of 40mg/ml of Kenalog were injected into the left knee. The patient tolerated the procedure well. There were no complications. A sterile dressing was placed over the injection site.        Ariel Maynard MD  02/18/19  6:58 AM

## 2019-02-18 NOTE — PROGRESS NOTES
[facility] Medicine Services  PROGRESS NOTE    Patient Name: Debbie Baum  : 1923  MRN: [id number]    Date of Admission: 2019  Length of Stay: 5  Primary Care Physician: Paula Scott MD    Subjective   Subjective     CC:  Hip fracture, shoulder dislocation/possible fracture, anemia    HPI:  Son at bedside.  Sleeping most of the time, when awake complains of pain.  Only eating/drinking few sips and bites.  When I wake her up she tells me she doesn't feel good before going back to sleep.    Review of Systems  Gen- No fevers, chills  CV- No chest pain, palpitations  Resp- No cough, dyspnea  GI- No N/V/D, abd pain    Otherwise ROS is negative except as mentioned in the HPI.    Objective   Objective     Vital Signs:   Temp:  [97 °F (36.1 °C)-98.2 °F (36.8 °C)] 97.5 °F (36.4 °C)  Heart Rate:  [] 76  Resp:  [16-20] 16  BP: (108-182)/() 110/65        Physical Exam:  Constitutional: frail, age appropriate, sleeping but arousable to loud stim  HENT: NCAT, mucous membranes moist. Bruising to left eye  Respiratory: mild bilateral rhonchi, respiratory effort normal   Cardiovascular: RRR, no murmurs, rubs, or gallops  Gastrointestinal: Positive bowel sounds, soft, nontender, nondistended  Musculoskeletal: No bilateral ankle edema  Psychiatric: unable to assess  Neurologic: resting, moves all 4, follows commands  Skin: No rashes      Results Reviewed:  I have personally reviewed current lab, radiology, and data and agree.    Results from last 7 days   Lab Units 19  0625 19  0707 02/15/19  1701  02/15/19  0608  19  0514 19  1009   WBC 10*3/mm3  --  10.50  --   --  10.92*  --  12.74* 10.76   HEMOGLOBIN g/dL 10.1* 9.2* 10.0*   < > 7.7*   < > 7.4* 10.7*   HEMATOCRIT % 30.4* 27.6* 30.2*   < > 24.4*   < > 23.3* 34.1*   PLATELETS 10*3/mm3  --  171  --   --  165  --  225 312   INR   --   --   --   --   --   --   --  1.07    < > = values in this interval not  displayed.     Results from last 7 days   Lab Units 02/17/19  2218 02/17/19  0625 02/16/19  0707 02/15/19  0608  02/13/19  1009   SODIUM mmol/L  --  136 127* 135   < > 139   POTASSIUM mmol/L 4.2 3.4* 3.7 4.0   < > 3.8   CHLORIDE mmol/L  --  106 99 106   < > 107   CO2 mmol/L  --  25.0 26.0 24.0   < > 27.0   BUN mg/dL  --  14 18 23   < > 24*   CREATININE mg/dL  --  0.65 0.60 0.78   < > 0.86   GLUCOSE mg/dL  --  94 98 113*   < > 95   CALCIUM mg/dL  --  8.9 8.6* 8.7   < > 9.9   ALT (SGPT) U/L  --   --   --   --   --  20   AST (SGOT) U/L  --   --   --   --   --  21    < > = values in this interval not displayed.     Estimated Creatinine Clearance: 33.1 mL/min (by C-G formula based on SCr of 0.65 mg/dL).    No results found for: BNP    Microbiology Results Abnormal     None          Imaging Results (last 24 hours)     ** No results found for the last 24 hours. **          Results for orders placed during the hospital encounter of 09/17/18   Adult Transthoracic Echo Complete W/ Cont if Necessary Per Protocol    Narrative · Mild-to-moderate mitral valve regurgitation is present          I have reviewed the medications:    Current Facility-Administered Medications:   •  acetaminophen (TYLENOL) tablet 650 mg, 650 mg, Oral, Q4H PRN, 650 mg at 02/17/19 1450 **OR** acetaminophen (TYLENOL) 160 MG/5ML solution 650 mg, 650 mg, Oral, Q4H PRN **OR** acetaminophen (TYLENOL) suppository 650 mg, 650 mg, Rectal, Q4H PRN, Ariel Maynard MD  •  acetaminophen (TYLENOL) tablet 650 mg, 650 mg, Oral, Q8H, Babita Harmon CRNA, 650 mg at 02/18/19 0639  •  aspirin chewable tablet 81 mg, 81 mg, Oral, BID, Ariel Maynard MD, 81 mg at 02/18/19 0800  •  bisacodyl (DULCOLAX) suppository 10 mg, 10 mg, Rectal, Daily PRN, Ariel Maynard MD  •  carvedilol (COREG) tablet 3.125 mg, 3.125 mg, Oral, BID With Meals, Eleuterio Pride MD, 3.125 mg at 02/18/19 0759  •  cholecalciferol (VITAMIN D3) tablet 1,000 Units, 1,000 Units, Oral, Daily,  Eleuterio Pride MD, 1,000 Units at 02/18/19 0800  •  docusate sodium (COLACE) liquid 100 mg, 100 mg, Oral, BID PRN, Cristiana Cruz APRN, 100 mg at 02/16/19 2053  •  HYDROcodone-acetaminophen (NORCO) 5-325 MG per tablet 1 tablet, 1 tablet, Oral, Q6H PRN, Fadi Muir MD  •  lactated ringers infusion, 9 mL/hr, Intravenous, Continuous PRN, Colin Castro MD, Last Rate: 9 mL/hr at 02/13/19 1649  •  lansoprazole (FIRST) oral suspension 30 mg, 30 mg, Oral, QAM, Eleuterio Pride MD, 30 mg at 02/18/19 0759  •  lidocaine (LIDODERM) 5 % 1 patch, 1 patch, Transdermal, Q24H, Eleuterio Pride MD, 1 patch at 02/18/19 0800  •  Magnesium Sulfate 2 gram Bolus, followed by 8 gram infusion (total Mg dose 10 grams)- Mg less than or equal to 1mg/dL, 2 g, Intravenous, PRN **OR** Magnesium Sulfate 2 gram / 50mL Infusion (GIVE X 3 BAGS TO EQUAL 6GM TOTAL DOSE) - Mg 1.1 - 1.5 mg/dl, 2 g, Intravenous, PRN **OR** Magnesium Sulfate 4 gram infusion- Mg 1.6-1.9 mg/dL, 4 g, Intravenous, PRN, Eleuterio Pride MD, Last Rate: 25 mL/hr at 02/14/19 1446, 4 g at 02/14/19 1446  •  meloxicam (MOBIC) tablet 7.5 mg, 7.5 mg, Oral, Daily, Eleuterio Pride MD, 7.5 mg at 02/18/19 0800  •  metaxalone (SKELAXIN) tablet 400 mg, 400 mg, Oral, 4x Daily PRN, Babita Harmon CRNA, 400 mg at 02/18/19 0721  •  Morphine sulfate (PF) injection 2 mg, 2 mg, Intravenous, Q4H PRN, Eleuterio Pride MD, 2 mg at 02/18/19 0940  •  [DISCONTINUED] HYDROmorphone (DILAUDID) injection 0.5 mg, 0.5 mg, Intravenous, Q2H PRN, 0.5 mg at 02/18/19 0355 **AND** naloxone (NARCAN) injection 0.1 mg, 0.1 mg, Intravenous, Q5 Min PRN, Ariel Maynard MD  •  ondansetron (ZOFRAN) tablet 4 mg, 4 mg, Oral, Q6H, Stopped at 02/18/19 0632 **OR** [DISCONTINUED] ondansetron (ZOFRAN) injection 4 mg, 4 mg, Intravenous, Q6H PRN, Ariel Maynard MD, 4 mg at 02/17/19 1102  •  potassium chloride (MICRO-K) CR capsule 40 mEq, 40 mEq, Oral, PRN **OR** potassium chloride  "(KLOR-CON) packet 40 mEq, 40 mEq, Oral, PRN, 40 mEq at 02/17/19 1818 **OR** potassium chloride 10 mEq in 100 mL IVPB, 10 mEq, Intravenous, Q1H PRN, Fadi Muir MD  •  sennosides-docusate sodium (SENOKOT-S) 8.6-50 MG tablet 2 tablet, 2 tablet, Oral, Nightly PRN, Cristiana Cruz APRN, 2 tablet at 02/18/19 0143  •  [COMPLETED] Insert peripheral IV, , , Once **AND** sodium chloride 0.9 % flush 10 mL, 10 mL, Intravenous, PRN, Soco Hancock PA  •  sodium chloride 0.9 % flush 3 mL, 3 mL, Intravenous, Q12H, Eleuterio Pride MD, 3 mL at 02/18/19 0945  •  sodium chloride 0.9 % flush 3-10 mL, 3-10 mL, Intravenous, PRN, Eleuterio Pride MD  •  vitamin B-12 (CYANOCOBALAMIN) tablet 500 mcg, 500 mcg, Oral, Daily, Eleuterio Pride MD, 500 mcg at 02/18/19 0800      Assessment/Plan   Assessment / Plan     Active Hospital Problems    Diagnosis Date Noted   • **Closed fracture of left hip (CMS/HCC) [S72.002A] 02/13/2019   • Humeral head fracture [S42.293A] 02/14/2019   • Shoulder dislocation, recurrent, right [M24.411] 02/14/2019   • Postoperative anemia due to acute blood loss [D62] 02/14/2019   • Dysphagia [R13.10] 02/14/2019   • Falls [W19.XXXA] 02/13/2019   • Periorbital ecchymosis of left eye [S00.12XA] 02/13/2019   • Hematoma [T14.8XXA] 02/13/2019     Left gluteal hematoma     • Atrial fibrillation (CMS/HCC) [I48.91] 04/05/2018   • Essential hypertension [I10] 04/05/2018          Brief Hospital Course to date:  Debbie Baum is a 89 y.o. female w/ hx afib (s/p prior ablation) on coreg and asa, prior right shoulder dislocations, multiple prior falls who was using her walker at nursing facility today when lost balance and fell to left side striking left hip and face. Patient was brought to ER where initial xray hip/pelvis revealed no fracture. However, later while moving legs heard a \"pop\" and had worsening pain and subsequent ct pelvis showed left intertrochanteric fracture and 6cm gluteal & subcutanoeus " hematoma. Also has bruising left eye. Ct face negative for fractures. No preceding chest pain or palpitations. Patient went for left intertrochanteric nail the evening of admission (2/13/19) and did will post-operatively. POD #1 hgb dropped to 7.4 (from 10.7 on admission) and received 1 unit prbc, and also developed right shoulder/arm pain (same shoulder that patient has apparently dislocated in the past). X-ray shoulder and humerus showed anterior dislocation right humeral head and possible humeral head fracture. Subsequent CT right upper extremity showed extensive degenerative changes and mildly comminuted anterior fracture/dislocation of right humeral head and glenoid fractures which, per discussion w/ dr. Maynard, appeared chronic in nature.     *left hip (intertrochanteric) fracture      -s/p left intertrochanteric nail 2/13/19 by dr. Maynard  *left gluteal/subcutaneous hematoma and post op anemia      -s/p 1 unit prbc 2/14/19     -receiving 2nd unit 2/15/19  *right shoulder dislocation/possible fracture  *encephalopathy (due to age, polypharmacy, etc)  *Hx afib  *left periorbital ecchymoses       -ct facial bones no fractures; ice tid  *hx htn  *hx multiple falls    --------------------------------------------------------------------------------------  Plan:    -s/p left intertrochanteric nail 2/13/19 by dr. Maynard; asa 81mg bid for dvt prophylaxis.  F/u with him 2 weeks  -s/p 1 unit prbc 2/14/19 for post op anemia and another unit given 2/15 with good response, will follow  - Reviewed Dr. ritter note re: right shoulder, changes appear to be chronic. Sling for comfort as needed.  WBAT   - sodium normalized yesterday  -replace elctrolytes prn    - d/w SLP/  [place] not safe for any PO.  Discussed with family, they are agreeable with comfort diet with nectar thick liquids.  Discussed at length with family re: prognosis again today.  PO intake remains poor, high aspiration risk.  Not candidate for feeding tube.   Will d/c morphine and increase lortab to try to get more awake per family request.  Ultimately, I'm not sure if she will recover but family is hopeful.  I have message out to discuss with her grandson as well (Dr. Taco Baum).  Son is ok with palliative consult.      DVT Prophylaxis:  Asa 81mg bid       CODE STATUS:   Code Status and Medical Interventions:   Ordered at: 02/13/19 1302     Limited Support to NOT Include:    Intubation     Code Status:    No CPR     Medical Interventions (Level of Support Prior to Arrest):    Limited         Electronically signed by Fadi Muir MD, 02/18/19, 1:35 PM.

## 2019-02-18 NOTE — PROGRESS NOTES
Continued Stay Note  [facility]     Patient Name: Debbie Baum  MRN: [id number]  Today's Date: 2/18/2019    Admit Date: 2/13/2019    Discharge Plan     Row Name 02/18/19 1212       Plan    Plan  SNF- [facility] ( citation)    Plan Comments  Case mgt con't to follow. D/C plan is to go to a skilled bed at the [facility] when medically ready, she was a resident in their assisted living apt prior to admit. Case mgt will con't to follow.        Discharge Codes    No documentation.       Expected Discharge Date and Time     Expected Discharge Date Expected Discharge Time    Feb 16, 2019             Sonja C Kellerman, RN

## 2019-02-18 NOTE — PLAN OF CARE
Problem: Patient Care Overview  Goal: Plan of Care Review  Outcome: Ongoing (interventions implemented as appropriate)   02/18/19 0906   Coping/Psychosocial   Plan of Care Reviewed With patient   Plan of Care Review   Progress no change   OTHER   Outcome Summary patient performed bed mobility with assist x2, did assist once in sidelying with hold onto bedrail, performed B LE exercises in supine and seated positions, patient resisting sit<>stand transfers from recliner today, increased confusion limiting progress.

## 2019-02-18 NOTE — CONSULTS
Paula Scott MD  Consulting physician: Fadi Muir  Reason for referral : goc discussion, ACP  Chief Complaint   Patient presents with   • Fall     HPI:   89 yo female with history of A fib s/p ablation, prior Right shoulder dislocation, multiple falls presenting to ED on 2/13/19 by EMS from [facility]. Pt states she was using her walker going to the bathroom and lost her balance landing on her left side. She is having pain in her left hip and swelling with bruising around her left eye. She has been unable to bear weight since the fall. She denies LOC, neck or back pain. She denies numbness, tingling or weakness to UE or LE. She denies dizziness, CP, SOB or dizziness prior to fall or after. She denies prior hip fracture. She denies headache, nausea or vomiting. The only blood thinner she takes is a daily 81mg ASA. She has significant hx for prior colon cancer, cardiac ablation, GERD, and HTN.   Xray imaging showed Left closed intertrochanteric femur fracture. Patient underwent fixation with cephalomedullary nailing on 2/13. On 2/14 patient was found to have per xray a shoulder and humerus showed anterior dislocation right humeral head and possible humeral head fracture. Subsequent CT right upper extremity showed extensive degenerative changes and mildly comminuted anterior fracture/dislocation of right humeral head and glenoid fractures which, per discussion w/ Dr. Maynard, appeared chronic in nature.   During hospital course patient has been determined to not be safe with any po intake. Family agreed to comfort diet with nectar thick liquids, pureed food.   Left knee Xray imaging showed severe left knee degenerative change, no acute oseesous abnormality     Symptoms:   + tiredness, patient has to be cued frequently to stay in the conversation.   Patient had been reporting Left knee pain, received prior injections into Left knee for chronic knee pain.  Left knee injected with bupivacaine, lidocaine and  triamcinolone acetonide.   Ropivacaine infusion removed today from Left femur fracture.   No nausea, no dyspnea, no anxiety.   Reports no pain currently in Left knee, Right lower extremity or Right shoulder.   Daughter, son report that the patient has managed chronic pain with ibuprofen, Aspercreme and acetaminophen/diphenhydramine at bedtime.   Patient had been receiving opioid at bedtime but the patient became sedated.    Functional status - prior to this fall patient was dependent in most ADLs, able to ambulate with a walker and feed herself, needed assistance with personal care, dressing and going to bathroom some times.     Advance care planning discussed: yes  Code Status:   Current Code Status     Date Active Code Status Order ID Comments User Context       2/13/2019 13:02 No CPR [id number]  Eleuterio Pride MD ED       Questions for Current Code Status     Question Answer Comment    Code Status No CPR     Medical Interventions (Level of Support Prior to Arrest) Limited     Limited Support to NOT Include Intubation         Advance Directive: none on medical record  Surrogate decision maker: children: Fadi Baum and Kecia Baum, Daughter - Kecia Baum primary contact  Past Medical History:   Diagnosis Date   • Atrial fibrillation (CMS/HCC)    • Cancer (CMS/HCC)     colon   • Coronary artery disease    • GERD (gastroesophageal reflux disease)    • Hypertension      Past Surgical History:   Procedure Laterality Date   • CARDIAC ABLATION     • CHOLECYSTECTOMY     • COLON SURGERY      BOWEL RESECTION FOR HX COLON CANCER   • HIP INTERTROCHANTERIC NAILING Left 2/13/2019    Procedure: HIP INTERTROCHANTERIC NAILING LEFT;  Surgeon: Ariel Maynard MD;  Location: [facility];  Service: Orthopedics   • HYSTERECTOMY     • REPLACEMENT TOTAL KNEE         Reviewed current scheduled and prn medications for route, type, dose and frequency.    Current Facility-Administered Medications   Medication Dose Route Frequency Provider  Last Rate Last Dose   • acetaminophen (TYLENOL) tablet 650 mg  650 mg Oral Q4H PRN Ariel Maynard MD   650 mg at 02/17/19 1450    Or   • acetaminophen (TYLENOL) 160 MG/5ML solution 650 mg  650 mg Oral Q4H PRN Ariel Maynard MD        Or   • acetaminophen (TYLENOL) suppository 650 mg  650 mg Rectal Q4H PRN Ariel Maynard MD       • acetaminophen (TYLENOL) tablet 650 mg  650 mg Oral Q8H Babita Harmon CRNA   650 mg at 02/18/19 0639   • aspirin chewable tablet 81 mg  81 mg Oral BID Ariel Maynard MD   81 mg at 02/18/19 0800   • bisacodyl (DULCOLAX) suppository 10 mg  10 mg Rectal Daily PRN Ariel Maynard MD       • carvedilol (COREG) tablet 3.125 mg  3.125 mg Oral BID With Meals Eleuterio Pride MD   3.125 mg at 02/18/19 0759   • cholecalciferol (VITAMIN D3) tablet 1,000 Units  1,000 Units Oral Daily Eleuterio Pride MD   1,000 Units at 02/18/19 0800   • docusate sodium (COLACE) liquid 100 mg  100 mg Oral BID PRN Cristiana Cruz APRN   100 mg at 02/16/19 2053   • HYDROcodone-acetaminophen (NORCO) 5-325 MG per tablet 1 tablet  1 tablet Oral Q6H PRN Fadi Muir MD       • lactated ringers infusion  9 mL/hr Intravenous Continuous PRN Colin Castro MD 9 mL/hr at 02/13/19 1649     • lansoprazole (FIRST) oral suspension 30 mg  30 mg Oral QAM Eleuterio Pride MD   30 mg at 02/18/19 0759   • lidocaine (LIDODERM) 5 % 1 patch  1 patch Transdermal Q24H Eleuterio Pride MD   1 patch at 02/18/19 0800   • Magnesium Sulfate 2 gram Bolus, followed by 8 gram infusion (total Mg dose 10 grams)- Mg less than or equal to 1mg/dL  2 g Intravenous PRN Eleuterio Pride MD        Or   • Magnesium Sulfate 2 gram / 50mL Infusion (GIVE X 3 BAGS TO EQUAL 6GM TOTAL DOSE) - Mg 1.1 - 1.5 mg/dl  2 g Intravenous PRN Eleuterio Pride MD        Or   • Magnesium Sulfate 4 gram infusion- Mg 1.6-1.9 mg/dL  4 g Intravenous PRN Eleuterio Pride MD 25 mL/hr at 02/14/19 1446 4 g at 02/14/19 1446    • meloxicam (MOBIC) tablet 7.5 mg  7.5 mg Oral Daily Eleuterio Pride MD   7.5 mg at 02/18/19 0800   • metaxalone (SKELAXIN) tablet 400 mg  400 mg Oral 4x Daily PRN Babita Harmon CRNA   400 mg at 02/18/19 0721   • Morphine sulfate (PF) injection 2 mg  2 mg Intravenous Q4H PRN Eleuterio Pride MD   2 mg at 02/18/19 0940   • naloxone (NARCAN) injection 0.1 mg  0.1 mg Intravenous Q5 Min PRN Ariel Maynard MD       • ondansetron (ZOFRAN) tablet 4 mg  4 mg Oral Q6H Hyacinth Montero APRN   Stopped at 02/18/19 0632   • potassium chloride (MICRO-K) CR capsule 40 mEq  40 mEq Oral PRN Fadi Muir MD        Or   • potassium chloride (KLOR-CON) packet 40 mEq  40 mEq Oral PRN Fadi Muir MD   40 mEq at 02/17/19 1818    Or   • potassium chloride 10 mEq in 100 mL IVPB  10 mEq Intravenous Q1H PRN Fadi Muir MD       • sennosides-docusate sodium (SENOKOT-S) 8.6-50 MG tablet 2 tablet  2 tablet Oral Nightly PRN Cristiana Cruz APRN   2 tablet at 02/18/19 0143   • sodium chloride 0.9 % flush 10 mL  10 mL Intravenous PRN Soco Hancock PA       • sodium chloride 0.9 % flush 3 mL  3 mL Intravenous Q12H Eleuterio Pride MD   3 mL at 02/18/19 0945   • sodium chloride 0.9 % flush 3-10 mL  3-10 mL Intravenous PRN Eleuterio Pride MD       • vitamin B-12 (CYANOCOBALAMIN) tablet 500 mcg  500 mcg Oral Daily Eleuterio Pride MD   500 mcg at 02/18/19 0800       lactated ringers 9 mL/hr Last Rate: 9 mL/hr (02/13/19 1649)     •  acetaminophen **OR** acetaminophen **OR** acetaminophen  •  bisacodyl  •  docusate sodium  •  HYDROcodone-acetaminophen  •  lactated ringers  •  magnesium sulfate **OR** magnesium sulfate **OR** magnesium sulfate  •  metaxalone  •  Morphine  •  [DISCONTINUED] HYDROmorphone **AND** naloxone  •  potassium chloride **OR** potassium chloride **OR** potassium chloride  •  sennosides-docusate sodium  •  [COMPLETED] Insert peripheral IV **AND** sodium chloride  •  sodium  "chloride  Allergies   Allergen Reactions   • Crab (Diagnostic) Hives   • Lovenox [Enoxaparin Sodium] Rash   • Penicillins Rash     unsure     History reviewed. No pertinent family history.  Social History     Socioeconomic History   • Marital status:      Spouse name: Not on file   • Number of children: Not on file   • Years of education: Not on file   • Highest education level: Not on file   Social Needs   • Financial resource strain: Not on file   • Food insecurity - worry: Not on file   • Food insecurity - inability: Not on file   • Transportation needs - medical: Not on file   • Transportation needs - non-medical: Not on file   Occupational History   • Not on file   Tobacco Use   • Smoking status: Never Smoker   • Smokeless tobacco: Never Used   Substance and Sexual Activity   • Alcohol use: No   • Drug use: No   • Sexual activity: Defer   Other Topics Concern   • Not on file   Social History Narrative   • Not on file       Review of Systems - +/- per HPI and symptom review.      PPS: 30%  /65 (BP Location: Left arm, Patient Position: Sitting)   Pulse 76   Temp 97.5 °F (36.4 °C) (Axillary)   Resp 16   Ht 165.1 cm (65\")   Wt 49.9 kg (110 lb)   LMP  (LMP Unknown)   SpO2 94%   BMI 18.30 kg/m²   49.9 kg (110 lb) Body mass index is 18.3 kg/m².  Intake & Output (last day)       02/17 0701 - 02/18 0700 02/18 0701 - 02/19 0700    P.O. 120 300    Total Intake(mL/kg) 120 (2.4) 300 (6)    Urine (mL/kg/hr) 400 (0.3) 350 (1)    Stool  0    Total Output 400 350    Net -280 -50          Urine Unmeasured Occurrence  1 x    Stool Unmeasured Occurrence  1 x          Physical Exam:  General Appearance: No acute distress, frail  Head: Normocephalic without obvious abnormality, atraumatic  Eyes: Conjunctivae and sclerae normal, no icterus, KEVIN, large periorbital ecchymosis on Left eye.  Throat: No oral lesions, no thrush, oral mucosa moist  Lungs: Clear to auscultation, respirations regular, even and " non-labored  Heart: Regular rhythm and normal rate, normal S1  Abdomen: Normal bowel sounds, soft, non-distended, non-tender  Extremities: Moves all extremities, no redness, no cyanosis, no edema,   Pulses: Distal pulses palpable and equal bilaterally  Skin: Warm and dry  Neurological: Awake with cueing, no myoclonus, equal hand  and strength, minimally able to lift lower extremities off bed    Reviewed labs and diagnostic results.  No results found for: HGBA1C  Results from last 7 days   Lab Units 02/17/19 0625 02/16/19  0707   WBC 10*3/mm3  --  10.50   HEMOGLOBIN g/dL 10.1* 9.2*   HEMATOCRIT % 30.4* 27.6*   PLATELETS 10*3/mm3  --  171     Results from last 7 days   Lab Units 02/17/19 2218 02/17/19 0625 02/13/19  1009   SODIUM mmol/L  --  136   < > 139   POTASSIUM mmol/L 4.2 3.4*   < > 3.8   CHLORIDE mmol/L  --  106   < > 107   CO2 mmol/L  --  25.0   < > 27.0   BUN mg/dL  --  14   < > 24*   CREATININE mg/dL  --  0.65   < > 0.86   CALCIUM mg/dL  --  8.9   < > 9.9   BILIRUBIN mg/dL  --   --   --  0.5   ALK PHOS U/L  --   --   --  71   ALT (SGPT) U/L  --   --   --  20   AST (SGOT) U/L  --   --   --  21   GLUCOSE mg/dL  --  94   < > 95    < > = values in this interval not displayed.     Results from last 7 days   Lab Units 02/17/19 2218 02/17/19 0625   SODIUM mmol/L  --  136   POTASSIUM mmol/L 4.2 3.4*   CHLORIDE mmol/L  --  106   CO2 mmol/L  --  25.0   BUN mg/dL  --  14   CREATININE mg/dL  --  0.65   GLUCOSE mg/dL  --  94   CALCIUM mg/dL  --  8.9     Imaging Results (last 72 hours)     ** No results found for the last 72 hours. **        Impression: 89 y.o. female with acute Left intertrochanteric femur fx s/p medullary nailing 2/13, multiple falls    Plan:   Acute Left lower extremity pain, chronic Left knee pain, Right shoulder pain -  Anesthesia injected Left knee  Noted patient started on Meloxicam on 2/15 - family reported patient had better effect with management of chronic pain with NSAID, ibuprofen.    Meloxicam has 20 hour halflife, today is 4 th dose - should be achieving some sustained anti inflammatory effect.  Will keep scheduled acetaminophen in place; however family report it is ineffective.      Chronic Right shoulder pain - scheduled aspercreme, continue scheduled acetaminophen.     Overall pain is incidental pain with repositioning and working with therapy.   Discussed with family that this pain may be difficult to manage pain at all times without sedating the patient. Patient reports that the pain subsides after being repositioned.  Patient wanting to minimize any sedating medication.   Will decrease morphine 1mg prn, hydrocodone/APAP 2.5mg prn    Tiredness - discontinue skelaxin with 9 hour half life, continue to monitor.   Reviewed with family that as a 89yo, she will naturally be more tired and especially with recovering from femur fracture repair. Methocarbamol has shorter half life, which may consideration for drowsiness side effect.     Goals of care discussion - discussed with patient, son, Fadi Baum, and daughter, Kecia Baum, was on speaker phone.   Reviewed changes in functional status and current clinical status - with diminished po intake.   Patient goals are to be able to stand on her feet again and ambulate with her walker. Also to feed her self again.  She wants to return to [facility] with skilled therapies, she has lived there since September.   Reviewed different choices with plans of care, hospice vs continuing skilled therapy.  If the patient is wanting to pursue skilled therapies then discussed returning to the [facility] as a SNF placement.  Family was under the impression that they would be transferring to a palliative unit at [facility] where the patient would be receiving skilled therapies and would allow us to have time to make adjustments to med regimen for pain management and to assist with optimal function prior to returning to the [facility]. Answered questions about palliative medicine and  informed that there is not a designated unit.     Discussed that if the patient does not have increase appetite, then overall recovery will not happen.  May consider 2 week trial of Megace.  Children and patient realize that she needs to have consistent po intake to sustain herself.   Patient reports that she is wanting to continue for rehab.     Reviewed and updated nursing.     Palliative medicine will continue to provide support to patient and family.     Penny Barillas, APRN  [phone]  02/18/19  2:19 PM      Time: 60 minutes spent reviewing medical and medication records, assessing and examining patient, discussing with patient, family, nursing staff and primary care team, answering questions, formulating a plan and documentation of care. > 50% time spent face to face

## 2019-02-18 NOTE — CONSULTS
Clinical Nutrition     Nutrition Assessment  Reason for Visit:   Follow-up protocol, RN consult at family request      Patient Name: Debbie Baum  YOB: 1923  MRN: [id number]  Date of Encounter: 02/18/19 12:37 PM  Admission date: 2/13/2019    Nutrition Assessment   Assessment       Hospital Problem List    Closed fracture of left hip (CMS/HCC)    Essential hypertension    Atrial fibrillation (CMS/HCC)    Falls    Periorbital ecchymosis of left eye    Hematoma    Humeral head fracture    Shoulder dislocation, recurrent, right    Postoperative anemia due to acute blood loss    Dysphagia      PMH: She  has a past medical history of Atrial fibrillation (CMS/HCC), Cancer (CMS/HCC), Coronary artery disease, GERD (gastroesophageal reflux disease), and Hypertension.   PSxH: She  has a past surgical history that includes Cholecystectomy; Replacement total knee; Colon surgery; Hysterectomy; Cardiac Ablation; and HIP INTERTROCHANTERIC NAILING LEFT (Left, 2/13/2019).     Other nutrition related factors:  (2/17): SLP recommended downgrading diet from dysphagia IV to dysphagia III.         Reported/Observed/Food/Nutrition Related History:   RD spoke with RN on the phone about pt family request to talk with RD about optimizing calories at mealtimes with current diet modifications. Spoke with pt's daughter and son in law at bedside. Pt has had issues since admission with being too fatigued, medicated, or nauseous from medications to eat post PT leading to long periods of time without intake per family. Family is concerned she is not eating enough and would like to figure out how to work around this. Pt family entertained waking pt up every 3 hours to feed through the night, RD advised against this- communicating that pt would benefit more from sleep to assist in strength for rehabilitation. RD spoke with family about the option of incorporating snacks (Magic Cup) in between meal trays to increase  "calorie intake throughout day. Pt is unable to eat large amounts at one time, therefore increasing frequency of smaller meals may encourage increased PO intake.     RD notes pt's daughter was feeding her lunch during visit. Pt family requested that if they are not present, to have someone assist in feeding pt her meals and snacks.     Anthropometrics     Height: 165.1 cm (65\")  Last filed wt: Weight: (ht=65in, wt= 110lb; BMI=18.3) (02/14/19 1706)  Weight Method: Stated    BMI: BMI (Calculated): 18.3  Underweight:<18.5kg/m2    Ideal Body Weight (IBW) (kg): 57.29  Admission wt: 110 lb  Method obtained: Stated (2/13)     -would benefit from bed scale weight- only recent weight is \"stated\".     Labs reviewed   Yes     Medications reviewed   Pertinent: IV Naropin @ 10 ml/hr, Coreg, VIT D3, mobic, zofran, VIT B12    Current Nutrition Prescription     PO: Diet Dysphagia; III - Pureed With Some Mashed; Nectar / Syrup Thick  Orders Placed This Encounter      Dietary Nutrition Supplements Boost Plus; chocolate    Intake: 30% of 5 meals    Nutrition Diagnosis     2/14; updated 2/18  Problem Predicted suboptimal energy intake   Etiology Family reports decreased appetite   Signs/Symptoms 30% of 5 meals      2/18  Problem Swallowing difficulty   Etiology SLP eval   Signs/Symptoms Pt diet order dysphagia III with NTL, needs assistance feeding.        Nutrition Intervention   1.  Follow treatment progress, Care plan reviewed  2. Advise alternate selection, Advised available snacks, Interview for preferences, Adjusted supplement, Encourage intake, Supplement provided   3. RD to order AM, PM, HS snack of Magic cup daily.   4. RD to continue Boost Plus BID and order Boost pudding TID.     Goal:   General: Nutrition support treatment  PO: Increase intake  Additional goals: pt consistently able to consume > or equal to 67% of meal trays.       Monitoring/Evaluation:   Per protocol, PO intake, Supplement intake, Weight, Symptoms, " POC/GOC, Swallow function      Will Continue to follow per protocol      Candelaria Fortune RD  Time Spent: 45 minutes

## 2019-02-18 NOTE — PLAN OF CARE
Problem: Pain, Acute (Adult)  Goal: Acceptable Pain Control/Comfort Level  Outcome: Ongoing (interventions implemented as appropriate)   02/18/19 0423   Pain, Acute (Adult)   Acceptable Pain Control/Comfort Level unable to achieve outcome       Problem: Patient Care Overview  Goal: Plan of Care Review  Outcome: Ongoing (interventions implemented as appropriate)   02/18/19 0423   Coping/Psychosocial   Plan of Care Reviewed With patient   Plan of Care Review   Progress no change   OTHER   Outcome Summary Pt c/o pain after midnight. Pt was given Skelaxin and Norco as ordered. This was not effective and 2mg morphine IV was given. After an hour, pt was still moanind in pain and began to go into afib with RVR. Dilaudid administered and pt converted back to NSR. She is now sleeping quietly. SpO2 remained >93% on RA. Ropiv going at 10ml/hr; external catheter in place and draining.       Problem: Fall Risk (Adult)  Goal: Absence of Fall  Outcome: Ongoing (interventions implemented as appropriate)   02/18/19 0423   Fall Risk (Adult)   Absence of Fall making progress toward outcome

## 2019-02-18 NOTE — PROGRESS NOTES
Procedure   Large Joint Arthrocentesis: L knee  Date/Time: 2/18/2019 4:43 PM  Consent given by: patient  Site marked: site marked  Timeout: Immediately prior to procedure a time out was called to verify the correct patient, procedure, equipment, support staff and site/side marked as required   Supporting Documentation  Indications: pain   Procedure Details  Location: knee - L knee  Preparation: Patient was prepped and draped in the usual sterile fashion  Needle size: 22 G  Approach: anterolateral  Medications administered: 3 mL bupivacaine 0.25 %; 3 mL lidocaine 1 %; 80 mg triamcinolone acetonide 40 MG/ML  Patient tolerance: patient tolerated the procedure well with no immediate complications

## 2019-02-18 NOTE — PROGRESS NOTES
[facility]    Acute pain service Inpatient Progress Note    Patient Name: Debbie Baum  :  1923  MRN:  [id number]        Acute Pain  Service Inpatient Progress Note:    Analgesia:Good  Pain Score:4/10  Side Effects:None  Catheter Site:clean, dry and dressing intact  Cath type: peripheral nerve cath(MOOG pump)  Infusion rate: 12ml/hr  Catheter Plan:Catheter to remain Insitu and RN to remove catheter

## 2019-02-18 NOTE — THERAPY TREATMENT NOTE
Acute Care - Physical Therapy Treatment Note  [facility]     Patient Name: Debbie Baum  : 1923  MRN: [id number]  Today's Date: 2019  Onset of Illness/Injury or Date of Surgery: 19  Date of Referral to PT: 19  Referring Physician: MD Caesar    Admit Date: 2019    Visit Dx:    ICD-10-CM ICD-9-CM   1. Closed fracture of left hip, initial encounter (CMS/[facility]) S72.002A 820.8   2. Injury of head, initial encounter S09.90XA 959.01   3. Contusion of left orbital tissues, initial encounter S05.12XA 921.2   4. Abrasion of face, initial encounter S00.81XA 910.0   5. Fall, initial encounter W19.XXXA E888.9   6. Impaired functional mobility, balance, gait, and endurance Z74.09 V49.89   7. Impaired mobility and ADLs Z74.09 799.89     Patient Active Problem List   Diagnosis   • Hyponatremia   • Essential hypertension   • Coronary artery disease   • Weakness generalized   • GERD (gastroesophageal reflux disease)   • Atrial fibrillation (CMS/[facility])   • Somnolence   • Medication adverse effect   • Chronic pain   • Closed fracture of left hip (CMS/[facility])   • Falls   • Periorbital ecchymosis of left eye   • Hematoma   • Humeral head fracture   • Shoulder dislocation, recurrent, right   • Postoperative anemia due to acute blood loss   • Dysphagia       Therapy Treatment    Rehabilitation Treatment Summary     Row Name 19 0834             Treatment Time/Intention    Discipline  physical therapy assistant  -AS      Document Type  therapy note (daily note)  -AS      Subjective Information  complains of;pain  -AS      Mode of Treatment  physical therapy  -AS      Patient/Family Observations  no family at bedside, patient supine and agreed to therrapy.  -AS      Patient Effort  good  -AS      Comment  patient resisted scooting edge of chair to progress sit<>stand. deferred this date.  -AS      Existing Precautions/Restrictions  fall;other (see comments) s/p L hip nailing, nerve catheter, h/o R shldr  dislocation  -AS      Recorded by [AS] Dara Clark, PTA 02/18/19 0906      Row Name 02/18/19 0834             Cognitive Assessment/Intervention- PT/OT    Affect/Mental Status (Cognitive)  confused  -AS      Orientation Status (Cognition)  oriented to;person  -AS      Follows Commands (Cognition)  follows one step commands;50-74% accuracy;repetition of directions required;verbal cues/prompting required  -AS      Cognitive Function (Cognitive)  safety deficit;memory deficit  -AS      Safety Deficit (Cognitive)  moderate deficit;at risk behavior observed;judgment;problem solving;safety precautions follow-through/compliance  -AS      Personal Safety Interventions  fall prevention program maintained;gait belt;nonskid shoes/slippers when out of bed;other (see comments) exit alarm  -AS      Recorded by [AS] Dara Clark [facility] 02/18/19 0906      Row Name 02/18/19 0834             Bed Mobility Assessment/Treatment    Rolling Left Cook (Bed Mobility)  verbal cues;maximum assist (25% patient effort);2 person assist  -AS      Rolling Right Cook (Bed Mobility)  verbal cues;maximum assist (25% patient effort);2 person assist once rolled on side patient assisting with bedrail  -AS      Bed Mobility, Safety Issues  decreased use of arms for pushing/pulling;decreased use of legs for bridging/pushing  -AS      Assistive Device (Bed Mobility)  bed rails;draw sheet  -AS      Comment (Bed Mobility)  rolled mutliple times to be cleaned secondary to BM and placement of lift sling for transfer to recliner.  -AS      Recorded by [AS] Dara Clark [facility] 02/18/19 0906      Row Name 02/18/19 0834             Sit-Stand Transfer    Sit-Stand Cook (Transfers)  -- patient deferred  -AS      Recorded by [AS] Dara Clark [facility] 02/18/19 0906      Row Name 02/18/19 0834             Motor Skills Assessment/Interventions    Additional Documentation  Therapeutic Exercise (Group)  -AS      Recorded by  [AS] Dara Clark, [facility] 02/18/19 0906      Row Name 02/18/19 0834             Therapeutic Exercise    07192 - PT Therapeutic Exercise Minutes  23  -AS      Recorded by [AS] Dara Clark, [facility] 02/18/19 0906      Row Name 02/18/19 0834             Therapeutic Exercise    Lower Extremity (Therapeutic Exercise)  heel slides, bilateral;LAQ (long arc quad), bilateral  -AS      Lower Extremity Range of Motion (Therapeutic Exercise)  hip abduction/adduction, bilateral;ankle dorsiflexion/plantar flexion, bilateral  -AS      Exercise Type (Therapeutic Exercise)  AAROM (active assistive range of motion)  -AS      Position (Therapeutic Exercise)  seated;supine  -AS      Sets/Reps (Therapeutic Exercise)  1/10  -AS      Comment (Therapeutic Exercise)  verbal and tactile cues for completion of exercises  -AS      Recorded by [AS] Dara Clark, [facility] 02/18/19 0906      Row Name 02/18/19 0834             Positioning and Restraints    Pre-Treatment Position  in bed  -AS      Post Treatment Position  chair  -AS      In Chair  reclined;call light within reach;encouraged to call for assist;exit alarm on;waffle cushion;on mechanical lift sling;pillow between legs  -AS      Recorded by [AS] Dara Clark, [facility] 02/18/19 0906      Row Name 02/18/19 0834             Pain Scale: FACES Pre/Post-Treatment    Pain: FACES Scale, Pretreatment  0-->no hurt  -AS      Pain: FACES Scale, Post-Treatment  2-->hurts little bit  -AS      Recorded by [AS] Dara Clark, [facility] 02/18/19 0906      Row Name                Wound 02/13/19 1019 Left eyebrow abrasion    Wound - Properties Group Date first assessed: 02/13/19 [CH] Time first assessed: 1019 [CH] Present On Admission : yes [CH] Side: Left [CH] Location: eyebrow [CH] Type: abrasion [CH] Recorded by:  [CH] Cally Swanson RN 02/13/19 1020    Row Name                Wound 02/13/19 1855 Left hip incision    Wound - Properties Group Date first assessed: 02/13/19 [SS] Time first  assessed: 1855 [SS] Side: Left [SS] Location: hip [SS] Type: incision [SS] Recorded by:  [SS] Bang Love RN 02/13/19 1855    Row Name                Wound 02/16/19 2000 Left lower leg skin tear    Wound - Properties Group Date first assessed: 02/16/19 [JR] Time first assessed: 2000 [JR] Present On Admission : yes [JR] Side: Left [JR] Orientation: lower [JR] Location: leg [JR] Type: skin tear [JR] Additional Comments: steri strips X 4 and mepliex in place, ST V shaped [JR] Recorded by:  [JR] Breonna Bruce RN 02/17/19 0226      User Key  (r) = Recorded By, (t) = Taken By, (c) = Cosigned By    Initials Name Effective Dates Discipline    AS Dara Clark, FAISAL 06/22/15 -  PT    Cally Mancia RN 06/16/16 -  Nurse    Bang Simon RN 01/15/18 -  --    Breonna Rice RN 09/18/16 -  Nurse          Wound 02/13/19 1019 Left eyebrow abrasion (Active)   Dressing Appearance open to air 2/18/2019  7:59 AM   Base scab 2/18/2019  7:59 AM   Periwound ecchymotic;swelling 2/18/2019  7:59 AM   Drainage Amount none 2/18/2019  7:59 AM   Dressing Care, Wound open to air 2/18/2019  7:59 AM       Wound 02/13/19 1855 Left hip incision (Active)   Dressing Appearance dried drainage 2/18/2019  7:59 AM   Closure INOCENTE 2/18/2019  7:59 AM   Drainage Characteristics/Odor serosanguineous 2/17/2019  6:15 PM   Drainage Amount small 2/18/2019  7:59 AM   Dressing Care, Wound border dressing 2/18/2019  7:59 AM       Wound 02/16/19 2000 Left lower leg skin tear (Active)   Dressing Appearance dry;intact 2/18/2019  7:59 AM   Closure Adhesive closure strips 2/18/2019  7:59 AM   Base scab 2/18/2019  7:59 AM   Drainage Amount none 2/18/2019  7:59 AM   Dressing Care, Wound border dressing 2/18/2019  7:59 AM           Physical Therapy Education     Title: PT OT SLP Therapies (In Progress)     Topic: Physical Therapy (In Progress)     Point: Mobility training (In Progress)     Learning Progress Summary           Patient  Acceptance, E, NR by AS at 2/18/2019  9:06 AM    Acceptance, E, NR by ES at 2/17/2019  2:10 PM    Acceptance, E, NR by AS at 2/16/2019  3:30 PM    Acceptance, E, NR by AS at 2/15/2019  1:57 PM    Acceptance, E, NR by EJ at 2/14/2019  4:27 PM   Family Acceptance, E, NR by EJ at 2/14/2019  4:27 PM                   Point: Home exercise program (In Progress)     Learning Progress Summary           Patient Acceptance, E, NR by AS at 2/18/2019  9:06 AM    Acceptance, E, NR by ES at 2/17/2019  2:10 PM    Acceptance, E, NR by AS at 2/16/2019  3:30 PM    Acceptance, E, NR by AS at 2/15/2019  1:57 PM    Acceptance, E, NR by EJ at 2/14/2019  4:27 PM   Family Acceptance, E, NR by EJ at 2/14/2019  4:27 PM                   Point: Body mechanics (In Progress)     Learning Progress Summary           Patient Acceptance, E, NR by AS at 2/18/2019  9:06 AM    Acceptance, E, NR by ES at 2/17/2019  2:10 PM    Acceptance, E, NR by AS at 2/16/2019  3:30 PM    Acceptance, E, NR by AS at 2/15/2019  1:57 PM    Acceptance, E, NR by EJ at 2/14/2019  4:27 PM   Family Acceptance, E, NR by EJ at 2/14/2019  4:27 PM                   Point: Precautions (In Progress)     Learning Progress Summary           Patient Acceptance, E, NR by AS at 2/18/2019  9:06 AM    Acceptance, E, NR by ES at 2/17/2019  2:10 PM    Acceptance, E, NR by AS at 2/16/2019  3:30 PM    Acceptance, E, NR by AS at 2/15/2019  1:57 PM    Acceptance, E, NR by EJ at 2/14/2019  4:27 PM   Family Acceptance, E, NR by EJ at 2/14/2019  4:27 PM                               User Key     Initials Effective Dates Name Provider Type Discipline     11/20/18 -  Nayana Adams, PT Physical Therapist PT    AS 06/22/15 -  Dara Clark, PTA Physical Therapy Assistant PT    LENARD 11/13/17 -  Ana Paul, PT Physical Therapist PT                PT Recommendation and Plan     Plan of Care Reviewed With: patient  Progress: no change  Outcome Summary: patient performed bed mobility with  assist x2, did assist once in sidelying with hold onto bedrail, performed B LE exercises in supine and seated positions, patient resisting sit<>stand transfers from recliner today, increased confusion limiting progress.  Outcome Measures     Row Name 02/18/19 0834 02/17/19 1031 02/17/19 1015       How much help from another person do you currently need...    Turning from your back to your side while in flat bed without using bedrails?  2  -AS  --  2  -ES    Moving from lying on back to sitting on the side of a flat bed without bedrails?  2  -AS  --  2  -ES    Moving to and from a bed to a chair (including a wheelchair)?  1  -AS  --  2  -ES    Standing up from a chair using your arms (e.g., wheelchair, bedside chair)?  2  -AS  --  2  -ES    Climbing 3-5 steps with a railing?  1  -AS  --  1  -ES    To walk in hospital room?  1  -AS  --  1  -ES    AM-PAC 6 Clicks Score  9  -AS  --  10  -ES       How much help from another is currently needed...    Putting on and taking off regular lower body clothing?  --  1  -AC  --    Bathing (including washing, rinsing, and drying)  --  2  -AC  --    Toileting (which includes using toilet bed pan or urinal)  --  1  -AC  --    Putting on and taking off regular upper body clothing  --  2  -AC  --    Taking care of personal grooming (such as brushing teeth)  --  2  -AC  --    Eating meals  --  2  -AC  --    Score  --  10  -AC  --       Functional Assessment    Outcome Measure Options  AM-PAC 6 Clicks Basic Mobility (PT)  -AS  AM-PAC 6 Clicks Daily Activity (OT)  -AC  AM-PAC 6 Clicks Basic Mobility (PT)  -ES    Row Name 02/16/19 1445 02/15/19 1303          How much help from another person do you currently need...    Turning from your back to your side while in flat bed without using bedrails?  2  -AS  2  -AS     Moving from lying on back to sitting on the side of a flat bed without bedrails?  2  -AS  2  -AS     Moving to and from a bed to a chair (including a wheelchair)?  2  -AS  2   -AS     Standing up from a chair using your arms (e.g., wheelchair, bedside chair)?  2  -AS  2  -AS     Climbing 3-5 steps with a railing?  1  -AS  1  -AS     To walk in hospital room?  1  -AS  1  -AS     AM-PAC 6 Clicks Score  10  -AS  10  -AS        Functional Assessment    Outcome Measure Options  AM-PAC 6 Clicks Basic Mobility (PT)  -AS  AM-PAC 6 Clicks Basic Mobility (PT)  -AS       User Key  (r) = Recorded By, (t) = Taken By, (c) = Cosigned By    Initials Name Provider Type    AC Chitra Thomas, OT Occupational Therapist    AS Dara Clark, PTA Physical Therapy Assistant    Ana Eden, PT Physical Therapist         Time Calculation:   PT Charges     Row Name 02/18/19 0834             Time Calculation    Start Time  0834  -AS      PT Received On  02/18/19  -AS      PT Goal Re-Cert Due Date  02/24/19  -AS         Timed Charges    74502 - PT Therapeutic Exercise Minutes  23  -AS        User Key  (r) = Recorded By, (t) = Taken By, (c) = Cosigned By    Initials Name Provider Type    AS Dara Clark, PTA Physical Therapy Assistant        Therapy Suggested Charges     Code   Minutes Charges    50751 (CPT®) Hc Pt Neuromusc Re Education Ea 15 Min      84321 (CPT®) Hc Pt Ther Proc Ea 15 Min 23 2    08857 (CPT®) Hc Gait Training Ea 15 Min      74913 (CPT®) Hc Pt Therapeutic Act Ea 15 Min      72233 (CPT®) Hc Pt Manual Therapy Ea 15 Min      30474 (CPT®) Hc Pt Iontophoresis Ea 15 Min      65896 (CPT®) Hc Pt Elec Stim Ea-Per 15 Min      72722 (CPT®) Hc Pt Ultrasound Ea 15 Min      96643 (CPT®) Hc Pt Self Care/Mgmt/Train Ea 15 Min      76064 (CPT®) Hc Pt Prosthetic (S) Train Initial Encounter, Each 15 Min      66385 (CPT®) Hc Pt Orthotic(S)/Prosthetic(S) Encounter, Each 15 Min      11856 (CPT®) Hc Orthotic(S) Mgmt/Train Initial Encounter, Each 15min      Total  23 2        Therapy Charges for Today     Code Description Service Date Service Provider Modifiers Qty    44255156990 HC PT THER PROC EA 15 MIN  2/18/2019 Dara Clark, FAISAL GP 2    08226113749 HC PT THER SUPP EA 15 MIN 2/18/2019 Dara Clark, FAISAL GP 2          PT G-Codes  Outcome Measure Options: AM-PAC 6 Clicks Basic Mobility (PT)  AM-PAC 6 Clicks Score: 9  Score: 10    Dara Clark PTA  2/18/2019

## 2019-02-19 LAB
ANION GAP SERPL CALCULATED.3IONS-SCNC: 5 MMOL/L (ref 3–11)
BUN BLD-MCNC: 15 MG/DL (ref 9–23)
BUN/CREAT SERPL: 26.3 (ref 7–25)
CALCIUM SPEC-SCNC: 9.3 MG/DL (ref 8.7–10.4)
CHLORIDE SERPL-SCNC: 105 MMOL/L (ref 99–109)
CO2 SERPL-SCNC: 26 MMOL/L (ref 20–31)
CREAT BLD-MCNC: 0.57 MG/DL (ref 0.6–1.3)
GFR SERPL CREATININE-BSD FRML MDRD: 99 ML/MIN/1.73
GLUCOSE BLD-MCNC: 129 MG/DL (ref 70–100)
POTASSIUM BLD-SCNC: 3.8 MMOL/L (ref 3.5–5.5)
SODIUM BLD-SCNC: 136 MMOL/L (ref 132–146)

## 2019-02-19 PROCEDURE — 99024 POSTOP FOLLOW-UP VISIT: CPT | Performed by: ORTHOPAEDIC SURGERY

## 2019-02-19 PROCEDURE — 99233 SBSQ HOSP IP/OBS HIGH 50: CPT | Performed by: INTERNAL MEDICINE

## 2019-02-19 PROCEDURE — 80048 BASIC METABOLIC PNL TOTAL CA: CPT | Performed by: INTERNAL MEDICINE

## 2019-02-19 PROCEDURE — 97110 THERAPEUTIC EXERCISES: CPT

## 2019-02-19 PROCEDURE — 25010000002 MORPHINE SULFATE (PF) 2 MG/ML SOLUTION: Performed by: NURSE PRACTITIONER

## 2019-02-19 RX ORDER — BUPIVACAINE HYDROCHLORIDE 2.5 MG/ML
3 INJECTION, SOLUTION INFILTRATION; PERINEURAL
Status: COMPLETED | OUTPATIENT
Start: 2019-02-18 | End: 2019-02-18

## 2019-02-19 RX ORDER — TROLAMINE SALICYLATE 10 G/100G
CREAM TOPICAL AS NEEDED
Status: DISCONTINUED | OUTPATIENT
Start: 2019-02-19 | End: 2019-02-22 | Stop reason: HOSPADM

## 2019-02-19 RX ORDER — IBUPROFEN 400 MG/1
200 TABLET ORAL EVERY 4 HOURS PRN
Status: DISCONTINUED | OUTPATIENT
Start: 2019-02-19 | End: 2019-02-22 | Stop reason: HOSPADM

## 2019-02-19 RX ORDER — IPRATROPIUM BROMIDE AND ALBUTEROL SULFATE 2.5; .5 MG/3ML; MG/3ML
3 SOLUTION RESPIRATORY (INHALATION) EVERY 6 HOURS PRN
Status: DISCONTINUED | OUTPATIENT
Start: 2019-02-19 | End: 2019-02-22 | Stop reason: HOSPADM

## 2019-02-19 RX ORDER — MORPHINE SULFATE 2 MG/ML
1 INJECTION, SOLUTION INTRAMUSCULAR; INTRAVENOUS
Status: DISCONTINUED | OUTPATIENT
Start: 2019-02-19 | End: 2019-02-21

## 2019-02-19 RX ORDER — LIDOCAINE HYDROCHLORIDE 10 MG/ML
3 INJECTION, SOLUTION INFILTRATION; PERINEURAL
Status: COMPLETED | OUTPATIENT
Start: 2019-02-18 | End: 2019-02-18

## 2019-02-19 RX ORDER — TRIAMCINOLONE ACETONIDE 40 MG/ML
80 INJECTION, SUSPENSION INTRA-ARTICULAR; INTRAMUSCULAR
Status: COMPLETED | OUTPATIENT
Start: 2019-02-18 | End: 2019-02-18

## 2019-02-19 RX ADMIN — CYANOCOBALAMIN TAB 1000 MCG 500 MCG: 1000 TAB at 09:19

## 2019-02-19 RX ADMIN — DICLOFENAC 2 G: 10 GEL TOPICAL at 20:49

## 2019-02-19 RX ADMIN — ACETAMINOPHEN 649.6 MG: 650 SOLUTION ORAL at 15:36

## 2019-02-19 RX ADMIN — LANSOPRAZOLE 30 MG: KIT at 08:03

## 2019-02-19 RX ADMIN — TROLAMINE SALICYLATE: 10 CREAM TOPICAL at 09:25

## 2019-02-19 RX ADMIN — DICLOFENAC 2 G: 10 GEL TOPICAL at 17:57

## 2019-02-19 RX ADMIN — ASPIRIN 81 MG CHEWABLE TABLET 81 MG: 81 TABLET CHEWABLE at 20:48

## 2019-02-19 RX ADMIN — TROLAMINE SALICYLATE: 10 CREAM TOPICAL at 16:18

## 2019-02-19 RX ADMIN — VITAMIN D, TAB 1000IU (100/BT) 1000 UNITS: 25 TAB at 09:18

## 2019-02-19 RX ADMIN — IBUPROFEN 200 MG: 100 SUSPENSION ORAL at 22:18

## 2019-02-19 RX ADMIN — ACETAMINOPHEN 650 MG: 325 TABLET ORAL at 05:07

## 2019-02-19 RX ADMIN — TROLAMINE SALICYLATE: 10 CREAM TOPICAL at 12:15

## 2019-02-19 RX ADMIN — SODIUM CHLORIDE, PRESERVATIVE FREE 3 ML: 5 INJECTION INTRAVENOUS at 09:30

## 2019-02-19 RX ADMIN — IBUPROFEN 200 MG: 100 SUSPENSION ORAL at 16:32

## 2019-02-19 RX ADMIN — HYDROCODONE BITARTRATE AND ACETAMINOPHEN 0.5 TABLET: 5; 325 TABLET ORAL at 05:07

## 2019-02-19 RX ADMIN — ASPIRIN 81 MG CHEWABLE TABLET 81 MG: 81 TABLET CHEWABLE at 09:19

## 2019-02-19 RX ADMIN — CARVEDILOL 3.12 MG: 3.12 TABLET, FILM COATED ORAL at 17:56

## 2019-02-19 RX ADMIN — TROLAMINE SALICYLATE 1 APPLICATION: 10 CREAM TOPICAL at 22:55

## 2019-02-19 RX ADMIN — MORPHINE SULFATE 1 MG: 2 INJECTION, SOLUTION INTRAMUSCULAR; INTRAVENOUS at 23:12

## 2019-02-19 RX ADMIN — SODIUM CHLORIDE, PRESERVATIVE FREE 3 ML: 5 INJECTION INTRAVENOUS at 20:49

## 2019-02-19 RX ADMIN — MELOXICAM 7.5 MG: 7.5 TABLET ORAL at 09:17

## 2019-02-19 RX ADMIN — ACETAMINOPHEN 649.6 MG: 650 SOLUTION ORAL at 07:32

## 2019-02-19 RX ADMIN — CARVEDILOL 3.12 MG: 3.12 TABLET, FILM COATED ORAL at 09:19

## 2019-02-19 RX ADMIN — MORPHINE SULFATE 1 MG: 2 INJECTION, SOLUTION INTRAMUSCULAR; INTRAVENOUS at 03:43

## 2019-02-19 RX ADMIN — ACETAMINOPHEN 650 MG: 325 TABLET ORAL at 13:29

## 2019-02-19 RX ADMIN — LIDOCAINE 1 PATCH: 50 PATCH CUTANEOUS at 09:26

## 2019-02-19 NOTE — PLAN OF CARE
Problem: Patient Care Overview  Goal: Plan of Care Review  Outcome: Ongoing (interventions implemented as appropriate)   02/19/19 1750   Coping/Psychosocial   Plan of Care Reviewed With patient   Plan of Care Review   Progress improving   OTHER   Outcome Summary Pt A&Ox4 with episodes of confusion at times. Has been more awake today from not giving morphine yet. Was up in chair for several hours today, max 2 assist to get back to bed, pt wasn't able to even stand. Left hip covaderm scant old dried drainage, will change dressing tomorrow according to order. Pt c/o left knee pain/swelling still this shift, meds adjusted more. NWB RUE. Palliative, ortho and hospitalist following.  Pt remains on Dysphagia III diet, nectar thick, needs assistance to eat. VSS this shift, sinus arrhytmia on monitor. Incontinent of bowel/bladder. Frequently seeks out staff for various reasons and for pain medicine requests. Plan for SNF at d/c. Family and pt both want to rehab.

## 2019-02-19 NOTE — PROGRESS NOTES
"CHIEF COMPLAINT: Left intertrochanteric hip fracture, left knee osteoarthritis    SUBJECTIVE  Patient resting comfortably in bed this morning.  Intermittent bouts of left leg pain throughout the night.  Sleeping comfortably upon entering the room this morning.     OBJECTIVE  Temp (24hrs), Av.5 °F (36.9 °C), Min:98.3 °F (36.8 °C), Max:98.8 °F (37.1 °C)    Blood pressure 153/91, pulse 79, temperature 98.8 °F (37.1 °C), temperature source Oral, resp. rate 16, height 165.1 cm (65\"), weight 49.9 kg (110 lb), SpO2 98 %, not currently breastfeeding.    Lab Results (last 24 hours)     Procedure Component Value Units Date/Time    Basic Metabolic Panel [[id number]]  (Abnormal) Collected:  19    Specimen:  Blood Updated:  19     Glucose 129 mg/dL      BUN 15 mg/dL      Creatinine 0.57 mg/dL      Sodium 136 mmol/L      Potassium 3.8 mmol/L      Chloride 105 mmol/L      CO2 26.0 mmol/L      Calcium 9.3 mg/dL      eGFR Non African Amer 99 mL/min/1.73      BUN/Creatinine Ratio 26.3     Anion Gap 5.0 mmol/L     Narrative:       [employer] Guidelines    Stage     Description        GFR  1         Normal or High     90+  2         Mild decrease      60-89  3         Moderate decrease  30-59  4         Severe decrease    15-29  5         Kidney failure     <15    The MDRD GFR formula is only valid for adults with stable renal function between ages 18 and 70.            PHYSICAL EXAM  Left lower extremity:Dressing intact with slight serosanguinous drainage- stable.  Mild pain with logroll of hip. Intact EHL, FHL, tibialis anterior, and gastrocsoleus. Sensation intact to light touch to deep peroneal, superficial peroneal, sural, saphenous, tibial nerves. 2+ palpable DP and PT pulses.  The exam remains slight valgus with range of motion of 20-80 degrees.  Range of motion of knee appears to be less painful this morning.  Trace effusion.      Closed fracture of left hip (CMS/HCC)    Essential " hypertension    Atrial fibrillation (CMS/HCC)    Falls    Periorbital ecchymosis of left eye    Hematoma    Humeral head fracture    Shoulder dislocation, recurrent, right    Postoperative anemia due to acute blood loss    Dysphagia  Left knee osteoarthritis     PLAN / DISPOSITION:  6 Day Post-Op left hip intertrochanteric fracture fixation    Protected weight bearing as tolerated left lower extremity, hip range of motion as tolerated   Pain control  Postop blood loss anemia-stable.  PT/OT for post op mobilization and medical equipment needs   Right upper extremity: CT of shoulder demonstrates chronic changes with anterior chronic dislocation.   SCD's bilateral lower extremities   Aspirin 81 mg twice a day for DVT prophylaxis   Social work for discharge planning.   Dressing to remain in place for 7 days. May remove on POD#7. If no drainage, may shower on POD#10. No submerging wound in water. If drainage is noted, sterile dressing should be placed and wound checked daily. No showering until wound has remained dry for 72 consecutive hours.   Follow up in 2 weeks for re-assessment.    Left knee osteoarthritis    Cortisone injection yesterday.  Continue to observe.      Ariel Maynard MD  02/19/19  7:24 AM

## 2019-02-19 NOTE — THERAPY TREATMENT NOTE
Acute Care - Physical Therapy Treatment Note  [facility]     Patient Name: Debbie Baum  : 1923  MRN: [id number]  Today's Date: 2019  Onset of Illness/Injury or Date of Surgery: 19  Date of Referral to PT: 19  Referring Physician: MD Caesar    Admit Date: 2019    Visit Dx:    ICD-10-CM ICD-9-CM   1. Closed fracture of left hip, initial encounter (CMS/[facility]) S72.002A 820.8   2. Injury of head, initial encounter S09.90XA 959.01   3. Contusion of left orbital tissues, initial encounter S05.12XA 921.2   4. Abrasion of face, initial encounter S00.81XA 910.0   5. Fall, initial encounter W19.XXXA E888.9   6. Impaired functional mobility, balance, gait, and endurance Z74.09 V49.89   7. Impaired mobility and ADLs Z74.09 799.89     Patient Active Problem List   Diagnosis   • Hyponatremia   • Essential hypertension   • Coronary artery disease   • Weakness generalized   • GERD (gastroesophageal reflux disease)   • Atrial fibrillation (CMS/[facility])   • Somnolence   • Medication adverse effect   • Chronic pain   • Closed fracture of left hip (CMS/[facility])   • Falls   • Periorbital ecchymosis of left eye   • Hematoma   • Humeral head fracture   • Shoulder dislocation, recurrent, right   • Postoperative anemia due to acute blood loss   • Dysphagia       Therapy Treatment    Rehabilitation Treatment Summary     Row Name 19 1004             Treatment Time/Intention    Discipline  physical therapy assistant  -AS      Document Type  therapy note (daily note)  -AS      Subjective Information  complains of;pain;weakness  -AS      Mode of Treatment  physical therapy  -AS      Patient/Family Observations  no family at bedside, patient sitting UIC and agreed to therapy.  -AS      Patient Effort  good  -AS      Existing Precautions/Restrictions  fall;other (see comments) NWB R UE, L LE WBAT HIP FX  -AS      Treatment Considerations/Comments  R UE with h/o dislocation  -AS      Recorded by [AS] Dara Clark  Cally, [facility] 02/19/19 1055      Row Name 02/19/19 1004             Cognitive Assessment/Intervention- PT/OT    Affect/Mental Status (Cognitive)  confused  -AS      Orientation Status (Cognition)  oriented to;person;verbal cues/prompts needed for orientation;place  -AS      Follows Commands (Cognition)  follows one step commands;50-74% accuracy;verbal cues/prompting required;repetition of directions required  -AS      Safety Deficit (Cognitive)  moderate deficit;at risk behavior observed;judgment;safety precautions follow-through/compliance  -AS      Personal Safety Interventions  fall prevention program maintained;gait belt;nonskid shoes/slippers when out of bed;other (see comments) exit alarm  -AS      Recorded by [AS] Dara Clark, [facility] 02/19/19 1055      Row Name 02/19/19 1004             Bed Mobility Assessment/Treatment    Comment (Bed Mobility)  not tested, patient sitting UIC  -AS      Recorded by [AS] Dara Clark [facility] 02/19/19 1055      Row Name 02/19/19 1004             Sit-Stand Transfer    Sit-Stand [place] (Transfers)  verbal cues;maximum assist (25% patient effort);2 person assist  -AS      Assistive Device (Sit-Stand Transfers)  other (see comments) B UE support  -AS      Recorded by [AS] Dara Clark [facility] 02/19/19 1055      Row Name 02/19/19 1004             Stand-Sit Transfer    Stand-Sit [place] (Transfers)  verbal cues;maximum assist (25% patient effort);2 person assist  -AS      Assistive Device (Stand-Sit Transfers)  other (see comments) B UE support  -AS      Recorded by [AS] Dara Clark [facility] 02/19/19 1055      Row Name 02/19/19 1004             Gait/Stairs Assessment/Training    04975 - Gait Training Minutes   15 pre-gait activity  -AS      Comment (Gait/Stairs)  patient stood with max assist x2 with B UE support, assist to block B LE from sliding as patient leaning posteriorly. Patient unable to reach full upright position. Marcial shoes prior to sit<>stand  transfer.  -AS      Recorded by [AS] Dara Clark, [facility] 02/19/19 1055      Row Name 02/19/19 1004             Motor Skills Assessment/Interventions    Additional Documentation  Therapeutic Exercise (Group)  -AS      Recorded by [AS] Dara Clark, [facility] 02/19/19 1055      Row Name 02/19/19 1004             Therapeutic Exercise    91941 - PT Therapeutic Exercise Minutes  8  -AS      Recorded by [AS] Dara Clark, [facility] 02/19/19 1055      Row Name 02/19/19 1004             Therapeutic Exercise    Lower Extremity (Therapeutic Exercise)  gluteal sets;heel slides, left;LAQ (long arc quad), bilateral;quad sets, left  -AS      Lower Extremity Range of Motion (Therapeutic Exercise)  ankle dorsiflexion/plantar flexion, bilateral;hip abduction/adduction, left  -AS      Exercise Type (Therapeutic Exercise)  AAROM (active assistive range of motion)  -AS      Position (Therapeutic Exercise)  seated;other (see comments) reclined in chair  -AS      Sets/Reps (Therapeutic Exercise)  1/10  -AS      Comment (Therapeutic Exercise)  verbal and tactile cues for technique  -AS      Recorded by [AS] Dara Clark, [facility] 02/19/19 1055      Row Name 02/19/19 1004             Positioning and Restraints    Pre-Treatment Position  sitting in chair/recliner  -AS      Post Treatment Position  chair  -AS      In Chair  reclined;call light within reach;encouraged to call for assist;exit alarm on;on mechanical lift sling;waffle cushion;with PT;legs elevated  -AS      Recorded by [AS] Dara Clark, [facility] 02/19/19 1055      Row Name 02/19/19 1004             Pain Scale: FACES Pre/Post-Treatment    Pain: FACES Scale, Pretreatment  2-->hurts little bit  -AS      Pain: FACES Scale, Post-Treatment  2-->hurts little bit  -AS      Recorded by [AS] Dara Clark, [facility] 02/19/19 1055      Row Name                Wound 02/13/19 1019 Left eyebrow abrasion    Wound - Properties Group Date first assessed: 02/13/19 [CH] Time first  assessed: 1019 [CH] Present On Admission : yes [CH] Side: Left [CH] Location: eyebrow [CH] Type: abrasion [CH] Recorded by:  [CH] Cally Swanson RN 02/13/19 1020    Row Name                Wound 02/13/19 1855 Left hip incision    Wound - Properties Group Date first assessed: 02/13/19 [SS] Time first assessed: 1855 [SS] Side: Left [SS] Location: hip [SS] Type: incision [SS] Recorded by:  [SS] Bang Love RN 02/13/19 1855    Row Name                Wound 02/16/19 2000 Left lower leg skin tear    Wound - Properties Group Date first assessed: 02/16/19 [JR] Time first assessed: 2000 [JR] Present On Admission : yes [JR] Side: Left [JR] Orientation: lower [JR] Location: leg [JR] Type: skin tear [JR] Additional Comments: steri strips X 4 and mepliex in place, ST V shaped [JR] Recorded by:  [JR] Breonna Bruce RN 02/17/19 0226      User Key  (r) = Recorded By, (t) = Taken By, (c) = Cosigned By    Initials Name Effective Dates Discipline    AS Dara Clark, FAISAL 06/22/15 -  PT    CH Cally Swanson RN 06/16/16 -  Nurse    SS Bang Love RN 01/15/18 -  --    Breonna Rice RN 09/18/16 -  Nurse          Wound 02/13/19 1019 Left eyebrow abrasion (Active)   Dressing Appearance open to air 2/19/2019  8:04 AM   Base scab 2/19/2019  8:04 AM   Periwound ecchymotic;swelling 2/19/2019  8:04 AM   Drainage Amount none 2/19/2019  8:04 AM   Dressing Care, Wound open to air 2/19/2019  8:04 AM       Wound 02/13/19 1855 Left hip incision (Active)   Dressing Appearance dried drainage 2/19/2019  8:04 AM   Closure INOCENTE 2/19/2019  8:04 AM   Drainage Amount small 2/19/2019  8:04 AM   Dressing Care, Wound border dressing 2/19/2019  8:04 AM       Wound 02/16/19 2000 Left lower leg skin tear (Active)   Dressing Appearance dry;intact 2/19/2019  8:04 AM   Closure Adhesive closure strips 2/19/2019  8:04 AM   Base scab 2/19/2019  8:04 AM   Drainage Amount none 2/19/2019  8:04 AM   Dressing Care, Wound border  dressing;low-adherent 2/19/2019  8:04 AM           Physical Therapy Education     Title: PT OT SLP Therapies (In Progress)     Topic: Physical Therapy (In Progress)     Point: Mobility training (In Progress)     Learning Progress Summary           Patient Acceptance, E, NR by AS at 2/19/2019 10:55 AM    Acceptance, E, NR by AS at 2/18/2019  9:06 AM    Acceptance, E, NR by ES at 2/17/2019  2:10 PM    Acceptance, E, NR by AS at 2/16/2019  3:30 PM    Acceptance, E, NR by AS at 2/15/2019  1:57 PM    Acceptance, E, NR by EJ at 2/14/2019  4:27 PM   Family Acceptance, E, NR by EJ at 2/14/2019  4:27 PM                   Point: Home exercise program (In Progress)     Learning Progress Summary           Patient Acceptance, E, NR by AS at 2/19/2019 10:55 AM    Acceptance, E, NR by AS at 2/18/2019  9:06 AM    Acceptance, E, NR by ES at 2/17/2019  2:10 PM    Acceptance, E, NR by AS at 2/16/2019  3:30 PM    Acceptance, E, NR by AS at 2/15/2019  1:57 PM    Acceptance, E, NR by EJ at 2/14/2019  4:27 PM   Family Acceptance, E, NR by EJ at 2/14/2019  4:27 PM                   Point: Body mechanics (In Progress)     Learning Progress Summary           Patient Acceptance, E, NR by AS at 2/19/2019 10:55 AM    Acceptance, E, NR by AS at 2/18/2019  9:06 AM    Acceptance, E, NR by ES at 2/17/2019  2:10 PM    Acceptance, E, NR by AS at 2/16/2019  3:30 PM    Acceptance, E, NR by AS at 2/15/2019  1:57 PM    Acceptance, E, NR by EJ at 2/14/2019  4:27 PM   Family Acceptance, E, NR by EJ at 2/14/2019  4:27 PM                   Point: Precautions (In Progress)     Learning Progress Summary           Patient Acceptance, E, NR by AS at 2/19/2019 10:55 AM    Acceptance, E, NR by AS at 2/18/2019  9:06 AM    Acceptance, E, NR by ES at 2/17/2019  2:10 PM    Acceptance, E, NR by AS at 2/16/2019  3:30 PM    Acceptance, E, NR by AS at 2/15/2019  1:57 PM    Acceptance, E, NR by EJ at 2/14/2019  4:27 PM   Family Acceptance, E, NR by ABA at 2/14/2019  4:27 PM                                User Key     Initials Effective Dates Name Provider Type Discipline    EJ 11/20/18 -  Nayana Adams, PT Physical Therapist PT    AS 06/22/15 -  Dara Clark, PTA Physical Therapy Assistant PT    ES 11/13/17 -  Ana Paul, PT Physical Therapist PT                PT Recommendation and Plan     Plan of Care Reviewed With: patient  Progress: no change  Outcome Summary: patient continues to need max asisst x2 for sit<>stands transfers, unable to reach full upright posture due to weakness, impaired balance and decreased strength. B LE exercises performed with verbal and tactile cues. UIC with alarm set.  Outcome Measures     Row Name 02/19/19 1004 02/18/19 0834 02/17/19 1031       How much help from another person do you currently need...    Turning from your back to your side while in flat bed without using bedrails?  2  -AS  2  -AS  --    Moving from lying on back to sitting on the side of a flat bed without bedrails?  2  -AS  2  -AS  --    Moving to and from a bed to a chair (including a wheelchair)?  1  -AS  1  -AS  --    Standing up from a chair using your arms (e.g., wheelchair, bedside chair)?  2  -AS  2  -AS  --    Climbing 3-5 steps with a railing?  1  -AS  1  -AS  --    To walk in hospital room?  1  -AS  1  -AS  --    AM-PAC 6 Clicks Score  9  -AS  9  -AS  --       How much help from another is currently needed...    Putting on and taking off regular lower body clothing?  --  --  1  -AC    Bathing (including washing, rinsing, and drying)  --  --  2  -AC    Toileting (which includes using toilet bed pan or urinal)  --  --  1  -AC    Putting on and taking off regular upper body clothing  --  --  2  -AC    Taking care of personal grooming (such as brushing teeth)  --  --  2  -AC    Eating meals  --  --  2  -AC    Score  --  --  10  -AC       Functional Assessment    Outcome Measure Options  AM-PAC 6 Clicks Basic Mobility (PT)  -AS  AM-PAC 6 Clicks Basic Mobility (PT)   -AS  AM-Merged with [facility] 6 Clicks Daily Activity (OT)  -AC    Row Name 02/17/19 1015 02/16/19 1445          How much help from another person do you currently need...    Turning from your back to your side while in flat bed without using bedrails?  2  -ES  2  -AS     Moving from lying on back to sitting on the side of a flat bed without bedrails?  2  -ES  2  -AS     Moving to and from a bed to a chair (including a wheelchair)?  2  -ES  2  -AS     Standing up from a chair using your arms (e.g., wheelchair, bedside chair)?  2  -ES  2  -AS     Climbing 3-5 steps with a railing?  1  -ES  1  -AS     To walk in hospital room?  1  -ES  1  -AS     AM-PAC 6 Clicks Score  10  -ES  10  -AS        Functional Assessment    Outcome Measure Options  AM-PAC 6 Clicks Basic Mobility (PT)  -ES  AM-Merged with [facility] 6 Clicks Basic Mobility (PT)  -AS       User Key  (r) = Recorded By, (t) = Taken By, (c) = Cosigned By    Initials Name Provider Type    hCitra Rodarte, OT Occupational Therapist    AS Dara Clark, FAISAL Physical Therapy Assistant    ES Ana Paul, PT Physical Therapist         Time Calculation:   PT Charges     Row Name 02/19/19 1004             Time Calculation    Start Time  1004  -AS      PT Received On  02/19/19  -AS      PT Goal Re-Cert Due Date  02/24/19  -AS         Timed Charges    24094 - PT Therapeutic Exercise Minutes  8  -AS      32735 - Gait Training Minutes   15 pre-gait activity  -AS        User Key  (r) = Recorded By, (t) = Taken By, (c) = Cosigned By    Initials Name Provider Type    AS Dara Clark, FAISAL Physical Therapy Assistant        Therapy Suggested Charges     Code   Minutes Charges    89756 (CPT®) Hc Pt Neuromusc Re Education Ea 15 Min      01432 (CPT®) Hc Pt Ther Proc Ea 15 Min 8 1    93075 (CPT®) Hc Gait Training Ea 15 Min 15 1    30685 (CPT®) Hc Pt Therapeutic Act Ea 15 Min      52495 (CPT®) Hc Pt Manual Therapy Ea 15 Min      53721 (CPT®) Hc Pt Iontophoresis Ea 15 Min      78135 (CPT®) Hc Pt Elec  Stim Ea-Per 15 Min      33848 (CPT®) Hc Pt Ultrasound Ea 15 Min      70664 (CPT®) Hc Pt Self Care/Mgmt/Train Ea 15 Min      58019 (CPT®) Hc Pt Prosthetic (S) Train Initial Encounter, Each 15 Min      12888 (CPT®) Hc Pt Orthotic(S)/Prosthetic(S) Encounter, Each 15 Min      93566 (CPT®) Hc Orthotic(S) Mgmt/Train Initial Encounter, Each 15min      Total  23 2        Therapy Charges for Today     Code Description Service Date Service Provider Modifiers Qty    52349961107 HC PT THER PROC EA 15 MIN 2/18/2019 Dara Clark, PTA GP 2    39924158745 HC PT THER SUPP EA 15 MIN 2/18/2019 Dara Clark, PTA GP 2    84314826641 HC PT THER PROC EA 15 MIN 2/19/2019 Dara Clark, PTA GP 2    69632326155 HC PT THER SUPP EA 15 MIN 2/19/2019 Dara Clark, PTA GP 2          PT G-Codes  Outcome Measure Options: AM-PAC 6 Clicks Basic Mobility (PT)  AM-PAC 6 Clicks Score: 9  Score: 10    Dara Clark PTA  2/19/2019

## 2019-02-19 NOTE — PROGRESS NOTES
Palliative Care Progress Note    Date of Admission: 2/13/2019    Code Status:   Current Code Status     Date Active Code Status Order ID Comments User Context       2/13/2019 13:02 No CPR [id number]  Eleuterio Pride MD ED       Questions for Current Code Status     Question Answer Comment    Code Status No CPR     Medical Interventions (Level of Support Prior to Arrest) Limited     Limited Support to NOT Include Intubation         Subjective:  Patient denies any pain while sitting up in chair.   She reports that if she has pain it is with her Left knee. Reports increase pain with movement or bearing weight.   + dyspnea after eating lunch  No anxiety, no nausea  Reviewed Medication lists and regimen with daughterKecia. Reviewed last few months prior to this hospital admission. Reviewed current med regimen.   Daughter reports intolerance to hydrocodone/APAP and patient has always managed her pain well with ibuprofen, diclofenac.  Acetaminophen has never been an effective analgesia med.   Patient reports that the morphine iv has been effective and does not make her too tired.   Patient is reporting tiredness today because she did not sleep well because of the pain.   Reviewed current scheduled and prn medications for route, type, dose, and frequency.    lactated ringers 9 mL/hr Last Rate: 9 mL/hr (02/13/19 1649)     •  acetaminophen **OR** acetaminophen **OR** acetaminophen  •  bisacodyl  •  docusate sodium  •  ibuprofen  •  ibuprofen  •  lactated ringers  •  magnesium sulfate **OR** magnesium sulfate **OR** magnesium sulfate  •  Morphine  •  [DISCONTINUED] HYDROmorphone **AND** naloxone  •  potassium chloride **OR** potassium chloride **OR** potassium chloride  •  sennosides-docusate sodium  •  [COMPLETED] Insert peripheral IV **AND** sodium chloride  •  sodium chloride  •  trolamine salicylate    Objective:  PPS 30%   /74 (BP Location: Left arm, Patient Position: Lying)   Pulse 69   Temp 98.3 °F (36.8  "°C) (Oral)   Resp 16   Ht 165.1 cm (65\")   Wt 49.9 kg (110 lb)   LMP  (LMP Unknown)   SpO2 97%   BMI 18.30 kg/m²    Intake & Output (last day)       02/18 0701 - 02/19 0700 02/19 0701 - 02/20 0700    P.O. 500 620    Total Intake(mL/kg) 500 (10) 620 (12.4)    Urine (mL/kg/hr) 350 (0.3) 300 (0.6)    Stool 0 0    Total Output 350 300    Net +150 +320          Urine Unmeasured Occurrence 5 x 2 x    Stool Unmeasured Occurrence 4 x 1 x        Lab Results (last 24 hours)     Procedure Component Value Units Date/Time    Basic Metabolic Panel [[id number]]  (Abnormal) Collected:  02/19/19 0607    Specimen:  Blood Updated:  02/19/19 0709     Glucose 129 mg/dL      BUN 15 mg/dL      Creatinine 0.57 mg/dL      Sodium 136 mmol/L      Potassium 3.8 mmol/L      Chloride 105 mmol/L      CO2 26.0 mmol/L      Calcium 9.3 mg/dL      eGFR Non African Amer 99 mL/min/1.73      BUN/Creatinine Ratio 26.3     Anion Gap 5.0 mmol/L     Narrative:       [employer] Guidelines    Stage     Description        GFR  1         Normal or High     90+  2         Mild decrease      60-89  3         Moderate decrease  30-59  4         Severe decrease    15-29  5         Kidney failure     <15    The MDRD GFR formula is only valid for adults with stable renal function between ages 18 and 70.        Imaging Results (last 24 hours)     ** No results found for the last 24 hours. **          Physical Exam:  Gen: NAD, up in chair  Skin: warm, dry   Eyes: KEVIN, conjunctiva clear and moist, diffuse Left periorbital ecchymosis  Resp/thorax: even effort, non labored, CTA, bilateral ronchi, audible respiratory sounds  CV: RRR   ABD: soft, bowel sounds+, nontender  Ext: no edema, no redness   Neuro: alert, interactive, no myoclonus   Psych: appropriate conversation and mood     Reviewed labs and diagnostic results.   No results found for: HGBA1C  Results from last 7 days   Lab Units 02/17/19  0625 02/16/19  0707   WBC 10*3/mm3  --  10.50 "   HEMOGLOBIN g/dL 10.1* 9.2*   HEMATOCRIT % 30.4* 27.6*   PLATELETS 10*3/mm3  --  171     Results from last 7 days   Lab Units 02/19/19  0607  02/13/19  1009   SODIUM mmol/L 136   < > 139   POTASSIUM mmol/L 3.8   < > 3.8   CHLORIDE mmol/L 105   < > 107   CO2 mmol/L 26.0   < > 27.0   BUN mg/dL 15   < > 24*   CREATININE mg/dL 0.57*   < > 0.86   CALCIUM mg/dL 9.3   < > 9.9   BILIRUBIN mg/dL  --   --  0.5   ALK PHOS U/L  --   --  71   ALT (SGPT) U/L  --   --  20   AST (SGOT) U/L  --   --  21   GLUCOSE mg/dL 129*   < > 95    < > = values in this interval not displayed.       Impression: 89 y.o. female with acute Left intertrochanteric femur fx s/p medullary nailing 2/13, multiple falls    Plan:   Dysphagia - suspected pharyngeal dysfunction.  Patient having some wet breathing sounds and shortness of breath after eating.   Reviewed aspiration risks of expected outcome.   Prn neb Rx    Acute on chronic Left knee pain, Right shoulder pain - Diclofenac ointment x4 daily.  Prn morphine continue with change to every 2hours.   Stop acetaminophen, prn ibuprofen.  Continue meloxicam 7.5mg, patient had tried it prior to fall for chronic pain and it was ineffective before.     Dysphagia, anorexia - reviewed that the patient will need to have more consistent po intake for any short term recovery and to have progress with rehab outcomes.     Goals of care discussion - met with Kecia bowman, for around 60 minutes today - two different visits.  Co visit with Dr Muir  Patient wants to return to the [facility], she does not want to return home.   Reviewed a SNF placement or returning to her assisted living space with increase care givers.  Decision was made to proceed with SNF placement at the [facility].   It was reviewed with the patient and the daughter, Kecia, that this fall and recovery process may not be a survivable outcome for the patient.   Overall the patient and the family need to try this plan.   Reviewed that palliative  medicine may follow at the [facility] to continue with symptom management.      Time: 30 minutes   > 50% time spent in counseling and education concerning current orders for symptom management with patient and daughter, Kecia.     Penny Barillas, APRN  [phone]  02/19/19  4:18 PM

## 2019-02-19 NOTE — PLAN OF CARE
"Problem: Patient Care Overview  Goal: Interprofessional Rounds/Family Conf   02/19/19 1400   Interdisciplinary Rounds/Family Conf   Summary Palliative Care Interdisciplinary Rounds - Palliative Care Team members present: JOSE ELIAS Delgado DO; LINDA NEWTON, [facility]; MARIUSZ Rowe RN, PN; FRANKO Terry RN, PN; IVETH Hurd LCSW, [facility]-; IVETH Orellana MDiv, [facility]       Problem: Palliative Care (Adult)  Intervention: Support/Optimize Psychosocial Response   02/19/19 1048   Coping/Psychosocial Interventions   Supportive Measures active listening utilized;positive reinforcement provided;other (see comments)  (symptom assessment)   Patient up to chair, receptive to symptom assessment and supportive presence.  Pt states no pain when still/not moving - any movement and pain increases to \"10\".  Patient ate minimal breakfast but states she didn't like the food that came on the tray.  No family present at time of visit.        "

## 2019-02-19 NOTE — PLAN OF CARE
Problem: Patient Care Overview  Goal: Plan of Care Review  Outcome: Ongoing (interventions implemented as appropriate)   02/19/19 1056   Coping/Psychosocial   Plan of Care Reviewed With patient   Plan of Care Review   Progress no change   OTHER   Outcome Summary patient continues to need max asisst x2 for sit<>stands transfers, unable to reach full upright posture due to weakness, impaired balance and decreased strength. B LE exercises performed with verbal and tactile cues. UIC with alarm set.

## 2019-02-19 NOTE — PROGRESS NOTES
[facility] Medicine Services  PROGRESS NOTE    Patient Name: Debbie Baum  : 1923  MRN: [id number]    Date of Admission: 2019  Length of Stay: 6  Primary Care Physician: Paula Scott MD    Subjective   Subjective     CC:  Hip fracture, shoulder dislocation/possible fracture, anemia    HPI:  Daughter at bedside.  Patient more awake and interactive today.  Sitting up in chair.  She complains of pain.  Worked a little with therapy.    Review of Systems  Gen- No fevers, chills  CV- No chest pain, palpitations  Resp- No cough, dyspnea  GI- No N/V/D, abd pain    Otherwise ROS is negative except as mentioned in the HPI.    Objective   Objective     Vital Signs:   Temp:  [98.1 °F (36.7 °C)-98.8 °F (37.1 °C)] 98.1 °F (36.7 °C)  Heart Rate:  [69-89] 69  Resp:  [16-17] 16  BP: (149-181)/(70-93) 153/73        Physical Exam:  Constitutional: frail, age appropriate, awake, up in chair  HENT: NCAT, mucous membranes moist. Bruising to left eye  Respiratory: mild bilateral rhonchi, respiratory effort normal   Cardiovascular: RRR, no murmurs, rubs, or gallops  Gastrointestinal: Positive bowel sounds, soft, nontender, nondistended  Musculoskeletal: No bilateral ankle edema  Psychiatric: appropriate affect  Neurologic: resting, moves all 4, follows commands  Skin: No rashes      Results Reviewed:  I have personally reviewed current lab, radiology, and data and agree.    Results from last 7 days   Lab Units 19  0625 19  0707 02/15/19  1701  02/15/19  0608  19  0514 19  1009   WBC 10*3/mm3  --  10.50  --   --  10.92*  --  12.74* 10.76   HEMOGLOBIN g/dL 10.1* 9.2* 10.0*   < > 7.7*   < > 7.4* 10.7*   HEMATOCRIT % 30.4* 27.6* 30.2*   < > 24.4*   < > 23.3* 34.1*   PLATELETS 10*3/mm3  --  171  --   --  165  --  225 312   INR   --   --   --   --   --   --   --  1.07    < > = values in this interval not displayed.     Results from last 7 days   Lab Units 19  0697  02/17/19  2218 02/17/19  0625 02/16/19  0707  02/13/19  1009   SODIUM mmol/L 136  --  136 127*   < > 139   POTASSIUM mmol/L 3.8 4.2 3.4* 3.7   < > 3.8   CHLORIDE mmol/L 105  --  106 99   < > 107   CO2 mmol/L 26.0  --  25.0 26.0   < > 27.0   BUN mg/dL 15  --  14 18   < > 24*   CREATININE mg/dL 0.57*  --  0.65 0.60   < > 0.86   GLUCOSE mg/dL 129*  --  94 98   < > 95   CALCIUM mg/dL 9.3  --  8.9 8.6*   < > 9.9   ALT (SGPT) U/L  --   --   --   --   --  20   AST (SGOT) U/L  --   --   --   --   --  21    < > = values in this interval not displayed.     Estimated Creatinine Clearance: 33.1 mL/min (A) (by C-G formula based on SCr of 0.57 mg/dL (L)).    No results found for: BNP    Microbiology Results Abnormal     None          Imaging Results (last 24 hours)     Procedure Component Value Units Date/Time    XR Knee 1 or 2 View Left [[id number]] Collected:  02/18/19 1543     Updated:  02/18/19 1555    Narrative:       EXAMINATION: XR KNEE 1 OR 2 VW LEFT-, XR HIP W OR WO PELVIS 1 VIEW LEFT-  02/18/2019      INDICATION: pain; S72.002A-Fracture of unspecified part of neck of left  femur, initial encounter for closed fracture; S09.90XA-Unspecified  injury of head, initial encounter; S05.12XA-Contusion of eyeball and  orbital tissues, left eye, initial encounter; S00.81XA-Abrasion of other  part of head, initial encounter; W19.XXXA-Unspecified fall, initial  encounter; Z74.09-Other reduced mobility     TECHNIQUE:  Single frontal view of the left hip and 2 views of the left  knee.     COMPARISONS:  CT pelvis 02/13/2019     FINDINGS:       Left hip: Interval ORIF for intertrochanteric left femur fracture.  Anatomic alignment is seen of the bones and the hardware is in good  position.     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 There is no evidence of fracture. There is background  osteopenia. Minimal suprapatellar effusion  with loose bodies in the  joint space. No radiodense foreign body.       Impression:       1. Interval ORIF of intertrochanteric left femur fracture.  2. Severe left knee degenerative change. No acute osseous abnormality.     D:  02/18/2019  E:  02/18/2019     This report was finalized on 2/18/2019 3:53 PM by Franklin Baum.       XR Hip With or Without Pelvis 1 View Left [[id number]] Collected:  02/18/19 1543     Updated:  02/18/19 1555    Narrative:       EXAMINATION: XR KNEE 1 OR 2 VW LEFT-, XR HIP W OR WO PELVIS 1 VIEW LEFT-  02/18/2019      INDICATION: pain; S72.002A-Fracture of unspecified part of neck of left  femur, initial encounter for closed fracture; S09.90XA-Unspecified  injury of head, initial encounter; S05.12XA-Contusion of eyeball and  orbital tissues, left eye, initial encounter; S00.81XA-Abrasion of other  part of head, initial encounter; W19.XXXA-Unspecified fall, initial  encounter; Z74.09-Other reduced mobility     TECHNIQUE:  Single frontal view of the left hip and 2 views of the left  knee.     COMPARISONS:  CT pelvis 02/13/2019     FINDINGS:       Left hip: Interval ORIF for intertrochanteric left femur fracture.  Anatomic alignment is seen of the bones and the hardware is in good  position.     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 There is no evidence of fracture. There is background  osteopenia. Minimal suprapatellar effusion with loose bodies in the  joint space. No radiodense foreign body.       Impression:       1. Interval ORIF of intertrochanteric left femur fracture.  2. Severe left knee degenerative change. No acute osseous abnormality.     D:  02/18/2019  E:  02/18/2019     This report was finalized on 2/18/2019 3:53 PM by Franklin Baum.             Results for orders placed during the hospital encounter of 09/17/18   Adult Transthoracic Echo Complete W/ Cont if Necessary  Per Protocol    Narrative · Mild-to-moderate mitral valve regurgitation is present          I have reviewed the medications:    Current Facility-Administered Medications:   •  acetaminophen (TYLENOL) tablet 650 mg, 650 mg, Oral, Q4H PRN, 650 mg at 02/17/19 1450 **OR** acetaminophen (TYLENOL) 160 MG/5ML solution 650 mg, 650 mg, Oral, Q4H PRN, 649.6 mg at 02/19/19 0732 **OR** acetaminophen (TYLENOL) suppository 650 mg, 650 mg, Rectal, Q4H PRN, Ariel Maynard MD  •  aspirin chewable tablet 81 mg, 81 mg, Oral, BID, Ariel Maynard MD, 81 mg at 02/19/19 0919  •  bisacodyl (DULCOLAX) suppository 10 mg, 10 mg, Rectal, Daily PRN, Ariel Maynard MD  •  carvedilol (COREG) tablet 3.125 mg, 3.125 mg, Oral, BID With Meals, Eleuterio Pride MD, 3.125 mg at 02/19/19 0919  •  cholecalciferol (VITAMIN D3) tablet 1,000 Units, 1,000 Units, Oral, Daily, Eleuterio Pride MD, 1,000 Units at 02/19/19 0918  •  diclofenac (VOLTAREN) 1 % gel 2 g, 2 g, Topical, 4x Daily, Penny Barillas APRN  •  docusate sodium (COLACE) liquid 100 mg, 100 mg, Oral, BID PRN, Cristiana Cruz APRN, 100 mg at 02/16/19 2053  •  lactated ringers infusion, 9 mL/hr, Intravenous, Continuous PRN, Colin Castro MD, Last Rate: 9 mL/hr at 02/13/19 1649  •  lansoprazole (FIRST) oral suspension 30 mg, 30 mg, Oral, QAM, Eleuterio Pride MD, 30 mg at 02/19/19 0803  •  lidocaine (LIDODERM) 5 % 1 patch, 1 patch, Transdermal, Q24H, Eleuterio Pride MD, 1 patch at 02/19/19 0926  •  Magnesium Sulfate 2 gram Bolus, followed by 8 gram infusion (total Mg dose 10 grams)- Mg less than or equal to 1mg/dL, 2 g, Intravenous, PRN **OR** Magnesium Sulfate 2 gram / 50mL Infusion (GIVE X 3 BAGS TO EQUAL 6GM TOTAL DOSE) - Mg 1.1 - 1.5 mg/dl, 2 g, Intravenous, PRN **OR** Magnesium Sulfate 4 gram infusion- Mg 1.6-1.9 mg/dL, 4 g, Intravenous, PRN, Eleuterio Pride MD, Last Rate: 25 mL/hr at 02/14/19 1446, 4 g at 02/14/19 1446  •  meloxicam (MOBIC)  tablet 7.5 mg, 7.5 mg, Oral, Daily, Eleuterio Pride MD, 7.5 mg at 02/19/19 0917  •  Morphine sulfate (PF) injection 1 mg, 1 mg, Intravenous, Q4H PRN, Penny Barillas, APRN, 1 mg at 02/19/19 0343  •  [DISCONTINUED] HYDROmorphone (DILAUDID) injection 0.5 mg, 0.5 mg, Intravenous, Q2H PRN, 0.5 mg at 02/18/19 0355 **AND** naloxone (NARCAN) injection 0.1 mg, 0.1 mg, Intravenous, Q5 Min PRN, Ariel Maynard MD  •  potassium chloride (MICRO-K) CR capsule 40 mEq, 40 mEq, Oral, PRN **OR** potassium chloride (KLOR-CON) packet 40 mEq, 40 mEq, Oral, PRN, 40 mEq at 02/17/19 1818 **OR** potassium chloride 10 mEq in 100 mL IVPB, 10 mEq, Intravenous, Q1H PRN, Fadi Muir MD  •  sennosides-docusate sodium (SENOKOT-S) 8.6-50 MG tablet 2 tablet, 2 tablet, Oral, Nightly PRN, Cristiana Cruz APRN, 2 tablet at 02/18/19 0143  •  [COMPLETED] Insert peripheral IV, , , Once **AND** sodium chloride 0.9 % flush 10 mL, 10 mL, Intravenous, PRN, Soco Hancock PA  •  sodium chloride 0.9 % flush 3 mL, 3 mL, Intravenous, Q12H, Eleuterio Pride MD, 3 mL at 02/19/19 0930  •  sodium chloride 0.9 % flush 3-10 mL, 3-10 mL, Intravenous, PRN, Eleuterio Pride MD  •  trolamine salicylate (ASPERCREME) 10 % cream, , Topical, PRN, Penny Barillas, APRN  •  vitamin B-12 (CYANOCOBALAMIN) tablet 500 mcg, 500 mcg, Oral, Daily, Eleuterio Pride MD, 500 mcg at 02/19/19 0919      Assessment/Plan   Assessment / Plan     Active Hospital Problems    Diagnosis Date Noted   • **Closed fracture of left hip (CMS/[facility]) [S72.002A] 02/13/2019   • Humeral head fracture [S42.293A] 02/14/2019   • Shoulder dislocation, recurrent, right [M24.411] 02/14/2019   • Postoperative anemia due to acute blood loss [D62] 02/14/2019   • Dysphagia [R13.10] 02/14/2019   • Falls [W19.XXXA] 02/13/2019   • Periorbital ecchymosis of left eye [S00.12XA] 02/13/2019   • Hematoma [T14.8XXA] 02/13/2019     Left gluteal hematoma     • Atrial fibrillation (CMS/[facility]) [I48.91]  "04/05/2018   • Essential hypertension [I10] 04/05/2018          Brief Hospital Course to date:  Debbie Baum is a 89 y.o. female w/ hx afib (s/p prior ablation) on coreg and asa, prior right shoulder dislocations, multiple prior falls who was using her walker at nursing facility today when lost balance and fell to left side striking left hip and face. Patient was brought to ER where initial xray hip/pelvis revealed no fracture. However, later while moving legs heard a \"pop\" and had worsening pain and subsequent ct pelvis showed left intertrochanteric fracture and 6cm gluteal & subcutanoeus hematoma. Also has bruising left eye. Ct face negative for fractures. No preceding chest pain or palpitations. Patient went for left intertrochanteric nail the evening of admission (2/13/19) and did will post-operatively. POD #1 hgb dropped to 7.4 (from 10.7 on admission) and received 1 unit prbc, and also developed right shoulder/arm pain (same shoulder that patient has apparently dislocated in the past). X-ray shoulder and humerus showed anterior dislocation right humeral head and possible humeral head fracture. Subsequent CT right upper extremity showed extensive degenerative changes and mildly comminuted anterior fracture/dislocation of right humeral head and glenoid fractures which, per discussion w/ dr. Maynard, appeared chronic in nature.     *left hip (intertrochanteric) fracture      -s/p left intertrochanteric nail 2/13/19 by dr. Maynard  *left gluteal/subcutaneous hematoma and post op anemia      -s/p 1 unit prbc 2/14/19     -receiving 2nd unit 2/15/19  *right shoulder dislocation/possible fracture  *encephalopathy (due to age, polypharmacy, etc)  *Hx afib  *left periorbital ecchymoses       -ct facial bones no fractures; ice tid  *hx htn  *hx multiple falls    --------------------------------------------------------------------------------------  Plan:    -s/p left intertrochanteric nail 2/13/19 by dr. Maynard; asa " 81mg bid for dvt prophylaxis.  F/u with him 2 weeks  -s/p 1 unit prbc 2/14/19 for post op anemia and another unit given 2/15 with good response, will follow  - Reviewed Dr. ritter note re: right shoulder, changes appear to be chronic. Sling for comfort as needed.  WBAT   - sodium normalized replace elctrolytes prn    - d/w SLP/  [place] not safe for any PO.  Discussed with family, they are agreeable with comfort diet with nectar thick liquids.      - discussed at bedside with palliative.  More awake today, but intake remains poor with risk for aspiration.  Will continue to monitor intake, hospice appropriateness, vs skilled.      DVT Prophylaxis:  Asa 81mg bid       CODE STATUS:   Code Status and Medical Interventions:   Ordered at: 02/13/19 1302     Limited Support to NOT Include:    Intubation     Code Status:    No CPR     Medical Interventions (Level of Support Prior to Arrest):    Limited         Electronically signed by Fadi Muir MD, 02/19/19, 2:27 PM.

## 2019-02-19 NOTE — PLAN OF CARE
Problem: Pain, Acute (Adult)  Goal: Identify Related Risk Factors and Signs and Symptoms  Outcome: Ongoing (interventions implemented as appropriate)      Problem: Patient Care Overview  Goal: Plan of Care Review  Outcome: Ongoing (interventions implemented as appropriate)   02/19/19 0604   Coping/Psychosocial   Plan of Care Reviewed With patient   Plan of Care Review   Progress no change   OTHER   Outcome Summary vital signs stable, pt medicated several times with prn meds due to c/o left leg pain, pt crying and moaning, pt frequently seeks out staff, pt remains incontinent of bowel and bladder and several BM's during shift, pt provided snacks of ice cream and boost pudding, SR with 1st AV block on monitor, meds crushed - dysphagia 3/nectar thick liquids diet, continue to monitor       Problem: Skin Injury Risk (Adult)  Goal: Identify Related Risk Factors and Signs and Symptoms  Outcome: Ongoing (interventions implemented as appropriate)      Problem: Fall Risk (Adult)  Goal: Identify Related Risk Factors and Signs and Symptoms  Outcome: Ongoing (interventions implemented as appropriate)      Problem: Fractured Hip (Adult)  Goal: Signs and Symptoms of Listed Potential Problems Will be Absent, Minimized or Managed (Fractured Hip)  Outcome: Ongoing (interventions implemented as appropriate)

## 2019-02-20 PROCEDURE — 97530 THERAPEUTIC ACTIVITIES: CPT | Performed by: OCCUPATIONAL THERAPIST

## 2019-02-20 PROCEDURE — 99024 POSTOP FOLLOW-UP VISIT: CPT | Performed by: ORTHOPAEDIC SURGERY

## 2019-02-20 PROCEDURE — 97530 THERAPEUTIC ACTIVITIES: CPT

## 2019-02-20 PROCEDURE — 92610 EVALUATE SWALLOWING FUNCTION: CPT

## 2019-02-20 PROCEDURE — 25010000002 MORPHINE SULFATE (PF) 2 MG/ML SOLUTION: Performed by: NURSE PRACTITIONER

## 2019-02-20 PROCEDURE — 99233 SBSQ HOSP IP/OBS HIGH 50: CPT | Performed by: INTERNAL MEDICINE

## 2019-02-20 RX ORDER — MORPHINE SULFATE 10 MG/5ML
2 SOLUTION ORAL
Status: DISCONTINUED | OUTPATIENT
Start: 2019-02-20 | End: 2019-02-22

## 2019-02-20 RX ORDER — MORPHINE SULFATE 10 MG/5ML
2 SOLUTION ORAL ONCE
Status: COMPLETED | OUTPATIENT
Start: 2019-02-20 | End: 2019-02-20

## 2019-02-20 RX ORDER — SENNOSIDES 8.6 MG
2 TABLET ORAL NIGHTLY
Status: DISCONTINUED | OUTPATIENT
Start: 2019-02-20 | End: 2019-02-20

## 2019-02-20 RX ORDER — MORPHINE SULFATE 10 MG/5ML
5 SOLUTION ORAL
Status: DISCONTINUED | OUTPATIENT
Start: 2019-02-20 | End: 2019-02-22

## 2019-02-20 RX ORDER — SENNOSIDES 8.6 MG
1 TABLET ORAL NIGHTLY
Status: DISCONTINUED | OUTPATIENT
Start: 2019-02-20 | End: 2019-02-22 | Stop reason: HOSPADM

## 2019-02-20 RX ORDER — CELECOXIB 100 MG/1
100 CAPSULE ORAL EVERY 12 HOURS SCHEDULED
Status: DISCONTINUED | OUTPATIENT
Start: 2019-02-20 | End: 2019-02-22 | Stop reason: HOSPADM

## 2019-02-20 RX ADMIN — CARVEDILOL 3.12 MG: 3.12 TABLET, FILM COATED ORAL at 17:15

## 2019-02-20 RX ADMIN — IBUPROFEN 200 MG: 100 SUSPENSION ORAL at 02:40

## 2019-02-20 RX ADMIN — SODIUM CHLORIDE, PRESERVATIVE FREE 3 ML: 5 INJECTION INTRAVENOUS at 20:47

## 2019-02-20 RX ADMIN — TROLAMINE SALICYLATE 1 APPLICATION: 10 CREAM TOPICAL at 06:06

## 2019-02-20 RX ADMIN — MORPHINE SULFATE 2 MG: 10 SOLUTION ORAL at 17:15

## 2019-02-20 RX ADMIN — CELECOXIB 100 MG: 100 CAPSULE ORAL at 12:19

## 2019-02-20 RX ADMIN — MORPHINE SULFATE 1 MG: 2 INJECTION, SOLUTION INTRAMUSCULAR; INTRAVENOUS at 10:04

## 2019-02-20 RX ADMIN — CYANOCOBALAMIN TAB 1000 MCG 500 MCG: 1000 TAB at 08:10

## 2019-02-20 RX ADMIN — MORPHINE SULFATE 1 MG: 2 INJECTION, SOLUTION INTRAMUSCULAR; INTRAVENOUS at 22:27

## 2019-02-20 RX ADMIN — ASPIRIN 81 MG CHEWABLE TABLET 81 MG: 81 TABLET CHEWABLE at 08:10

## 2019-02-20 RX ADMIN — IBUPROFEN 200 MG: 400 TABLET ORAL at 07:53

## 2019-02-20 RX ADMIN — DICLOFENAC 2 G: 10 GEL TOPICAL at 08:11

## 2019-02-20 RX ADMIN — CARVEDILOL 3.12 MG: 3.12 TABLET, FILM COATED ORAL at 08:10

## 2019-02-20 RX ADMIN — MORPHINE SULFATE 2 MG: 10 SOLUTION ORAL at 20:48

## 2019-02-20 RX ADMIN — MORPHINE SULFATE 1 MG: 2 INJECTION, SOLUTION INTRAMUSCULAR; INTRAVENOUS at 03:45

## 2019-02-20 RX ADMIN — MORPHINE SULFATE 1 MG: 2 INJECTION, SOLUTION INTRAMUSCULAR; INTRAVENOUS at 07:53

## 2019-02-20 RX ADMIN — CELECOXIB 100 MG: 100 CAPSULE ORAL at 20:47

## 2019-02-20 RX ADMIN — MORPHINE SULFATE 1 MG: 2 INJECTION, SOLUTION INTRAMUSCULAR; INTRAVENOUS at 06:04

## 2019-02-20 RX ADMIN — MELOXICAM 7.5 MG: 7.5 TABLET ORAL at 08:10

## 2019-02-20 RX ADMIN — IBUPROFEN 200 MG: 100 SUSPENSION ORAL at 16:02

## 2019-02-20 RX ADMIN — SODIUM CHLORIDE, PRESERVATIVE FREE 3 ML: 5 INJECTION INTRAVENOUS at 08:10

## 2019-02-20 RX ADMIN — SENNOSIDES 8.6 MG: 8.6 TABLET, FILM COATED ORAL at 20:47

## 2019-02-20 RX ADMIN — ASPIRIN 81 MG CHEWABLE TABLET 81 MG: 81 TABLET CHEWABLE at 20:47

## 2019-02-20 RX ADMIN — LANSOPRAZOLE 30 MG: KIT at 06:04

## 2019-02-20 RX ADMIN — DICLOFENAC 2 G: 10 GEL TOPICAL at 12:19

## 2019-02-20 RX ADMIN — VITAMIN D, TAB 1000IU (100/BT) 1000 UNITS: 25 TAB at 08:10

## 2019-02-20 RX ADMIN — TROLAMINE SALICYLATE: 10 CREAM TOPICAL at 17:16

## 2019-02-20 RX ADMIN — LIDOCAINE 1 PATCH: 50 PATCH CUTANEOUS at 10:00

## 2019-02-20 RX ADMIN — DICLOFENAC 2 G: 10 GEL TOPICAL at 20:47

## 2019-02-20 RX ADMIN — MORPHINE SULFATE 2 MG: 10 SOLUTION ORAL at 12:19

## 2019-02-20 RX ADMIN — MORPHINE SULFATE 2 MG: 10 SOLUTION ORAL at 15:33

## 2019-02-20 RX ADMIN — DICLOFENAC 2 G: 10 GEL TOPICAL at 17:16

## 2019-02-20 NOTE — PLAN OF CARE
Problem: Patient Care Overview  Goal: Plan of Care Review  Outcome: Ongoing (interventions implemented as appropriate)   02/20/19 1026   Coping/Psychosocial   Plan of Care Reviewed With patient;daughter   OTHER   Outcome Summary Pt required total assistance for bed mobility, utilized mechanical lift sling to bedside chair. STSx2 with total assistance; pt demonstrated right lateral lean requiring multiple and repetitive cues for correction. Pt able to complete x1 upright stand at bedside chair with cues. Will decrease PT frequency at this time to 3x/week due to inability to make consistent progress toward functional goals.

## 2019-02-20 NOTE — PLAN OF CARE
Problem: Patient Care Overview  Goal: Interprofessional Rounds/Family Conf  Outcome: Ongoing (interventions implemented as appropriate)  [facility] Palliative Team Conference: MARIUSZ Rowe RN, PN; JOSE ELIAS Delgado, ; FRANKO Terry RN, PN; LAMAR Alejandre; CHI Hurd, [facility], [facility]; ROSETTA Avila MDiv; IVETH Arriaga RN, PN   02/20/19 1451   Interdisciplinary Rounds/Family Conf   Summary Pt reported improved pain management with morphine. Seen within one hour of IV dose, able to rouse easily to voice, interact verbally appropriately. Pt agreeable to working with PT/OT at time of visit. D/w daughter that pt may be able to progress better with therapy with improvement pain management. Morphine transition to oral solution, scheduled and prn. Monitor effectiveness of pain regimen. Palliative follow-up at rehab facility recommended for continuing symptom management and ongoing GOC discussion; referral faxed to [facility], information given to family by LINDA NEWTON.       Problem: Palliative Care (Adult)  Intervention: Minimize Discomfort   02/20/19 1451   Promote Oxygenation/Perfusion   Pain Management Interventions pain management plan reviewed with patient/caregiver

## 2019-02-20 NOTE — PLAN OF CARE
Problem: Patient Care Overview  Goal: Plan of Care Review  Outcome: Ongoing (interventions implemented as appropriate)   02/20/19 1028   Coping/Psychosocial   Plan of Care Reviewed With patient   OTHER   Outcome Summary Pt w/improved strength during chair mobility and standing/transfers. Pt able to use LUE to scoot self forward to EOC. Pt does still require dependent X2 assist d/t generalized weakness however pt able to stand fully erect. Pt continues to be dependent with toileting (using pure wick) and LBD. Pt is most appropriate for 3x/week d/t progress and LT outcomes.

## 2019-02-20 NOTE — PLAN OF CARE
Problem: Patient Care Overview  Goal: Plan of Care Review  Outcome: Ongoing (interventions implemented as appropriate)   02/20/19 1015   Coping/Psychosocial   Plan of Care Reviewed With patient;daughter   Plan of Care Review   Progress improving   SLP re-evaluation completed. Will continue to address dysphagia. Please see note for further details and recommendations.

## 2019-02-20 NOTE — THERAPY RE-EVALUATION
Acute Care - Speech Language Pathology   Swallow Re-Evaluation [facility]     Patient Name: Debbie Baum  : 1923  MRN: [id number]  Today's Date: 2019  Onset of Illness/Injury or Date of Surgery: 19     Referring Physician: MD Caesar      Admit Date: 2019    Visit Dx:     ICD-10-CM ICD-9-CM   1. Closed fracture of left hip, initial encounter (CMS/[facility]) S72.002A 820.8   2. Injury of head, initial encounter S09.90XA 959.01   3. Contusion of left orbital tissues, initial encounter S05.12XA 921.2   4. Abrasion of face, initial encounter S00.81XA 910.0   5. Fall, initial encounter W19.XXXA E888.9   6. Impaired functional mobility, balance, gait, and endurance Z74.09 V49.89   7. Impaired mobility and ADLs Z74.09 799.89     Patient Active Problem List   Diagnosis   • Hyponatremia   • Essential hypertension   • Coronary artery disease   • Weakness generalized   • GERD (gastroesophageal reflux disease)   • Atrial fibrillation (CMS/HCC)   • Somnolence   • Medication adverse effect   • Chronic pain   • Closed fracture of left hip (CMS/HCC)   • Falls   • Periorbital ecchymosis of left eye   • Hematoma   • Humeral head fracture   • Shoulder dislocation, recurrent, right   • Postoperative anemia due to acute blood loss   • Dysphagia     Past Medical History:   Diagnosis Date   • Atrial fibrillation (CMS/HCC)    • Cancer (CMS/HCC)     colon   • Coronary artery disease    • GERD (gastroesophageal reflux disease)    • Hypertension      Past Surgical History:   Procedure Laterality Date   • CARDIAC ABLATION     • CHOLECYSTECTOMY     • COLON SURGERY      BOWEL RESECTION FOR HX COLON CANCER   • HIP INTERTROCHANTERIC NAILING Left 2019    Procedure: HIP INTERTROCHANTERIC NAILING LEFT;  Surgeon: Ariel Maynard MD;  Location: [facility];  Service: Orthopedics   • HYSTERECTOMY     • REPLACEMENT TOTAL KNEE          SWALLOW EVALUATION (last 72 hours)      SLP Adult Swallow Evaluation     Row Name 19  1000                   Rehab Evaluation    Document Type  re-evaluation  -AW        Subjective Information  no complaints  -AW        Patient Observations  alert;cooperative  -AW        Patient/Family Observations  dtr came in at end of eval - spoke with her re: diet change  -AW        Patient Effort  good  -AW           General Information    Patient Profile Reviewed  yes  -AW        Pertinent History Of Current Problem  hip fx  -AW        Current Method of Nutrition  pureed with some mashed;nectar/syrup-thick liquids  -AW        Precautions/Limitations, Vision  WFL  -AW        Precautions/Limitations, Hearing  WFL  -AW        Prior Level of Function-Communication  WFL  -AW        Prior Level of Function-Swallowing  no diet consistency restrictions  -AW        Plans/Goals Discussed with  agreed upon;patient and family  -AW        Barriers to Rehab  medically complex  -AW           Pain Assessment    Additional Documentation  Pain Scale: FACES Pre/Post-Treatment (Group)  -AW           Pain Scale: FACES Pre/Post-Treatment    Pain: FACES Scale, Pretreatment  2-->hurts little bit  -AW        Pain: FACES Scale, Post-Treatment  2-->hurts little bit  -AW        Pre/Post Treatment Pain Comment  asked for neck to be repositioned  -AW           Oral Motor and Function    Dentition Assessment  natural, present and adequate  -AW        Secretion Management  WNL/WFL  -AW        Mucosal Quality  moist, healthy  -AW        Volitional Swallow  WFL  -AW           Oral Musculature and Cranial Nerve Assessment    Oral Motor General Assessment  WFL  -AW           General Eating/Swallowing Observations    Respiratory Support Currently in Use  nasal cannula  -AW        Eating/Swallowing Skills  fed by SLP  -AW        Positioning During Eating  upright in bed  -AW        Utensils Used  cup;straw;spoon  -AW        Consistencies Trialed  regular textures;pureed;thin liquids  -AW           Clinical Swallow Eval    Oral Prep Phase  WFL  -AW         Oral Transit  WFL  -AW        Oral Residue  WFL  -AW        Pharyngeal Phase  no overt signs/symptoms of pharyngeal impairment  -AW        Esophageal Phase  unremarkable  -AW        Clinical Swallow Evaluation Summary  Pt with limited intake, does not like food. Re-eval to see if diet can be upgraded. Pt showed no overt s/s with small single sips of thin. She does exh multiple swallows with larger drink. She was happy to have water and was able to control her sip size by cup or straw. She was able to chew and clear nova cracker from oral cavity, but she prefers a softer diet so she doesn't have to chew so much. Will change diet to L4, no mixed consistencies, thins, and monitor for tolerance/adjustments as needed. Dtr in agreement with this upgrade   -AW           Pharyngeal Phase Concerns    Pharyngeal Phase Concerns  multiple swallows  -AW        Multiple Swallows  thin  -AW        Pharyngeal Phase Concerns, Comment  large sips of thin only. Tolerated small sips well.   -AW           Clinical Impression    SLP Swallowing Diagnosis  functional oral phase;functional pharyngeal phase  -AW        Functional Impact  risk of aspiration/pneumonia use general aspiration precautions  -AW        Rehab Potential/Prognosis, Swallowing  adequate, monitor progress closely  -AW        Swallow Criteria for Skilled Therapeutic Interventions Met  demonstrates skilled criteria  -AW           Recommendations    Therapy Frequency (Swallow)  PRN  -AW        Predicted Duration Therapy Intervention (Days)  until discharge  -AW        SLP Diet Recommendation  mechanical soft with no mixed consistencies;chopped;thin liquids extra gravy  -AW        Recommended Precautions and Strategies  upright posture during/after eating;small bites of food and sips of liquid  -AW        SLP Rec. for Method of Medication Administration  meds crushed;meds whole;with pudding or applesauce;as tolerated  -AW        Monitor for Signs of Aspiration   yes;notify SLP if any concerns  -AW        Anticipated Dischage Disposition  inpatient rehabilitation facility pt and family wanting rehab, palliative following  -AW           Oral Nutrition/Hydration Goal 1 (SLP)    Oral Nutrition/Hydration Goal 1, SLP  LTG: Pt will tolerate soft diet and small sips thin liquids  -AW        Time Frame (Oral Nutrition/Hydration Goal 1, SLP)  by discharge  -AW          User Key  (r) = Recorded By, (t) = Taken By, (c) = Cosigned By    Initials Name Effective Dates    Dara Velasco, MS CCC-SLP 06/22/15 -           EDUCATION  The patient has been educated in the following areas:   Dysphagia (Swallowing Impairment).    SLP Recommendation and Plan  SLP Swallowing Diagnosis: functional oral phase, functional pharyngeal phase  SLP Diet Recommendation: mechanical soft with no mixed consistencies, chopped, thin liquids(extra gravy)  Recommended Precautions and Strategies: upright posture during/after eating, small bites of food and sips of liquid     Monitor for Signs of Aspiration: yes, notify SLP if any concerns     Swallow Criteria for Skilled Therapeutic Interventions Met: demonstrates skilled criteria  Anticipated Dischage Disposition: inpatient rehabilitation facility(pt and family wanting rehab, palliative following)  Rehab Potential/Prognosis, Swallowing: adequate, monitor progress closely  Therapy Frequency (Swallow): PRN  Predicted Duration Therapy Intervention (Days): until discharge       Plan of Care Reviewed With: patient, daughter  Plan of Care Review  Plan of Care Reviewed With: patient, daughter  Progress: improving    SLP GOALS     Row Name 02/20/19 1000             Oral Nutrition/Hydration Goal 1 (SLP)    Oral Nutrition/Hydration Goal 1, SLP  LTG: Pt will tolerate soft diet and small sips thin liquids  -AW      Time Frame (Oral Nutrition/Hydration Goal 1, SLP)  by discharge  -AW        User Key  (r) = Recorded By, (t) = Taken By, (c) = Cosigned By    Initials Name  Provider Type    Dara Velasco MS CCC-SLP Speech and Language Pathologist             Time Calculation:   Time Calculation- SLP     Row Name 02/20/19 1015             Time Calculation- SLP    SLP Start Time  0930  -      SLP Received On  02/20/19  -        User Key  (r) = Recorded By, (t) = Taken By, (c) = Cosigned By    Initials Name Provider Type    Dara Velasco MS CCC-SLP Speech and Language Pathologist          Therapy Charges for Today     Code Description Service Date Service Provider Modifiers Qty    [id number]  ST EVAL ORAL PHARYNG SWALLOW 4 2/20/2019 Dara Durant, MS CCC-SLP GN 1               Dara Durant MS CCC-SLP  2/20/2019

## 2019-02-20 NOTE — THERAPY TREATMENT NOTE
Acute Care - Occupational Therapy Treatment Note  [facility]     Patient Name: Debbie Baum  : 1923  MRN: [id number]  Today's Date: 2019  Onset of Illness/Injury or Date of Surgery: 19  Date of Referral to OT: 19  Referring Physician: MD Caesar    Admit Date: 2019       ICD-10-CM ICD-9-CM   1. Closed fracture of left hip, initial encounter (CMS/[facility]) S72.002A 820.8   2. Injury of head, initial encounter S09.90XA 959.01   3. Contusion of left orbital tissues, initial encounter S05.12XA 921.2   4. Abrasion of face, initial encounter S00.81XA 910.0   5. Fall, initial encounter W19.XXXA E888.9   6. Impaired functional mobility, balance, gait, and endurance Z74.09 V49.89   7. Impaired mobility and ADLs Z74.09 799.89     Patient Active Problem List   Diagnosis   • Hyponatremia   • Essential hypertension   • Coronary artery disease   • Weakness generalized   • GERD (gastroesophageal reflux disease)   • Atrial fibrillation (CMS/HCC)   • Somnolence   • Medication adverse effect   • Chronic pain   • Closed fracture of left hip (CMS/HCC)   • Falls   • Periorbital ecchymosis of left eye   • Hematoma   • Humeral head fracture   • Shoulder dislocation, recurrent, right   • Postoperative anemia due to acute blood loss   • Dysphagia     Past Medical History:   Diagnosis Date   • Atrial fibrillation (CMS/HCC)    • Cancer (CMS/HCC)     colon   • Coronary artery disease    • GERD (gastroesophageal reflux disease)    • Hypertension      Past Surgical History:   Procedure Laterality Date   • CARDIAC ABLATION     • CHOLECYSTECTOMY     • COLON SURGERY      BOWEL RESECTION FOR HX COLON CANCER   • HIP INTERTROCHANTERIC NAILING Left 2019    Procedure: HIP INTERTROCHANTERIC NAILING LEFT;  Surgeon: Ariel Maynard MD;  Location: [facility];  Service: Orthopedics   • HYSTERECTOMY     • REPLACEMENT TOTAL KNEE         Therapy Treatment    Rehabilitation Treatment Summary     Row Name 19 1028  02/20/19 1026          Treatment Time/Intention    Discipline  occupational therapist  -ST  physical therapist  -LM     Document Type  therapy note (daily note)  -ST  therapy note (daily note)  -LM     Subjective Information  no complaints  -ST  no complaints  -LM     Mode of Treatment  occupational therapy  -ST  physical therapy  -LM     Patient/Family Observations  daughter present; pt sitting UIB leaning to R side  -ST  Daughter present. Pt received supine in bed, right lateral lean.   -LM     Care Plan Review  care plan/treatment goals reviewed;risks/benefits reviewed;current/potential barriers reviewed;patient/other agree to care plan  -ST  care plan/treatment goals reviewed;patient/other agree to care plan  -LM     Therapy Frequency (PT Clinical Impression)  3 times/wk  -ST  3 times/wk  -LM     Patient Effort  good  -ST  good  -LM     Existing Precautions/Restrictions  fall;other (see comments) L LE WBAT; R shoulder WBAT per Dr. Jacinto s/p imaging  -ST  fall;weight bearing RUE WBAT per MD, RAYMON WBAT  -LM2     Treatment Considerations/Comments  RUE w/ h/o anterior dislocation however per Dr. Jacinto pt is ok with WB for use w/walker.   -ST  RUE history of dislocation  -LM2     Recorded by [ST] Sumaya Amin, OTR 02/20/19 1404 [LM] Kelsie Villatoro, PT 02/20/19 1311  [LM2] Kelsie Villatoro, PT 02/20/19 1333     Row Name 02/20/19 1026             Cognitive Assessment/Intervention    Additional Documentation  Cognitive Assessment/Intervention (Group)  -LM      Recorded by [LM] Kelsie Villatoro, PT 02/20/19 1333      Row Name 02/20/19 1028 02/20/19 1026          Cognitive Assessment/Intervention- PT/OT    Affect/Mental Status (Cognitive)  WNL  -ST  WFL  -LM     Orientation Status (Cognition)  oriented x 4  -ST  oriented x 4  -LM     Follows Commands (Cognition)  follows one step commands;over 90% accuracy  -ST  follows one step commands;75-90% accuracy;increased processing time needed;physical/tactile prompts  required;repetition of directions required  -LM     Cognitive Function (Cognitive)  --  safety deficit  -LM     Memory Deficit (Cognitive)  mild deficit  -ST  --     Safety Deficit (Cognitive)  mild deficit  -ST  mild deficit;ability to follow commands;awareness of need for assistance;insight into deficits/self awareness;judgment;problem solving;safety precautions awareness;safety precautions follow-through/compliance  -LM     Personal Safety Interventions  fall prevention program maintained;gait belt;nonskid shoes/slippers when out of bed  -ST  --     Recorded by [ST] Sumaya Amin, OTR 02/20/19 1404 [LM] Kelsie Villatoro, PT 02/20/19 1333     Row Name 02/20/19 1026             Safety Issues, Functional Mobility    Safety Issues Affecting Function (Mobility)  ability to follow commands;awareness of need for assistance;insight into deficits/self awareness;positioning of assistive device;problem solving;safety precaution awareness;safety precautions follow-through/compliance  -LM      Impairments Affecting Function (Mobility)  balance;pain;strength;endurance/activity tolerance  -LM      Recorded by [LM] Kelsie Villatoro, PT 02/20/19 1333      Row Name 02/20/19 1026             Mobility Assessment/Intervention    Extremity Weight-bearing Status  right upper extremity  -LM      Right Upper Extremity (Weight-bearing Status)  weight-bearing as tolerated (WBAT) per MD  -LM      Left Lower Extremity (Weight-bearing Status)  weight-bearing as tolerated (WBAT)  -LM      Recorded by [LM] Kelsie Villatoro, PT 02/20/19 1333      Row Name 02/20/19 1028 02/20/19 1026          Bed Mobility Assessment/Treatment    Rolling Left White (Bed Mobility)  dependent (less than 25% patient effort);2 person assist  -ST  dependent (less than 25% patient effort);2 person assist;verbal cues  -LM     Rolling Right White (Bed Mobility)  dependent (less than 25% patient effort);2 person assist  -ST  dependent (less than 25%  patient effort);2 person assist;verbal cues  -LM     Comment (Bed Mobility)  rolled R/L to place mechanical lift sling   -ST  Rolled to place mechanical lift sling. Protected roll towards R side due to h/o R shoulder dislocation. Placed cushion between knees due to internal rotation and daughter concern of skin contact with right LE.   -LM     Recorded by [ST] Sumaya Amin, OTR 02/20/19 1404 [LM] Kelsie Villatoro, PT 02/20/19 1333     Row Name 02/20/19 1028             Functional Mobility    Functional Mobility- Ind. Level  not appropriate to assess  -ST      Functional Mobility- Comment  pt u/a to perform d/t weakness  -ST      Recorded by [ST] Sumaya Amin, OTR 02/20/19 1404      Row Name 02/20/19 1028 02/20/19 1026          Transfer Assessment/Treatment    Transfer Assessment/Treatment  sit-stand transfer;stand-sit transfer  -ST  sit-stand transfer;stand-sit transfer  -LM     Comment (Transfers)  completed 2 sit/stand transfers from EOC, pt able to utilize LUE to pull self forward in chair to EOB however requiring aid with foot placement and maintaining correct foot placement (severe IR of LLE); once pt in standing, she required to brace BLE's against surface along with BUE support; second standing attempt, pt able to flex pelvis forward slightly with cuing  -ST  STSx2. Pt required multiple cues to correct trunk to midline as patient rested in right lateral lean. Pt required total assistance to complete stand, cues for upright posture. Pt continued to demonstrate right lateral lean in standing. Pt also required barrier between LEs to prevent increased LE internal rotation and adduction.  -LM     Recorded by [ST] Sumaya Amin, OTR 02/20/19 1404 [LM] Kelsie Villatoro, PT 02/20/19 1333     Row Name 02/20/19 1028             Bed-Chair Transfer    Bed-Chair [place] (Transfers)  dependent (less than 25% patient effort)  -ST      Assistive Device (Bed-Chair Transfers)  mechanical lift/aid  -ST       Recorded by [ST] Sumaya Amin, OTR 02/20/19 1404      Row Name 02/20/19 1028 02/20/19 1026          Sit-Stand Transfer    Sit-Stand [place] (Transfers)  dependent (less than 25% patient effort);2 person assist  -ST  dependent (less than 25% patient effort);2 person assist;verbal cues  -LM     Assistive Device (Sit-Stand Transfers)  -- BUE support; pt very weak in BLE's impacting ability to A  -ST  other (see comments) BUE support  -LM     Recorded by [ST] Sumaya Amin, OTR 02/20/19 1404 [LM] Kelsie Villatoro, PT 02/20/19 1333     Row Name 02/20/19 1028 02/20/19 1026          Stand-Sit Transfer    Stand-Sit [place] (Transfers)  dependent (less than 25% patient effort);2 person assist  -ST  dependent (less than 25% patient effort);2 person assist;verbal cues  -LM     Assistive Device (Stand-Sit Transfers)  -- BUE support   -ST  other (see comments) BUE support  -LM     Recorded by [ST] Sumaya Amin, OTR 02/20/19 1404 [LM] Kelsie Villatoro, PT 02/20/19 1333     Row Name 02/20/19 1026             Gait/Stairs Assessment/Training    [place] Level (Gait)  unable to assess  -LM      Recorded by [LM] Kelsie Villatoro, PT 02/20/19 1333      Row Name 02/20/19 1028             Lower Body Dressing Assessment/Training    Lower Body Dressing [place] Level  don;shoes/slippers;dependent (less than 25% patient effort)  -ST      Lower Body Dressing Position  supported sitting  -ST      Recorded by [ST] Sumaya Amin, OTR 02/20/19 1404      Row Name 02/20/19 1028 02/20/19 1026          Therapeutic Exercise    81226 - PT Therapeutic Activity Minutes  --  19  -LM     42919 - OT Therapeutic Activity Minutes  20  -ST  --     Recorded by [ST] Sumaya Amin, OTR 02/20/19 1404 [LM] Kelsie Villatoro, PT 02/20/19 1333     Row Name 02/20/19 1028 02/20/19 1026          Therapeutic Exercise    Comment (Therapeutic Exercise)  encouraged pt to complete digit f/e each hand throughout the day  -ST  Deferred  due to fatigue.  -LM     Recorded by [ST] Sumaya Amin, OTR 02/20/19 1404 [LM] Kelsie Villatoro, PT 02/20/19 1333     Row Name 02/20/19 1026             Balance    Balance  static sitting balance;static standing balance  -LM      Recorded by [LM] Kelsie Villatoro, PT 02/20/19 1333      Row Name 02/20/19 1028 02/20/19 1026          Static Sitting Balance    Level of [place] (Unsupported Sitting, Static Balance)  minimal assist, 75% patient effort  -ST  minimal assist, 75% patient effort  -LM     Sitting Position (Unsupported Sitting, Static Balance)  --  sitting in chair  -LM     Time Able to Maintain Position (Unsupported Sitting, Static Balance)  --  30 to 45 seconds  -LM     Comment (Unsupported Sitting, Static Balance)  --  Pt preferences right lateral lean, repetitive cues to correct trunk to midline.  -LM     Comment (Supported Sitting, Static Balance)  progression to SBA, addressing trunk control and movement toward midline and head/neck control; pt with trouble with these tasks d/t severe R lean  -ST  --     Recorded by [ST] Sumaya Amin, OTR 02/20/19 1404 [LM] Kelsie Villatoro, PT 02/20/19 1333     Row Name 02/20/19 1026             Static Standing Balance    Level of [place] (Supported Standing, Static Balance)  maximal assist, 25 to 49% patient effort  -LM      Time Able to Maintain Position (Supported Standing, Static Balance)  less than 15 seconds  -LM      Assistive Device Utilized (Supported Standing, Static Balance)  other (see comments) BUE support  -LM      Recorded by [LM] Kelsie Villatoro, PT 02/20/19 1333      Row Name 02/20/19 1028 02/20/19 1026          Positioning and Restraints    Pre-Treatment Position  in bed  -ST  sitting in chair/recliner  -LM     Post Treatment Position  chair  -ST  chair  -LM     In Chair  notified nsg;reclined;call light within reach;encouraged to call for assist;exit alarm on;with family/caregiver;RUE elevated;waffle cushion;on mechanical lift  sling;pillow between legs;legs elevated  -ST  notified nsg;reclined;sitting;call light within reach;encouraged to call for assist;exit alarm on;with family/caregiver;legs elevated  -LM     Recorded by [ST] Sumaya Amin, OTR 02/20/19 1404 [LM] Kelsie Villatoro, PT 02/20/19 1333     Row Name 02/20/19 1026             Pain Assessment    Additional Documentation  Pain Scale: FACES Pre/Post-Treatment (Group);Pain Scale 2: FACES Pre/Post-Treatment (Group)  -LM      Recorded by [LM] Kelsie Villatoro, PT 02/20/19 1333      Row Name 02/20/19 1028 02/20/19 1026          Pain Scale: Numbers Pre/Post-Treatment    Pain Scale: Numbers, Pretreatment  0/10 - no pain  -ST  --     Pain Scale: Numbers, Post-Treatment  2/10  -ST  --     Pain Location - Side  Left  -ST  Left  -LM     Pain Location - Orientation  --  generalized  -LM     Pain Location  shoulder  -ST  hip  -LM     Pain Intervention(s)  --  Repositioned  -LM     Recorded by [ST] Sumaya Amin, OTR 02/20/19 1404 [LM] Kelsie Villatoro, PT 02/20/19 1333     Row Name 02/20/19 1026             Pain Scale: FACES Pre/Post-Treatment    Pain: FACES Scale, Pretreatment  2-->hurts little bit  -LM      Pain: FACES Scale, Post-Treatment  2-->hurts little bit  -LM      Recorded by [LM] Kelsie Villatoro, PT 02/20/19 1333      Row Name 02/20/19 1026             Pain Scale 2: FACES Pre/Post-Treatment    Pain Location 2 - Side  Right  -LM      Pain Location 2 - Orientation  upper  -LM      Pain Location 2  extremity  -LM      Recorded by [LM] Kelsie Villatoro, PT 02/20/19 1333      Row Name                Wound 02/13/19 1019 Left eyebrow abrasion    Wound - Properties Group Date first assessed: 02/13/19 [CH] Time first assessed: 1019 [CH] Present On Admission : yes [CH] Side: Left [CH] Location: eyebrow [CH] Type: abrasion [CH] Recorded by:  [CH] Cally Swanson RN 02/13/19 1020    Row Name                Wound 02/13/19 1855 Left hip incision    Wound - Properties Group Date first  assessed: 02/13/19 [SS] Time first assessed: 1855 [SS] Side: Left [SS] Location: hip [SS] Type: incision [SS] Recorded by:  [SS] Bang Love RN 02/13/19 1855    Row Name                Wound 02/16/19 2000 Left lower leg skin tear    Wound - Properties Group Date first assessed: 02/16/19 [JR] Time first assessed: 2000 [JR] Present On Admission : yes [JR] Side: Left [JR] Orientation: lower [JR] Location: leg [JR] Type: skin tear [JR] Additional Comments: steri strips X 4 and mepliex in place, ST V shaped [JR] Recorded by:  [JR] Breonna Bruce RN 02/17/19 0226    Row Name 02/20/19 1026             Plan of Care Review    Plan of Care Reviewed With  patient;daughter  -LM      Recorded by [LM] Keslie Villatoro, PT 02/20/19 1333      Row Name 02/20/19 1026             Outcome Summary/Treatment Plan (PT)    Daily Summary of Progress (PT)  unable to show any progress toward functional goals  -LM      Recorded by [LM] Kelsie Villatoro, PT 02/20/19 1333        User Key  (r) = Recorded By, (t) = Taken By, (c) = Cosigned By    Initials Name Effective Dates Discipline     EloisaSumaya, OTR 06/10/18 -  OT    Cally Mancia RN 06/16/16 -  Nurse    Bang Simon RN 01/15/18 -  --    Breonna Rice RN 09/18/16 -  Nurse    Kelsie Burnett, PT 04/03/18 -  PT        Wound 02/13/19 1019 Left eyebrow abrasion (Active)   Dressing Appearance open to air 2/20/2019  8:00 AM   Base scab 2/20/2019  8:00 AM   Periwound ecchymotic;swelling 2/20/2019  8:00 AM   Drainage Amount none 2/20/2019  8:00 AM   Dressing Care, Wound open to air 2/20/2019  8:00 AM       Wound 02/13/19 1855 Left hip incision (Active)   Dressing Appearance dried drainage 2/20/2019  8:00 AM   Closure Sutures 2/20/2019  8:00 AM   Base clean;dry;maroon/purple 2/20/2019  8:00 AM   Periwound dry;ecchymotic 2/20/2019  8:00 AM   Drainage Amount small 2/20/2019  8:00 AM   Care, Wound dressing removed 2/20/2019  8:00 AM   Dressing Care,  Wound dressing changed;border dressing 2/20/2019  8:00 AM       Wound 02/16/19 2000 Left lower leg skin tear (Active)   Dressing Appearance dry;intact 2/20/2019  8:00 AM   Closure Adhesive closure strips 2/20/2019  8:00 AM   Base scab 2/20/2019  8:00 AM   Drainage Amount none 2/20/2019  8:00 AM   Dressing Care, Wound border dressing;foam 2/20/2019  8:00 AM     Rehab Goal Summary     Row Name 02/20/19 1000             Oral Nutrition/Hydration Goal 1 (SLP)    Oral Nutrition/Hydration Goal 1, SLP  LTG: Pt will tolerate soft diet and small sips thin liquids  -AW      Time Frame (Oral Nutrition/Hydration Goal 1, SLP)  by discharge  -AW        User Key  (r) = Recorded By, (t) = Taken By, (c) = Cosigned By    Initials Name Provider Type Discipline    Dara Velasco MS CCC-SLP Speech and Language Pathologist SLP        Occupational Therapy Education     Title: PT OT SLP Therapies (In Progress)     Topic: Occupational Therapy (In Progress)     Point: ADL training (In Progress)     Description: Instruct learner(s) on proper safety adaptation and remediation techniques during self care or transfers.   Instruct in proper use of assistive devices.    Learning Progress Summary           Patient Acceptance, E, NR by AC at 2/17/2019 11:18 AM    Comment:  use of RUE sling with transfers, NWB JAVY Malik, E,D, NR by AR at 2/14/2019  1:20 PM                   Point: Home exercise program (Done)     Description: Instruct learner(s) on appropriate technique for monitoring, assisting and/or progressing therapeutic exercises/activities.    Learning Progress Summary           Patient Acceptance, E,TB,D, VU,DU by ST at 2/20/2019 10:28 AM    Comment:  see flow sheet   Family Acceptance, E,TB,D, VU,DU by ST at 2/20/2019 10:28 AM    Comment:  see flow sheet                   Point: Precautions (Done)     Description: Instruct learner(s) on prescribed precautions during self-care and functional transfers.    Learning Progress Summary            Patient Acceptance, E,TB,D, VU,DU by ST at 2/20/2019 10:28 AM    Comment:  see flow sheet    Eager, E,D, NR by AR at 2/14/2019  1:20 PM   Family Acceptance, E,TB,D, VU,DU by ST at 2/20/2019 10:28 AM    Comment:  see flow sheet                   Point: Body mechanics (Done)     Description: Instruct learner(s) on proper positioning and spine alignment during self-care, functional mobility activities and/or exercises.    Learning Progress Summary           Patient Acceptance, E,TB,D, VU,DU by ST at 2/20/2019 10:28 AM    Comment:  see flow sheet    Eager, E,D, NR by AR at 2/14/2019  1:20 PM   Family Acceptance, E,TB,D, VU,DU by ST at 2/20/2019 10:28 AM    Comment:  see flow sheet                               User Key     Initials Effective Dates Name Provider Type Discipline     06/23/15 -  Chitra Thomas, OT Occupational Therapist OT    ST 06/10/18 -  Sumaya Amin OTR Occupational Therapist OT    AR 06/22/15 -  Kandi Merritt, OT Occupational Therapist OT                OT Recommendation and Plan     Plan of Care Review  Plan of Care Reviewed With: patient  Plan of Care Reviewed With: patient  Outcome Summary: Pt w/improved strength during chair mobility and standing/transfers. Pt able to use LUE to scoot self forward to EOC. Pt does still require dependent X2 assist d/t generalized weakness however pt able to stand fully erect. Pt continues to be dependent with toileting (using pure wick) and LBD. Pt is most appropriate for 3x/week d/t progress and LT outcomes.   Outcome Measures     Row Name 02/20/19 1028 02/20/19 1026 02/19/19 1004       How much help from another person do you currently need...    Turning from your back to your side while in flat bed without using bedrails?  --  1  -LM  2  -AS    Moving from lying on back to sitting on the side of a flat bed without bedrails?  --  1  -LM  2  -AS    Moving to and from a bed to a chair (including a wheelchair)?  --  1  -LM  1  -AS    Standing up  from a chair using your arms (e.g., wheelchair, bedside chair)?  --  1  -LM  2  -AS    Climbing 3-5 steps with a railing?  --  1  -LM  1  -AS    To walk in hospital room?  --  1  -LM  1  -AS    AM-PAC 6 Clicks Score  --  6  -LM  9  -AS       How much help from another is currently needed...    Putting on and taking off regular lower body clothing?  1  -ST  --  --    Bathing (including washing, rinsing, and drying)  2  -ST  --  --    Toileting (which includes using toilet bed pan or urinal)  1  -ST  --  --    Putting on and taking off regular upper body clothing  2  -ST  --  --    Taking care of personal grooming (such as brushing teeth)  2  -ST  --  --    Eating meals  2  -ST  --  --    Score  10  -ST  --  --       Functional Assessment    Outcome Measure Options  --  AM-PAC 6 Clicks Basic Mobility (PT)  -LM  -[facility] 6 Clicks Basic Mobility (PT)  -AS    Row Name 02/18/19 0834             How much help from another person do you currently need...    Turning from your back to your side while in flat bed without using bedrails?  2  -AS      Moving from lying on back to sitting on the side of a flat bed without bedrails?  2  -AS      Moving to and from a bed to a chair (including a wheelchair)?  1  -AS      Standing up from a chair using your arms (e.g., wheelchair, bedside chair)?  2  -AS      Climbing 3-5 steps with a railing?  1  -AS      To walk in hospital room?  1  -AS      AM-PAC 6 Clicks Score  9  -AS         Functional Assessment    Outcome Measure Options  [facility] 6 Clicks Basic Mobility (PT)  -AS        User Key  (r) = Recorded By, (t) = Taken By, (c) = Cosigned By    Initials Name Provider Type    Sumaya Caldera, OTR Occupational Therapist    AS Dara Clark, PTA Physical Therapy Assistant    Kelsie Burnett, PT Physical Therapist           Time Calculation:   Time Calculation- OT     Row Name 02/20/19 1028             Time Calculation- OT    OT Start Time  1028  -ST      OT Received On   02/20/19  -ST      OT Goal Re-Cert Due Date  02/24/19  -ST         Timed Charges    39073 - OT Therapeutic Activity Minutes  20  -ST        User Key  (r) = Recorded By, (t) = Taken By, (c) = Cosigned By    Initials Name Provider Type    Sumaya Caldera OTR Occupational Therapist           Therapy Suggested Charges     Code   Minutes Charges    98428 (CPT®) Hc Ot Neuromusc Re Education Ea 15 Min      39225 (CPT®) Hc Ot Ther Proc Ea 15 Min      40315 (CPT®) Hc Ot Therapeutic Act Ea 15 Min 20 1    45665 (CPT®) Hc Ot Manual Therapy Ea 15 Min      03243 (CPT®) Hc Ot Iontophoresis Ea 15 Min      17265 (CPT®) Hc Ot Elec Stim Ea-Per 15 Min      85960 (CPT®) Hc Ot Ultrasound Ea 15 Min      63896 (CPT®) Hc Ot Self Care/Mgmt/Train Ea 15 Min      Total  20 1        Therapy Charges for Today     Code Description Service Date Service Provider Modifiers Qty    [id number] HC OT THERAPEUTIC ACT EA 15 MIN 2/20/2019 Sumaya Amin OTR GO 1               ABDOUL Castle  2/20/2019

## 2019-02-20 NOTE — PROGRESS NOTES
"                  Clinical Nutrition     Nutrition Assessment  Reason for Visit:   Follow-up protocol      Patient Name: Debbie Baum  YOB: 1923  MRN: [id number]  Date of Encounter: 02/20/19 3:29 PM  Admission date: 2/13/2019    Nutrition Assessment   Assessment       Hospital Problem List    Closed fracture of left hip (CMS/HCC)    Essential hypertension    Atrial fibrillation (CMS/HCC)    Falls    Periorbital ecchymosis of left eye    Hematoma    Humeral head fracture    Shoulder dislocation, recurrent, right    Postoperative anemia due to acute blood loss    Dysphagia      PMH: She  has a past medical history of Atrial fibrillation (CMS/HCC), Cancer (CMS/HCC), Coronary artery disease, GERD (gastroesophageal reflux disease), and Hypertension.   PSxH: She  has a past surgical history that includes Cholecystectomy; Replacement total knee; Colon surgery; Hysterectomy; Cardiac Ablation; and HIP INTERTROCHANTERIC NAILING LEFT (Left, 2/13/2019).     Other nutrition related factors:  (2/17): SLP recommended downgrading diet from dysphagia IV to dysphagia III.         Reported/Observed/Food/Nutrition Related History:   Pt has been eating ~25% of meals and per nsg documentation, ~75% of various nutritional supplements. Pain has been an issue for her.       Pt family requested that if they are not present, to have someone assist in feeding pt her meals and snacks.     Anthropometrics     Height: 165.1 cm (65\")  Last filed wt: Weight: (ht=65in, wt= 110lb; BMI=18.3) (02/14/19 1706)  Weight Method: Stated    BMI: BMI (Calculated): 18.3  Underweight:<18.5kg/m2    Ideal Body Weight (IBW) (kg): 57.29  Admission wt: 110 lb  Method obtained: Stated (2/13)     -would benefit from bed scale weight- only recent weight is \"stated\".     Labs reviewed   Yes     Medications reviewed   Pertinent: IV Naropin @ 10 ml/hr, Coreg, VIT D3, mobic, zofran, VIT B12    Current Nutrition Prescription     PO: Diet Dysphagia; IV - " Mechanical Soft No Mixed Consistencies; Thin; Extra Sauce / Gravy  Orders Placed This Encounter      Dietary Nutrition Supplements Boost Plus; chocolate      Snack: Please send chocolate magic cup for AM snack.      Snack: Please send chocolate magic cup for PM snack      Snack: Please send chocolate magic cup for HS snack.      Dietary Nutrition Supplements Boost Pudding; chocolate      DIET MESSAGE Please bring pt a chocolate boost with every meal - Thanks! :)      DIET MESSAGE Please send chocolate magic cup for bedtime snack tonight. Thanks!    Intake: 25% of 5 meals plus ~75% of various nutritional supplements per documentation.     Nutrition Diagnosis     2/20  Problem Inadequate energy intake   Etiology Post op pain, decreased appetite   Signs/Symptoms 25% of 5 meals       2/18  Problem Swallowing difficulty   Etiology SLP eval   Signs/Symptoms Pt diet order dysphagia III with NTL, needs assistance feeding.        Nutrition Intervention   1.  Follow treatment progress, Care plan reviewed  2. Advise alternate selection, Encourage intake     Goal:   General: Nutrition support treatment, Palliative care  PO: Increase intake  Additional goals: pt consistently able to consume > or equal to 50% of meal trays along with 50% or more of nutritional supplements      Monitoring/Evaluation:   Per protocol, PO intake, Supplement intake, Weight, Symptoms, POC/GOC, Swallow function      Will Continue to follow per protocol      Flaquita Guerrero RD  Time Spent: 30 minutes

## 2019-02-20 NOTE — THERAPY TREATMENT NOTE
Acute Care - Physical Therapy Treatment Note  [facility]     Patient Name: Debbie Baum  : 1923  MRN: [id number]  Today's Date: 2019  Onset of Illness/Injury or Date of Surgery: 19  Date of Referral to PT: 19  Referring Physician: MD Caesar    Admit Date: 2019    Visit Dx:    ICD-10-CM ICD-9-CM   1. Closed fracture of left hip, initial encounter (CMS/[facility]) S72.002A 820.8   2. Injury of head, initial encounter S09.90XA 959.01   3. Contusion of left orbital tissues, initial encounter S05.12XA 921.2   4. Abrasion of face, initial encounter S00.81XA 910.0   5. Fall, initial encounter W19.XXXA E888.9   6. Impaired functional mobility, balance, gait, and endurance Z74.09 V49.89   7. Impaired mobility and ADLs Z74.09 799.89     Patient Active Problem List   Diagnosis   • Hyponatremia   • Essential hypertension   • Coronary artery disease   • Weakness generalized   • GERD (gastroesophageal reflux disease)   • Atrial fibrillation (CMS/[facility])   • Somnolence   • Medication adverse effect   • Chronic pain   • Closed fracture of left hip (CMS/[facility])   • Falls   • Periorbital ecchymosis of left eye   • Hematoma   • Humeral head fracture   • Shoulder dislocation, recurrent, right   • Postoperative anemia due to acute blood loss   • Dysphagia       Therapy Treatment    Rehabilitation Treatment Summary     Row Name 19 1026             Treatment Time/Intention    Discipline  physical therapist  -LM      Document Type  therapy note (daily note)  -LM      Subjective Information  no complaints  -LM      Mode of Treatment  physical therapy  -LM      Patient/Family Observations  Daughter present. Pt received supine in bed, right lateral lean.   -LM      Care Plan Review  care plan/treatment goals reviewed;patient/other agree to care plan  -LM      Therapy Frequency (PT Clinical Impression)  3 times/wk  -LM      Patient Effort  good  -LM      Existing Precautions/Restrictions  fall;weight bearing RUE  WBAT per RAYMON DOWNEY WBAT  -LM2      Treatment Considerations/Comments  RUE history of dislocation  -LM2      Recorded by [LM] Kelsie Villatoro, PT 02/20/19 1311  [LM2] Kelsie Villatoro, PT 02/20/19 1333      Row Name 02/20/19 1026             Cognitive Assessment/Intervention    Additional Documentation  Cognitive Assessment/Intervention (Group)  -LM      Recorded by [LM] Kelsie Villatoro, PT 02/20/19 1333      Row Name 02/20/19 1026             Cognitive Assessment/Intervention- PT/OT    Affect/Mental Status (Cognitive)  WFL  -LM      Orientation Status (Cognition)  oriented x 4  -LM      Follows Commands (Cognition)  follows one step commands;75-90% accuracy;increased processing time needed;physical/tactile prompts required;repetition of directions required  -LM      Cognitive Function (Cognitive)  safety deficit  -LM      Safety Deficit (Cognitive)  mild deficit;ability to follow commands;awareness of need for assistance;insight into deficits/self awareness;judgment;problem solving;safety precautions awareness;safety precautions follow-through/compliance  -LM      Recorded by [LM] Kelsie Villatoro, PT 02/20/19 1333      Row Name 02/20/19 1026             Safety Issues, Functional Mobility    Safety Issues Affecting Function (Mobility)  ability to follow commands;awareness of need for assistance;insight into deficits/self awareness;positioning of assistive device;problem solving;safety precaution awareness;safety precautions follow-through/compliance  -LM      Impairments Affecting Function (Mobility)  balance;pain;strength;endurance/activity tolerance  -LM      Recorded by [LM] Kelsie Villatoro, PT 02/20/19 1333      Row Name 02/20/19 1026             Mobility Assessment/Intervention    Extremity Weight-bearing Status  right upper extremity  -LM      Right Upper Extremity (Weight-bearing Status)  weight-bearing as tolerated (WBAT) per MD  -LM      Left Lower Extremity (Weight-bearing Status)  weight-bearing as  tolerated (WBAT)  -LM      Recorded by [LM] Kelsie Villatoro, PT 02/20/19 1333      Row Name 02/20/19 1026             Bed Mobility Assessment/Treatment    Rolling Left Harper (Bed Mobility)  dependent (less than 25% patient effort);2 person assist;verbal cues  -LM      Rolling Right Harper (Bed Mobility)  dependent (less than 25% patient effort);2 person assist;verbal cues  -LM      Comment (Bed Mobility)  Rolled to place mechanical lift sling. Protected roll towards R side due to h/o R shoulder dislocation. Placed cushion between knees due to internal rotation and daughter concern of skin contact with right LE.   -LM      Recorded by [LM] Kelsie Villatoro, PT 02/20/19 1333      Row Name 02/20/19 1026             Transfer Assessment/Treatment    Transfer Assessment/Treatment  sit-stand transfer;stand-sit transfer  -LM      Comment (Transfers)  STSx2. Pt required multiple cues to correct trunk to midline as patient rested in right lateral lean. Pt required total assistance to complete stand, cues for upright posture. Pt continued to demonstrate right lateral lean in standing. Pt also required barrier between LEs to prevent increased LE internal rotation and adduction.  -LM      Recorded by [LM] Kelsie Villatoro, PT 02/20/19 1333      Row Name 02/20/19 1026             Sit-Stand Transfer    Sit-Stand Harper (Transfers)  dependent (less than 25% patient effort);2 person assist;verbal cues  -LM      Assistive Device (Sit-Stand Transfers)  other (see comments) BUE support  -LM      Recorded by [LM] Kelsie Villatoro, PT 02/20/19 1333      Row Name 02/20/19 1026             Stand-Sit Transfer    Stand-Sit Harper (Transfers)  dependent (less than 25% patient effort);2 person assist;verbal cues  -LM      Assistive Device (Stand-Sit Transfers)  other (see comments) BUE support  -LM      Recorded by [LM] Kelsie Villatoro, PT 02/20/19 1333      Row Name 02/20/19 1026             Gait/Stairs  Assessment/Training    Moro Level (Gait)  unable to assess  -LM      Recorded by [LM] Kelsie Villatoro, PT 02/20/19 1333      Row Name 02/20/19 1026             Therapeutic Exercise    72398 - PT Therapeutic Activity Minutes  19  -LM      Recorded by [LM] Kelsie Villatoro, PT 02/20/19 1333      Row Name 02/20/19 1026             Therapeutic Exercise    Comment (Therapeutic Exercise)  Deferred due to fatigue.  -LM      Recorded by [LM] Kelsei Villatoro, PT 02/20/19 1333      Row Name 02/20/19 1026             Balance    Balance  static sitting balance;static standing balance  -LM      Recorded by [LM] Kelsie Villatoro, PT 02/20/19 1333      Row Name 02/20/19 1026             Static Sitting Balance    Level of Moro (Unsupported Sitting, Static Balance)  minimal assist, 75% patient effort  -LM      Sitting Position (Unsupported Sitting, Static Balance)  sitting in chair  -LM      Time Able to Maintain Position (Unsupported Sitting, Static Balance)  30 to 45 seconds  -LM      Comment (Unsupported Sitting, Static Balance)  Pt preferences right lateral lean, repetitive cues to correct trunk to midline.  -LM      Recorded by [LM] Kelsie Villatoro, PT 02/20/19 1333      Row Name 02/20/19 1026             Static Standing Balance    Level of Moro (Supported Standing, Static Balance)  maximal assist, 25 to 49% patient effort  -LM      Time Able to Maintain Position (Supported Standing, Static Balance)  less than 15 seconds  -LM      Assistive Device Utilized (Supported Standing, Static Balance)  other (see comments) BUE support  -LM      Recorded by [LM] Kelsie Villatoro, PT 02/20/19 1333      Row Name 02/20/19 1026             Positioning and Restraints    Pre-Treatment Position  sitting in chair/recliner  -LM      Post Treatment Position  chair  -LM      In Chair  notified nsg;reclined;sitting;call light within reach;encouraged to call for assist;exit alarm on;with family/caregiver;legs elevated  -LM       Recorded by [LM] Kelsie Villatoro, PT 02/20/19 1333      Row Name 02/20/19 1026             Pain Assessment    Additional Documentation  Pain Scale: FACES Pre/Post-Treatment (Group);Pain Scale 2: FACES Pre/Post-Treatment (Group)  -LM      Recorded by [LM] Kelsie Villatoro, PT 02/20/19 1333      Row Name 02/20/19 1026             Pain Scale: Numbers Pre/Post-Treatment    Pain Location - Side  Left  -LM      Pain Location - Orientation  generalized  -LM      Pain Location  hip  -LM      Pain Intervention(s)  Repositioned  -LM      Recorded by [LM] Kelsie Villatoro, PT 02/20/19 1333      Row Name 02/20/19 1026             Pain Scale: FACES Pre/Post-Treatment    Pain: FACES Scale, Pretreatment  2-->hurts little bit  -LM      Pain: FACES Scale, Post-Treatment  2-->hurts little bit  -LM      Recorded by [LM] Kelsie Villatoro, PT 02/20/19 1333      Row Name 02/20/19 1026             Pain Scale 2: FACES Pre/Post-Treatment    Pain Location 2 - Side  Right  -LM      Pain Location 2 - Orientation  upper  -LM      Pain Location 2  extremity  -LM      Recorded by [LM] Kelsie Villatoro, PT 02/20/19 1333      Row Name                Wound 02/13/19 1019 Left eyebrow abrasion    Wound - Properties Group Date first assessed: 02/13/19 [CH] Time first assessed: 1019 [CH] Present On Admission : yes [CH] Side: Left [CH] Location: eyebrow [CH] Type: abrasion [CH] Recorded by:  [CH] Cally Swanson RN 02/13/19 1020    Row Name                Wound 02/13/19 1855 Left hip incision    Wound - Properties Group Date first assessed: 02/13/19 [SS] Time first assessed: 1855 [SS] Side: Left [SS] Location: hip [SS] Type: incision [SS] Recorded by:  [SS] Bang Love RN 02/13/19 1855    Row Name                Wound 02/16/19 2000 Left lower leg skin tear    Wound - Properties Group Date first assessed: 02/16/19 [JR] Time first assessed: 2000 [JR] Present On Admission : yes [JR] Side: Left [JR] Orientation: lower [JR] Location: leg [JR]  Type: skin tear [JR] Additional Comments: steri strips X 4 and mepliex in place, ST V shaped [JR] Recorded by:  [] Breonna Bruce RN 02/17/19 0226    Row Name 02/20/19 1026             Plan of Care Review    Plan of Care Reviewed With  patient;daughter  -LM      Recorded by [LM] Kelsie Villatoro, PT 02/20/19 1333      Row Name 02/20/19 1026             Outcome Summary/Treatment Plan (PT)    Daily Summary of Progress (PT)  unable to show any progress toward functional goals  -LM      Recorded by [LM] Kelsie Villatoro, PT 02/20/19 1333        User Key  (r) = Recorded By, (t) = Taken By, (c) = Cosigned By    Initials Name Effective Dates Discipline     Cally Swanson RN 06/16/16 -  Nurse    Bang Simon RN 01/15/18 -  --    Breonna Rice RN 09/18/16 -  Nurse    Kelsie Burnett, PT 04/03/18 -  PT          Wound 02/13/19 1019 Left eyebrow abrasion (Active)   Dressing Appearance open to air 2/20/2019  8:00 AM   Base scab 2/20/2019  8:00 AM   Periwound ecchymotic;swelling 2/20/2019  8:00 AM   Drainage Amount none 2/20/2019  8:00 AM   Dressing Care, Wound open to air 2/20/2019  8:00 AM       Wound 02/13/19 1855 Left hip incision (Active)   Dressing Appearance dried drainage 2/20/2019  8:00 AM   Closure Sutures 2/20/2019  8:00 AM   Base clean;dry;maroon/purple 2/20/2019  8:00 AM   Periwound dry;ecchymotic 2/20/2019  8:00 AM   Drainage Amount small 2/20/2019  8:00 AM   Care, Wound dressing removed 2/20/2019  8:00 AM   Dressing Care, Wound dressing changed;border dressing 2/20/2019  8:00 AM       Wound 02/16/19 2000 Left lower leg skin tear (Active)   Dressing Appearance dry;intact 2/20/2019  8:00 AM   Closure Adhesive closure strips 2/20/2019  8:00 AM   Base scab 2/20/2019  8:00 AM   Drainage Amount none 2/20/2019  8:00 AM   Dressing Care, Wound border dressing;foam 2/20/2019  8:00 AM       Rehab Goal Summary     Row Name 02/20/19 1000             Oral Nutrition/Hydration Goal 1 (SLP)    Oral  Nutrition/Hydration Goal 1, SLP  LTG: Pt will tolerate soft diet and small sips thin liquids  -AW      Time Frame (Oral Nutrition/Hydration Goal 1, SLP)  by discharge  -AW        User Key  (r) = Recorded By, (t) = Taken By, (c) = Cosigned By    Initials Name Provider Type Discipline    Dara Velasco MS CCC-SLP Speech and Language Pathologist SLP          Physical Therapy Education     Title: PT OT SLP Therapies (In Progress)     Topic: Physical Therapy (Done)     Point: Mobility training (Done)     Learning Progress Summary           Patient Acceptance, E,D, VU,NR by DWAYNE at 2/20/2019 10:26 AM    Comment:  Reviewed benefits of activity.    Acceptance, E, NR by AS at 2/19/2019 10:55 AM    Acceptance, E, NR by AS at 2/18/2019  9:06 AM    Acceptance, E, NR by LENARD at 2/17/2019  2:10 PM    Acceptance, E, NR by AS at 2/16/2019  3:30 PM    Acceptance, E, NR by AS at 2/15/2019  1:57 PM    Acceptance, E, NR by ABA at 2/14/2019  4:27 PM   Family Acceptance, E,D, VU,NR by DWAYNE at 2/20/2019 10:26 AM    Comment:  Reviewed benefits of activity.    Acceptance, E, NR by ABA at 2/14/2019  4:27 PM                   Point: Home exercise program (Done)     Learning Progress Summary           Patient Acceptance, E,D, VU,NR by DWAYNE at 2/20/2019 10:26 AM    Comment:  Reviewed benefits of activity.    Acceptance, E, NR by AS at 2/19/2019 10:55 AM    Acceptance, E, NR by AS at 2/18/2019  9:06 AM    Acceptance, E, NR by LENARD at 2/17/2019  2:10 PM    Acceptance, E, NR by AS at 2/16/2019  3:30 PM    Acceptance, E, NR by AS at 2/15/2019  1:57 PM    Acceptance, E, NR by ABA at 2/14/2019  4:27 PM   Family Acceptance, E,D, VU,NR by DWAYNE at 2/20/2019 10:26 AM    Comment:  Reviewed benefits of activity.    Acceptance, E, NR by ABA at 2/14/2019  4:27 PM                   Point: Body mechanics (Done)     Learning Progress Summary           Patient Acceptance, E,D, VU,NR by LM at 2/20/2019 10:26 AM    Comment:  Reviewed benefits of activity.    Acceptance, E,  NR by AS at 2/19/2019 10:55 AM    Acceptance, E, NR by AS at 2/18/2019  9:06 AM    Acceptance, E, NR by ES at 2/17/2019  2:10 PM    Acceptance, E, NR by AS at 2/16/2019  3:30 PM    Acceptance, E, NR by AS at 2/15/2019  1:57 PM    Acceptance, E, NR by EJ at 2/14/2019  4:27 PM   Family Acceptance, E,D, VU,NR by DWAYNE at 2/20/2019 10:26 AM    Comment:  Reviewed benefits of activity.    Acceptance, E, NR by ABA at 2/14/2019  4:27 PM                   Point: Precautions (Done)     Learning Progress Summary           Patient Acceptance, E,D, VU,NR by DWAYNE at 2/20/2019 10:26 AM    Comment:  Reviewed benefits of activity.    Acceptance, E, NR by AS at 2/19/2019 10:55 AM    Acceptance, E, NR by AS at 2/18/2019  9:06 AM    Acceptance, E, NR by LENARD at 2/17/2019  2:10 PM    Acceptance, E, NR by AS at 2/16/2019  3:30 PM    Acceptance, E, NR by AS at 2/15/2019  1:57 PM    Acceptance, E, NR by EJ at 2/14/2019  4:27 PM   Family Acceptance, E,D, VU,NR by DWAYNE at 2/20/2019 10:26 AM    Comment:  Reviewed benefits of activity.    Acceptance, E, NR by ABA at 2/14/2019  4:27 PM                               User Key     Initials Effective Dates Name Provider Type Discipline     11/20/18 -  Nayana Adams, PT Physical Therapist PT    AS 06/22/15 -  Dara Clark, PTA Physical Therapy Assistant PT     04/03/18 -  Kelsie Villatoro, PT Physical Therapist PT     11/13/17 -  Ana Paul, PT Physical Therapist PT                PT Recommendation and Plan  Therapy Frequency (PT Clinical Impression): 3 times/wk  Outcome Summary/Treatment Plan (PT)  Daily Summary of Progress (PT): unable to show any progress toward functional goals  Plan of Care Reviewed With: patient, daughter  Outcome Summary: Pt required total assistance for bed mobility, utilized mechanical lift sling to bedside chair. STSx2 with total assistance; pt demonstrated right lateral lean requiring multiple and repetitive cues for correction. Pt able to complete x1 upright  stand at bedside chair with cues. Will decrease PT frequency at this time to 3x/week due to inability to make consistent progress toward functional goals.   Outcome Measures     Row Name 02/20/19 1026 02/19/19 1004 02/18/19 0834       How much help from another person do you currently need...    Turning from your back to your side while in flat bed without using bedrails?  1  -LM  2  -AS  2  -AS    Moving from lying on back to sitting on the side of a flat bed without bedrails?  1  -LM  2  -AS  2  -AS    Moving to and from a bed to a chair (including a wheelchair)?  1  -LM  1  -AS  1  -AS    Standing up from a chair using your arms (e.g., wheelchair, bedside chair)?  1  -LM  2  -AS  2  -AS    Climbing 3-5 steps with a railing?  1  -LM  1  -AS  1  -AS    To walk in hospital room?  1  -LM  1  -AS  1  -AS    AM-PAC 6 Clicks Score  6  -LM  9  -AS  9  -AS       Functional Assessment    Outcome Measure Options  AM-PAC 6 Clicks Basic Mobility (PT)  -LM  AM-PAC 6 Clicks Basic Mobility (PT)  -AS  AM-PAC 6 Clicks Basic Mobility (PT)  -AS      User Key  (r) = Recorded By, (t) = Taken By, (c) = Cosigned By    Initials Name Provider Type    AS Dara Clark, PTA Physical Therapy Assistant    Kelsie Burnett, PT Physical Therapist         Time Calculation:   PT Charges     Row Name 02/20/19 1026             Time Calculation    Start Time  1026  -LM      PT Received On  02/20/19  -LM      PT Goal Re-Cert Due Date  02/24/19  -LM         Time Calculation- PT    Total Timed Code Minutes- PT  19 minute(s)  -LM         Timed Charges    13841 - PT Therapeutic Activity Minutes  19  -LM        User Key  (r) = Recorded By, (t) = Taken By, (c) = Cosigned By    Initials Name Provider Type    Kelsie Burnett PT Physical Therapist        Therapy Suggested Charges     Code   Minutes Charges    72466 (CPT®) Hc Pt Neuromusc Re Education Ea 15 Min      02939 (CPT®) Hc Pt Ther Proc Ea 15 Min      42406 (CPT®) Hc Gait Training Ea 15  Min      43975 (CPT®) Hc Pt Therapeutic Act Ea 15 Min 19 1    52378 (CPT®) Hc Pt Manual Therapy Ea 15 Min      09458 (CPT®) Hc Pt Iontophoresis Ea 15 Min      23650 (CPT®) Hc Pt Elec Stim Ea-Per 15 Min      26370 (CPT®) Hc Pt Ultrasound Ea 15 Min      85448 (CPT®) Hc Pt Self Care/Mgmt/Train Ea 15 Min      91292 (CPT®) Hc Pt Prosthetic (S) Train Initial Encounter, Each 15 Min      94062 (CPT®) Hc Pt Orthotic(S)/Prosthetic(S) Encounter, Each 15 Min      64948 (CPT®) Hc Orthotic(S) Mgmt/Train Initial Encounter, Each 15min      Total  19 1        Therapy Charges for Today     Code Description Service Date Service Provider Modifiers Qty    48893079531 HC PT THERAPEUTIC ACT EA 15 MIN 2/20/2019 Kelsie Villatoro, PT GP 1          PT G-Codes  Outcome Measure Options: AM-PAC 6 Clicks Basic Mobility (PT)  AM-PAC 6 Clicks Score: 6  Score: 10    Kelsie Villatoro, PT  2/20/2019

## 2019-02-20 NOTE — PLAN OF CARE
Problem: Patient Care Overview  Goal: Plan of Care Review  Outcome: Ongoing (interventions implemented as appropriate)   02/20/19 1470   Coping/Psychosocial   Plan of Care Reviewed With patient   Plan of Care Review   Progress improving   OTHER   Outcome Summary Pt A&Ox4. Has been more alert today and participating in ADLs more. Was up in chair for several hours today; pt mech lift vs max 2 assist to pivot. Left hip covaderm changed today according to order, sutures for closure. Pt c/o left knee pain/swelling still this shift, meds adjusted more with oral morphine scheduled now. 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 Palliative, ortho and hospitalist following. SLP evaluated today, reamains on Dysphagia IV diet, THIN liquids, needs assistance to eat. VSS this shift, sinus arrhytmia on monitor. Incontinent of bowel/bladder. Plan for SNF at d/c, EMS scheduled for tomorrow at 1300.

## 2019-02-20 NOTE — PLAN OF CARE
"Problem: Patient Care Overview  Goal: Plan of Care Review  Outcome: Ongoing (interventions implemented as appropriate)   02/20/19 0511   Coping/Psychosocial   Plan of Care Reviewed With patient   Plan of Care Review   Progress no change   OTHER   Outcome Summary Pt A&O x 4. Pain control was significant issue tonight. Once family left, pt continually sought out staff for pain medication, position changes, etc. Family was refusing morphine today, stating the only thing that controlled pain was ibuprofen. Ibuprofen given q4h, pt had no relief. Pt called at 0340 asking if her family had requested us to hold certain types of pain medication. She then stated she wanted them anyway, stating \"they don't have to go throught all of this.\" IV morphine was given, and pt slept for about an hour after. Otherwise, pt has been awake, crying/moaning the rest of the night. BP elevated, other VSS. Continues to be sinus arryhthmia on monitor. Incontinent of bowel and bladder, very large BM this morning, new purewick in place.       Problem: Skin Injury Risk (Adult)  Goal: Identify Related Risk Factors and Signs and Symptoms  Outcome: Ongoing (interventions implemented as appropriate)    Goal: Skin Health and Integrity  Outcome: Ongoing (interventions implemented as appropriate)      Problem: Fall Risk (Adult)  Goal: Absence of Fall  Outcome: Ongoing (interventions implemented as appropriate)      Problem: Fractured Hip (Adult)  Goal: Signs and Symptoms of Listed Potential Problems Will be Absent, Minimized or Managed (Fractured Hip)  Outcome: Ongoing (interventions implemented as appropriate)        "

## 2019-02-20 NOTE — PROGRESS NOTES
Continued Stay Note  [facility]     Patient Name: Debbie Baum  MRN: [id number]  Today's Date: 2/20/2019    Admit Date: 2/13/2019    Discharge Plan     Row Name 02/20/19 1120       Plan    Plan  [facility]    Patient/Family in Agreement with Plan  yes    Plan Comments  Discussed in am rounds w/ MD. Family and patient want to pursue short term rehab at the [facility], palliative to follow there if needed. Notified Malena mark/ [facility] that patient may be medically ready for dc tomorrow. EMS tentatively scheduled for tomorrow at 1300. Number to call report will be updated once dc is finalized. CM following.         Discharge Codes    No documentation.       Expected Discharge Date and Time     Expected Discharge Date Expected Discharge Time    Feb 22, 2019             Kirsten Henry RN

## 2019-02-20 NOTE — PROGRESS NOTES
"CHIEF COMPLAINT: Left intertrochanteric hip fracture, left knee osteoarthritis    SUBJECTIVE  Patient resting comfortably in bed this morning.  Sleeping comfortably in bed this morning.  Patient continues to have left leg pain.    OBJECTIVE  Temp (24hrs), Av.7 °F (37.1 °C), Min:98.1 °F (36.7 °C), Max:99.7 °F (37.6 °C)    Blood pressure 167/99, pulse 97, temperature 99.7 °F (37.6 °C), temperature source Temporal, resp. rate 16, height 165.1 cm (65\"), weight 49.9 kg (110 lb), SpO2 98 %, not currently breastfeeding.    Lab Results (last 24 hours)     Procedure Component Value Units Date/Time    Basic Metabolic Panel [[id number]]  (Abnormal) Collected:  19    Specimen:  Blood Updated:  19     Glucose 129 mg/dL      BUN 15 mg/dL      Creatinine 0.57 mg/dL      Sodium 136 mmol/L      Potassium 3.8 mmol/L      Chloride 105 mmol/L      CO2 26.0 mmol/L      Calcium 9.3 mg/dL      eGFR Non African Amer 99 mL/min/1.73      BUN/Creatinine Ratio 26.3     Anion Gap 5.0 mmol/L     Narrative:       [employer] Guidelines    Stage     Description        GFR  1         Normal or High     90+  2         Mild decrease      60-89  3         Moderate decrease  30-59  4         Severe decrease    15-29  5         Kidney failure     <15    The MDRD GFR formula is only valid for adults with stable renal function between ages 18 and 70.            PHYSICAL EXAM  Left lower extremity:Dressing intact with slight serosanguinous drainage- stable.  Ecchymosis overlying zachary-incisional site.  Mild pain with logroll of hip. Intact EHL, FHL, tibialis anterior, and gastrocsoleus. Sensation intact to light touch to deep peroneal, superficial peroneal, sural, saphenous, tibial nerves. 2+ palpable DP and PT pulses.  The exam remains slight valgus with range of motion of 20-80 degrees.  Continues to have pain with knee range of motion.      Closed fracture of left hip (CMS/HCC)    Essential hypertension    Atrial " fibrillation (CMS/HCC)    Falls    Periorbital ecchymosis of left eye    Hematoma    Humeral head fracture    Shoulder dislocation, recurrent, right    Postoperative anemia due to acute blood loss    Dysphagia  Left knee osteoarthritis     PLAN / DISPOSITION:  7 Day Post-Op left hip intertrochanteric fracture fixation    Protected weight bearing as tolerated left lower extremity, hip range of motion as tolerated   Pain control  Postop blood loss anemia-stable.  PT/OT for post op mobilization and medical equipment needs   Right upper extremity: CT of shoulder demonstrates chronic changes with anterior chronic dislocation.   SCD's bilateral lower extremities   Aspirin 81 mg twice a day for DVT prophylaxis   Social work for discharge planning.   Dressing to remain in place for 7 days. May remove on POD#7. If no drainage, may shower on POD#10. No submerging wound in water. If drainage is noted, sterile dressing should be placed and wound checked daily. No showering until wound has remained dry for 72 consecutive hours.   Follow up in 2 weeks for re-assessment.    Left knee osteoarthritis  Cortisone injection intra-articularly on 2/18.  Continue to observe at this time.    Ariel Maynard MD  02/20/19  7:06 AM

## 2019-02-20 NOTE — PROGRESS NOTES
[facility] Medicine Services  PROGRESS NOTE    Patient Name: Debbie Baum  : 1923  MRN: [id number]    Date of Admission: 2019  Length of Stay: 7  Primary Care Physician: Paula Scott MD    Subjective   Subjective     CC:  Hip fracture, shoulder dislocation/possible fracture, anemia    HPI:  Daughter at bedside.  She complains of frequent pain (mostly knee) and is upset she doesn't get enough pain meds.  Intake remains poor.    Review of Systems  Gen- No fevers, chills  CV- No chest pain, palpitations  Resp- No cough, dyspnea  GI- No N/V/D, abd pain    Otherwise ROS is negative except as mentioned in the HPI.    Objective   Objective     Vital Signs:   Temp:  [98 °F (36.7 °C)-99.7 °F (37.6 °C)] 98 °F (36.7 °C)  Heart Rate:  [] 130  Resp:  [16-18] 18  BP: (136-185)/(74-99) 185/84        Physical Exam:  Constitutional: frail, age appropriate, awake, up in chair  HENT: NCAT, mucous membranes moist. Bruising to left eye  Respiratory: mild bilateral rhonchi, respiratory effort normal   Cardiovascular: RRR, no murmurs, rubs, or gallops  Gastrointestinal: Positive bowel sounds, soft, nontender, nondistended  Musculoskeletal: No bilateral ankle edema  Psychiatric: appropriate affect  Neurologic: resting, moves all 4, follows commands  Skin: No rashes  Exam is unchanged from previous      Results Reviewed:  I have personally reviewed current lab, radiology, and data and agree.    Results from last 7 days   Lab Units 19  0625 19  0707 02/15/19  1701  02/15/19  0608  19  0514   WBC 10*3/mm3  --  10.50  --   --  10.92*  --  12.74*   HEMOGLOBIN g/dL 10.1* 9.2* 10.0*   < > 7.7*   < > 7.4*   HEMATOCRIT % 30.4* 27.6* 30.2*   < > 24.4*   < > 23.3*   PLATELETS 10*3/mm3  --  171  --   --  165  --  225    < > = values in this interval not displayed.     Results from last 7 days   Lab Units 19  0607 19  2218 19  0625 19  0707   SODIUM mmol/L 136   --  136 127*   POTASSIUM mmol/L 3.8 4.2 3.4* 3.7   CHLORIDE mmol/L 105  --  106 99   CO2 mmol/L 26.0  --  25.0 26.0   BUN mg/dL 15  --  14 18   CREATININE mg/dL 0.57*  --  0.65 0.60   GLUCOSE mg/dL 129*  --  94 98   CALCIUM mg/dL 9.3  --  8.9 8.6*     Estimated Creatinine Clearance: 33.1 mL/min (A) (by C-G formula based on SCr of 0.57 mg/dL (L)).    No results found for: BNP    Microbiology Results Abnormal     None          Imaging Results (last 24 hours)     ** No results found for the last 24 hours. **          Results for orders placed during the hospital encounter of 09/17/18   Adult Transthoracic Echo Complete W/ Cont if Necessary Per Protocol    Narrative · Mild-to-moderate mitral valve regurgitation is present          I have reviewed the medications:    Current Facility-Administered Medications:   •  aspirin chewable tablet 81 mg, 81 mg, Oral, BID, Ariel Maynard MD, 81 mg at 02/20/19 0810  •  bisacodyl (DULCOLAX) suppository 10 mg, 10 mg, Rectal, Daily PRN, Ariel Maynard MD  •  carvedilol (COREG) tablet 3.125 mg, 3.125 mg, Oral, BID With Meals, Eleuterio Pride MD, 3.125 mg at 02/20/19 0810  •  celecoxib (CeleBREX) capsule 100 mg, 100 mg, Oral, Q12H, Penny Barillas APRN  •  cholecalciferol (VITAMIN D3) tablet 1,000 Units, 1,000 Units, Oral, Daily, Eleuterio Pride MD, 1,000 Units at 02/20/19 0810  •  diclofenac (VOLTAREN) 1 % gel 2 g, 2 g, Topical, 4x Daily, Penny Barillas APRN, 2 g at 02/20/19 0811  •  docusate sodium (COLACE) liquid 100 mg, 100 mg, Oral, BID PRN, Cristiana Cruz APRN, 100 mg at 02/16/19 2053  •  ibuprofen (ADVIL,MOTRIN) 100 MG/5ML suspension 200 mg, 200 mg, Oral, Q4H PRN, Penny Barillas APRN, 200 mg at 02/20/19 0240  •  ibuprofen (ADVIL,MOTRIN) tablet 200 mg, 200 mg, Oral, Q4H PRN, Penny Barillas APRN, 200 mg at 02/20/19 0753  •  ipratropium-albuterol (DUO-NEB) nebulizer solution 3 mL, 3 mL, Nebulization, Q6H PRN, Penny Barillas APRN  •  lactated ringers  infusion, 9 mL/hr, Intravenous, Continuous PRN, Colin Castro MD, Last Rate: 9 mL/hr at 02/13/19 1649  •  lansoprazole (FIRST) oral suspension 30 mg, 30 mg, Oral, QAM, Eleuterio Pride MD, 30 mg at 02/20/19 0604  •  lidocaine (LIDODERM) 5 % 1 patch, 1 patch, Transdermal, Q24H, Eleuterio Pride MD, 1 patch at 02/20/19 1000  •  Magnesium Sulfate 2 gram Bolus, followed by 8 gram infusion (total Mg dose 10 grams)- Mg less than or equal to 1mg/dL, 2 g, Intravenous, PRN **OR** Magnesium Sulfate 2 gram / 50mL Infusion (GIVE X 3 BAGS TO EQUAL 6GM TOTAL DOSE) - Mg 1.1 - 1.5 mg/dl, 2 g, Intravenous, PRN **OR** Magnesium Sulfate 4 gram infusion- Mg 1.6-1.9 mg/dL, 4 g, Intravenous, PRN, Eleuterio Pride MD, Last Rate: 25 mL/hr at 02/14/19 1446, 4 g at 02/14/19 1446  •  Morphine 10 MG/5ML solution 2 mg, 2 mg, Oral, Q4H While Awake, Penny Barillas APRN  •  Morphine 10 MG/5ML solution 2 mg, 2 mg, Oral, Once, Penny Barillas APRN  •  Morphine 10 MG/5ML solution 5 mg, 5 mg, Oral, Q2H PRN, Penny Barillas APRN  •  Morphine sulfate (PF) injection 1 mg, 1 mg, Intravenous, Q2H PRN, Penny Barillas APRN, 1 mg at 02/20/19 1004  •  [DISCONTINUED] HYDROmorphone (DILAUDID) injection 0.5 mg, 0.5 mg, Intravenous, Q2H PRN, 0.5 mg at 02/18/19 0355 **AND** naloxone (NARCAN) injection 0.1 mg, 0.1 mg, Intravenous, Q5 Min PRN, Ariel Maynard MD  •  potassium chloride (MICRO-K) CR capsule 40 mEq, 40 mEq, Oral, PRN **OR** potassium chloride (KLOR-CON) packet 40 mEq, 40 mEq, Oral, PRN, 40 mEq at 02/17/19 1818 **OR** potassium chloride 10 mEq in 100 mL IVPB, 10 mEq, Intravenous, Q1H PRN, aFdi Muir MD  •  sennosides-docusate sodium (SENOKOT-S) 8.6-50 MG tablet 2 tablet, 2 tablet, Oral, Nightly PRN, Cristiana Cruz APRN, 2 tablet at 02/18/19 0143  •  [COMPLETED] Insert peripheral IV, , , Once **AND** sodium chloride 0.9 % flush 10 mL, 10 mL, Intravenous, PRN, Soco Hancock PA  •  sodium chloride 0.9 % flush 3 mL,  "3 mL, Intravenous, Q12H, Eleuterio Pride MD, 3 mL at 02/20/19 0810  •  sodium chloride 0.9 % flush 3-10 mL, 3-10 mL, Intravenous, PRN, Eleuterio Pride MD  •  trolamine salicylate (ASPERCREME) 10 % cream, , Topical, PRN, Penny Barillas, APRN, 1 application at 02/20/19 0606  •  vitamin B-12 (CYANOCOBALAMIN) tablet 500 mcg, 500 mcg, Oral, Daily, Eleuterio Pride MD, 500 mcg at 02/20/19 0810      Assessment/Plan   Assessment / Plan     Active Hospital Problems    Diagnosis Date Noted   • **Closed fracture of left hip (CMS/[facility]) [S72.002A] 02/13/2019   • Humeral head fracture [S42.293A] 02/14/2019   • Shoulder dislocation, recurrent, right [M24.411] 02/14/2019   • Postoperative anemia due to acute blood loss [D62] 02/14/2019   • Dysphagia [R13.10] 02/14/2019   • Falls [W19.XXXA] 02/13/2019   • Periorbital ecchymosis of left eye [S00.12XA] 02/13/2019   • Hematoma [T14.8XXA] 02/13/2019     Left gluteal hematoma     • Atrial fibrillation (CMS/[facility]) [I48.91] 04/05/2018   • Essential hypertension [I10] 04/05/2018          Brief Hospital Course to date:  Debbie Baum is a 89 y.o. female w/ hx afib (s/p prior ablation) on coreg and asa, prior right shoulder dislocations, multiple prior falls who was using her walker at nursing facility today when lost balance and fell to left side striking left hip and face. Patient was brought to ER where initial xray hip/pelvis revealed no fracture. However, later while moving legs heard a \"pop\" and had worsening pain and subsequent ct pelvis showed left intertrochanteric fracture and 6cm gluteal & subcutanoeus hematoma. Also has bruising left eye. Ct face negative for fractures. No preceding chest pain or palpitations. Patient went for left intertrochanteric nail the evening of admission (2/13/19) and did will post-operatively. POD #1 hgb dropped to 7.4 (from 10.7 on admission) and received 1 unit prbc, and also developed right shoulder/arm pain (same shoulder that patient has " "apparently dislocated in the past). X-ray shoulder and humerus showed anterior dislocation right humeral head and possible humeral head fracture. Subsequent CT right upper extremity showed extensive degenerative changes and mildly comminuted anterior fracture/dislocation of right humeral head and glenoid fractures which, per discussion w/ dr. Maynard, appeared chronic in nature.     *left hip (intertrochanteric) fracture      -s/p left intertrochanteric nail 2/13/19 by dr. Maynard  *left gluteal/subcutaneous hematoma and post op anemia      -s/p 1 unit prbc 2/14/19     -receiving 2nd unit 2/15/19  *right shoulder dislocation/possible fracture  *encephalopathy (due to age, polypharmacy, etc)  *Hx afib  *left periorbital ecchymoses       -ct facial bones no fractures; ice tid  *hx htn  *hx multiple falls    --------------------------------------------------------------------------------------  Plan:    -s/p left intertrochanteric nail 2/13/19 by dr. Maynard; asa 81mg bid for dvt prophylaxis.  F/u with him 2 weeks  -s/p 1 unit prbc 2/14/19 for post op anemia and another unit given 2/15 with good response, will follow  - Reviewed Dr. ritter note re: right shoulder, changes appear to be chronic. Sling for comfort as needed.  WBAT   - sodium normalized replace elctrolytes prn    - previosuly SLP.  [place] not safe for any PO.  Discussed with family, they are agreeable with comfort diet with nectar thick liquids.      - son asking for a \"gel injection\" to knee, I will discuss with Dr. Maynard.    - overall at this point she has rallied a bit.  Not appropriate for inpatient hospice.  Family goal is to the [place].  However, I do expect her to continue to decline over weeks to months.  Hopefully palliative can follow her there transition her when appropriate and keep her out of hospital      DVT Prophylaxis:  Asa 81mg bid       CODE STATUS:   Code Status and Medical Interventions:   Ordered at: 02/13/19 1302     Limited Support " to NOT Include:    Intubation     Code Status:    No CPR     Medical Interventions (Level of Support Prior to Arrest):    Limited         Electronically signed by Fadi Muir MD, 02/20/19, 11:39 AM.

## 2019-02-20 NOTE — NURSING NOTE
"Patient has remained AOx4. Has constantly called out through the night complaining of pain. Patient has been very restless, uncomfortable, and moans/cries out frequently. Appears to have severe pain with movement as well as constant pain at rest. Patient slept for less than 1 hour this shift, after receiving a dose of IV morphine at 2312. Patient called out around  0340 and stated, \"Did Kecia tell you not to give me a certain type of drug? Well she doesn't have to go through all this. I want it [morphine] anyway.\" Therefore IV morphine was given again at 0345. PRN Ibuprofen and Aspercreme used multiple times this shift and do not appear to help patient's pain. Ice was applied to patient's right knee and pillow support was used to attempt to alleviate pain. Neither appeared to give any relief. Will continue to monitor patient's pain and give PRN medications as appropriate.   "

## 2019-02-21 PROCEDURE — 99024 POSTOP FOLLOW-UP VISIT: CPT | Performed by: ORTHOPAEDIC SURGERY

## 2019-02-21 PROCEDURE — 99233 SBSQ HOSP IP/OBS HIGH 50: CPT | Performed by: NURSE PRACTITIONER

## 2019-02-21 PROCEDURE — 25010000002 MORPHINE SULFATE (PF) 2 MG/ML SOLUTION: Performed by: NURSE PRACTITIONER

## 2019-02-21 PROCEDURE — 92526 ORAL FUNCTION THERAPY: CPT

## 2019-02-21 RX ORDER — KETOROLAC TROMETHAMINE 10 MG/1
10 TABLET, FILM COATED ORAL EVERY 6 HOURS PRN
Status: DISCONTINUED | OUTPATIENT
Start: 2019-02-21 | End: 2019-02-22

## 2019-02-21 RX ADMIN — MORPHINE SULFATE 2 MG: 10 SOLUTION ORAL at 05:37

## 2019-02-21 RX ADMIN — CYANOCOBALAMIN TAB 1000 MCG 500 MCG: 1000 TAB at 08:58

## 2019-02-21 RX ADMIN — LANSOPRAZOLE 30 MG: KIT at 05:37

## 2019-02-21 RX ADMIN — SENNOSIDES 8.6 MG: 8.6 TABLET, FILM COATED ORAL at 20:29

## 2019-02-21 RX ADMIN — DICLOFENAC 2 G: 10 GEL TOPICAL at 17:40

## 2019-02-21 RX ADMIN — CARVEDILOL 3.12 MG: 3.12 TABLET, FILM COATED ORAL at 17:39

## 2019-02-21 RX ADMIN — SODIUM CHLORIDE, PRESERVATIVE FREE 3 ML: 5 INJECTION INTRAVENOUS at 09:07

## 2019-02-21 RX ADMIN — TROLAMINE SALICYLATE 1 APPLICATION: 10 CREAM TOPICAL at 03:52

## 2019-02-21 RX ADMIN — LIDOCAINE 1 PATCH: 50 PATCH CUTANEOUS at 08:59

## 2019-02-21 RX ADMIN — MORPHINE SULFATE 2 MG: 10 SOLUTION ORAL at 17:39

## 2019-02-21 RX ADMIN — VITAMIN D, TAB 1000IU (100/BT) 1000 UNITS: 25 TAB at 08:58

## 2019-02-21 RX ADMIN — ASPIRIN 81 MG CHEWABLE TABLET 81 MG: 81 TABLET CHEWABLE at 20:30

## 2019-02-21 RX ADMIN — ASPIRIN 81 MG CHEWABLE TABLET 81 MG: 81 TABLET CHEWABLE at 08:58

## 2019-02-21 RX ADMIN — MORPHINE SULFATE 2 MG: 10 SOLUTION ORAL at 09:06

## 2019-02-21 RX ADMIN — MORPHINE SULFATE 2 MG: 10 SOLUTION ORAL at 20:29

## 2019-02-21 RX ADMIN — MORPHINE SULFATE 5 MG: 10 SOLUTION ORAL at 11:51

## 2019-02-21 RX ADMIN — DICLOFENAC 2 G: 10 GEL TOPICAL at 20:32

## 2019-02-21 RX ADMIN — CARVEDILOL 3.12 MG: 3.12 TABLET, FILM COATED ORAL at 08:58

## 2019-02-21 RX ADMIN — KETOROLAC TROMETHAMINE 10 MG: 10 TABLET, FILM COATED ORAL at 18:08

## 2019-02-21 RX ADMIN — CELECOXIB 100 MG: 100 CAPSULE ORAL at 09:06

## 2019-02-21 RX ADMIN — CELECOXIB 100 MG: 100 CAPSULE ORAL at 20:30

## 2019-02-21 RX ADMIN — DICLOFENAC 2 G: 10 GEL TOPICAL at 11:51

## 2019-02-21 RX ADMIN — IBUPROFEN 200 MG: 100 SUSPENSION ORAL at 03:52

## 2019-02-21 RX ADMIN — MORPHINE SULFATE 5 MG: 10 SOLUTION ORAL at 22:03

## 2019-02-21 RX ADMIN — TROLAMINE SALICYLATE 1 APPLICATION: 10 CREAM TOPICAL at 05:37

## 2019-02-21 RX ADMIN — TROLAMINE SALICYLATE 1 APPLICATION: 10 CREAM TOPICAL at 01:11

## 2019-02-21 RX ADMIN — IBUPROFEN 200 MG: 400 TABLET ORAL at 10:29

## 2019-02-21 RX ADMIN — DICLOFENAC 2 G: 10 GEL TOPICAL at 08:59

## 2019-02-21 RX ADMIN — MORPHINE SULFATE 2 MG: 10 SOLUTION ORAL at 15:14

## 2019-02-21 RX ADMIN — MORPHINE SULFATE 1 MG: 2 INJECTION, SOLUTION INTRAMUSCULAR; INTRAVENOUS at 01:11

## 2019-02-21 RX ADMIN — SODIUM CHLORIDE, PRESERVATIVE FREE 3 ML: 5 INJECTION INTRAVENOUS at 20:30

## 2019-02-21 NOTE — PROGRESS NOTES
"CHIEF COMPLAINT: Left intertrochanteric hip fracture, left knee osteoarthritis    SUBJECTIVE  Patient resting comfortably in bed this morning.  Complaining of left knee pain. Minimal discomfort in hip this morning.      OBJECTIVE  Temp (24hrs), Av.1 °F (36.7 °C), Min:97.8 °F (36.6 °C), Max:98.9 °F (37.2 °C)    Blood pressure 127/65, pulse 98, temperature 97.9 °F (36.6 °C), temperature source Oral, resp. rate 16, height 165.1 cm (65\"), weight 49.9 kg (110 lb), SpO2 100 %, not currently breastfeeding.    Lab Results (last 24 hours)     ** No results found for the last 24 hours. **            PHYSICAL EXAM  Left lower extremity:Dressing intact with slight serosanguinous drainage- stable.  Ecchymosis overlying zachary-incisional site.  Mild pain with logroll of hip. Intact EHL, FHL, tibialis anterior, and gastrocsoleus. Sensation intact to light touch to deep peroneal, superficial peroneal, sural, saphenous, tibial nerves. 2+ palpable DP and PT pulses.  The exam remains slight valgus with range of motion of 20-80 degrees.  Continues to have pain with knee range of motion.      Closed fracture of left hip (CMS/HCC)    Essential hypertension    Atrial fibrillation (CMS/HCC)    Falls    Periorbital ecchymosis of left eye    Hematoma    Humeral head fracture    Shoulder dislocation, recurrent, right    Postoperative anemia due to acute blood loss    Dysphagia  Left knee osteoarthritis     PLAN / DISPOSITION:  8 Day Post-Op left hip intertrochanteric fracture fixation    Protected weight bearing as tolerated left lower extremity, hip range of motion as tolerated   Pain control  Postop blood loss anemia-stable.  PT/OT for post op mobilization and medical equipment needs   Right upper extremity: CT of shoulder demonstrates chronic changes with anterior chronic dislocation.   SCD's bilateral lower extremities   Aspirin 81 mg twice a day for DVT prophylaxis   Social work for discharge planning.   Dressing to remain in place for " 7 days. May remove on POD#7. If no drainage, may shower on POD#10. No submerging wound in water. If drainage is noted, sterile dressing should be placed and wound checked daily. No showering until wound has remained dry for 72 consecutive hours.   Follow up in 2 weeks for re-assessment.    Left knee osteoarthritis  Cortisone injection intra-articularly on 2/18.  Family requesting Visco supplementation injection series in left knee.  I explained that this could be initiated on an outpatient basis as it does require insurance preauthorization which is unlikely to be approved while the patient remains in the hospital.    Ariel Maynard MD  02/21/19  7:11 AM

## 2019-02-21 NOTE — PROGRESS NOTES
[facility] Medicine Services  PROGRESS NOTE    Patient Name: Debbie Baum  : 1923  MRN: [id number]    Date of Admission: 2019  Length of Stay: 8  Primary Care Physician: Paula Scott MD    Subjective   Subjective     CC:  Hospital follow up for hip fracture, left knee pain     HPI:  Son at bedside this morning.  Patient sitting up in chair with feet elevated.  Mainly complains of left knee pain.  Patient has received silicone injections to that knee in the past.  Son is insistent that the patient try to have another silicone knee injection prior to her discharge.  She required IV morphine overnight.  Was able to stand with therapy.    Review of Systems  Gen- No fevers, chills  CV- No chest pain, palpitations  Resp- No cough, dyspnea  GI- No N/V/D, abd pain  MSK-left knee pain    Otherwise ROS is negative except as mentioned in the HPI.    Objective   Objective     Vital Signs:   Temp:  [97.8 °F (36.6 °C)-98.9 °F (37.2 °C)] 97.9 °F (36.6 °C)  Heart Rate:  [77-98] 84  Resp:  [16-18] 18  BP: (123-163)/(56-85) 123/67        Physical Exam:  Constitutional: frail, age appropriate, awake, up in chair, son at bedside  HENT: NCAT, mucous membranes moist. Bruising to left eye  Respiratory: mild bilateral rhonchi, respiratory effort normal on room air  Cardiovascular: RRR, no murmurs, rubs, or gallops  Gastrointestinal: Positive bowel sounds, soft, nontender, nondistended  Musculoskeletal: No bilateral ankle edema, no swelling noted to bilateral knees.  Patient with significant discomfort to left knee with movement  Psychiatric: appropriate affect, cooperative  Neurologic: Oriented x3, moves all extremities, follows commands  Skin: No rashes        Results Reviewed:  I have personally reviewed current lab, radiology, and data and agree.    Results from last 7 days   Lab Units 19  0625 19  0707 02/15/19  1701  02/15/19  0608   WBC 10*3/mm3  --  10.50  --   --  10.92*    HEMOGLOBIN g/dL 10.1* 9.2* 10.0*   < > 7.7*   HEMATOCRIT % 30.4* 27.6* 30.2*   < > 24.4*   PLATELETS 10*3/mm3  --  171  --   --  165    < > = values in this interval not displayed.     Results from last 7 days   Lab Units 02/19/19  0607 02/17/19  2218 02/17/19  0625 02/16/19  0707   SODIUM mmol/L 136  --  136 127*   POTASSIUM mmol/L 3.8 4.2 3.4* 3.7   CHLORIDE mmol/L 105  --  106 99   CO2 mmol/L 26.0  --  25.0 26.0   BUN mg/dL 15  --  14 18   CREATININE mg/dL 0.57*  --  0.65 0.60   GLUCOSE mg/dL 129*  --  94 98   CALCIUM mg/dL 9.3  --  8.9 8.6*     Estimated Creatinine Clearance: 33.1 mL/min (A) (by C-G formula based on SCr of 0.57 mg/dL (L)).    No results found for: BNP    Microbiology Results Abnormal     None          Imaging Results (last 24 hours)     ** No results found for the last 24 hours. **          Results for orders placed during the hospital encounter of 09/17/18   Adult Transthoracic Echo Complete W/ Cont if Necessary Per Protocol    Narrative · Mild-to-moderate mitral valve regurgitation is present          I have reviewed the medications:    Current Facility-Administered Medications:   •  aspirin chewable tablet 81 mg, 81 mg, Oral, BID, Ariel Maynard MD, 81 mg at 02/21/19 0858  •  bisacodyl (DULCOLAX) suppository 10 mg, 10 mg, Rectal, Daily PRN, Ariel Maynard MD  •  carvedilol (COREG) tablet 3.125 mg, 3.125 mg, Oral, BID With Meals, Eleuterio Pride MD, 3.125 mg at 02/21/19 0858  •  celecoxib (CeleBREX) capsule 100 mg, 100 mg, Oral, Q12H, Penny Barillas APRN, 100 mg at 02/21/19 0906  •  cholecalciferol (VITAMIN D3) tablet 1,000 Units, 1,000 Units, Oral, Daily, Eleuterio Pride MD, 1,000 Units at 02/21/19 0858  •  diclofenac (VOLTAREN) 1 % gel 2 g, 2 g, Topical, 4x Daily, Penny Barillas APRN, 2 g at 02/21/19 1151  •  docusate sodium (COLACE) liquid 100 mg, 100 mg, Oral, BID PRN, Cristiana Cruz APRN, 100 mg at 02/16/19 2053  •  ibuprofen (ADVIL,MOTRIN) 100 MG/5ML  suspension 200 mg, 200 mg, Oral, Q4H PRN, Penny Barillas APRN, 200 mg at 02/21/19 0352  •  ibuprofen (ADVIL,MOTRIN) tablet 200 mg, 200 mg, Oral, Q4H PRN, Penny Barillas APRN, 200 mg at 02/21/19 1029  •  ipratropium-albuterol (DUO-NEB) nebulizer solution 3 mL, 3 mL, Nebulization, Q6H PRN, Penny Barillas APRN  •  lactated ringers infusion, 9 mL/hr, Intravenous, Continuous PRN, Colin Castro MD, Last Rate: 9 mL/hr at 02/13/19 1649  •  lansoprazole (FIRST) oral suspension 30 mg, 30 mg, Oral, QAM, Eleuterio Pride MD, 30 mg at 02/21/19 0537  •  lidocaine (LIDODERM) 5 % 1 patch, 1 patch, Transdermal, Q24H, Eleuterio Pride MD, 1 patch at 02/21/19 0859  •  Magnesium Sulfate 2 gram Bolus, followed by 8 gram infusion (total Mg dose 10 grams)- Mg less than or equal to 1mg/dL, 2 g, Intravenous, PRN **OR** Magnesium Sulfate 2 gram / 50mL Infusion (GIVE X 3 BAGS TO EQUAL 6GM TOTAL DOSE) - Mg 1.1 - 1.5 mg/dl, 2 g, Intravenous, PRN **OR** Magnesium Sulfate 4 gram infusion- Mg 1.6-1.9 mg/dL, 4 g, Intravenous, PRN, Eleuterio Pride MD, Last Rate: 25 mL/hr at 02/14/19 1446, 4 g at 02/14/19 1446  •  Morphine 10 MG/5ML solution 2 mg, 2 mg, Oral, Q4H While Awake, Penny Barillas APRN, 2 mg at 02/21/19 0906  •  Morphine 10 MG/5ML solution 5 mg, 5 mg, Oral, Q2H PRN, Penny Barillas APRN, 5 mg at 02/21/19 1151  •  Morphine sulfate (PF) injection 1 mg, 1 mg, Intravenous, Q2H PRN, Penny Barillas APRN, 1 mg at 02/21/19 0111  •  [DISCONTINUED] HYDROmorphone (DILAUDID) injection 0.5 mg, 0.5 mg, Intravenous, Q2H PRN, 0.5 mg at 02/18/19 0355 **AND** naloxone (NARCAN) injection 0.1 mg, 0.1 mg, Intravenous, Q5 Min PRN, Ariel Maynard MD  •  potassium chloride (MICRO-K) CR capsule 40 mEq, 40 mEq, Oral, PRN **OR** potassium chloride (KLOR-CON) packet 40 mEq, 40 mEq, Oral, PRN, 40 mEq at 02/17/19 1818 **OR** potassium chloride 10 mEq in 100 mL IVPB, 10 mEq, Intravenous, Q1H PRN, Fadi Muir MD  •  janetna  "(SENOKOT) tablet 8.6 mg, 1 tablet, Oral, Nightly, Penny Barillas, APRN, 8.6 mg at 02/20/19 2047  •  sennosides-docusate sodium (SENOKOT-S) 8.6-50 MG tablet 2 tablet, 2 tablet, Oral, Nightly PRN, Cristiana Cruz, APRN, 2 tablet at 02/18/19 0143  •  [COMPLETED] Insert peripheral IV, , , Once **AND** sodium chloride 0.9 % flush 10 mL, 10 mL, Intravenous, PRN, Soco Hancock PA  •  sodium chloride 0.9 % flush 3 mL, 3 mL, Intravenous, Q12H, Eleuterio Pride MD, 3 mL at 02/21/19 0907  •  sodium chloride 0.9 % flush 3-10 mL, 3-10 mL, Intravenous, PRN, Eleuterio Pride MD  •  trolamine salicylate (ASPERCREME) 10 % cream, , Topical, PRN, Penny Barillas APRN, 1 application at 02/21/19 0537  •  vitamin B-12 (CYANOCOBALAMIN) tablet 500 mcg, 500 mcg, Oral, Daily, Eleuterio Pride MD, 500 mcg at 02/21/19 0858      Assessment/Plan   Assessment / Plan     Active Hospital Problems    Diagnosis Date Noted   • **Closed fracture of left hip (CMS/HCC) [S72.002A] 02/13/2019   • Humeral head fracture [S42.293A] 02/14/2019   • Shoulder dislocation, recurrent, right [M24.411] 02/14/2019   • Postoperative anemia due to acute blood loss [D62] 02/14/2019   • Dysphagia [R13.10] 02/14/2019   • Falls [W19.XXXA] 02/13/2019   • Periorbital ecchymosis of left eye [S00.12XA] 02/13/2019   • Hematoma [T14.8XXA] 02/13/2019     Left gluteal hematoma     • Atrial fibrillation (CMS/HCC) [I48.91] 04/05/2018   • Essential hypertension [I10] 04/05/2018          Brief Hospital Course to date:  Debbie Baum is a 89 y.o. female w/ hx afib (s/p prior ablation) on coreg and asa, prior right shoulder dislocations, multiple prior falls who was using her walker at nursing facility today when lost balance and fell to left side striking left hip and face. Patient was brought to ER where initial xray hip/pelvis revealed no fracture. However, later while moving legs heard a \"pop\" and had worsening pain and subsequent ct pelvis showed left " "intertrochanteric fracture and 6cm gluteal & subcutanoeus hematoma. Also has bruising left eye. Ct face negative for fractures. No preceding chest pain or palpitations. Patient went for left intertrochanteric nail the evening of admission (2/13/19) and did will post-operatively. POD #1 hgb dropped to 7.4 (from 10.7 on admission) and received 1 unit prbc, and also developed right shoulder/arm pain (same shoulder that patient has apparently dislocated in the past). X-ray shoulder and humerus showed anterior dislocation right humeral head and possible humeral head fracture. Subsequent CT right upper extremity showed extensive degenerative changes and mildly comminuted anterior fracture/dislocation of right humeral head and glenoid fractures which, per discussion w/ dr. Maynard, appeared chronic in nature.     *left hip (intertrochanteric) fracture      -s/p left intertrochanteric nail 2/13/19 by dr. Maynard  *left gluteal/subcutaneous hematoma and post op anemia      -s/p 1 unit prbc 2/14/19     -receiving 2nd unit 2/15/19  *right shoulder dislocation/possible fracture  *encephalopathy (due to age, polypharmacy, etc)  *Hx afib  *left periorbital ecchymoses       -ct facial bones no fractures; ice tid  *hx htn  *hx multiple falls    --------------------------------------------------------------------------------------  Plan:    -s/p left intertrochanteric nail 2/13/19 by dr. Maynard; asa 81mg bid for dvt prophylaxis.  F/u with him 2 weeks  -s/p 1 unit prbc 2/14/19 for post op anemia and another unit given 2/15 with good response, will follow  - Reviewed Dr. ritter note re: right shoulder, changes appear to be chronic. Sling for comfort as needed.  WBAT.  Diclofenac gel to shoulder appears to be helping.  - sodium normalized replace elctrolytes prn    - previosuly SLP.  Freer not safe for any PO.  Discussed with family, they are agreeable with comfort diet with nectar thick liquids.      - son asking for a \"gel injection\" " to knee.  Dr. Maynard saw the patient this morning and explained that a silicone injection to the knee can only occur in the clinic due to insurance authorization.  The patient's son is concerned that her knee pain is so significant that she will eat up all of her skilled days at the nursing facility and be unable to participate in therapy.  He is still insistent that there has to be some way for us to give her a silicone injection while she is here.  I had a long conversation with the patient and her son this morning.  He has been provided with Dr. Maynard's office # to begin the authorization process for a knee injection once she is discharged from the hospital.    -Palliative is following for assistance with goals of care as well as pain management.  She was placed on oral morphine yesterday but is still utilizing some IV morphine.  Discussed case with LAMAR Magdaleno in the room with the patient.  The plan will be to discontinue IV morphine use and only utilize oral morphine.  Hopeful for adequate pain control.     -Family goal is to the [facility].  However, she will likely continue to decline over weeks to months.  Hopefully palliative can follow her there transition her when appropriate and keep her out of hospital    More than 50% of time spent counseling on current illness and plan of care. Case discussed with: patient, son, case management, palliative LAMAR Barillas, nursing staff and attending physician.    Total time spent face to face with the patient was 35 minutes.  Total time of the encounter was 50 minutes.        DVT Prophylaxis:  Asa 81mg bid     Disposition:  Patient has a bed available at the [facility] at any time.  Hopefully can go tomorrow if pain controlled.  We are unfortunately not able to get the patient a silicone knee injection prior to discharge.  This must occur in the office.  Family has bene made aware.      CODE STATUS:   Code Status and Medical Interventions:   Ordered at:  02/13/19 1302     Limited Support to NOT Include:    Intubation     Code Status:    No CPR     Medical Interventions (Level of Support Prior to Arrest):    Limited         Electronically signed by LAMAR Moreno, 02/21/19, 1:44 PM.

## 2019-02-21 NOTE — PLAN OF CARE
Problem: Patient Care Overview  Goal: Plan of Care Review  Outcome: Ongoing (interventions implemented as appropriate)   02/21/19 7818   Coping/Psychosocial   Plan of Care Reviewed With patient   Plan of Care Review   Progress no change   OTHER   Outcome Summary Patient remains AOx4. Has slept a little more this shift, but has been awake most of the night. Frequently screams and calls out for pain medicine. Scheduled PO morphine given in addition to PRN IV morphine, ibuprofen, and aspercreme. Always rates left knee pain at a 10. VSS. Voiding adequately. EMS scheduled for today at 1300 [facility].        Problem: Skin Injury Risk (Adult)  Goal: Identify Related Risk Factors and Signs and Symptoms  Outcome: Ongoing (interventions implemented as appropriate)    Goal: Skin Health and Integrity  Outcome: Ongoing (interventions implemented as appropriate)      Problem: Fall Risk (Adult)  Goal: Identify Related Risk Factors and Signs and Symptoms  Outcome: Ongoing (interventions implemented as appropriate)    Goal: Absence of Fall  Outcome: Ongoing (interventions implemented as appropriate)      Problem: Fractured Hip (Adult)  Goal: Signs and Symptoms of Listed Potential Problems Will be Absent, Minimized or Managed (Fractured Hip)  Outcome: Ongoing (interventions implemented as appropriate)

## 2019-02-21 NOTE — DISCHARGE PLACEMENT REQUEST
"Nella Tee (89 y.o. Female)     Date of Birth Social Security Number Address Home Phone MRN    07/24/1923  [address] [place], [place] [address] [phone] [id number]    Holiness Marital Status          None        Admission Date Admission Type Admitting Provider Attending Provider Department, Room/Bed    2/13/19 Emergency Carmen Milton MD Opii, [place], MD [facility] 3H, S373/1    Discharge Date Discharge Disposition Discharge Destination                       Attending Provider:  Carmen Milton MD    Allergies:  Hydrocodone-acetaminophen, Crab (Diagnostic), Lovenox [Enoxaparin Sodium], Penicillins    Isolation:  None   Infection:  None   Code Status:  No CPR    Ht:  165.1 cm (65\")   Wt:  49.9 kg (110 lb)    Admission Cmt:  None   Principal Problem:  Closed fracture of left hip (CMS/[facility]) [S72.002A]                 Active Insurance as of 2/13/2019     Primary Coverage     Payor Plan Insurance Group Employer/Plan Group    MEDICARE MEDICARE A & B      Payor Plan Address Payor Plan Phone Number Payor Plan Fax Number Effective Dates    [address] [phone]  7/1/1988 - None Entered    [facility] [id number]       Subscriber Name Subscriber Birth Date Member ID       NELLA TEE 7/24/1923 [id number]           Secondary Coverage     Payor Plan Insurance Group Employer/Plan Group    [facility] SUPP [id number]     Payor Plan Address Payor Plan Phone Number Payor Plan Fax Number Effective Dates    [address]   12/1/2016 - None Entered    [facility] [id number]       Subscriber Name Subscriber Birth Date Member ID       NELLA TEE 7/24/1923 [id number]                 Emergency Contacts      (Rel.) Home Phone Work Phone Mobile Phone    Kecia Tee (Daughter) -- -- [phone]    Dixie Ojeda (Daughter) [phone] -- --            Emergency Contact Information     Name Relation Home Work Mobile    Kecia Tee Daughter   [phone]    " "Dixie Ojeda Daughter [phone]            Insurance Information                MEDICARE/MEDICARE A & B Phone: [phone]    Subscriber: Debbie Baum Subscriber#: [id number]    Group#:  Precert#:         NERISSA BLUE CROSS/NERISSA [facility] SUPP Phone:     Subscriber: Debbie Baum Subscriber#: [id number]    Group#: [id number] Precert#:              History & Physical      Eleuterio Pride MD at 2019  1:04 PM              [facility]  HISTORY AND PHYSICAL    Patient Name: Debbie Baum  : 1923  MRN: [id number]  Primary Care Physician: Paula Scott MD  Date of admission: 2019      Subjective   Subjective     Chief Complaint:  Left hip pain    HPI:  Debbie Baum is a 89 y.o. female w/ hx afib (s/p prior ablation) on coreg and asa, multiple prior falls who was using her walker at nursing facility today when lost balance and fell to left side striking left hip and face. Brought to ER where initial xray hip/pelvix revealed no fracture. Later while moving legs heard a \"pop\" and had worsening pain and subsequent ct pelvis showed left intertrochanteric fracture and 6cm gluteal & subcutanoeus hematoma. Also has bruising left eye. Ct face negative for fractures. No preceding chest pain or palpitations. No dysuria, no fever, no n/v/d. No dyspnea    Review of Systems   No confusion, no chest pain or dyspnea    Otherwise complete ROS reviewed and is negative except as mentioned in the HPI.    Personal History     Past Medical History:   Diagnosis Date   • Cancer (CMS/HCC)     colon   • Coronary artery disease    • GERD (gastroesophageal reflux disease)    • H/O cardiac radiofrequency ablation        Past Surgical History:   Procedure Laterality Date   • CARDIAC ABLATION     • CHOLECYSTECTOMY     • COLON SURGERY      BOWEL RESECTION FOR HX COLON CANCER   • HYSTERECTOMY     • REPLACEMENT TOTAL KNEE         Family History: family history is not on file. " Otherwise pertinent FHx was reviewed and unremarkable.     Social History:  reports that  has never smoked. she has never used smokeless tobacco. She reports that she does not drink alcohol or use drugs.  Social History     Social History Narrative   • Not on file       Medications:    Available home medication information reviewed.    (Not in a hospital admission)    Allergies   Allergen Reactions   • Crab (Diagnostic) Hives   • Lovenox [Enoxaparin Sodium] Rash   • Penicillins Rash     unsure       Objective   Objective     Vital Signs:   Temp:  [97.7 °F (36.5 °C)] 97.7 °F (36.5 °C)  Heart Rate:  [67-79] 68  Resp:  [16-18] 16  BP: (122-184)/() 122/64        Physical Exam   Elderly, frail, nontoxic, ox3, left eye bruising noted  Upon opening left eye eomi, pupils equal  Neck supple  rrr  ctab  abd soft, nontender  No cce  No extremity rash  Let hip pain w/ palptation; 2 + dp pulse    Results Reviewed:  I have personally reviewed current lab, radiology, and data and agree.    Results from last 7 days   Lab Units 02/13/19  1009   WBC 10*3/mm3 10.76   HEMOGLOBIN g/dL 10.7*   HEMATOCRIT % 34.1*   PLATELETS 10*3/mm3 312   INR  1.07     Results from last 7 days   Lab Units 02/13/19  1009   SODIUM mmol/L 139   POTASSIUM mmol/L 3.8   CHLORIDE mmol/L 107   CO2 mmol/L 27.0   BUN mg/dL 24*   CREATININE mg/dL 0.86   GLUCOSE mg/dL 95   CALCIUM mg/dL 9.9   ALT (SGPT) U/L 20   AST (SGOT) U/L 21     Estimated Creatinine Clearance: 30.8 mL/min (by C-G formula based on SCr of 0.86 mg/dL).  Brief Urine Lab Results  (Last result in the past 365 days)      Color   Clarity   Blood   Leuk Est   Nitrite   Protein   CREAT   Urine HCG        10/06/18 1807 Yellow Clear Negative Negative Negative Negative             No results found for: BNP  Imaging Results (last 24 hours)     Procedure Component Value Units Date/Time    XR Chest 1 View [[id number]] Collected:  02/13/19 1247     Updated:  02/13/19 1247    Narrative:       EXAMINATION:  "XR CHEST 1 VW-02/13/2019:      INDICATION: Preop; S72.002A-Fracture of unspecified part of neck of left  femur, initial encounter for closed fracture; S09.90XA-Unspecified  injury of head, initial encounter; S05.12XA-Contusion of eyeball and  orbital tissues, left eye, initial encounter; S00.81XA-Abrasion of other  part of head, initial encounter; W19.XXXA-Unspecified fall, initial  encounter.      COMPARISON: 09/13/2018.     FINDINGS: There appears to be a large hiatal hernia. The heart shadow  itself appears normal in size. The vasculature is upper limits of normal  size. Relative elevation of the right hemidiaphragm compared to left is  unchanged. There are mild chronic appearing interstitial changes. No new  pulmonary parenchymal disease is seen. 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 On these images it appears the right humerus may be   subluxed or dislocated with more questionable suggestion of fracture or  avulsion of the humeral head.       Impression:       1. Large hiatal hernia.  2. Stable chronic appearing lung changes. No new heart or lung disease.  3. Questionable subluxation and trauma to the right humeral head as  noted. Please correlate with exam findings and patient complaint.     D:  02/13/2019  E:  02/13/2019             CT Pelvis Without Contrast [[id number]] Collected:  02/13/19 1122     Updated:  02/13/19 1122    Narrative:       EXAMINATION: CT PELVIS WO CONTRAST-      INDICATION: Left hip pain, negative x-ray.     TECHNIQUE: 2 mm axial images through the pelvis and proximal femurs with  sagittal and coronal 2 mm 2-D reconstructions.     The radiation dose reduction device was turned on for each scan per the  ALARA (As Low as Reasonably Achievable) protocol.     COMPARISON: Left hip plain film series of the same day.     FINDINGS: By history, the patient came to the ER this morning  complaining of left hip pain after fall, with the left hip making a loud  \"pop\" while the patient was " in the ER.     There is a displaced intertrochanteric fracture of the left hip, not  appearing on previous plain films. Most likely, there was a nondisplaced  occult fracture present previously which progressed since the previous  study. There is relatively mild comminution along the fracture margins.  No underlying destructive lesion is identified. Remaining bony  structures appear anatomic and intact. There is an old healed right  inferior pubic ramus fracture. Soft tissue window images show an  approximately 6 cm left gluteal hematoma, and some more diffuse  intramuscular hematoma of the gluteus.  There is some patchy diffuse  hematoma laterally as well. No intrapelvic hematoma is seen. No other  bony or soft tissue trauma is identified. Bowel loops are normal in  caliber. No ascites is seen. Bladder is moderately distended.       Impression:       1. Interval development of displaced comminuted intratrochanteric  fracture since previous plain film.  2. Associated discrete 6 cm hematoma and intramuscular and subcutaneous  hematoma as well.     D:  02/13/2019  E:  02/13/2019       CT Head Without Contrast [[id number]] Collected:  02/13/19 0946     Updated:  02/13/19 1030    Narrative:       EXAMINATION: CT HEAD WO CONTRAST- 02/13/2019      INDICATION: Head trauma, minor, GCS>=13, NOC/NEXUS/CCR neg, first study          TECHNIQUE: 5 mm unenhanced images through the brain     The radiation dose reduction device was turned on for each scan per the  ALARA (As Low as Reasonably Achievable) protocol.     COMPARISON: Head CT scan of 08/08/2018.     FINDINGS: The calvarium appears intact. Included paranasal sinuses and  mastoids appear clear. Soft tissue window images show no evidence of  hemorrhage, contusion, edema, mass or mass effect, infarct,  hydrocephalus, or abnormal extra-axial collection. There is expected  degree of generalized cerebral atrophy for age. Re windowing this study  for facial soft tissues shows  superficial/preseptal hematoma of the left  periorbital region. Separate facial bone CT scan is also performed.       Impression:       Age-appropriate generalized cerebral atrophy. No evidence of  acute intracranial disease. Incidentally noted left facial hematoma.  Separate facial bone CT scan has also been performed. Please see that  report.     D:  02/13/2019  E:  02/13/2019          XR Hip With or Without Pelvis 2 - 3 View Left [[id number]] Collected:  02/13/19 0949     Updated:  02/13/19 0949    Narrative:       EXAMINATION:  AP PELVIS, AP AND LEFT LATERAL HIP-02/13/2019     HISTORY: Fall this morning, left hip pain.     COMPARISON: 08/09/2019.     FINDINGS: The bony structures are markedly osteopenic, expected for age.  Lumbar scoliosis and advanced lower lumbar degenerative disc disease are  noted. Femoral head contours are generally well maintained. There is no  apparent left acetabular fracture, pubic ramus fracture, or proximal  left femoral fracture. With this degree of osteopenia, a nondisplaced  fracture could escape detection, but no interruption of the trabecular  pattern of the femur is seen. Irregularity of the upper margin of the  greater trochanter is probably stable, more prominent today due to the  hip previously internally rotated, today externally rotated.       Impression:       1.  Marked osteopenia.  2.  No pelvic or left hip fracture is identified.  3.  Lower lumbar degenerative disc disease and scoliosis.     D:  02/13/2019  E:  02/13/2019          CT Facial Bones Without Contrast [[id number]] Collected:  02/13/19 0947     Updated:  02/13/19 0947    Narrative:       EXAMINATION: CT FACIAL BONES WO CONTRAST-02/13/2019:      INDICATION: Maxface trauma, blunt.         TECHNIQUE: Spiral acquisition 2 mm axial images through the facial bones  with sagittal and coronal 2 mm 2-D reconstructions.      The radiation dose reduction device was turned on for each scan per the  ALARA (As Low as  Reasonably Achievable) protocol.     COMPARISON: NONE.     FINDINGS: The facial bones and included portions of the calvarium appear  intact. In particular, no fracture of nasal bones, orbital or maxillary  sinus fracture or zygomatic arch fracture is seen. TM joints appear  anatomic, and show expected DJD for age. Paranasal sinuses and mastoids  are clear except for single opacified right ethmoid air cell. Nasal  passages are normally patent. Allowing for streak artifact from the  patient's dental hardware, maxilla and mandible appear grossly normal.     Soft tissue window images show diffuse superficial/preseptal hematoma  around the left orbit, but no evidence of hematoma elsewhere and no  evidence of foreign body. The optic globes, extraocular muscles, and  optic nerves appear grossly normal and symmetric.       Impression:       1. Superficial left periorbital hematoma.  2. No other evidence of acute soft tissue or bony trauma elsewhere.     D:  02/13/2019  E:  02/13/2019              Results for orders placed during the hospital encounter of 09/17/18   Adult Transthoracic Echo Complete W/ Cont if Necessary Per Protocol    Narrative · Mild-to-moderate mitral valve regurgitation is present          Assessment/Plan   Assessment / Plan     Active Hospital Problems    Diagnosis Date Noted   • **Closed fracture of left hip (CMS/HCC) [S72.002A] 02/13/2019   • Falls [W19.XXXA] 02/13/2019   • Periorbital ecchymosis of left eye [S00.12XA] 02/13/2019   • Hematoma [T14.8XXA] 02/13/2019     Left gluteal hematoma     • Atrial fibrillation (CMS/HCC) [I48.91] 04/05/2018   • Essential hypertension [I10] 04/05/2018       Plan:  -npo  -dr. Maynard w/ orthopedic surgery plans surgery later today  -s/p nerve block  -ice to left eye  -analgesics  -pt/ot after surgery when ok w/ ortho    -cbc, bmp in a.m.    DVT prophylaxis:  Mechanical for now, start chemical per ortho surgery perioperatively    CODE STATUS:    Code Status and  Medical Interventions:   Ordered at: 02/13/19 1302     Limited Support to NOT Include:    Intubation     Code Status:    No CPR     Medical Interventions (Level of Support Prior to Arrest):    Limited       Admission Status:  I believe this patient meets inpatient status    Electronically signed by Eleuterio Pride MD, 02/13/19, 1:04 PM.        Electronically signed by Eleuterio Pride MD at 2/13/2019  1:12 PM

## 2019-02-21 NOTE — PLAN OF CARE
Problem: Pain, Acute (Adult)  Goal: Identify Related Risk Factors and Signs and Symptoms  Outcome: Ongoing (interventions implemented as appropriate)      Problem: Patient Care Overview  Goal: Plan of Care Review  Outcome: Ongoing (interventions implemented as appropriate)   02/21/19 1812   Coping/Psychosocial   Plan of Care Reviewed With patient   Plan of Care Review   Progress no change   OTHER   Outcome Summary Patient requiring po morphine scheduled and prn, pain 10/10 to left knee, also applying voltaren cream. Will also try po toradol. PAtients family has been in room most of the day. [facility] scheduled for 1300.        Problem: Skin Injury Risk (Adult)  Goal: Identify Related Risk Factors and Signs and Symptoms  Outcome: Ongoing (interventions implemented as appropriate)    Goal: Skin Health and Integrity  Outcome: Ongoing (interventions implemented as appropriate)      Problem: Fall Risk (Adult)  Goal: Identify Related Risk Factors and Signs and Symptoms  Outcome: Ongoing (interventions implemented as appropriate)    Goal: Absence of Fall  Outcome: Ongoing (interventions implemented as appropriate)

## 2019-02-21 NOTE — PROGRESS NOTES
Case Management Discharge Note    Final Note: [facility] are able to accept patient today if medically ready. Discussed in am rounds. MD and APRN notified. EMS scheduled for 1300. Nurse to call report to [phone], dc summary will be pulled from Epic.  following.     Destination - Selection Complete      Service Provider Request Status Selected Services Address Phone Number Fax Number    [facility] [address] [facility] [address] [phone]      [facility]: [facility]    Final Discharge Disposition Code: 03 - skilled nursing facility (SNF)

## 2019-02-21 NOTE — PROGRESS NOTES
"Palliative Care Progress Note    Date of Admission: 2/13/2019    Code Status:   Current Code Status     Date Active Code Status Order ID Comments User Context       2/13/2019 13:02 No CPR [id number]  Eleuterio Pride MD ED       Questions for Current Code Status     Question Answer Comment    Code Status No CPR     Medical Interventions (Level of Support Prior to Arrest) Limited     Limited Support to NOT Include Intubation         Subjective:  Patient reports pain is in Left knee, currently not having pain.   Pain comes upon her quickly and pain with movement.   Patient is able to have some po intake  Slept some overnight but continues to have need for prn meds overnight, x2 morphine iv and x1 ibuprofen - no additional po morphine administered.       Reviewed current scheduled and prn medications for route, type, dose, and frequency.    lactated ringers 9 mL/hr Last Rate: 9 mL/hr (02/13/19 1649)     bisacodyl  •  docusate sodium  •  ibuprofen  •  ibuprofen  •  ipratropium-albuterol  •  ketorolac  •  lactated ringers  •  magnesium sulfate **OR** magnesium sulfate **OR** magnesium sulfate  •  Morphine  •  Morphine  •  [DISCONTINUED] HYDROmorphone **AND** naloxone  •  potassium chloride **OR** potassium chloride **OR** potassium chloride  •  sennosides-docusate sodium  •  [COMPLETED] Insert peripheral IV **AND** sodium chloride  •  sodium chloride  •  trolamine salicylate    Objective:  PPS 30%   /67 (BP Location: Right arm, Patient Position: Sitting)   Pulse 84   Temp 97.9 °F (36.6 °C) (Oral)   Resp 18   Ht 165.1 cm (65\")   Wt 49.9 kg (110 lb)   LMP  (LMP Unknown)   SpO2 99%   BMI 18.30 kg/m²    Intake & Output (last day)       02/20 0701 - 02/21 0700 02/21 0701 - 02/22 0700    P.O. 200 120    Total Intake(mL/kg) 200 (4) 120 (2.4)    Urine (mL/kg/hr) 1600 (1.3) 600 (1.1)    Stool      Total Output 1600 600    Net -1400 -480          Urine Unmeasured Occurrence 1 x         Lab Results (last 24 hours)  "    ** No results found for the last 24 hours. **        Imaging Results (last 24 hours)     ** No results found for the last 24 hours. **          Physical Exam:  Gen: NAD, up in chair  Skin: warm, dry   Eyes: KEVIN, conjunctiva clear and moist, left periorbital ecchymosis resolving  Resp/thorax: even effort, non labored, CTA   CV: RRR   ABD: soft, bowel sounds+, nontender  Ext: no edema, no redness   Neuro: awake, answers direct questions, no myoclonus   Psych: appropriate conversation and mood     Reviewed labs and diagnostic results.   No results found for: HGBA1C  Results from last 7 days   Lab Units 02/17/19  0625 02/16/19  0707   WBC 10*3/mm3  --  10.50   HEMOGLOBIN g/dL 10.1* 9.2*   HEMATOCRIT % 30.4* 27.6*   PLATELETS 10*3/mm3  --  171     Results from last 7 days   Lab Units 02/19/19  0607   SODIUM mmol/L 136   POTASSIUM mmol/L 3.8   CHLORIDE mmol/L 105   CO2 mmol/L 26.0   BUN mg/dL 15   CREATININE mg/dL 0.57*   CALCIUM mg/dL 9.3   GLUCOSE mg/dL 129*       Impression: 89 y.o. female with acute Left intertrochanteric femur fx s/p medullary nailing 2/13, multiple falls, Left knee severe degenerative joint disease    Plan:   Left knee pain - continue celebrex and prn ibuprofen.   Will provide prn toradol, directed nursing to try instead of ibuprofen to assess effectiveness. If effective will provide short course prn.   Continue scheduled po morphine 2mg every 4 hours scheduled and prn.   Encouraged nursing staff to administer prn po morphine for longer analgesic effect.   Reviewed consequences with NSAIDs    Restlessness - noted patient had been on escitalopram but was stopped on 2/15, last dose 2/14.     Goals of care - Two visits to room.   First visit was at bedside with son, Fadi, and covvickiit with hospitalist NP, Ana Fernandez  Discussed at length logistics of having Left knee injection completed, SNF placement at [facility].     Second visit was at bedside with all three children, around 45 minutes   Patient  voiced that she wants to continue to do therapy at her own pace and wanting to have medicine to manage her knee pain.   Reviewed skilled therapy care and hospice care services.   Discussed different scenarios of plans of care, efforts to continue to manage pain.   Family made decision to proceed with SNF placement at the [facility].     Time: 90 minutes   > 50% time spent in counseling and education concerning current orders for symptom management with patient and family.     Penny Barillas, APRN  [phone]  02/21/19  5:39 PM

## 2019-02-21 NOTE — PROGRESS NOTES
"Palliative Care Progress Note    Date of Admission: 2/13/2019    Code Status:   Current Code Status     Date Active Code Status Order ID Comments User Context       2/13/2019 13:02 No CPR [id number]  Eleuterio Pride MD ED       Questions for Current Code Status     Question Answer Comment    Code Status No CPR     Medical Interventions (Level of Support Prior to Arrest) Limited     Limited Support to NOT Include Intubation         Subjective: Two visits made to room.   Initial visit - patient up in chair, reporting no Left knee or hip pain.  Noted Right shoulder has arm abducted and supported on pillow. Denies any pain. Diclofenac has been effective for shoulder pain.   Reports not effective for Left knee pain.    Patient reports another difficult night - noted prns from yesterday pm to this am - x5 morphine 2mg iv; x4 ibuprofen 200mg, both diclofenac and aspercreme applications.   Able to eat some breakfast and work with physical therapy this am.   Second visit after starting po morphine elixir - patient tolerating po morphine well and participated with therapy again.     Reviewed current scheduled and prn medications for route, type, dose, and frequency.    lactated ringers 9 mL/hr Last Rate: 9 mL/hr (02/13/19 1649)     bisacodyl  •  docusate sodium  •  ibuprofen  •  ibuprofen  •  ipratropium-albuterol  •  lactated ringers  •  magnesium sulfate **OR** magnesium sulfate **OR** magnesium sulfate  •  Morphine  •  Morphine  •  [DISCONTINUED] HYDROmorphone **AND** naloxone  •  potassium chloride **OR** potassium chloride **OR** potassium chloride  •  sennosides-docusate sodium  •  [COMPLETED] Insert peripheral IV **AND** sodium chloride  •  sodium chloride  •  trolamine salicylate    Objective:  PPS 40%   /73 (BP Location: Right arm, Patient Position: Lying)   Pulse 86   Temp 98.1 °F (36.7 °C) (Oral)   Resp 16   Ht 165.1 cm (65\")   Wt 49.9 kg (110 lb)   LMP  (LMP Unknown)   SpO2 95%   BMI 18.30 kg/m²  "   Intake & Output (last day)       02/20 0701 - 02/21 0700    P.O. 400    Total Intake(mL/kg) 400 (8)    Urine (mL/kg/hr) 700 (1.1)    Stool     Total Output 700    Net -300             Lab Results (last 24 hours)     ** No results found for the last 24 hours. **        Imaging Results (last 24 hours)     ** No results found for the last 24 hours. **          Physical Exam:  Gen: NAD, up in chair  Skin: warm, dry   Eyes: KEVIN, conjunctiva clear and moist, Left periorbital ecchymosis resolving  HEENT: oropharynx clear, moist  Resp/thorax: even effort, non labored, CTA   CV: RRR   ABD: soft, bowel sounds +, nontender  Ext: no edema, no redness   Neuro: alert, interactive, no myoclonus   Psych: appropriate conversation and mood     Reviewed labs and diagnostic results.   No results found for: HGBA1C  Results from last 7 days   Lab Units 02/17/19  0625 02/16/19  0707   WBC 10*3/mm3  --  10.50   HEMOGLOBIN g/dL 10.1* 9.2*   HEMATOCRIT % 30.4* 27.6*   PLATELETS 10*3/mm3  --  171     Results from last 7 days   Lab Units 02/19/19  0607   SODIUM mmol/L 136   POTASSIUM mmol/L 3.8   CHLORIDE mmol/L 105   CO2 mmol/L 26.0   BUN mg/dL 15   CREATININE mg/dL 0.57*   CALCIUM mg/dL 9.3   GLUCOSE mg/dL 129*       Impression: 89 y.o. female acute Left intertrochanteric femur fx s/p medullary nailing 2/13, multiple falls, Left knee severe degenerative joint disease    Plan:   Right shoulder pain, Left knee pain, Left femur pain - primary pain is Left knee pain - acute on chronic pain.   Will start scheduled morphine po elixir - patient has had no intolerance to iv morphine and is effective for pain.  Mental status has improved off hydrocodone/APAP.   Patient tried meloxicam 15mg for around one month last Fall per [facility] MAR. Ineffective - will discontinue and start celebrex 100mg bid.     Constipation - add stimulant with opioid scheduled, monitor bowel med regimen.  Large BM 2/20 early am.     Dysphagia - continue to monitor, speech  therapy following    Goals of care - SNF placement at [place], optimal pain management for patient to be able to participate in therapy.  Patient wants to be able to stand again and use walker if possible.     Time: 60 minutes   > 50% time spent in counseling and education concerning current orders for symptom management with patient, daughter, and family    Penny Barillas, LAMAR  [phone]  02/20/19  7:54 PM

## 2019-02-21 NOTE — PLAN OF CARE
Problem: Patient Care Overview  Goal: Plan of Care Review  Outcome: Ongoing (interventions implemented as appropriate)   02/21/19 1016   Coping/Psychosocial   Plan of Care Reviewed With patient   SLP treatment completed. SLP will continue to follow for assessment of diet tolerance/adjustments as needed. Please see note for further details and recommendations.

## 2019-02-21 NOTE — THERAPY TREATMENT NOTE
Acute Care - Speech Language Pathology   Swallow Treatment Note [facility]     Patient Name: Debbie Baum  : 1923  MRN: [id number]  Today's Date: 2019  Onset of Illness/Injury or Date of Surgery: 19     Referring Physician: MD Caesar      Admit Date: 2019    Visit Dx:      ICD-10-CM ICD-9-CM   1. Closed fracture of left hip, initial encounter (CMS/[facility]) S72.002A 820.8   2. Injury of head, initial encounter S09.90XA 959.01   3. Contusion of left orbital tissues, initial encounter S05.12XA 921.2   4. Abrasion of face, initial encounter S00.81XA 910.0   5. Fall, initial encounter W19.XXXA E888.9   6. Impaired functional mobility, balance, gait, and endurance Z74.09 V49.89   7. Impaired mobility and ADLs Z74.09 799.89     Patient Active Problem List   Diagnosis   • Hyponatremia   • Essential hypertension   • Coronary artery disease   • Weakness generalized   • GERD (gastroesophageal reflux disease)   • Atrial fibrillation (CMS/[facility])   • Somnolence   • Medication adverse effect   • Chronic pain   • Closed fracture of left hip (CMS/[facility])   • Falls   • Periorbital ecchymosis of left eye   • Hematoma   • Humeral head fracture   • Shoulder dislocation, recurrent, right   • Postoperative anemia due to acute blood loss   • Dysphagia       Therapy Treatment  Rehabilitation Treatment Summary     Row Name 19 0945             Treatment Time/Intention    Discipline  speech language pathologist  -MP      Document Type  therapy note (daily note)  -MP      Subjective Information  no complaints  -MP      Mode of Treatment  individual therapy;speech-language pathology  -MP      Patient/Family Observations  No family present  -MP      Care Plan Review  care plan/treatment goals reviewed;patient/other agree to care plan  -MP      Therapy Frequency (Swallow)  PRN  -MP      Patient Effort  good  -MP      Recorded by [MP] Jae Crystal, MS, CFY-SLP 19 1013      Row Name 19 0945             Pain  Assessment    Additional Documentation  Pain Scale: FACES Pre/Post-Treatment (Group)  -MP      Recorded by [MP] Jae Crystal MS, CFY-SLP 02/21/19 1013      Row Name 02/21/19 0945             Pain Scale: FACES Pre/Post-Treatment    Pain: FACES Scale, Pretreatment  2-->hurts little bit  -MP      Pain: FACES Scale, Post-Treatment  2-->hurts little bit  -MP      Recorded by [MP] Jae Crystal MS, CFY-SLP 02/21/19 1013      Row Name                Wound 02/13/19 1019 Left eyebrow abrasion    Wound - Properties Group Date first assessed: 02/13/19 [CH] Time first assessed: 1019 [CH] Present On Admission : yes [CH] Side: Left [CH] Location: eyebrow [CH] Type: abrasion [CH] Recorded by:  [CH] Cally Swanson RN 02/13/19 1020    Row Name                Wound 02/13/19 1855 Left hip incision    Wound - Properties Group Date first assessed: 02/13/19 [SS] Time first assessed: 1855 [SS] Side: Left [SS] Location: hip [SS] Type: incision [SS] Recorded by:  [SS] Bang Love RN 02/13/19 1855    Row Name                Wound 02/16/19 2000 Left lower leg skin tear    Wound - Properties Group Date first assessed: 02/16/19 [JR] Time first assessed: 2000 [JR] Present On Admission : yes [JR] Side: Left [JR] Orientation: lower [JR] Location: leg [JR] Type: skin tear [JR] Additional Comments: steri strips X 4 and mepliex in place, ST V shaped [JR] Recorded by:  [JR] Breonna Bruce RN 02/17/19 0226    Row Name 02/21/19 0945             Outcome Summary/Treatment Plan (SLP)    Daily Summary of Progress (SLP)  progress toward functional goals as expected  -MP      Plan for Continued Treatment (SLP)  Pt seen for assessment of diet tolerance. Pt using adaptive cups and independently taking small sips. No overt s/sxs aspiration w/ small single sips of thin. Pt refused solid trials, but took approximately 1/2 of Boost pudding w/ no overt s/sxs aspiration or discomfort. SLP will continue to follow for diet tolerance/adjustments  as needed. Rec continue current diet, meds whole or crushed in pudding/puree. Follow standard aspiration precautions.  -MP      Anticipated Dischage Disposition  inpatient rehabilitation facility;other (see comments) per notes, family desiring rehab  -MP      Recorded by [MP] Jae Crystal MS, CFY-SLP 02/21/19 1013        User Key  (r) = Recorded By, (t) = Taken By, (c) = Cosigned By    Initials Name Effective Dates Discipline    Cally Mancia, RN 06/16/16 -  Nurse    Bang Simon RN 01/15/18 -  --    Breonna Rice RN 09/18/16 -  Nurse    MP Jae Crystal MS, CFY-SLP 08/15/18 -  SLP          Outcome Summary  Outcome Summary/Treatment Plan (SLP)  Daily Summary of Progress (SLP): progress toward functional goals as expected (02/21/19 0945 : Jae Crystal MS, CFY-SLP)  Plan for Continued Treatment (SLP): Pt seen for assessment of diet tolerance. Pt using adaptive cups and independently taking small sips. No overt s/sxs aspiration w/ small single sips of thin. Pt refused solid trials, but took approximately 1/2 of Boost pudding w/ no overt s/sxs aspiration or discomfort. SLP will continue to follow for diet tolerance/adjustments as needed. Rec continue current diet, meds whole or crushed in pudding/puree. Follow standard aspiration precautions. (02/21/19 0945 : Jae Crystal MS, CFY-SLP)  Anticipated Dischage Disposition: inpatient rehabilitation facility, other (see comments)(per notes, family desiring rehab) (02/21/19 0945 : Jae Crystal MS, CFY-SLP)      SLP GOALS     Row Name 02/21/19 0945 02/20/19 1000          Oral Nutrition/Hydration Goal 1 (SLP)    Oral Nutrition/Hydration Goal 1, SLP  LTG: Pt will tolerate soft diet and thin liquids via small single sips w/ no overt s/sxs aspiration or distress w/ 100% acc and no cues  -MP  LTG: Pt will tolerate soft diet and small sips thin liquids  -AW     Time Frame (Oral Nutrition/Hydration Goal 1, SLP)  by discharge  -MP  by  discharge  -AW     Barriers (Oral Nutrition/Hydration Goal 1, SLP)  Pt independently using adaptive cup to take small sips. No overt s/sxs aspiration observed w/ small sips thin.  -MP  --     Progress/Outcomes (Oral Nutrition/Hydration Goal 1, SLP)  goal revised this date;good progress toward goal  -MP  --        Oral Nutrition/Hydration Goal 2 (SLP)    Oral Nutrition/Hydration Goal 2, SLP  Pt will tolerate soft solids and thin liquids via small single sips w/ no overt s/sxs aspiration or distress w/ 100% acc and no cues  -MP  --     Time Frame (Oral Nutrition/Hydration Goal 2, SLP)  short term goal (STG);by discharge  -MP  --     Barriers (Oral Nutrition/Hydration Goal 2, SLP)  Pt refused solid trials. Took approximatley 1/2 Boost pudding and small single sips of thin via adaptive cup w/ no overt s/sxs aspiration or distress w/ 100% acc and no cues  -MP  --     Progress/Outcomes (Oral Nutrition/Hydration Goal 2, SLP)  goal revised this date;continuing progress toward goal  -MP  --       User Key  (r) = Recorded By, (t) = Taken By, (c) = Cosigned By    Initials Name Provider Type    Dara Velasco MS CCC-SLP Speech and Language Pathologist    Jae Aponte MS, CFY-SLP Speech and Language Pathologist          EDUCATION  The patient has been educated in the following areas:   Dysphagia (Swallowing Impairment) Modified Diet Instruction.    SLP Recommendation and Plan                       Anticipated Dischage Disposition: inpatient rehabilitation facility, other (see comments)(per notes, family desiring rehab)     Therapy Frequency (Swallow): PRN          Plan of Care Reviewed With: patient  Plan of Care Review  Plan of Care Reviewed With: patient  Daily Summary of Progress (SLP): progress toward functional goals as expected  Plan for Continued Treatment (SLP): Pt seen for assessment of diet tolerance. Pt using adaptive cups and independently taking small sips. No overt s/sxs aspiration w/ small single  sips of thin. Pt refused solid trials, but took approximately 1/2 of Boost pudding w/ no overt s/sxs aspiration or discomfort. SLP will continue to follow for diet tolerance/adjustments as needed. Rec continue current diet, meds whole or crushed in pudding/puree. Follow standard aspiration precautions.           Time Calculation:   Time Calculation- SLP     Row Name 02/21/19 1016             Time Calculation- SLP    SLP Start Time  0945  -      SLP Received On  02/21/19  -        User Key  (r) = Recorded By, (t) = Taken By, (c) = Cosigned By    Initials Name Provider Type    Jae Aponte MS, CFY-SLP Speech and Language Pathologist          Therapy Charges for Today     Code Description Service Date Service Provider Modifiers Qty    [id number]  ST TREATMENT SWALLOW 3 2/21/2019 Jae Crystal MS, CFY-SLP GN 1                 Jae Crystal MS, ALEJANDRO-SLP  2/21/2019

## 2019-02-21 NOTE — PROGRESS NOTES
Continued Stay Note  [facility]     Patient Name: Debbie Baum  MRN: [id number]  Today's Date: 2/21/2019    Admit Date: 2/13/2019    Discharge Plan     Row Name 02/21/19 1136       Plan    Plan Comments  EMS rescheduled for tomorrow, 2/22, at 1300. [facility] notified. CM following.     Row Name 02/21/19 1119       Plan    Final Note  [facility] are able to accept patient today if medically ready. Discussed in am rounds. MD and APRN notified. EMS scheduled for 1300. Nurse to call report to [phone], dc summary will be pulled from Epic. CM following.         Discharge Codes    No documentation.       Expected Discharge Date and Time     Expected Discharge Date Expected Discharge Time    Feb 22, 2019             Kirsten Henry RN

## 2019-02-22 VITALS
OXYGEN SATURATION: 94 % | RESPIRATION RATE: 18 BRPM | HEIGHT: 65 IN | BODY MASS INDEX: 18.33 KG/M2 | HEART RATE: 99 BPM | SYSTOLIC BLOOD PRESSURE: 148 MMHG | WEIGHT: 110 LBS | DIASTOLIC BLOOD PRESSURE: 64 MMHG | TEMPERATURE: 97.8 F

## 2019-02-22 PROCEDURE — 99239 HOSP IP/OBS DSCHRG MGMT >30: CPT | Performed by: NURSE PRACTITIONER

## 2019-02-22 RX ORDER — LIDOCAINE 50 MG/G
1 PATCH TOPICAL
Start: 2019-02-23

## 2019-02-22 RX ORDER — MORPHINE SULFATE 10 MG/5ML
5 SOLUTION ORAL EVERY 6 HOURS PRN
Status: DISCONTINUED | OUTPATIENT
Start: 2019-02-22 | End: 2019-02-22 | Stop reason: HOSPADM

## 2019-02-22 RX ORDER — CELECOXIB 100 MG/1
100 CAPSULE ORAL EVERY 12 HOURS SCHEDULED
Status: ON HOLD
Start: 2019-02-22 | End: 2019-10-09

## 2019-02-22 RX ORDER — MORPHINE SULFATE 10 MG/5ML
5 SOLUTION ORAL
Status: DISCONTINUED | OUTPATIENT
Start: 2019-02-22 | End: 2019-02-22

## 2019-02-22 RX ORDER — MORPHINE SULFATE 10 MG/5ML
5 SOLUTION ORAL EVERY 6 HOURS PRN
Qty: 60 ML | Refills: 0
Start: 2019-02-22 | End: 2019-02-22

## 2019-02-22 RX ORDER — MORPHINE SULFATE 10 MG/5ML
5 SOLUTION ORAL EVERY 4 HOURS
Status: DISCONTINUED | OUTPATIENT
Start: 2019-02-22 | End: 2019-02-22 | Stop reason: HOSPADM

## 2019-02-22 RX ORDER — TROLAMINE SALICYLATE 10 G/100G
CREAM TOPICAL AS NEEDED
Start: 2019-02-22

## 2019-02-22 RX ORDER — DOCUSATE SODIUM 50 MG/5 ML
100 LIQUID (ML) ORAL 2 TIMES DAILY PRN
Start: 2019-02-22

## 2019-02-22 RX ORDER — ESCITALOPRAM OXALATE 10 MG/1
10 TABLET ORAL ONCE
Status: COMPLETED | OUTPATIENT
Start: 2019-02-22 | End: 2019-02-22

## 2019-02-22 RX ORDER — ASPIRIN 81 MG/1
81 TABLET, CHEWABLE ORAL 2 TIMES DAILY
Start: 2019-02-22

## 2019-02-22 RX ORDER — SENNA AND DOCUSATE SODIUM 50; 8.6 MG/1; MG/1
2 TABLET, FILM COATED ORAL NIGHTLY PRN
Start: 2019-02-22

## 2019-02-22 RX ADMIN — ESCITALOPRAM OXALATE 10 MG: 10 TABLET ORAL at 11:40

## 2019-02-22 RX ADMIN — MORPHINE SULFATE 5 MG: 10 SOLUTION ORAL at 04:21

## 2019-02-22 RX ADMIN — CARVEDILOL 3.12 MG: 3.12 TABLET, FILM COATED ORAL at 08:58

## 2019-02-22 RX ADMIN — VITAMIN D, TAB 1000IU (100/BT) 1000 UNITS: 25 TAB at 08:57

## 2019-02-22 RX ADMIN — LIDOCAINE 1 PATCH: 50 PATCH CUTANEOUS at 08:58

## 2019-02-22 RX ADMIN — LANSOPRAZOLE 30 MG: KIT at 10:04

## 2019-02-22 RX ADMIN — MORPHINE SULFATE 2 MG: 10 SOLUTION ORAL at 06:23

## 2019-02-22 RX ADMIN — DICLOFENAC 2 G: 10 GEL TOPICAL at 11:40

## 2019-02-22 RX ADMIN — KETOROLAC TROMETHAMINE 10 MG: 10 TABLET, FILM COATED ORAL at 08:57

## 2019-02-22 RX ADMIN — TROLAMINE SALICYLATE: 10 CREAM TOPICAL at 08:59

## 2019-02-22 RX ADMIN — MORPHINE SULFATE 5 MG: 10 SOLUTION ORAL at 01:07

## 2019-02-22 RX ADMIN — MORPHINE SULFATE 5 MG: 10 SOLUTION ORAL at 10:04

## 2019-02-22 RX ADMIN — DICLOFENAC 2 G: 10 GEL TOPICAL at 08:59

## 2019-02-22 RX ADMIN — IBUPROFEN 200 MG: 100 SUSPENSION ORAL at 02:16

## 2019-02-22 RX ADMIN — ASPIRIN 81 MG CHEWABLE TABLET 81 MG: 81 TABLET CHEWABLE at 08:58

## 2019-02-22 RX ADMIN — CELECOXIB 100 MG: 100 CAPSULE ORAL at 08:57

## 2019-02-22 RX ADMIN — CYANOCOBALAMIN TAB 1000 MCG 500 MCG: 1000 TAB at 08:58

## 2019-02-22 NOTE — DISCHARGE SUMMARY
[facility] Medicine Services  DISCHARGE SUMMARY    Patient Name: Debbie Baum  : 1923  MRN: [id number]    Date of Admission: 2019  Date of Discharge:  19  Primary Care Physician: Paula Scott MD    Consults     Date and Time Order Name Status Description    2019 1334 Inpatient Palliative Care MD Consult Completed           Hospital Course     Presenting Problem:   Closed fracture of left hip, initial encounter (CMS/[facility]) [S72.002A]    Active Hospital Problems    Diagnosis Date Noted   • **Closed fracture of left hip (CMS/[facility]) [S72.002A] 2019   • Humeral head fracture [S42.293A] 2019   • Shoulder dislocation, recurrent, right [M24.411] 2019   • Postoperative anemia due to acute blood loss [D62] 2019   • Dysphagia [R13.10] 2019   • Falls [W19.XXXA] 2019   • Periorbital ecchymosis of left eye [S00.12XA] 2019   • Hematoma [T14.8XXA] 2019   • Atrial fibrillation (CMS/[facility]) [I48.91] 2018   • Essential hypertension [I10] 2018      Resolved Hospital Problems   No resolved problems to display.          Hospital Course:       Debbie Baum is a 89 y.o. female with history of atrial fibrillation presented status post fall to left side striking hip and face.  Initial x-rays of hip pelvis revealed no fracture but later with worsening pain had a subsequent CT pelvis showed left intertrochanteric fracture and 6 cm gluteal subcutaneous hematoma.  Patient underwent left intertrochanteric nail on 2/3/2019 by Dr. Maynard and experienced post operative anemia receiving 2 units PRBC.  Patient otherwise did well postoperatively.  Patient continues to have right shoulder pain, and found on CT to have extensive degenerative changes of mildly comminuted anterior fracture/dislocation of right humeral head and glenoid fractures that appear chronic in nature per Dr. Maynard, orthopedic surgery.  Patient encouraged to use sling for  "supportive care and comfort of right upper extremity.  Patient will continue on aspirin 81 mg twice daily for DVT prophylaxis and follow-up with Dr. Maynard in 2 weeks outpatient.  Patient is weight-bear as tolerated for left lower extremity and right upper extremity.  Patient continues to have severe left knee pain and asking for a \"gel injection \"to her left knee.  Discussed with Dr. Maynard and will require patient to receive injection in office that has been discussed at length with family by provider.  Palliative medicine following for assistance in goals of care as well as pain management.  Patient with dysphasia and has been placed on comfort diet.  Pain medications are being adjusted by palliative care.  Patient is medically ready for discharge and will be transferred to [place] today by ambulance.    DISCHARGE INSTRUCTIONS   Dr. Maynard     Operative fixation left intertrochanteric hip fracture     Wound Care   1) Keep wound / incision area clean and dry.   2) Dressing to remain in place until post-operative day 7. If a splint or cast is present, leave in place until next appointment. Upon dressing removal (if no splint/cast present), assess for wound drainage. If no drainage is present, keep wound / incision area open to air as much as possible. If drainage is present, place sterile dressing to cover wound and assess daily. If drainage continues to occur after post-operative day 10, call the office for an urgent appointment. (You should be seen in the clinic within 1-2 days of calling).   3) No baths or swimming until otherwise instructed. The wound must remain dry for 10 days after surgery. After 10 days, you may begin to shower only if no drainage is present. No submerging the wound under standing water until cleared by your physician (no baths, hot tubs, swimming pools, etc). Sponge baths are the best way to perform personal hygiene while at the same time protecting the wound from moisture. If splint or " "cast is present, do not allow it to get wet.   4) Prior to showering, the wound must remain dry for 72 consecutive hours (no drainage whatsoever) prior to showering. If the wound drains or spots, the clock \"resets\" - make sure the wound has been drainage-free for 72 consecutive hours.   5) Once you are allowed to get the wound wet, please use gentle soap to wash the wound area. DO NOT aggressively scrub the wound with a washcloth or bath sponge. Please visually inspect your wound(s) at least once daily. If the wound(s) are in a difficult to see location, please use a mirror or have someone else assist with visual inspection.   6) No scrubbing the wound. You may \"pad dry\" the wound, but do not rub, as this may open up the wound and pre-dispose to wound infection.   7) Do not apply lotions or creams to incision site, unless instructed otherwise.   8) Observe for redness, swelling, or drainage. Please call the clinic immediately if you have fevers, chills with warmth/redness surrounding wound site or if you notice pus drainage from the wound site     Activity   No heavy lifting objects greater than 10 pounds.   No driving while on narcotic pain medication.   No submerging wound under standing water (pool, bath tub, etc.) until otherwise instructed.   You may be protected weightbearing as tolerated on your operative (left lower) extremity   Use crutches or a walker for ambulation as instructed.   Hip range of motion as tolerated     Follow-Up   Follow-up with Dr. Maynard's office in 3 weeks from the surgery date for a post-operative evaluation. Have the following xrays done upon arrival to the follow-up appointment: AP and lateral left hip/femur. Please call Dr. Maynard's office at [phone] for orthopaedic appointments or questions.        Discharge Follow Up Recommendations for labs/diagnostics:  [place] 2 weeks     Day of Discharge     HPI:   Resting upright in bed, NAD. Continue to have pain in right shoulder " and left knee. Eating well and having BM's. No other complaints. Dtr at bedside. Denies f/c, n/v/d, soa or cp.     Review of Systems  Otherwise ROS is negative except as mentioned in the HPI.    Vital Signs:   Temp:  [97.6 °F (36.4 °C)-98.1 °F (36.7 °C)] 97.8 °F (36.6 °C)  Heart Rate:  [77-99] 99  Resp:  [18] 18  BP: (117-151)/(64-74) 148/64     Physical Exam:  Constitutional: No acute distress, awake, alert  Eyes: PERRLA, sclerae anicteric, no conjunctival injection  HENT: NCAT, mucous membranes moist, left periorbital bruising   Neck: Supple, no thyromegaly, no lymphadenopathy, trachea midline  Respiratory: Clear to auscultation bilaterally with decreased bases, nonlabored respirations on RA  Cardiovascular: RRR, + murmurs, no rubs or gallops, palpable pedal pulses bilaterally  Gastrointestinal: Positive bowel sounds, soft, nontender, nondistended  Musculoskeletal: No bilateral ankle edema, no clubbing or cyanosis to extremities; left lateral hip incision with CDI dressing, left knee TTP with no surrounding erythema/ induration or swelling, right shoulder TTP  Psychiatric: Appropriate affect, cooperative  Neurologic: Oriented x 3, strength symmetric in all extremities, Cranial Nerves grossly intact to confrontation, speech clear  Skin: No rashes     Pertinent  and/or Most Recent Results     Results from last 7 days   Lab Units 02/19/19  0607 02/17/19  2218 02/17/19  0625 02/16/19  0707 02/15/19  1701 02/15/19  1346   WBC 10*3/mm3  --   --   --  10.50  --   --    HEMOGLOBIN g/dL  --   --  10.1* 9.2* 10.0* 10.0*   HEMATOCRIT %  --   --  30.4* 27.6* 30.2* 30.6*   PLATELETS 10*3/mm3  --   --   --  171  --   --    SODIUM mmol/L 136  --  136 127*  --   --    POTASSIUM mmol/L 3.8 4.2 3.4* 3.7  --   --    CHLORIDE mmol/L 105  --  106 99  --   --    CO2 mmol/L 26.0  --  25.0 26.0  --   --    BUN mg/dL 15  --  14 18  --   --    CREATININE mg/dL 0.57*  --  0.65 0.60  --   --    GLUCOSE mg/dL 129*  --  94 98  --   --     CALCIUM mg/dL 9.3  --  8.9 8.6*  --   --            Invalid input(s): PROT, LABALBU        Invalid input(s): TG, LDLCALC, LDLREALC        Brief Urine Lab Results  (Last result in the past 365 days)      Color   Clarity   Blood   Leuk Est   Nitrite   Protein   CREAT   Urine HCG        02/14/19 1453 Other Clear Negative Negative Negative Negative               Microbiology Results Abnormal     None          Imaging Results (all)     Procedure Component Value Units Date/Time    XR Knee 1 or 2 View Left [[id number]] Collected:  02/18/19 1543     Updated:  02/18/19 1555    Narrative:       EXAMINATION: XR KNEE 1 OR 2 VW LEFT-, XR HIP W OR WO PELVIS 1 VIEW LEFT-  02/18/2019      INDICATION: pain; S72.002A-Fracture of unspecified part of neck of left  femur, initial encounter for closed fracture; S09.90XA-Unspecified  injury of head, initial encounter; S05.12XA-Contusion of eyeball and  orbital tissues, left eye, initial encounter; S00.81XA-Abrasion of other  part of head, initial encounter; W19.XXXA-Unspecified fall, initial  encounter; Z74.09-Other reduced mobility     TECHNIQUE:  Single frontal view of the left hip and 2 views of the left  knee.     COMPARISONS:  CT pelvis 02/13/2019     FINDINGS:       Left hip: Interval ORIF for intertrochanteric left femur fracture.  Anatomic alignment is seen of the bones and the hardware is in good  position.     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 There is no evidence of fracture. There is background  osteopenia. Minimal suprapatellar effusion with loose bodies in the  joint space. No radiodense foreign body.       Impression:       1. Interval ORIF of intertrochanteric left femur fracture.  2. Severe left knee degenerative change. No acute osseous abnormality.     D:  02/18/2019  E:  02/18/2019     This report was finalized on 2/18/2019 3:53 PM by Franklin Baum.        XR Hip With or Without Pelvis 1 View Left [[id number]] Collected:  02/18/19 1543     Updated:  02/18/19 1555    Narrative:       EXAMINATION: XR KNEE 1 OR 2 VW LEFT-, XR HIP W OR WO PELVIS 1 VIEW LEFT-  02/18/2019      INDICATION: pain; S72.002A-Fracture of unspecified part of neck of left  femur, initial encounter for closed fracture; S09.90XA-Unspecified  injury of head, initial encounter; S05.12XA-Contusion of eyeball and  orbital tissues, left eye, initial encounter; S00.81XA-Abrasion of other  part of head, initial encounter; W19.XXXA-Unspecified fall, initial  encounter; Z74.09-Other reduced mobility     TECHNIQUE:  Single frontal view of the left hip and 2 views of the left  knee.     COMPARISONS:  CT pelvis 02/13/2019     FINDINGS:       Left hip: Interval ORIF for intertrochanteric left femur fracture.  Anatomic alignment is seen of the bones and the hardware is in good  position.     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 There is no evidence of fracture. There is background  osteopenia. Minimal suprapatellar effusion with loose bodies in the  joint space. No radiodense foreign body.       Impression:       1. Interval ORIF of intertrochanteric left femur fracture.  2. Severe left knee degenerative change. No acute osseous abnormality.     D:  02/18/2019  E:  02/18/2019     This report was finalized on 2/18/2019 3:53 PM by Franklin Baum.       CT Upper Extremity Right Without Contrast [[id number]] Collected:  02/14/19 1737     Updated:  02/15/19 1011    Narrative:       EXAMINATION: CT RIGHT UPPER EXTREMITY WO CONTRAST - 2/14/2019      INDICATION:  S72.002A-Fracture of unspecified part of neck of left  femur, initial encounter for closed fracture; S09.90XA-Unspecified  injury of head, initial encounter; S05.12XA-Contusion of eyeball and  orbital tissues, left eye, initial encounter;  S00.81XA-Abrasion of other  part of head, initial encounter; W19.XXXA-Unspecified fall, initial  encounter; Z74.09-Other reduced mobility.      TECHNIQUE: CT right upper extremity without intravenous contrast.     The radiation dose reduction device was turned on for each scan per the  ALARA (As Low as Reasonably Achievable) protocol.     COMPARISON: Radiographs earlier same day.     FINDINGS: Fracture dislocation of the right shoulder with anterior  dislocation of the humeral head demonstrating multipart mildly  comminuted fracture with at least 3 fracture fragments involving the  anteromedial humeral head. Background cortical irregularity of moderate  to severe DJD additionally noted. Additionally noted fracture of the  anterior superior glenoid and medial margin glenoid with adjacent  fracture fragments intervening as well as osteophytosis present.  Surgical neck of the humerus is preserved. AC joint space interval  within normal limits demonstrating mild DJD. Right chest partially  visualized with prior healed right rib injuries however no displaced  acute fracture of the ribs noted and right hemithorax grossly clear.       Impression:       Multipart mildly comminuted anterior fracture/dislocation of  the right humeral head in relationship to the osseous glenoid with acute  fractures of the anterior superior margin glenoid and medial glenoid  adjacent to the spine of the scapula. Extensive degenerative change is  noted in the background with cortical irregularity may mask subtle  cortical defects, however, surgical neck is preserved. Prior healed  right rib fractures are noted without displaced acute rib fracture or  pneumothorax.     DICTATED:   2/14/2019  EDITED/ls :   2/14/2019      This report was finalized on 2/15/2019 10:09 AM by Dr. Jae Baumann.       CT Facial Bones Without Contrast [[id number]] Collected:  02/13/19 0947     Updated:  02/14/19 2241    Narrative:       EXAMINATION: CT FACIAL BONES WO  CONTRAST-02/13/2019:      INDICATION: Maxface trauma, blunt.         TECHNIQUE: Spiral acquisition 2 mm axial images through the facial bones  with sagittal and coronal 2 mm 2-D reconstructions.      The radiation dose reduction device was turned on for each scan per the  ALARA (As Low as Reasonably Achievable) protocol.     COMPARISON: NONE.     FINDINGS: The facial bones and included portions of the calvarium appear  intact. In particular, no fracture of nasal bones, orbital or maxillary  sinus fracture or zygomatic arch fracture is seen. TM joints appear  anatomic, and show expected DJD for age. Paranasal sinuses and mastoids  are clear except for single opacified right ethmoid air cell. Nasal  passages are normally patent. Allowing for streak artifact from the  patient's dental hardware, maxilla and mandible appear grossly normal.     Soft tissue window images show diffuse superficial/preseptal hematoma  around the left orbit, but no evidence of hematoma elsewhere and no  evidence of foreign body. The optic globes, extraocular muscles, and  optic nerves appear grossly normal and symmetric.       Impression:       1. Superficial left periorbital hematoma.  2. No other evidence of acute soft tissue or bony trauma elsewhere.     D:  02/13/2019  E:  02/13/2019     This report was finalized on 2/14/2019 10:38 PM by DR. Sushil Epstein MD.       CT Pelvis Without Contrast [[id number]] Collected:  02/13/19 1122     Updated:  02/14/19 1935    Narrative:       EXAMINATION: CT PELVIS WO CONTRAST-      INDICATION: Left hip pain, negative x-ray.     TECHNIQUE: 2 mm axial images through the pelvis and proximal femurs with  sagittal and coronal 2 mm 2-D reconstructions.     The radiation dose reduction device was turned on for each scan per the  ALARA (As Low as Reasonably Achievable) protocol.     COMPARISON: Left hip plain film series of the same day.     FINDINGS: By history, the patient came to the ER this morning  complaining  "of left hip pain after fall, with the left hip making a loud  \"pop\" while the patient was in the ER.     There is a displaced intertrochanteric fracture of the left hip, not  appearing on previous plain films. Most likely, there was a nondisplaced  occult fracture present previously which progressed since the previous  study. There is relatively mild comminution along the fracture margins.  No underlying destructive lesion is identified. Remaining bony  structures appear anatomic and intact. There is an old healed right  inferior pubic ramus fracture. Soft tissue window images show an  approximately 6 cm left gluteal hematoma, and some more diffuse  intramuscular hematoma of the gluteus.  There is some patchy diffuse  hematoma laterally as well. No intrapelvic hematoma is seen. No other  bony or soft tissue trauma is identified. Bowel loops are normal in  caliber. No ascites is seen. Bladder is moderately distended.       Impression:       1. Interval development of displaced comminuted intratrochanteric  fracture since previous plain film.  2. Associated discrete 6 cm hematoma and intramuscular and subcutaneous  hematoma as well.     D:  02/13/2019  E:  02/13/2019     This report was finalized on 2/14/2019 7:33 PM by DR. Sushil Epstein MD.       XR Humerus Right [[id number]] Collected:  02/14/19 1422     Updated:  02/14/19 1435    Narrative:       EXAMINATION: XR HUMERUS, RIGHT-02/14/2019:      INDICATION: Fall, pain; S72.002A-Fracture of unspecified part of neck of  left femur, initial encounter for closed fracture; S09.90XA-Unspecified  injury of head, initial encounter; S05.12XA-Contusion of eyeball and  orbital tissues, left eye, initial encounter; S00.81XA-Abrasion of other  part of head, initial encounter; W19.XXXA-Unspecified fall, initial  encounter.      COMPARISON: 09/08/2018.     FINDINGS: Findings to suggest fracture or dislocation identified of the  right humeral head. There is anterior dislocation " identified with likely  fracture of the humeral head. There is osteopenia of the bony  structures. The cortex of the humeral shaft is intact.       Impression:       Findings suggesting dislocation anteriorly of the humeral  head with likely fracture of the humeral head itself.     D:  02/14/2019  E:  02/14/2019     This report was finalized on 2/14/2019 2:33 PM by Dr. Sylvia Wood MD.       XR Shoulder 2+ View Right [[id number]] Collected:  02/14/19 1413     Updated:  02/14/19 1435    Narrative:       EXAMINATION: XR SHOULDER 2+ VW RIGHT- 02/14/2019      INDICATION: rule out fracture, dislocation. fall, pain;  S72.002A-Fracture of unspecified part of neck of left femur, initial  encounter for closed fracture; S09.90XA-Unspecified injury of head,  initial encounter; S05.12XA-Contusion of eyeball and orbital tissues,  left eye, initial encounter; S00.81XA-Abrasion of other part of head,  initial encounter; W19.XXXA-Unspecified fall, initial encounter      COMPARISON: 02/14/2019     FINDINGS: 2 views of the right shoulder reveal fracture dislocation  identified of the humeral head. The fracture is seen of the humeral head  with anterior dislocation identified. The acromioclavicular joint  reveals degenerative change. The scapula visualized is intact.           Impression:       Fracture dislocation seen of the right humeral head.     D:  02/14/2019  E:  02/14/2019     This report was finalized on 2/14/2019 2:33 PM by Dr. Sylvia Wood MD.       XR Hip With or Without Pelvis 2 - 3 View Left [[id number]] Collected:  02/14/19 0837     Updated:  02/14/19 1258    Narrative:       EXAMINATION: AP VIEW OF THE PELVIS AND OBLIQUE VIEW OF THE LEFT HIP -  02/13/2019     HISTORY: Trauma pain     FINDINGS: There is a slight medial prosthesis stabilizing a comminuted  intertrochanteric fracture of the left femur. The bony pelvis is intact.  There is old fracture of the right inferior pubic ramus.       Impression:        Expected postoperative findings.     D:  02/14/2019  E:  02/14/2019     This report was finalized on 2/14/2019 12:56 PM by Dr. Chester Escoto MD.       FL C Arm During Surgery [[id number]] Collected:  02/14/19 0825     Updated:  02/14/19 1017    Narrative:       EXAMINATION: FLUOROSCOPY C-ARM DURING SURGERY-     INDICATION: Hip intertrochanteric nailing left; S72.002A-Fracture of  unspecified part of neck of left femur, initial encounter for closed  fracture; S09.90XA-Unspecified injury of head, initial encounter;  S05.12XA-Contusion of eyeball and orbital tissues, left eye, initial  encounter; S00.81XA-Abrasion of other part of head, initial encounter;  W19.XXXA-Unspecified fall, initial encounter.      TECHNIQUE: Intraoperative fluoroscopy for improved localization and  treatment planning.     COMPARISON: None.     FINDINGS: Intraoperative fluoroscopy with total fluoroscopic time usage  1 minute 45 seconds and 12 representative images saved during left hip  intratrochanteric nailing.       Impression:       Intraoperative fluoroscopy utilized during left hip nailing.     D:  02/14/2019  E:  02/14/2019     This report was finalized on 2/14/2019 10:15 AM by Dr. Jae Baumann.       CT Head Without Contrast [[id number]] Collected:  02/13/19 0946     Updated:  02/13/19 2244    Narrative:       EXAMINATION: CT HEAD WO CONTRAST- 02/13/2019      INDICATION: Head trauma, minor, GCS>=13, NOC/NEXUS/CCR neg, first study          TECHNIQUE: 5 mm unenhanced images through the brain     The radiation dose reduction device was turned on for each scan per the  ALARA (As Low as Reasonably Achievable) protocol.     COMPARISON: Head CT scan of 08/08/2018.     FINDINGS: The calvarium appears intact. Included paranasal sinuses and  mastoids appear clear. Soft tissue window images show no evidence of  hemorrhage, contusion, edema, mass or mass effect, infarct,  hydrocephalus, or abnormal extra-axial collection. There is expected  degree of  generalized cerebral atrophy for age. Re windowing this study  for facial soft tissues shows superficial/preseptal hematoma of the left  periorbital region. Separate facial bone CT scan is also performed.       Impression:       Age-appropriate generalized cerebral atrophy. No evidence of  acute intracranial disease. Incidentally noted left facial hematoma.  Separate facial bone CT scan has also been performed. Please see that  report.     D:  02/13/2019  E:  02/13/2019        This report was finalized on 2/13/2019 10:42 PM by DR. Sushil Epstein MD.       XR Chest 1 View [[id number]] Collected:  02/13/19 1247     Updated:  02/13/19 2234    Narrative:       EXAMINATION: XR CHEST 1 VW-02/13/2019:      INDICATION: Preop; S72.002A-Fracture of unspecified part of neck of left  femur, initial encounter for closed fracture; S09.90XA-Unspecified  injury of head, initial encounter; S05.12XA-Contusion of eyeball and  orbital tissues, left eye, initial encounter; S00.81XA-Abrasion of other  part of head, initial encounter; W19.XXXA-Unspecified fall, initial  encounter.      COMPARISON: 09/13/2018.     FINDINGS: There appears to be a large hiatal hernia. The heart shadow  itself appears normal in size. The vasculature is upper limits of normal  size. Relative elevation of the right hemidiaphragm compared to left is  unchanged. There are mild chronic appearing interstitial changes. No new  pulmonary parenchymal disease is seen. 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 On these images it appears the right humerus may be   subluxed or dislocated with more questionable suggestion of fracture or  avulsion of the humeral head.       Impression:       1. Large hiatal hernia.  2. Stable chronic appearing lung changes. No new heart or lung disease.  3. Questionable subluxation and trauma to the right humeral head as  noted. Please correlate with exam findings and patient complaint.     D:  02/13/2019  E:  02/13/2019     This report  was finalized on 2/13/2019 10:31 PM by DR. Sushil Epstein MD.       XR Hip With or Without Pelvis 2 - 3 View Left [[id number]] Collected:  02/13/19 0949     Updated:  02/13/19 2228    Narrative:       EXAMINATION:  AP PELVIS, AP AND LEFT LATERAL HIP-02/13/2019     HISTORY: Fall this morning, left hip pain.     COMPARISON: 08/09/2019.     FINDINGS: The bony structures are markedly osteopenic, expected for age.  Lumbar scoliosis and advanced lower lumbar degenerative disc disease are  noted. Femoral head contours are generally well maintained. There is no  apparent left acetabular fracture, pubic ramus fracture, or proximal  left femoral fracture. With this degree of osteopenia, a nondisplaced  fracture could escape detection, but no interruption of the trabecular  pattern of the femur is seen. Irregularity of the upper margin of the  greater trochanter is probably stable, more prominent today due to the  hip previously internally rotated, today externally rotated.       Impression:       1.  Marked osteopenia.  2.  No pelvic or left hip fracture is identified.  3.  Lower lumbar degenerative disc disease and scoliosis.     D:  02/13/2019  E:  02/13/2019     This report was finalized on 2/13/2019 10:25 PM by DR. Sushil Epstein MD.             Results for orders placed during the hospital encounter of 09/08/18   Duplex Venous Upper Extremity - Right    Narrative · Normal right upper extremity venous duplex scan.     PRELIMINARY TECH FINDINGS ONLY  All veins in the right upper extremity appear compressible where   visualized.          Results for orders placed during the hospital encounter of 09/08/18   Duplex Venous Upper Extremity - Right    Narrative · Normal right upper extremity venous duplex scan.     PRELIMINARY TECH FINDINGS ONLY  All veins in the right upper extremity appear compressible where   visualized.          Results for orders placed during the hospital encounter of 09/17/18   Adult Transthoracic Echo Complete W/  Cont if Necessary Per Protocol    Narrative · Mild-to-moderate mitral valve regurgitation is present            Discharge Details        Discharge Medications      New Medications      Instructions Start Date   celecoxib 100 MG capsule  Commonly known as:  CeleBREX   100 mg, Oral, Every 12 Hours Scheduled      diclofenac 1 % gel gel  Commonly known as:  VOLTAREN   2 g, Topical, 4 Times Daily      docusate sodium 150 MG/15ML liquid  Commonly known as:  COLACE   100 mg, Oral, 2 Times Daily PRN      lidocaine 5 %  Commonly known as:  LIDODERM   1 patch, Transdermal, Every 24 Hours Scheduled, Remove & Discard patch within 12 hours or as directed by MD      Morphine 10 MG/5ML solution   5 mg, Oral, Every 4 Hours      Morphine 10 MG/5ML solution   5 mg, Oral, Every 6 Hours PRN      sennosides-docusate sodium 8.6-50 MG tablet  Commonly known as:  SENOKOT-S   2 tablets, Oral, Nightly PRN      trolamine salicylate 10 % cream  Commonly known as:  ASPERCREME   Topical, As Needed         Changes to Medications      Instructions Start Date   aspirin 81 MG chewable tablet  What changed:  when to take this   81 mg, Oral, 2 Times Daily         Continue These Medications      Instructions Start Date   acetaminophen 650 MG 8 hr tablet  Commonly known as:  TYLENOL   1,300 mg, Oral, Nightly      B-12 PO   1 tablet, Oral, Daily      carvedilol 3.125 MG tablet  Commonly known as:  COREG   3.125 mg, Oral, 2 Times Daily With Meals      cholecalciferol 1000 units tablet  Commonly known as:  VITAMIN D3   1,000 Units, Oral, Daily      escitalopram 10 MG tablet  Commonly known as:  LEXAPRO   10 mg, Oral, Daily      esomeprazole 40 MG capsule  Commonly known as:  nexIUM   40 mg, Oral, Every Morning Before Breakfast      uribel 118 MG capsule capsule   1 capsule, Oral, Every Night at Bedtime         Stop These Medications    ibuprofen 200 MG tablet  Commonly known as:  ADVIL,MOTRIN     triamterene-hydrochlorothiazide 75-50 MG per  tablet  Commonly known as:  MAXZIDE            Allergies   Allergen Reactions   • Hydrocodone-Acetaminophen Confusion     Also noted to contribute to increase tiredness along with diminished mental clarity with overall ineffective analgesic effect.    • Crab (Diagnostic) Hives   • Lovenox [Enoxaparin Sodium] Rash   • Penicillins Rash     unsure         Discharge Disposition:  Skilled Nursing Facility (DC - External)    Discharge Diet:  Diet Order   Procedures   • Diet Dysphagia; IV - Mechanical Soft No Mixed Consistencies; Thin; Extra Sauce / Gravy         Discharge Activity:   Activity Instructions     Activity as Tolerated            CODE STATUS:    Code Status and Medical Interventions:   Ordered at: 02/13/19 1302     Limited Support to NOT Include:    Intubation     Code Status:    No CPR     Medical Interventions (Level of Support Prior to Arrest):    Limited         Future Appointments   Date Time Provider Department Center   2/28/2019 11:30 AM Ariel Maynard MD [facility] None       Additional Instructions for the Follow-ups that You Need to Schedule     Discharge Follow-up with PCP   As directed       Currently Documented PCP:    Paula Scott MD    PCP Phone Number:    [phone]     Follow Up Details:  as needed         Discharge Follow-up with Specialty: [facility]; 2 Weeks   As directed      Specialty:  [facility]    Follow Up:  2 Weeks         Discharge Follow-up with Specified Provider: Dr Maynard; 2 Weeks   As directed      To:  Dr Maynard    Follow Up:  2 Weeks               Time Spent on Discharge:  45 minutes    Electronically signed by LAMAR Lujan, 02/22/19, 10:45 AM.

## 2019-02-22 NOTE — NURSING NOTE
Patient has called out frequently during the night for pain medications rating pain 10/10 each time. PRN and scheduled pain medications given. Patient rested a few hours during the night. Patient is scheduled for d/c to the [facility] today at 1300.

## 2019-02-22 NOTE — PROGRESS NOTES
"Palliative Care Progress Note    Date of Admission: 2/13/2019    Code Status:   Current Code Status     Date Active Code Status Order ID Comments User Context       Prior        Subjective:  Patient reports no Left knee pain currently, in room while staff rolled side to side, patient had minimal report of pain.   Patient received morphine 5mg dose at 1000 this am, visit made to bedside at 1100.  Patient is alert, interactive - seems more like herself per daughters.   Patient slept some but overall reports more awake.   Noted since yesterday pm - prns: x2 Toradol, x1 ibuprofen, x3 morphine 5mg doses.  Patient reports unable to tell that toradol was any different than ibuprofen.   Noted that patient's escitalopram was stopped on 2/14, reviewed with daughter and patient, agreed to restart.     Reviewed current scheduled and prn medications for route, type, dose, and frequency.    No current facility-administered medications for this encounter.       Objective:  PPS 40%   /64 (BP Location: Right arm, Patient Position: Lying)   Pulse 99   Temp 97.8 °F (36.6 °C) (Oral)   Resp 18   Ht 165.1 cm (65\")   Wt 49.9 kg (110 lb)   LMP  (LMP Unknown)   SpO2 94%   BMI 18.30 kg/m²    Intake & Output (last day)       02/21 0701 - 02/22 0700 02/22 0701 - 02/23 0700    P.O. 600 100    Total Intake(mL/kg) 600 (12) 100 (2)    Urine (mL/kg/hr) 2150 (1.8) 500 (0.9)    Total Output 2150 500    Net -1550 -400              Lab Results (last 24 hours)     ** No results found for the last 24 hours. **        Imaging Results (last 24 hours)     ** No results found for the last 24 hours. **          Physical Exam:  Gen: NAD, resting in bed  Skin: warm, dry   Eyes: KEVIN, conjunctiva clear and moist   Resp/thorax: even effort, non labored, CTA   CV: RRR   ABD: soft, bowel sounds+, nontender  Ext: no edema, no redness   Neuro: alert, interactive, no myoclonus       Reviewed labs and diagnostic results.   No results found for: " HGBA1C  Results from last 7 days   Lab Units 02/17/19  0625 02/16/19  0707   WBC 10*3/mm3  --  10.50   HEMOGLOBIN g/dL 10.1* 9.2*   HEMATOCRIT % 30.4* 27.6*   PLATELETS 10*3/mm3  --  171     Results from last 7 days   Lab Units 02/19/19  0607   SODIUM mmol/L 136   POTASSIUM mmol/L 3.8   CHLORIDE mmol/L 105   CO2 mmol/L 26.0   BUN mg/dL 15   CREATININE mg/dL 0.57*   CALCIUM mg/dL 9.3   GLUCOSE mg/dL 129*       Impression: 89 y.o. female  with acute Left intertrochanteric femur fx s/p medullary nailing 2/13, multiple falls, Left knee severe degenerative joint disease    Plan:   Left knee pain - severe degenerative joint disease - will adjust scheduled morphine to 5mg every 4 hours, continue prn doses.  Continue diclofenac ointment scheduled  Left femur pain - no pain report  Right shoulder pain - chronic shoulder dislocation - continue diclofenac ointment scheduled    Anxiety - restart escitalopram daily    Goals of care - SNF with palliative medicine to follow.     Time: 60 minutes   > 50% time spent in counseling and education concerning current orders for symptom management with patient and daughters.     Penny Barillas, APRN  [phone]  02/22/19  5:41 PM

## 2019-02-26 ENCOUNTER — TELEPHONE (OUTPATIENT)
Dept: ORTHOPEDIC SURGERY | Facility: CLINIC | Age: 84
End: 2019-02-26

## 2019-02-26 NOTE — TELEPHONE ENCOUNTER
Pt's son, Pro, called to see if he absolutely needs to get the pt here to her post op appt on Thursday. He said she's feeling fine, but doesn't know if she'll be able to ride in a van by Thursday. They would need to arrange some other form of transportation if necessary. He would like a call back at [phone].

## 2019-02-28 ENCOUNTER — OFFICE VISIT (OUTPATIENT)
Dept: ORTHOPEDIC SURGERY | Facility: CLINIC | Age: 84
End: 2019-02-28

## 2019-02-28 DIAGNOSIS — Z98.890 STATUS POST HIP SURGERY: ICD-10-CM

## 2019-02-28 DIAGNOSIS — S72.142D CLOSED DISPLACED INTERTROCHANTERIC FRACTURE OF LEFT FEMUR WITH ROUTINE HEALING, SUBSEQUENT ENCOUNTER: ICD-10-CM

## 2019-02-28 DIAGNOSIS — M17.12 PRIMARY OSTEOARTHRITIS OF LEFT KNEE: Primary | ICD-10-CM

## 2019-02-28 PROCEDURE — 99024 POSTOP FOLLOW-UP VISIT: CPT | Performed by: ORTHOPAEDIC SURGERY

## 2019-02-28 PROCEDURE — 99213 OFFICE O/P EST LOW 20 MIN: CPT | Performed by: ORTHOPAEDIC SURGERY

## 2019-02-28 NOTE — PROGRESS NOTES
Orthopaedic Clinic Note:  Hip Post Op    Chief Complaint   Patient presents with   • Post-op     2 week S/P HIP INTERTROCHANTERIC NAILING LEFT 02/13/19   Left knee pain    HPI    Ms. Baum is 2  week(s) s/p left intertrochanteric fracture fixation.  She rates her pain at 3/10 on the pain scale.  She is ambulating with a walker at her assisted living facility.  When she stands, her pain increases to a 10/10 on the pain scale.  The majority of her pain is localized to the knee.  She denies any pain in the hip apart from some lateral based trochanter type pain.  She is taking morphine for pain control.  She denies fevers chills or constitutional symptoms.  Overall she is doing better.  She explains that the majority of her pain appears to be related to her knee which is known to be severely arthritic.    Past Medical History:   Diagnosis Date   • Atrial fibrillation (CMS/HCC)    • Cancer (CMS/HCC)     colon   • Coronary artery disease    • GERD (gastroesophageal reflux disease)    • Hypertension       Past Surgical History:   Procedure Laterality Date   • CARDIAC ABLATION     • CHOLECYSTECTOMY     • COLON SURGERY      BOWEL RESECTION FOR HX COLON CANCER   • HIP INTERTROCHANTERIC NAILING Left 2/13/2019    Procedure: HIP INTERTROCHANTERIC NAILING LEFT;  Surgeon: Ariel Maynard MD;  Location: [facility];  Service: Orthopedics   • HYSTERECTOMY     • REPLACEMENT TOTAL KNEE        Family History   Problem Relation Age of Onset   • Heart attack Father      Social History     Socioeconomic History   • Marital status:      Spouse name: Not on file   • Number of children: Not on file   • Years of education: Not on file   • Highest education level: Not on file   Social Needs   • Financial resource strain: Not on file   • Food insecurity - worry: Not on file   • Food insecurity - inability: Not on file   • Transportation needs - medical: Not on file   • Transportation needs - non-medical: Not on file   Occupational  History   • Not on file   Tobacco Use   • Smoking status: Never Smoker   • Smokeless tobacco: Never Used   Substance and Sexual Activity   • Alcohol use: No   • Drug use: No   • Sexual activity: Defer   Other Topics Concern   • Not on file   Social History Narrative   • Not on file      Current Outpatient Medications on File Prior to Visit   Medication Sig Dispense Refill   • acetaminophen (TYLENOL) 650 MG 8 hr tablet Take 1,300 mg by mouth Every Night.     • aspirin 81 MG chewable tablet Chew 1 tablet 2 (Two) Times a Day.     • carvedilol (COREG) 3.125 MG tablet Take 3.125 mg by mouth 2 (Two) Times a Day With Meals.     • celecoxib (CeleBREX) 100 MG capsule Take 1 capsule by mouth Every 12 (Twelve) Hours.     • cholecalciferol (VITAMIN D3) 1000 units tablet Take 1,000 Units by mouth Daily.     • Cyanocobalamin (B-12 PO) Take 1 tablet by mouth Daily.     • diclofenac (VOLTAREN) 1 % gel gel Apply 2 g topically to the appropriate area as directed 4 (Four) Times a Day.     • docusate sodium (COLACE) 150 MG/15ML liquid Take 10 mL by mouth 2 (Two) Times a Day As Needed (constipation).     • escitalopram (LEXAPRO) 10 MG tablet Take 10 mg by mouth Daily.     • esomeprazole (nexIUM) 40 MG capsule Take 40 mg by mouth Every Morning Before Breakfast.     • lidocaine (LIDODERM) 5 % Place 1 patch on the skin as directed by provider Daily. Remove & Discard patch within 12 hours or as directed by MD     • Morphine 10 MG/5ML solution Take 2.5 mL by mouth Every 4 Hours scheduled and Every 6 Hours As Needed for Pain 60 mL 0   • sennosides-docusate sodium (SENOKOT-S) 8.6-50 MG tablet Take 2 tablets by mouth At Night As Needed for Constipation.     • trolamine salicylate (ASPERCREME) 10 % cream Apply  topically to the appropriate area as directed As Needed for Muscle / Joint Pain.     • uribel (URO-MP) 118 MG capsule capsule Take 1 capsule by mouth every night at bedtime.       No current facility-administered medications on file prior  to visit.       Allergies   Allergen Reactions   • Hydrocodone-Acetaminophen Confusion     Also noted to contribute to increase tiredness along with diminished mental clarity with overall ineffective analgesic effect.    • Crab (Diagnostic) Hives   • Lovenox [Enoxaparin Sodium] Rash   • Penicillins Rash     unsure        Review of Systems   Constitutional: Negative.    HENT: Negative.    Eyes: Negative.    Respiratory: Negative.    Cardiovascular: Negative.    Gastrointestinal: Negative.    Endocrine: Negative.    Genitourinary: Negative.    Musculoskeletal: Positive for arthralgias.   Skin: Negative.    Allergic/Immunologic: Negative.    Neurological: Negative.    Hematological: Negative.    Psychiatric/Behavioral: Negative.         Physical Exam  not currently breastfeeding.    There is no height or weight on file to calculate BMI.    GENERAL APPEARANCE: awake, alert, oriented, in no acute distress and well developed, well nourished  LUNGS:  breathing nonlabored  EXTREMITIES: no clubbing, cyanosis  PERIPHERAL PULSES: palpable dorsalis pedis and posterior tibial pulses bilaterally.    GAIT:  Not assessed            Hip Exam:  Left    RANGE OF MOTION:  EXTENSION/FLEXION:  normal (0-110 degrees)  IR:  20  ER:  30  PAIN WITH HIP MOTION:  yes laterally at trochanter bursa region only  PAIN WITH LOGROLL:  no     STRENGTH:  ABDUCTOR:  4/5  ADDUCTOR:  4/5  HIP FLEXION:  4/5    GREATER TROCHANTER BURSAL PAIN:  yes    SENSATION TO LIGHT TOUCH:  DEEP PERONEAL/SUPERFICIAL PERONEAL/SURAL/SAPHENOUS/TIBIAL:   intact    EDEMA:  no  ERYTHEMA:  no  WOUNDS/INCISIONS:  yes, lateral incisions are well-healed with no erythema or drainage  -----------------  Left knee exam:  ----------  ALIGNMENT: 2 degrees valgus, correctible to neutral    RANGE OF MOTION:  Decreased (10 - 110 degrees) with no extensor lag  LIGAMENTOUS STABILITY:   stable to varus and valgus stress at terminal extension and 30 degrees; slight retensioning of the LCL is  appreciated with varus stress at 30 degrees consistent with lateral compartment degeneration     STRENGTH:  4/5 knee flexion, extension. 5/5 ankle dorsiflexion and plantarflexion.     PAIN WITH PALPATION: lateral joint line and anterior knee  KNEE EFFUSION: yes, trace effusion  PAIN WITH KNEE ROM: yes, Anteriorly and laterally  PATELLAR CREPITUS: yes, painful and symptomatic  SPECIAL EXAM FINDINGS:  none    REFLEXES:  PATELLAR 2+/4  ACHILLES 2+/4    CLONUS: no  STRAIGHT LEG TEST:   negative    SENSATION TO LIGHT TOUCH:  DEEP PERONEAL/SUPERFICIAL PERONEAL/SURAL/SAPHENOUS/TIBIAL:   intact        _______________________________________________________________  _______________________________________________________________    RADIOGRAPHIC FINDINGS:   Indication: Status post operative fixation left intertrochanteric hip fracture    Comparison: Todays xrays were compared to previous xrays from 2/18/2019    AP pelvis, 2 views left hip: Radiographs demonstrate interval control collapse of the intertrochanteric hip fracture.  Hardware is in place no evidence of hardware complication.  There is no evidence of lag screw cut out.  Hip joint remains well preserved.    Prior left knee radiographs from 2/18/2019 demonstrate severe tricompartmental arthritis with bone-on-bone articulation      Assessment/Plan:   Diagnosis Plan   1. Primary osteoarthritis of left knee     2. Status post hip surgery  XR Pelvis 1 or 2 View   3. Closed displaced intertrochanteric fracture of left femur with routine healing, subsequent encounter       Patient is doing well 2 weeks status post intertrochanteric fracture fixation.  The majority of her pain appears to be localized to the knee at this time.  The patient experienced good relief in the past with Visco supplementation injections into the left knee.  Family is requesting repeat series of left knee injections today.  We will obtain insurance preauthorization before proceeding with this.  Once  insurance approval has occurred, we will proceed with a left knee injection series.  We will see her back next week to hopefully initiate the Visco supplementation series provided insurance has approved the injections.  We will also discontinue sutures at that follow-up appointment.  No x-rays needed upon return.    Ariel Maynard MD  02/28/19  1:12 PM

## 2019-03-07 ENCOUNTER — OFFICE VISIT (OUTPATIENT)
Dept: ORTHOPEDIC SURGERY | Facility: CLINIC | Age: 84
End: 2019-03-07

## 2019-03-07 DIAGNOSIS — M17.12 PRIMARY OSTEOARTHRITIS OF LEFT KNEE: Primary | ICD-10-CM

## 2019-03-07 PROCEDURE — 20610 DRAIN/INJ JOINT/BURSA W/O US: CPT | Performed by: ORTHOPAEDIC SURGERY

## 2019-03-07 NOTE — PROGRESS NOTES
Orthopaedic Clinic Note: knee Established Patient    Chief Complaint   Patient presents with   •         • Left Knee - Follow-up        HPI    It has been 1  week(s) since Ms. Baum's last visit. She returns to clinic today for follow-up left knee arthritis.  She is here today for ongoing severe left knee pain which is limiting daily activities.  She states that her hip pain from her fracture is much improved.  She is mobilizing with therapy at a rehab facility.  She rates her pain 2/10 on pain scale today.    Past Medical History:   Diagnosis Date   • Atrial fibrillation (CMS/HCC)    • Cancer (CMS/HCC)     colon   • Coronary artery disease    • GERD (gastroesophageal reflux disease)    • Hypertension       Past Surgical History:   Procedure Laterality Date   • CARDIAC ABLATION     • CHOLECYSTECTOMY     • COLON SURGERY      BOWEL RESECTION FOR HX COLON CANCER   • HIP INTERTROCHANTERIC NAILING Left 2/13/2019    Procedure: HIP INTERTROCHANTERIC NAILING LEFT;  Surgeon: Ariel Maynard MD;  Location: [facility];  Service: Orthopedics   • HYSTERECTOMY     • REPLACEMENT TOTAL KNEE        Family History   Problem Relation Age of Onset   • Heart attack Father      Social History     Socioeconomic History   • Marital status:      Spouse name: Not on file   • Number of children: Not on file   • Years of education: Not on file   • Highest education level: Not on file   Social Needs   • Financial resource strain: Not on file   • Food insecurity - worry: Not on file   • Food insecurity - inability: Not on file   • Transportation needs - medical: Not on file   • Transportation needs - non-medical: Not on file   Occupational History   • Not on file   Tobacco Use   • Smoking status: Never Smoker   • Smokeless tobacco: Never Used   Substance and Sexual Activity   • Alcohol use: No   • Drug use: No   • Sexual activity: Defer   Other Topics Concern   • Not on file   Social History Narrative   • Not on file      Current  Outpatient Medications on File Prior to Visit   Medication Sig Dispense Refill   • acetaminophen (TYLENOL) 650 MG 8 hr tablet Take 1,300 mg by mouth Every Night.     • aspirin 81 MG chewable tablet Chew 1 tablet 2 (Two) Times a Day.     • carvedilol (COREG) 3.125 MG tablet Take 3.125 mg by mouth 2 (Two) Times a Day With Meals.     • celecoxib (CeleBREX) 100 MG capsule Take 1 capsule by mouth Every 12 (Twelve) Hours.     • cholecalciferol (VITAMIN D3) 1000 units tablet Take 1,000 Units by mouth Daily.     • Cyanocobalamin (B-12 PO) Take 1 tablet by mouth Daily.     • diclofenac (VOLTAREN) 1 % gel gel Apply 2 g topically to the appropriate area as directed 4 (Four) Times a Day.     • docusate sodium (COLACE) 150 MG/15ML liquid Take 10 mL by mouth 2 (Two) Times a Day As Needed (constipation).     • escitalopram (LEXAPRO) 10 MG tablet Take 10 mg by mouth Daily.     • esomeprazole (nexIUM) 40 MG capsule Take 40 mg by mouth Every Morning Before Breakfast.     • lidocaine (LIDODERM) 5 % Place 1 patch on the skin as directed by provider Daily. Remove & Discard patch within 12 hours or as directed by MD     • Morphine 10 MG/5ML solution Take 2.5 mL by mouth Every 4 Hours scheduled and Every 6 Hours As Needed for Pain 60 mL 0   • sennosides-docusate sodium (SENOKOT-S) 8.6-50 MG tablet Take 2 tablets by mouth At Night As Needed for Constipation.     • trolamine salicylate (ASPERCREME) 10 % cream Apply  topically to the appropriate area as directed As Needed for Muscle / Joint Pain.     • uribel (URO-MP) 118 MG capsule capsule Take 1 capsule by mouth every night at bedtime.       No current facility-administered medications on file prior to visit.       Allergies   Allergen Reactions   • Hydrocodone-Acetaminophen Confusion     Also noted to contribute to increase tiredness along with diminished mental clarity with overall ineffective analgesic effect.    • Crab (Diagnostic) Hives   • Lovenox [Enoxaparin Sodium] Rash   •  Penicillins Rash     unsure        Review of Systems   Constitutional: Positive for activity change.   HENT: Negative.    Eyes: Negative.    Respiratory: Negative.    Cardiovascular: Negative.    Gastrointestinal: Negative.    Endocrine: Negative.    Genitourinary: Negative.    Musculoskeletal: Positive for arthralgias (left hip, left knee).   Skin: Negative.    Allergic/Immunologic: Negative.    Neurological: Negative.    Hematological: Negative.    Psychiatric/Behavioral: Negative.         Physical Exam  not currently breastfeeding.    There is no height or weight on file to calculate BMI.    GENERAL APPEARANCE: awake, alert, oriented, in no acute distress and well developed, well nourished  LUNGS:  breathing nonlabored  EXTREMITIES: no clubbing, cyanosis  PERIPHERAL PULSES: palpable dorsalis pedis and posterior tibial pulses bilaterally.    GAIT: Not assessed            Left knee exam:  ----------  ALIGNMENT: 2 degrees valgus, correctible to neutral    RANGE OF MOTION:  Decreased (10 - 110 degrees) with no extensor lag  LIGAMENTOUS STABILITY:   stable to varus and valgus stress at terminal extension and 30 degrees; slight retensioning of the LCL is appreciated with varus stress at 30 degrees consistent with lateral compartment degeneration     STRENGTH:  4/5 knee flexion, extension. 5/5 ankle dorsiflexion and plantarflexion.     PAIN WITH PALPATION: lateral joint line and anterior knee  KNEE EFFUSION: yes, trace effusion  PAIN WITH KNEE ROM: yes, Anteriorly and laterally  PATELLAR CREPITUS: yes, painful and symptomatic  SPECIAL EXAM FINDINGS:  none    REFLEXES:  PATELLAR 2+/4  ACHILLES 2+/4    CLONUS: no  STRAIGHT LEG TEST:   negative     SENSATION TO LIGHT TOUCH:  DEEP PERONEAL/SUPERFICIAL PERONEAL/SURAL/SAPHENOUS/TIBIAL:   intact      _________________________________________________________________  _________________________________________________________________    RADIOGRAPHIC FINDINGS:   No new imaging  today     Assessment/Plan:   Diagnosis Plan   1. Primary osteoarthritis of left knee  Large Joint Arthrocentesis    Large Joint Arthrocentesis: L knee     We will initiate Visco supplementation series and left knee today.  First injection will be today followed by subsequent injection 2 and 3 for the next 2 weeks.    Procedure Note:  I discussed with the patient the potential benefits of performing a therapeutic injection of the left knee as well as potential risks including but not limited to infection, swelling, pain, bleeding, bruising, nerve/vessel damage, pseudoseptic reaction, and worsening joint pain. After informed consent and after the area was prepped with alcohol, ethyl chloride was used to numb the skin. Via the superolateral approach, the viscosupplementation syringe contents were injected into the left knee. The patient tolerated the procedure well. There were no complications. A sterile dressing was placed over the injection site.    Ariel Maynard MD  03/07/19  11:35 AM

## 2019-03-07 NOTE — PROGRESS NOTES
Procedure   Large Joint Arthrocentesis: L knee  Date/Time: 3/7/2019 11:19 AM  Consent given by: patient  Site marked: site marked  Timeout: Immediately prior to procedure a time out was called to verify the correct patient, procedure, equipment, support staff and site/side marked as required   Supporting Documentation  Indications: pain   Procedure Details  Location: knee - L knee  Preparation: Patient was prepped and draped in the usual sterile fashion  Needle size: 22 G  Approach: anterolateral  Medications administered: 30 mg Hyaluronan 30 MG/2ML  Patient tolerance: patient tolerated the procedure well with no immediate complications

## 2019-03-14 ENCOUNTER — CLINICAL SUPPORT (OUTPATIENT)
Dept: ORTHOPEDIC SURGERY | Facility: CLINIC | Age: 84
End: 2019-03-14

## 2019-03-14 DIAGNOSIS — M17.12 PRIMARY OSTEOARTHRITIS OF LEFT KNEE: Primary | ICD-10-CM

## 2019-03-14 PROCEDURE — 20610 DRAIN/INJ JOINT/BURSA W/O US: CPT | Performed by: ORTHOPAEDIC SURGERY

## 2019-03-14 NOTE — PROGRESS NOTES
Patient returns for second Orthovisc injection left knee.  She denies any complications with the initial injection.  Overall her pain has improved.    Procedure Note:  I discussed with the patient the potential benefits of performing a therapeutic injection of the left knee as well as potential risks including but not limited to infection, swelling, pain, bleeding, bruising, nerve/vessel damage, pseudoseptic reaction, and worsening joint pain. After informed consent and after the area was prepped with alcohol, ethyl chloride was used to numb the skin. Via the superolateral approach, the viscosupplementation syringe contents were injected into the left knee. The patient tolerated the procedure well. There were no complications. A sterile dressing was placed over the injection site.

## 2019-03-14 NOTE — PROGRESS NOTES

## 2019-03-21 ENCOUNTER — CLINICAL SUPPORT (OUTPATIENT)
Dept: ORTHOPEDIC SURGERY | Facility: CLINIC | Age: 84
End: 2019-03-21

## 2019-03-21 DIAGNOSIS — M17.12 PRIMARY OSTEOARTHRITIS OF LEFT KNEE: Primary | ICD-10-CM

## 2019-03-21 PROCEDURE — 20610 DRAIN/INJ JOINT/BURSA W/O US: CPT | Performed by: ORTHOPAEDIC SURGERY

## 2019-03-21 NOTE — PROGRESS NOTES
Patient returns for third Orthovisc injection into the left knee.  She denies any complications.  Overall her pain has improved.    Procedure Note:  I discussed with the patient the potential benefits of performing a therapeutic injection of the left knee as well as potential risks including but not limited to infection, swelling, pain, bleeding, bruising, nerve/vessel damage, pseudoseptic reaction, and worsening joint pain. After informed consent and after the area was prepped with alcohol, ethyl chloride was used to numb the skin. Via the superolateral approach, the viscosupplementation syringe contents were injected into the left knee. The patient tolerated the procedure well. There were no complications. A sterile dressing was placed over the injection site.      Follow-up 1 week for reevaluation of left hip.

## 2019-03-21 NOTE — PROGRESS NOTES
Procedure   Large Joint Arthrocentesis: L knee  Date/Time: 3/21/2019 9:28 AM  Consent given by: patient  Site marked: site marked  Timeout: Immediately prior to procedure a time out was called to verify the correct patient, procedure, equipment, support staff and site/side marked as required   Supporting Documentation  Indications: pain   Procedure Details  Location: knee - L knee  Preparation: Patient was prepped and draped in the usual sterile fashion  Needle size: 22 G  Approach: anterolateral  Medications administered: 30 mg Hyaluronan 30 MG/2ML  Patient tolerance: patient tolerated the procedure well with no immediate complications

## 2019-04-02 ENCOUNTER — OFFICE VISIT (OUTPATIENT)
Dept: ORTHOPEDIC SURGERY | Facility: CLINIC | Age: 84
End: 2019-04-02

## 2019-04-02 DIAGNOSIS — Z98.890 STATUS POST HIP SURGERY: Primary | ICD-10-CM

## 2019-04-02 PROCEDURE — 99024 POSTOP FOLLOW-UP VISIT: CPT | Performed by: ORTHOPAEDIC SURGERY

## 2019-04-02 NOTE — PROGRESS NOTES
Orthopaedic Clinic Note:  Hip Post Op    Chief Complaint   Patient presents with   • Post-op Follow-up     5 week f/u ,7 week S/P HIP INTERTROCHANTERIC NAILING LEFT 02/13/19        HPI    Ms. Baum is 7  week(s) s/p fixation left intertrochanteric hip fracture.  She rates her pain 0/10 on the pain scale.  She remains in a nursing home and is having significant difficulty with ambulation.  She states that the majority of her pain when she ambulates is localized within the muscle of the thigh and quad area.  She denies any pain in the hip region.  She is also complaining of weakness in the bilateral lower extremities, right worse than left.  This is limiting her ability to ambulate after having the hip surgery.  She is currently using a walker to ambulate with assistance.  She is taking morphine as needed for pain.  Overall, she feels that she is doing better but has yet to regain her prior mobility status which was ambulation with a walker prior to her hip fracture.  Past Medical History:   Diagnosis Date   • Atrial fibrillation (CMS/HCC)    • Cancer (CMS/HCC)     colon   • Coronary artery disease    • GERD (gastroesophageal reflux disease)    • Hypertension       Past Surgical History:   Procedure Laterality Date   • CARDIAC ABLATION     • CHOLECYSTECTOMY     • COLON SURGERY      BOWEL RESECTION FOR HX COLON CANCER   • HIP INTERTROCHANTERIC NAILING Left 2/13/2019    Procedure: HIP INTERTROCHANTERIC NAILING LEFT;  Surgeon: Ariel Maynard MD;  [facility];  Service: Orthopedics   • HYSTERECTOMY     • REPLACEMENT TOTAL KNEE        Family History   Problem Relation Age of Onset   • Heart attack Father      Social History     Socioeconomic History   • Marital status:      Spouse name: Not on file   • Number of children: Not on file   • Years of education: Not on file   • Highest education level: Not on file   Tobacco Use   • Smoking status: Never Smoker   • Smokeless tobacco: Never Used   Substance and  Sexual Activity   • Alcohol use: No   • Drug use: No   • Sexual activity: Defer      Current Outpatient Medications on File Prior to Visit   Medication Sig Dispense Refill   • acetaminophen (TYLENOL) 650 MG 8 hr tablet Take 1,300 mg by mouth Every Night.     • aspirin 81 MG chewable tablet Chew 1 tablet 2 (Two) Times a Day.     • carvedilol (COREG) 3.125 MG tablet Take 3.125 mg by mouth 2 (Two) Times a Day With Meals.     • celecoxib (CeleBREX) 100 MG capsule Take 1 capsule by mouth Every 12 (Twelve) Hours.     • cholecalciferol (VITAMIN D3) 1000 units tablet Take 1,000 Units by mouth Daily.     • Cyanocobalamin (B-12 PO) Take 1 tablet by mouth Daily.     • diclofenac (VOLTAREN) 1 % gel gel Apply 2 g topically to the appropriate area as directed 4 (Four) Times a Day.     • docusate sodium (COLACE) 150 MG/15ML liquid Take 10 mL by mouth 2 (Two) Times a Day As Needed (constipation).     • escitalopram (LEXAPRO) 10 MG tablet Take 10 mg by mouth Daily.     • esomeprazole (nexIUM) 40 MG capsule Take 40 mg by mouth Every Morning Before Breakfast.     • lidocaine (LIDODERM) 5 % Place 1 patch on the skin as directed by provider Daily. Remove & Discard patch within 12 hours or as directed by MD     • Morphine 10 MG/5ML solution Take 2.5 mL by mouth Every 4 Hours scheduled and Every 6 Hours As Needed for Pain 60 mL 0   • sennosides-docusate sodium (SENOKOT-S) 8.6-50 MG tablet Take 2 tablets by mouth At Night As Needed for Constipation.     • trolamine salicylate (ASPERCREME) 10 % cream Apply  topically to the appropriate area as directed As Needed for Muscle / Joint Pain.     • uribel (URO-MP) 118 MG capsule capsule Take 1 capsule by mouth every night at bedtime.       No current facility-administered medications on file prior to visit.       Allergies   Allergen Reactions   • Hydrocodone-Acetaminophen Confusion     Also noted to contribute to increase tiredness along with diminished mental clarity with overall ineffective  analgesic effect.    • Crab (Diagnostic) Hives   • Lovenox [Enoxaparin Sodium] Rash   • Penicillins Rash     unsure        Review of Systems   Constitutional: Negative.    HENT: Negative.    Eyes: Negative.    Respiratory: Negative.    Cardiovascular: Negative.    Gastrointestinal: Negative.    Endocrine: Negative.    Genitourinary: Negative.    Musculoskeletal: Positive for arthralgias.   Skin: Negative.    Allergic/Immunologic: Negative.    Neurological: Negative.    Hematological: Negative.    Psychiatric/Behavioral: Negative.         Physical Exam  not currently breastfeeding.    There is no height or weight on file to calculate BMI.    GENERAL APPEARANCE: awake, alert, oriented, in no acute distress and well developed, well nourished  LUNGS:  breathing nonlabored  EXTREMITIES: no clubbing, cyanosis  PERIPHERAL PULSES: palpable dorsalis pedis and posterior tibial pulses bilaterally.    GAIT: Not assessed            Hip Exam:  Left    RANGE OF MOTION:  EXTENSION/FLEXION:  normal (0-110 degrees)  IR:  15  ER:  30  PAIN WITH HIP MOTION:  no  PAIN WITH LOGROLL:  no     STRENGTH:  ABDUCTOR:  4/5  ADDUCTOR:  4/5  HIP FLEXION:  4/5    GREATER TROCHANTER BURSAL PAIN:  yes    SENSATION TO LIGHT TOUCH:  DEEP PERONEAL/SUPERFICIAL PERONEAL/SURAL/SAPHENOUS/TIBIAL:   intact    EDEMA:  no  ERYTHEMA:  no  WOUNDS/INCISIONS:  yes, well-healed right lateral incisions  _______________________________________________________________  _______________________________________________________________    RADIOGRAPHIC FINDINGS:   Indication: Status post operative fixation left intertrochanteric hip fracture    Comparison: Todays xrays were compared to previous xrays from 2/28/2019    AP pelvis 2 views left hip: Radiographs demonstrate controlled collapse of the intertrochanteric hip fracture with interval callus formation.  Hardware remains in place with no evidence of hardware complication.  Fracture line remains visible on the lateral  view.      Assessment/Plan:   Diagnosis Plan   1. Status post hip surgery  XR Hip With or Without Pelvis 1 View Left     Radiographs are demonstrating progressive collapse of the intertrochanteric fracture in controlled fashion.  I do see radiographic progression of healing.  I recommend continued protected weightbearing as tolerated without restrictions.  I discussed the radiographic findings with the family today as well as the patient.  I will see her back in 2 months for repeat assessment.  At that time we will obtain repeat x-ray AP pelvis and cross table lateral of the left hip.    Ariel Maynard MD  04/02/19  2:43 PM

## 2019-05-20 ENCOUNTER — HOSPITAL ENCOUNTER (EMERGENCY)
Facility: HOSPITAL | Age: 84
Discharge: HOME | End: 2019-05-20
Attending: EMERGENCY MEDICINE | Admitting: EMERGENCY MEDICINE

## 2019-05-20 ENCOUNTER — APPOINTMENT (OUTPATIENT)
Dept: CT IMAGING | Facility: HOSPITAL | Age: 84
End: 2019-05-20

## 2019-05-20 VITALS
BODY MASS INDEX: 20.49 KG/M2 | OXYGEN SATURATION: 98 % | DIASTOLIC BLOOD PRESSURE: 81 MMHG | RESPIRATION RATE: 18 BRPM | HEART RATE: 84 BPM | HEIGHT: 64 IN | WEIGHT: 120 LBS | SYSTOLIC BLOOD PRESSURE: 153 MMHG | TEMPERATURE: 97.4 F

## 2019-05-20 DIAGNOSIS — S00.431A CONTUSION OF AURICLE OF RIGHT EAR, INITIAL ENCOUNTER: Primary | ICD-10-CM

## 2019-05-20 DIAGNOSIS — S00.03XA CONTUSION OF SCALP, INITIAL ENCOUNTER: ICD-10-CM
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
## 2019-05-20 PROCEDURE — 99284 EMERGENCY DEPT VISIT MOD MDM: CPT

## 2019-05-20 PROCEDURE — 70450 CT HEAD/BRAIN W/O DYE: CPT

## 2019-05-20 RX ORDER — GABAPENTIN 100 MG/1
300 CAPSULE ORAL 3 TIMES DAILY
COMMUNITY

## 2019-05-20 RX ORDER — MELOXICAM 7.5 MG/1
7.5 TABLET ORAL DAILY
COMMUNITY

## 2019-05-20 RX ORDER — DULOXETIN HYDROCHLORIDE 20 MG/1
20 CAPSULE, DELAYED RELEASE ORAL DAILY
COMMUNITY

## 2019-05-20 RX ORDER — FUROSEMIDE 20 MG/1
20 TABLET ORAL 2 TIMES DAILY
COMMUNITY

## 2019-06-25 ENCOUNTER — OFFICE VISIT (OUTPATIENT)
Dept: ORTHOPEDIC SURGERY | Facility: CLINIC | Age: 84
End: 2019-06-25

## 2019-06-25 VITALS — HEIGHT: 64 IN | WEIGHT: 119.93 LBS | BODY MASS INDEX: 20.47 KG/M2

## 2019-06-25 DIAGNOSIS — Z98.890 STATUS POST HIP SURGERY: ICD-10-CM

## 2019-06-25 DIAGNOSIS — S72.142D CLOSED DISPLACED INTERTROCHANTERIC FRACTURE OF LEFT FEMUR WITH ROUTINE HEALING, SUBSEQUENT ENCOUNTER: Primary | ICD-10-CM

## 2019-06-25 PROCEDURE — 99213 OFFICE O/P EST LOW 20 MIN: CPT | Performed by: ORTHOPAEDIC SURGERY

## 2019-06-25 RX ORDER — HYDROMORPHONE HYDROCHLORIDE 1 MG/ML
0.5 SOLUTION ORAL EVERY 6 HOURS PRN
COMMUNITY
End: 2019-10-12 | Stop reason: HOSPADM

## 2019-06-25 NOTE — PROGRESS NOTES
Orthopaedic Clinic Note: Hip Established Patient    Chief Complaint   Patient presents with   • Left Hip - Follow-up     4 months f/u ,S/P HIP INTERTROCHANTERIC NAILING LEFT 02/13/19             HPI    It has been 2.5  month(s) since Ms. Baum's last visit. She returns to clinic today for follow-up left intertrochanteric fracture fixation.  She is approximately 4 months out from surgery.  Patient has had significant decrease in mobility since her surgery.  She is complaining of pain in the bilateral legs with any attempted weightbearing.  She localizes her pain from of the thigh region radiating all the way down to both feet.  Her pain is worse with standing.  She actually states that her right leg hurts worse than her left when weightbearing.  When standing, her pain is a 10/10 on the pain scale.  When sitting it is 0/10 on the pain scale.  Her mobility has been limited to transfers only at this time because of her limitations in daily activities.  She is here today for follow-up of her left intertrochanteric fracture surgery.    Past Medical History:   Diagnosis Date   • Atrial fibrillation (CMS/HCC)    • Cancer (CMS/HCC)     colon   • Coronary artery disease    • GERD (gastroesophageal reflux disease)    • Hypertension       Past Surgical History:   Procedure Laterality Date   • CARDIAC ABLATION     • CHOLECYSTECTOMY     • COLON SURGERY      BOWEL RESECTION FOR HX COLON CANCER   • HIP INTERTROCHANTERIC NAILING Left 2/13/2019    Procedure: HIP INTERTROCHANTERIC NAILING LEFT;  Surgeon: Ariel Maynard MD;  Location: [facility];  Service: Orthopedics   • HYSTERECTOMY     • REPLACEMENT TOTAL KNEE        Family History   Problem Relation Age of Onset   • Heart attack Father      Social History     Socioeconomic History   • Marital status:      Spouse name: Not on file   • Number of children: Not on file   • Years of education: Not on file   • Highest education level: Not on file   Tobacco Use   • Smoking  status: Never Smoker   • Smokeless tobacco: Never Used   Substance and Sexual Activity   • Alcohol use: No   • Drug use: No   • Sexual activity: Defer      Current Outpatient Medications on File Prior to Visit   Medication Sig Dispense Refill   • acetaminophen (TYLENOL) 650 MG 8 hr tablet Take 1,300 mg by mouth Every Night.     • aspirin 81 MG chewable tablet Chew 1 tablet 2 (Two) Times a Day.     • carvedilol (COREG) 3.125 MG tablet Take 3.125 mg by mouth 2 (Two) Times a Day With Meals.     • celecoxib (CeleBREX) 100 MG capsule Take 1 capsule by mouth Every 12 (Twelve) Hours.     • cholecalciferol (VITAMIN D3) 1000 units tablet Take 1,000 Units by mouth Daily.     • Cyanocobalamin (B-12 PO) Take 1 tablet by mouth Daily.     • diclofenac (VOLTAREN) 1 % gel gel Apply 2 g topically to the appropriate area as directed 4 (Four) Times a Day.     • docusate sodium (COLACE) 150 MG/15ML liquid Take 10 mL by mouth 2 (Two) Times a Day As Needed (constipation).     • DULoxetine (CYMBALTA) 20 MG capsule Take 20 mg by mouth Daily.     • escitalopram (LEXAPRO) 10 MG tablet Take 10 mg by mouth Daily.     • esomeprazole (nexIUM) 40 MG capsule Take 40 mg by mouth Every Morning Before Breakfast.     • furosemide (LASIX) 20 MG tablet Take 20 mg by mouth 2 (Two) Times a Day.     • gabapentin (NEURONTIN) 100 MG capsule Take 100 mg by mouth 3 (Three) Times a Day.     • HYDROmorphone (DILAUDID) 1 MG/ML liquid liquid Take 0.5 mg by mouth Every 6 (Six) Hours As Needed.     • lidocaine (LIDODERM) 5 % Place 1 patch on the skin as directed by provider Daily. Remove & Discard patch within 12 hours or as directed by MD     • meloxicam (MOBIC) 7.5 MG tablet Take 7.5 mg by mouth Daily.     • sennosides-docusate sodium (SENOKOT-S) 8.6-50 MG tablet Take 2 tablets by mouth At Night As Needed for Constipation.     • trolamine salicylate (ASPERCREME) 10 % cream Apply  topically to the appropriate area as directed As Needed for Muscle / Joint Pain.    "  • uribel (URO-MP) 118 MG capsule capsule Take 1 capsule by mouth every night at bedtime.     • Morphine 10 MG/5ML solution Take 2.5 mL by mouth Every 4 Hours scheduled and Every 6 Hours As Needed for Pain 60 mL 0     No current facility-administered medications on file prior to visit.       Allergies   Allergen Reactions   • Hydrocodone-Acetaminophen Confusion     Also noted to contribute to increase tiredness along with diminished mental clarity with overall ineffective analgesic effect.    • Crab (Diagnostic) Hives   • Lovenox [Enoxaparin Sodium] Rash   • Penicillins Rash     unsure        Review of Systems   Constitutional: Negative.    HENT: Negative.    Eyes: Negative.    Respiratory: Negative.    Cardiovascular: Negative.    Gastrointestinal: Negative.    Endocrine: Negative.    Genitourinary: Negative.    Musculoskeletal: Positive for arthralgias.   Skin: Negative.    Allergic/Immunologic: Negative.    Neurological: Negative.    Hematological: Negative.    Psychiatric/Behavioral: Negative.         The patient's Review of Systems was personally reviewed and confirmed as accurate.    Physical Exam  Height 162.6 cm (64.02\"), weight 54.4 kg (119 lb 14.9 oz), not currently breastfeeding.    Body mass index is 20.58 kg/m².    GENERAL APPEARANCE: awake, alert, oriented, in no acute distress and well developed, well nourished  LUNGS:  breathing nonlabored  EXTREMITIES: no clubbing, cyanosis  PERIPHERAL PULSES: palpable dorsalis pedis and posterior tibial pulses bilaterally.    GAIT: Not assessed            Hip Exam:  Left     RANGE OF MOTION:  EXTENSION/FLEXION:  normal (0-110 degrees)  IR:  15  ER:  20  PAIN WITH HIP MOTION:  no  PAIN WITH LOGROLL:  no      STRENGTH:  ABDUCTOR:    4/5  ADDUCTOR:  4/5  HIP FLEXION:  4/5     GREATER TROCHANTER BURSAL PAIN:  yes  Nontender to palpation about proximal thigh    SENSATION TO LIGHT TOUCH:  DEEP PERONEAL/SUPERFICIAL PERONEAL/SURAL/SAPHENOUS/TIBIAL:   intact    EDEMA:  " no  ERYTHEMA:  no  WOUNDS/INCISIONS:  yes, well-healed right lateral incisions  _______________________________________________________________  _________________________________________________________________    RADIOGRAPHIC FINDINGS:   Indication: Status post operative fixation left intertrochanteric hip fracture    Comparison: Todays xrays were compared to previous xrays from 4/2/2019    AP pelvis, 2 views left hip: Radiographs demonstrate continued progressive collapse of the intertrochanteric fracture with further protrusion laterally of the lag screw.  On the lateral view, there does appear to be a windshield wiper effect of the distal portion of the nail which is now impinging on the anterior cortex resulting in a cortical stress reaction and thinning of the anterior cortex.  This was not seen on previous radiographs.    Assessment/Plan:   Diagnosis Plan   1. Closed displaced intertrochanteric fracture of left femur with routine healing, subsequent encounter     2. Status post hip surgery  XR Hip With or Without Pelvis 2 - 3 View Left     I had an extensive discussion with the patient/family regarding the radiographic findings.  Based on how the radiographs have changed since surgery, I am concerned that the fracture has either shifted to a malunion position, is not healing, or is healing in a delayed pattern resulting in change in alignment of the intramedullary nail.  This change in alignment has resulted in a stress riser at the anterior cortex with radiographically evident stress reaction at the anterior cortex.  I am concerned that this stress reaction is indicative of movement at the fracture site that either occurred early in the healing process, or is continuing to occur resulting in stress to the anterior cortex.  I explained that this outcome is suboptimal.  The stress riser could lead to potential catastrophic failure resulting in periprosthetic fracture in the future.  I explained that surgical  intervention could be performed including conversion to a long cephalo-medullary nail, or conversion to a diaphyseal fitting hemiarthroplasty.  Either of these surgeries would be relatively high risk in this elderly patient with multiple medical comorbidities.  Furthermore, the patient appears to have other problems which are limiting her mobility and ambulatory ability including pain in the contralateral, right leg, that is not allowing her to weight-bear on that extremity either.  After extensive discussion of the risks and benefits of surgical intervention vs continued observation, the patient/family wishes to proceed with conservative treatment (observation).  As a result, my recommendation is to proceed with limited weightbearing for transfers only.  Repetitive and progressive weightbearing has the risk of leading to catastrophic failure and periprosthetic fracture.  Family/patient understands and is agreeable to this.  If at any point, the patient experiences an increase in pain in the operative extremity, I would like to see her immediately.  Otherwise we will plan on close observation.  She will follow-up in 2 months with repeat x-ray 2 views left hip upon return.      Ariel Maynard MD  06/25/19  4:28 PM

## 2019-10-08 ENCOUNTER — APPOINTMENT (OUTPATIENT)
Dept: GENERAL RADIOLOGY | Facility: HOSPITAL | Age: 84
End: 2019-10-08

## 2019-10-08 ENCOUNTER — HOSPITAL ENCOUNTER (OUTPATIENT)
Facility: HOSPITAL | Age: 84
Setting detail: OBSERVATION
Discharge: NURSING, REHAB OR LONG-TERM CARE | End: 2019-10-12
Attending: EMERGENCY MEDICINE | Admitting: INTERNAL MEDICINE

## 2019-10-08 ENCOUNTER — APPOINTMENT (OUTPATIENT)
Dept: CT IMAGING | Facility: HOSPITAL | Age: 84
End: 2019-10-08

## 2019-10-08 DIAGNOSIS — Z74.09 IMPAIRED MOBILITY AND ADLS: ICD-10-CM

## 2019-10-08 DIAGNOSIS — L03.116 CELLULITIS OF LEFT LOWER EXTREMITY: ICD-10-CM

## 2019-10-08 DIAGNOSIS — L03.115 CELLULITIS OF RIGHT LOWER EXTREMITY: ICD-10-CM

## 2019-10-08 DIAGNOSIS — Z78.9 IMPAIRED MOBILITY AND ADLS: ICD-10-CM

## 2019-10-08 DIAGNOSIS — D72.829 LEUKOCYTOSIS, UNSPECIFIED TYPE: ICD-10-CM

## 2019-10-08 DIAGNOSIS — R07.9 CHEST PAIN, UNSPECIFIED TYPE: Primary | ICD-10-CM

## 2019-10-08 PROBLEM — R07.89 COSTOCHONDRAL CHEST PAIN: Status: ACTIVE | Noted: 2019-10-08

## 2019-10-08 LAB
ALBUMIN SERPL-MCNC: 4.1 G/DL (ref 3.5–5.2)
ALBUMIN/GLOB SERPL: 1.3 G/DL
ALP SERPL-CCNC: 91 U/L (ref 39–117)
ALT SERPL W P-5'-P-CCNC: 8 U/L (ref 1–33)
ANION GAP SERPL CALCULATED.3IONS-SCNC: 10 MMOL/L (ref 5–15)
AST SERPL-CCNC: 13 U/L (ref 1–32)
BASOPHILS # BLD AUTO: 0.06 10*3/MM3 (ref 0–0.2)
BASOPHILS NFR BLD AUTO: 0.4 % (ref 0–1.5)
BILIRUB SERPL-MCNC: 0.2 MG/DL (ref 0.2–1.2)
BUN BLD-MCNC: 20 MG/DL (ref 8–23)
BUN/CREAT SERPL: 29.9 (ref 7–25)
CALCIUM SPEC-SCNC: 9.8 MG/DL (ref 8.2–9.6)
CHLORIDE SERPL-SCNC: 102 MMOL/L (ref 98–107)
CO2 SERPL-SCNC: 31 MMOL/L (ref 22–29)
CREAT BLD-MCNC: 0.67 MG/DL (ref 0.57–1)
DEPRECATED RDW RBC AUTO: 76.8 FL (ref 37–54)
EOSINOPHIL # BLD AUTO: 0.56 10*3/MM3 (ref 0–0.4)
EOSINOPHIL NFR BLD AUTO: 3.7 % (ref 0.3–6.2)
ERYTHROCYTE [DISTWIDTH] IN BLOOD BY AUTOMATED COUNT: 23.1 % (ref 12.3–15.4)
GFR SERPL CREATININE-BSD FRML MDRD: 82 ML/MIN/1.73
GLOBULIN UR ELPH-MCNC: 3.1 GM/DL
GLUCOSE BLD-MCNC: 115 MG/DL (ref 65–99)
HCT VFR BLD AUTO: 36.4 % (ref 34–46.6)
HGB BLD-MCNC: 11 G/DL (ref 12–15.9)
HOLD SPECIMEN: NORMAL
HOLD SPECIMEN: NORMAL
IMM GRANULOCYTES # BLD AUTO: 0.13 10*3/MM3 (ref 0–0.05)
IMM GRANULOCYTES NFR BLD AUTO: 0.9 % (ref 0–0.5)
LIPASE SERPL-CCNC: 34 U/L (ref 13–60)
LYMPHOCYTES # BLD AUTO: 1.33 10*3/MM3 (ref 0.7–3.1)
LYMPHOCYTES NFR BLD AUTO: 8.8 % (ref 19.6–45.3)
MCH RBC QN AUTO: 27.7 PG (ref 26.6–33)
MCHC RBC AUTO-ENTMCNC: 30.2 G/DL (ref 31.5–35.7)
MCV RBC AUTO: 91.7 FL (ref 79–97)
MONOCYTES # BLD AUTO: 1.05 10*3/MM3 (ref 0.1–0.9)
MONOCYTES NFR BLD AUTO: 6.9 % (ref 5–12)
NEUTROPHILS # BLD AUTO: 12.03 10*3/MM3 (ref 1.7–7)
NEUTROPHILS NFR BLD AUTO: 79.3 % (ref 42.7–76)
NRBC BLD AUTO-RTO: 0 /100 WBC (ref 0–0.2)
NT-PROBNP SERPL-MCNC: 377.7 PG/ML (ref 5–1800)
PLATELET # BLD AUTO: 283 10*3/MM3 (ref 140–450)
PMV BLD AUTO: 9.1 FL (ref 6–12)
POTASSIUM BLD-SCNC: 4 MMOL/L (ref 3.5–5.2)
PROT SERPL-MCNC: 7.2 G/DL (ref 6–8.5)
RBC # BLD AUTO: 3.97 10*6/MM3 (ref 3.77–5.28)
SODIUM BLD-SCNC: 143 MMOL/L (ref 136–145)
TROPONIN T SERPL-MCNC: <0.01 NG/ML (ref 0–0.03)
WBC NRBC COR # BLD: 15.16 10*3/MM3 (ref 3.4–10.8)
WHOLE BLOOD HOLD SPECIMEN: NORMAL
WHOLE BLOOD HOLD SPECIMEN: NORMAL

## 2019-10-08 PROCEDURE — 96374 THER/PROPH/DIAG INJ IV PUSH: CPT

## 2019-10-08 PROCEDURE — 93005 ELECTROCARDIOGRAM TRACING: CPT | Performed by: EMERGENCY MEDICINE

## 2019-10-08 PROCEDURE — 96376 TX/PRO/DX INJ SAME DRUG ADON: CPT

## 2019-10-08 PROCEDURE — 99285 EMERGENCY DEPT VISIT HI MDM: CPT

## 2019-10-08 PROCEDURE — 83690 ASSAY OF LIPASE: CPT | Performed by: EMERGENCY MEDICINE

## 2019-10-08 PROCEDURE — 25010000002 HYDROMORPHONE PER 4 MG: Performed by: EMERGENCY MEDICINE

## 2019-10-08 PROCEDURE — 80053 COMPREHEN METABOLIC PANEL: CPT | Performed by: EMERGENCY MEDICINE

## 2019-10-08 PROCEDURE — 84484 ASSAY OF TROPONIN QUANT: CPT | Performed by: EMERGENCY MEDICINE

## 2019-10-08 PROCEDURE — 71250 CT THORAX DX C-: CPT

## 2019-10-08 PROCEDURE — 85025 COMPLETE CBC W/AUTO DIFF WBC: CPT | Performed by: EMERGENCY MEDICINE

## 2019-10-08 PROCEDURE — 83880 ASSAY OF NATRIURETIC PEPTIDE: CPT | Performed by: EMERGENCY MEDICINE

## 2019-10-08 RX ORDER — ASPIRIN 81 MG/1
324 TABLET, CHEWABLE ORAL ONCE
Status: DISCONTINUED | OUTPATIENT
Start: 2019-10-08 | End: 2019-10-08

## 2019-10-08 RX ORDER — LIDOCAINE 50 MG/G
1 PATCH TOPICAL
Status: DISCONTINUED | OUTPATIENT
Start: 2019-10-08 | End: 2019-10-09

## 2019-10-08 RX ORDER — HYDROMORPHONE HYDROCHLORIDE 1 MG/ML
0.25 INJECTION, SOLUTION INTRAMUSCULAR; INTRAVENOUS; SUBCUTANEOUS ONCE
Status: COMPLETED | OUTPATIENT
Start: 2019-10-08 | End: 2019-10-08

## 2019-10-08 RX ORDER — MORPHINE SULFATE 20 MG/ML
5 SOLUTION ORAL ONCE
Status: COMPLETED | OUTPATIENT
Start: 2019-10-08 | End: 2019-10-08

## 2019-10-08 RX ORDER — CEPHALEXIN 250 MG/1
500 CAPSULE ORAL ONCE
Status: COMPLETED | OUTPATIENT
Start: 2019-10-08 | End: 2019-10-08

## 2019-10-08 RX ORDER — SODIUM CHLORIDE 0.9 % (FLUSH) 0.9 %
10 SYRINGE (ML) INJECTION AS NEEDED
Status: DISCONTINUED | OUTPATIENT
Start: 2019-10-08 | End: 2019-10-12 | Stop reason: HOSPADM

## 2019-10-08 RX ADMIN — MORPHINE SULFATE 5 MG: 20 SOLUTION ORAL at 23:51

## 2019-10-08 RX ADMIN — HYDROMORPHONE HYDROCHLORIDE 0.25 MG: 1 INJECTION, SOLUTION INTRAMUSCULAR; INTRAVENOUS; SUBCUTANEOUS at 21:08

## 2019-10-08 RX ADMIN — HYDROMORPHONE HYDROCHLORIDE 0.25 MG: 1 INJECTION, SOLUTION INTRAMUSCULAR; INTRAVENOUS; SUBCUTANEOUS at 22:16

## 2019-10-08 RX ADMIN — HYDROMORPHONE HYDROCHLORIDE 0.25 MG: 1 INJECTION, SOLUTION INTRAMUSCULAR; INTRAVENOUS; SUBCUTANEOUS at 23:23

## 2019-10-08 RX ADMIN — CEPHALEXIN 500 MG: 250 CAPSULE ORAL at 23:51

## 2019-10-08 RX ADMIN — LIDOCAINE 1 PATCH: 50 PATCH TOPICAL at 23:23

## 2019-10-09 LAB
ALBUMIN SERPL-MCNC: 3.4 G/DL (ref 3.5–5.2)
ALBUMIN/GLOB SERPL: 1.2 G/DL
ALP SERPL-CCNC: 84 U/L (ref 39–117)
ALT SERPL W P-5'-P-CCNC: 6 U/L (ref 1–33)
ANION GAP SERPL CALCULATED.3IONS-SCNC: 7 MMOL/L (ref 5–15)
AST SERPL-CCNC: 13 U/L (ref 1–32)
BASOPHILS # BLD AUTO: 0.05 10*3/MM3 (ref 0–0.2)
BASOPHILS NFR BLD AUTO: 0.5 % (ref 0–1.5)
BILIRUB SERPL-MCNC: 0.3 MG/DL (ref 0.2–1.2)
BUN BLD-MCNC: 19 MG/DL (ref 8–23)
BUN/CREAT SERPL: 35.2 (ref 7–25)
CALCIUM SPEC-SCNC: 9.9 MG/DL (ref 8.2–9.6)
CHLORIDE SERPL-SCNC: 105 MMOL/L (ref 98–107)
CO2 SERPL-SCNC: 27 MMOL/L (ref 22–29)
CREAT BLD-MCNC: 0.54 MG/DL (ref 0.57–1)
D-LACTATE SERPL-SCNC: 1.3 MMOL/L (ref 0.5–2)
DEPRECATED RDW RBC AUTO: 75.8 FL (ref 37–54)
EOSINOPHIL # BLD AUTO: 0.42 10*3/MM3 (ref 0–0.4)
EOSINOPHIL NFR BLD AUTO: 4.1 % (ref 0.3–6.2)
ERYTHROCYTE [DISTWIDTH] IN BLOOD BY AUTOMATED COUNT: 22.9 % (ref 12.3–15.4)
GFR SERPL CREATININE-BSD FRML MDRD: 105 ML/MIN/1.73
GLOBULIN UR ELPH-MCNC: 2.9 GM/DL
GLUCOSE BLD-MCNC: 106 MG/DL (ref 65–99)
HCT VFR BLD AUTO: 37.1 % (ref 34–46.6)
HGB BLD-MCNC: 11 G/DL (ref 12–15.9)
IMM GRANULOCYTES # BLD AUTO: 0.05 10*3/MM3 (ref 0–0.05)
IMM GRANULOCYTES NFR BLD AUTO: 0.5 % (ref 0–0.5)
LYMPHOCYTES # BLD AUTO: 1.29 10*3/MM3 (ref 0.7–3.1)
LYMPHOCYTES NFR BLD AUTO: 12.5 % (ref 19.6–45.3)
MAGNESIUM SERPL-MCNC: 1.7 MG/DL (ref 1.7–2.3)
MCH RBC QN AUTO: 27.4 PG (ref 26.6–33)
MCHC RBC AUTO-ENTMCNC: 29.6 G/DL (ref 31.5–35.7)
MCV RBC AUTO: 92.3 FL (ref 79–97)
MONOCYTES # BLD AUTO: 0.99 10*3/MM3 (ref 0.1–0.9)
MONOCYTES NFR BLD AUTO: 9.6 % (ref 5–12)
NEUTROPHILS # BLD AUTO: 7.51 10*3/MM3 (ref 1.7–7)
NEUTROPHILS NFR BLD AUTO: 72.8 % (ref 42.7–76)
NRBC BLD AUTO-RTO: 0 /100 WBC (ref 0–0.2)
PLATELET # BLD AUTO: 262 10*3/MM3 (ref 140–450)
PMV BLD AUTO: 9.7 FL (ref 6–12)
POTASSIUM BLD-SCNC: 4 MMOL/L (ref 3.5–5.2)
PROCALCITONIN SERPL-MCNC: 0.04 NG/ML (ref 0.1–0.25)
PROT SERPL-MCNC: 6.3 G/DL (ref 6–8.5)
RBC # BLD AUTO: 4.02 10*6/MM3 (ref 3.77–5.28)
SODIUM BLD-SCNC: 139 MMOL/L (ref 136–145)
WBC NRBC COR # BLD: 10.31 10*3/MM3 (ref 3.4–10.8)

## 2019-10-09 PROCEDURE — G0378 HOSPITAL OBSERVATION PER HR: HCPCS

## 2019-10-09 PROCEDURE — 80053 COMPREHEN METABOLIC PANEL: CPT | Performed by: NURSE PRACTITIONER

## 2019-10-09 PROCEDURE — 96376 TX/PRO/DX INJ SAME DRUG ADON: CPT

## 2019-10-09 PROCEDURE — 85025 COMPLETE CBC W/AUTO DIFF WBC: CPT | Performed by: NURSE PRACTITIONER

## 2019-10-09 PROCEDURE — 84145 PROCALCITONIN (PCT): CPT | Performed by: NURSE PRACTITIONER

## 2019-10-09 PROCEDURE — 99220 PR INITIAL OBSERVATION CARE/DAY 70 MINUTES: CPT | Performed by: INTERNAL MEDICINE

## 2019-10-09 PROCEDURE — 25010000002 KETOROLAC TROMETHAMINE PER 15 MG: Performed by: NURSE PRACTITIONER

## 2019-10-09 PROCEDURE — 97162 PT EVAL MOD COMPLEX 30 MIN: CPT | Performed by: PHYSICAL THERAPIST

## 2019-10-09 PROCEDURE — 83605 ASSAY OF LACTIC ACID: CPT | Performed by: NURSE PRACTITIONER

## 2019-10-09 PROCEDURE — 25010000002 HYDROMORPHONE PER 4 MG: Performed by: PHYSICIAN ASSISTANT

## 2019-10-09 PROCEDURE — 83735 ASSAY OF MAGNESIUM: CPT | Performed by: NURSE PRACTITIONER

## 2019-10-09 PROCEDURE — 96375 TX/PRO/DX INJ NEW DRUG ADDON: CPT

## 2019-10-09 RX ORDER — TROLAMINE SALICYLATE 10 G/100G
CREAM TOPICAL AS NEEDED
Status: DISCONTINUED | OUTPATIENT
Start: 2019-10-09 | End: 2019-10-12 | Stop reason: HOSPADM

## 2019-10-09 RX ORDER — KETOROLAC TROMETHAMINE 15 MG/ML
15 INJECTION, SOLUTION INTRAMUSCULAR; INTRAVENOUS EVERY 6 HOURS
Status: DISCONTINUED | OUTPATIENT
Start: 2019-10-09 | End: 2019-10-09

## 2019-10-09 RX ORDER — ACETAMINOPHEN 500 MG
500 TABLET ORAL EVERY 6 HOURS PRN
Status: DISCONTINUED | OUTPATIENT
Start: 2019-10-09 | End: 2019-10-09 | Stop reason: SDUPTHER

## 2019-10-09 RX ORDER — CEPHALEXIN 250 MG/1
500 CAPSULE ORAL EVERY 12 HOURS SCHEDULED
Status: DISCONTINUED | OUTPATIENT
Start: 2019-10-09 | End: 2019-10-12 | Stop reason: HOSPADM

## 2019-10-09 RX ORDER — DULOXETIN HYDROCHLORIDE 20 MG/1
20 CAPSULE, DELAYED RELEASE ORAL DAILY
Status: DISCONTINUED | OUTPATIENT
Start: 2019-10-09 | End: 2019-10-12 | Stop reason: HOSPADM

## 2019-10-09 RX ORDER — ACETAMINOPHEN 325 MG/1
650 TABLET ORAL EVERY 4 HOURS PRN
Status: DISCONTINUED | OUTPATIENT
Start: 2019-10-09 | End: 2019-10-12 | Stop reason: HOSPADM

## 2019-10-09 RX ORDER — SODIUM CHLORIDE 0.9 % (FLUSH) 0.9 %
10 SYRINGE (ML) INJECTION AS NEEDED
Status: DISCONTINUED | OUTPATIENT
Start: 2019-10-09 | End: 2019-10-12 | Stop reason: HOSPADM

## 2019-10-09 RX ORDER — GABAPENTIN 100 MG/1
100 CAPSULE ORAL 3 TIMES DAILY
Status: DISCONTINUED | OUTPATIENT
Start: 2019-10-09 | End: 2019-10-12 | Stop reason: HOSPADM

## 2019-10-09 RX ORDER — HYDROMORPHONE HYDROCHLORIDE 1 MG/ML
0.5 INJECTION, SOLUTION INTRAMUSCULAR; INTRAVENOUS; SUBCUTANEOUS ONCE
Status: COMPLETED | OUTPATIENT
Start: 2019-10-09 | End: 2019-10-09

## 2019-10-09 RX ORDER — ACETAMINOPHEN 160 MG/5ML
650 SOLUTION ORAL EVERY 4 HOURS PRN
Status: DISCONTINUED | OUTPATIENT
Start: 2019-10-09 | End: 2019-10-12 | Stop reason: HOSPADM

## 2019-10-09 RX ORDER — DOCUSATE SODIUM 100 MG/1
100 CAPSULE, LIQUID FILLED ORAL 2 TIMES DAILY PRN
Status: DISCONTINUED | OUTPATIENT
Start: 2019-10-09 | End: 2019-10-12 | Stop reason: HOSPADM

## 2019-10-09 RX ORDER — HYDROMORPHONE HYDROCHLORIDE 1 MG/ML
0.5 INJECTION, SOLUTION INTRAMUSCULAR; INTRAVENOUS; SUBCUTANEOUS
Status: DISCONTINUED | OUTPATIENT
Start: 2019-10-09 | End: 2019-10-12 | Stop reason: HOSPADM

## 2019-10-09 RX ORDER — LIDOCAINE 50 MG/G
1 PATCH TOPICAL
Status: DISCONTINUED | OUTPATIENT
Start: 2019-10-09 | End: 2019-10-09 | Stop reason: SDUPTHER

## 2019-10-09 RX ORDER — SODIUM CHLORIDE 0.9 % (FLUSH) 0.9 %
10 SYRINGE (ML) INJECTION EVERY 12 HOURS SCHEDULED
Status: DISCONTINUED | OUTPATIENT
Start: 2019-10-09 | End: 2019-10-12 | Stop reason: HOSPADM

## 2019-10-09 RX ORDER — NAPROXEN 250 MG/1
250 TABLET ORAL 2 TIMES DAILY WITH MEALS
Status: DISCONTINUED | OUTPATIENT
Start: 2019-10-10 | End: 2019-10-09

## 2019-10-09 RX ORDER — ESCITALOPRAM OXALATE 10 MG/1
10 TABLET ORAL DAILY
Status: DISCONTINUED | OUTPATIENT
Start: 2019-10-09 | End: 2019-10-12 | Stop reason: HOSPADM

## 2019-10-09 RX ORDER — CARVEDILOL 3.12 MG/1
3.12 TABLET ORAL 2 TIMES DAILY WITH MEALS
Status: DISCONTINUED | OUTPATIENT
Start: 2019-10-09 | End: 2019-10-12 | Stop reason: HOSPADM

## 2019-10-09 RX ORDER — HYDROMORPHONE HYDROCHLORIDE 2 MG/1
1 TABLET ORAL 3 TIMES DAILY
Status: DISCONTINUED | OUTPATIENT
Start: 2019-10-09 | End: 2019-10-09

## 2019-10-09 RX ORDER — SENNOSIDES 8.6 MG
2 TABLET ORAL NIGHTLY
Status: DISCONTINUED | OUTPATIENT
Start: 2019-10-09 | End: 2019-10-10

## 2019-10-09 RX ORDER — PANTOPRAZOLE SODIUM 40 MG/1
40 TABLET, DELAYED RELEASE ORAL
Status: DISCONTINUED | OUTPATIENT
Start: 2019-10-09 | End: 2019-10-12 | Stop reason: HOSPADM

## 2019-10-09 RX ORDER — KETOROLAC TROMETHAMINE 30 MG/ML
15 INJECTION, SOLUTION INTRAMUSCULAR; INTRAVENOUS EVERY 6 HOURS PRN
Status: DISPENSED | OUTPATIENT
Start: 2019-10-09 | End: 2019-10-11

## 2019-10-09 RX ORDER — CEPHALEXIN 250 MG/1
500 CAPSULE ORAL EVERY 8 HOURS SCHEDULED
Status: DISCONTINUED | OUTPATIENT
Start: 2019-10-09 | End: 2019-10-09

## 2019-10-09 RX ORDER — HYDROMORPHONE HYDROCHLORIDE 2 MG/1
1 TABLET ORAL EVERY 6 HOURS
Status: DISCONTINUED | OUTPATIENT
Start: 2019-10-09 | End: 2019-10-10

## 2019-10-09 RX ORDER — KETOROLAC TROMETHAMINE 30 MG/ML
15 INJECTION, SOLUTION INTRAMUSCULAR; INTRAVENOUS EVERY 6 HOURS PRN
Status: DISCONTINUED | OUTPATIENT
Start: 2019-10-09 | End: 2019-10-09

## 2019-10-09 RX ORDER — HYDROMORPHONE HYDROCHLORIDE 2 MG/1
0.5 TABLET ORAL EVERY 6 HOURS PRN
Status: DISCONTINUED | OUTPATIENT
Start: 2019-10-09 | End: 2019-10-09

## 2019-10-09 RX ORDER — LIDOCAINE 50 MG/G
2 PATCH TOPICAL
Status: DISCONTINUED | OUTPATIENT
Start: 2019-10-10 | End: 2019-10-12 | Stop reason: HOSPADM

## 2019-10-09 RX ORDER — NAPROXEN 250 MG/1
250 TABLET ORAL 2 TIMES DAILY WITH MEALS
Status: DISCONTINUED | OUTPATIENT
Start: 2019-10-09 | End: 2019-10-10

## 2019-10-09 RX ORDER — NAPROXEN 250 MG/1
250 TABLET ORAL 2 TIMES DAILY WITH MEALS
Status: DISCONTINUED | OUTPATIENT
Start: 2019-10-09 | End: 2019-10-09

## 2019-10-09 RX ORDER — TROLAMINE SALICYLATE 10 G/100G
CREAM TOPICAL AS NEEDED
Status: DISCONTINUED | OUTPATIENT
Start: 2019-10-09 | End: 2019-10-09

## 2019-10-09 RX ORDER — HYDROMORPHONE HYDROCHLORIDE 2 MG/1
0.5 TABLET ORAL EVERY 6 HOURS
Status: DISCONTINUED | OUTPATIENT
Start: 2019-10-09 | End: 2019-10-09

## 2019-10-09 RX ORDER — FUROSEMIDE 20 MG/1
20 TABLET ORAL
Status: DISCONTINUED | OUTPATIENT
Start: 2019-10-09 | End: 2019-10-12 | Stop reason: HOSPADM

## 2019-10-09 RX ORDER — ACETAMINOPHEN 650 MG/1
650 SUPPOSITORY RECTAL EVERY 4 HOURS PRN
Status: DISCONTINUED | OUTPATIENT
Start: 2019-10-09 | End: 2019-10-12 | Stop reason: HOSPADM

## 2019-10-09 RX ORDER — ASPIRIN 81 MG/1
81 TABLET, CHEWABLE ORAL 2 TIMES DAILY
Status: DISCONTINUED | OUTPATIENT
Start: 2019-10-09 | End: 2019-10-12 | Stop reason: HOSPADM

## 2019-10-09 RX ADMIN — ESCITALOPRAM OXALATE 10 MG: 10 TABLET ORAL at 09:44

## 2019-10-09 RX ADMIN — GABAPENTIN 100 MG: 100 CAPSULE ORAL at 21:37

## 2019-10-09 RX ADMIN — ASPIRIN 81 MG CHEWABLE TABLET 81 MG: 81 TABLET CHEWABLE at 21:37

## 2019-10-09 RX ADMIN — FUROSEMIDE 20 MG: 20 TABLET ORAL at 17:15

## 2019-10-09 RX ADMIN — KETOROLAC TROMETHAMINE 15 MG: 30 INJECTION, SOLUTION INTRAMUSCULAR; INTRAVENOUS at 17:16

## 2019-10-09 RX ADMIN — KETOROLAC TROMETHAMINE 15 MG: 30 INJECTION, SOLUTION INTRAMUSCULAR at 01:07

## 2019-10-09 RX ADMIN — PANTOPRAZOLE SODIUM 40 MG: 40 TABLET, DELAYED RELEASE ORAL at 05:23

## 2019-10-09 RX ADMIN — SODIUM CHLORIDE, PRESERVATIVE FREE 10 ML: 5 INJECTION INTRAVENOUS at 21:38

## 2019-10-09 RX ADMIN — HYDROMORPHONE HYDROCHLORIDE 1 MG: 2 TABLET ORAL at 21:37

## 2019-10-09 RX ADMIN — HYDROMORPHONE HYDROCHLORIDE 1 MG: 2 TABLET ORAL at 15:23

## 2019-10-09 RX ADMIN — CARVEDILOL 3.12 MG: 3.12 TABLET, FILM COATED ORAL at 09:45

## 2019-10-09 RX ADMIN — SODIUM CHLORIDE, PRESERVATIVE FREE 10 ML: 5 INJECTION INTRAVENOUS at 09:45

## 2019-10-09 RX ADMIN — ASPIRIN 81 MG CHEWABLE TABLET 81 MG: 81 TABLET CHEWABLE at 09:45

## 2019-10-09 RX ADMIN — SENNOSIDES 8.6 MG: 8.6 TABLET, FILM COATED ORAL at 21:37

## 2019-10-09 RX ADMIN — FUROSEMIDE 20 MG: 20 TABLET ORAL at 09:45

## 2019-10-09 RX ADMIN — CEPHALEXIN 500 MG: 250 CAPSULE ORAL at 05:23

## 2019-10-09 RX ADMIN — CEPHALEXIN 500 MG: 250 CAPSULE ORAL at 21:37

## 2019-10-09 RX ADMIN — CARVEDILOL 3.12 MG: 3.12 TABLET, FILM COATED ORAL at 17:14

## 2019-10-09 RX ADMIN — GABAPENTIN 100 MG: 100 CAPSULE ORAL at 15:23

## 2019-10-09 RX ADMIN — SODIUM CHLORIDE, PRESERVATIVE FREE 10 ML: 5 INJECTION INTRAVENOUS at 02:32

## 2019-10-09 RX ADMIN — GABAPENTIN 100 MG: 100 CAPSULE ORAL at 09:45

## 2019-10-09 RX ADMIN — KETOROLAC TROMETHAMINE 15 MG: 30 INJECTION, SOLUTION INTRAMUSCULAR at 08:28

## 2019-10-09 RX ADMIN — DULOXETINE HYDROCHLORIDE 20 MG: 20 CAPSULE, DELAYED RELEASE ORAL at 09:44

## 2019-10-09 RX ADMIN — HYDROMORPHONE HYDROCHLORIDE 0.5 MG: 1 INJECTION, SOLUTION INTRAMUSCULAR; INTRAVENOUS; SUBCUTANEOUS at 05:49

## 2019-10-09 NOTE — PLAN OF CARE
Problem: Patient Care Overview  Goal: Interprofessional Rounds/Family Conf   10/09/19 1300   Interdisciplinary Rounds/Family Conf   Summary New Palliative consult from Dr. Fadi Muir for GOC and Pain Management. Patient admitted from [facility] r/t chest pain recieved from gait belt. Chest pain labs normal, cellulitis lower extremities, redness, patient states she is wheel chair bound. LAMAR [facility]dal[facility] Palliative saw patient on 10/9 at 1033 for pain management and goals of care. Patient was awake, rates her pain servere, states she always has pain. She asked for LAMAR to call daughter Kecia before making any medication changes. Goal is to get pain under control and back to [facility] is following there.

## 2019-10-09 NOTE — CONSULTS
Paula Scott MD  Consulting physician: Fadi Muir  Reason for referral: pain management, goc discussion  Chief Complaint   Patient presents with   • Chest Pain     HPI:   89 yo pleasant female who c/o left sided nonradiating chest pain which she states began when she was doing a lift/transfer w/ transfer belt in PT at long term care facility on 10/8/19.  Pt states prior to this she was in her normal state of health.  States the pain has been constant since that time.  Pain is worse with touch or deep inspiration.  No relief with pain meds given in ER (morphine and dilaudid).  Lidoderm patch has provided some relief.  No n/v. No shortness of breath.  No diaphoresis.  Long term care facility was concerned about possible BLE cellulitis.    Patient is known to palliative medicine team from prior admission in 2/2019 for repair of Left hip fracture. Patient has been followed by community palliative medicine at her LTC facility since that discharge.   Symptoms:   Patient is holding her Left chest wall.  Reports that the lidoderm patch is effective.  She reports moderate strong to severe pain.   Patient has chronic pain with bilateral shoulders, usually wears lidoderm patches.  Primarily her Left hip pain from arthritic hip and IM nailing friction per family report.   Reviewed meds with family member used for analgesia med regimen.   Patient is interested in lunch and reports BM day before yesterday.   No nausea, dyspnea or anxiety.    Patient just continues to voice persistent pain at chest wall site and unable to get comfortable.  Patient has been non ambulatory since hip repair in February, she has been transferring to wheel chair.  Currently she is requesting for her to get out of bed.     Advance care planning discussed: no  Code Status:   Current Code Status     Date Active Code Status Order ID Comments User Context       10/8/2019 23:58 No CPR [id number]  Nicol Cintron APRN ED       Questions for Current  Code Status     Question Answer Comment    Code Status No CPR     Medical Interventions (Level of Support Prior to Arrest) Comfort Measures     Level Of Support Discussed With Patient      Next of Kin (If No Surrogate)         Advance Directive: none on medical record  Surrogate decision maker: Patient reports it is her daughter, Kecia Baum  Past Medical History:   Diagnosis Date   • Arthritis    • Atrial fibrillation (CMS/HCC)    • Cancer (CMS/HCC)     colon   • Coronary artery disease    • GERD (gastroesophageal reflux disease)    • History of transfusion    • Hypertension      Past Surgical History:   Procedure Laterality Date   • CARDIAC ABLATION     • CHOLECYSTECTOMY     • COLON SURGERY      BOWEL RESECTION FOR HX COLON CANCER   • EYE SURGERY     • HIP INTERTROCHANTERIC NAILING Left 2/13/2019    Procedure: HIP INTERTROCHANTERIC NAILING LEFT;  Surgeon: Ariel Maynard MD;  Location: [facility], [facility];  Service: Orthopedics   • HYSTERECTOMY     • REPLACEMENT TOTAL KNEE         Reviewed current scheduled and prn medications for route, type, dose and frequency.    Current Facility-Administered Medications   Medication Dose Route Frequency Provider Last Rate Last Dose   • acetaminophen (TYLENOL) tablet 650 mg  650 mg Oral Q4H PRN Nicol Cintron APRN        Or   • acetaminophen (TYLENOL) 160 MG/5ML solution 650 mg  650 mg Oral Q4H PRN Nicol Cintron APRN        Or   • acetaminophen (TYLENOL) suppository 650 mg  650 mg Rectal Q4H PRN Nicol Cintron APRN       • aspirin chewable tablet 81 mg  81 mg Oral BID Nicol Cintron APRN   81 mg at 10/09/19 0945   • carvedilol (COREG) tablet 3.125 mg  3.125 mg Oral BID With Meals Nicol Cintron APRN   3.125 mg at 10/09/19 0945   • cephalexin (KEFLEX) capsule 500 mg  500 mg Oral Q12H Fadi Muir MD       • diclofenac (VOLTAREN) 1 % gel 2 g  2 g Topical 4x Daily PRN Nicol Cintron APRN       • docusate sodium (COLACE) capsule 100 mg  100 mg Oral BID PRN Abdulaziz  Nicol, APRMCKINLEY       • DULoxetine (CYMBALTA) DR capsule 20 mg  20 mg Oral Daily Nicol Cintron APRN   20 mg at 10/09/19 0944   • escitalopram (LEXAPRO) tablet 10 mg  10 mg Oral Daily Nicol Cintron APRN   10 mg at 10/09/19 0944   • furosemide (LASIX) tablet 20 mg  20 mg Oral BID Nicol Cintron APRN   20 mg at 10/09/19 0945   • gabapentin (NEURONTIN) capsule 100 mg  100 mg Oral TID Nicol Cintron APRN   100 mg at 10/09/19 0945   • HYDROmorphone (DILAUDID) tablet 0.5 mg  0.5 mg Oral Q6H PRN Nicol Cintron APRN       • ketorolac (TORADOL) injection 15 mg  15 mg Intravenous Q6H PRN Nicol Cintron APRN   15 mg at 10/09/19 0828   • lidocaine (LIDODERM) 5 % 1 patch  1 patch Transdermal Q24H Wagner Almazan MD   1 patch at 10/08/19 2323   • pantoprazole (PROTONIX) EC tablet 40 mg  40 mg Oral Q AM Nicol Cintron APRN   40 mg at 10/09/19 0523   • sodium chloride 0.9 % flush 10 mL  10 mL Intravenous PRN Wagner Almazan MD       • sodium chloride 0.9 % flush 10 mL  10 mL Intravenous Q12H Nicol Cintron APRN   10 mL at 10/09/19 0945   • sodium chloride 0.9 % flush 10 mL  10 mL Intravenous PRN Nicol Cintron APRN       • trolamine salicylate (ASPERCREME) 10 % cream   Topical PRN Nicol Cintron APRMCKINLEY            •  acetaminophen **OR** acetaminophen **OR** acetaminophen  •  diclofenac  •  docusate sodium  •  HYDROmorphone  •  ketorolac  •  sodium chloride  •  sodium chloride  •  trolamine salicylate  Allergies   Allergen Reactions   • Hydrocodone-Acetaminophen Confusion     Also noted to contribute to increase tiredness along with diminished mental clarity with overall ineffective analgesic effect.    • Crab (Diagnostic) Hives   • Lovenox [Enoxaparin Sodium] Rash   • Penicillins Rash     unsure     Family History   Problem Relation Age of Onset   • Heart attack Father      Social History     Socioeconomic History   • Marital status:      Spouse name: Not on file   • Number of children: Not on  "file   • Years of education: Not on file   • Highest education level: Not on file   Tobacco Use   • Smoking status: Never Smoker   • Smokeless tobacco: Never Used   Substance and Sexual Activity   • Alcohol use: No   • Drug use: No   • Sexual activity: Defer       Review of Systems - +/- per HPI and symptom review.    PPS: 40%  /76 (BP Location: Left arm, Patient Position: Lying)   Pulse 76   Temp 97.6 °F (36.4 °C) (Oral)   Resp 18   Ht 162.6 cm (64\")   Wt 49.4 kg (109 lb)   LMP  (LMP Unknown)   SpO2 98%   BMI 18.71 kg/m²   49.4 kg (109 lb) Body mass index is 18.71 kg/m².  Intake & Output (last day)     None          Physical Exam:  General Appearance: No acute distress, frail, ill appearing, sitting up in bed, eating lunch independently  Head: Normocephalic without obvious abnormality, atraumatic  Eyes: Conjunctivae and sclerae normal, no icterus, KEVIN  Throat: No oral lesions, no thrush, oral mucosa moist  Lungs: Clear to auscultation, respirations regular, even and non-labored  Heart: Regular rhythm and normal rate, normal S1  Abdomen: Normal bowel sounds, soft, non-distended, non-tender  Extremities: no redness, no cyanosis, no edema  Pulses: Distal pulses palpable and equal bilaterally  Skin: Warm and dry  Neurological: Alert, interactive, appropriate conversation, no myoclonus, equal hand  and strength  Reviewed labs and diagnostic results.  No results found for: HGBA1C  Results from last 7 days   Lab Units 10/09/19  0646   WBC 10*3/mm3 10.31   HEMOGLOBIN g/dL 11.0*   HEMATOCRIT % 37.1   PLATELETS 10*3/mm3 262     Results from last 7 days   Lab Units 10/09/19  0646   SODIUM mmol/L 139   POTASSIUM mmol/L 4.0   CHLORIDE mmol/L 105   CO2 mmol/L 27.0   BUN mg/dL 19   CREATININE mg/dL 0.54*   CALCIUM mg/dL 9.9*   BILIRUBIN mg/dL 0.3   ALK PHOS U/L 84   ALT (SGPT) U/L 6   AST (SGOT) U/L 13   GLUCOSE mg/dL 106*     Results from last 7 days   Lab Units 10/09/19  0646   SODIUM mmol/L 139   POTASSIUM " mmol/L 4.0   CHLORIDE mmol/L 105   CO2 mmol/L 27.0   BUN mg/dL 19   CREATININE mg/dL 0.54*   GLUCOSE mg/dL 106*   CALCIUM mg/dL 9.9*     Imaging Results (last 72 hours)     Procedure Component Value Units Date/Time    CT Chest Without Contrast [[id number]] Collected:  10/08/19 2218     Updated:  10/08/19 2220    Narrative:       CT CHEST, NONCONTRAST, 10/8/2019    HISTORY:  89-year-old female in the ED complaining of left side chest pain beginning yesterday.    TECHNIQUE:  CT imaging of the chest without IV contrast. Radiation dose reduction techniques included automated exposure control. Radiation audit for CT and nuclear cardiology exams in the last 12 months: 4.    COMPARISON:  *  CT chest, 8/8/2018. No chest x-ray has been obtained this visit.    FINDINGS:  No visible acute rib fracture. There is an old displaced mid sternal fracture that is unchanged since the prior exam. There are old T4 and T7 vertebral compression fractures that are also unchanged.    Large hiatal hernia with intrathoracic stomach. Bibasilar compressive atelectasis. Lungs otherwise clear. No acute pulmonary infiltrate, pneumothorax or pleural effusion.      Impression:       1. No active disease in the chest.  2. Old sternum and thoracic vertebral compression fractures. No acute thoracic fracture is identified.  3. Scoliosis.  4. Large hiatal hernia with intrathoracic stomach. Bibasilar pulmonary atelectasis.    Signer Name: Anthony Castañeda MD   Signed: 10/8/2019 10:18 PM   Workstation Name: [email or web address]    [facility]        Impression: 89 y.o. female with acute Left costochondritis, large intrathoracic hiatal hernia   Plan:   Acute chest wall pain - scheduled hydromorphone 1mg po every 6 hours, continue iv prn.  Patient was taking 0.5mg hydromorphone for baseline chronic musculoskeletal, diffuse joint pain.   Schedule naproxen 280mg bid.  Patient has always reported that NSAIDs are more effective for her pain  than acetaminophen.   Aspercreme, Joint Flex ointment have been beneficial with lidoderm patches.   Prn iv toradol for first 48 hours.     Constipation - add senna with increase opioid use.     Goals of care - no discussion today, hopefully patient will be more comfortable tomorrow to discuss her goals further.   Patient has already being followed by palliative medicine at the LT facility.     Continued support to patient and family. Reviewed meds with daughter, Kecia, over the phone. Patient's other daughter, Kylah, and grandson, Taco, at bedside.     Penny Barillas, APRN  [phone]  10/09/19  10:33 AM      Time: 60 minutes spent reviewing medical and medication records, assessing and examining patient, discussing with patient, family and nursing staff, answering questions, formulating a plan and documentation of care. > 50% time spent face to face

## 2019-10-09 NOTE — PLAN OF CARE
Problem: Patient Care Overview  Goal: Individualization and Mutuality   10/09/19 1419   Individualization   Patient Specific Preferences Wants up in her wheel chair, can't walk   Patient Specific Goals (Include Timeframe) Wants her pain control and return back to [facility] with Outpatient Palliative Team following   Mutuality/Individual Preferences   What Information Would Help Us Give You More Personalized Care? Hard of Hearing   How Would You and/or Your Support Person Like to Participate in Your Care? Talk to daughter Kecia before adjusting her p   10/09/19 1419   Individualization   Patient Specific Preferences Wants up in her wheel chair, can't walk   Patient Specific Goals (Include Timeframe) Wants her pain control and return back to [facility] with Outpatient Palliative Team following   Mutuality/Individual Preferences   What Information Would Help Us Give You More Personalized Care? Hard of Hearing   How Would You and/or Your Support Person Like to Participate in Your Care? Talk to fide Mcdonnell before adjusting her pain medications   ain medications

## 2019-10-09 NOTE — PLAN OF CARE
Problem: Patient Care Overview  Goal: Plan of Care Review  Outcome: Ongoing (interventions implemented as appropriate)   10/09/19 0582   Coping/Psychosocial   Plan of Care Reviewed With patient;other (see comments)  (Oscar HAGAN)   Plan of Care Review   Progress no change   OTHER   Outcome Summary WOC nurse consulted to assess sacrum skin. Buttocks/sacrum/coccyx skin intact, red, but blanchable; cleaned with blue skin wipes; Z-guard applied; MANE with dry devaughn pads. BLE skin intact, but dry and flaky; cleaned with blue skin wipes; Hydroguard cream applied. Heels red, but blanchable; off loading heel boots applied. Pt on waffle mattress. See Wound/skin orders. WOC nurse will s/o at this time. Please contact WOC nurse as needed for concerns.

## 2019-10-09 NOTE — ED PROVIDER NOTES
Subjective   Ms. Debbie Baum is a 89 y.o female presenting to the ED with c/o chest pain. The patient is currently living at [facility]. Her daughter informs us she had chest pain yesterday while walking with physical therapy with platforms under her shoes. Ms. Baum states her chest pain today started at 1400 during physical therapy and is much worse than the chest pain yesterday. She was given Nitroglycerin, which did not help. After EMS arrived, she was given 3 more doses of Nitroglycerin and 1 of Aspirin, and states this was ineffective as well. She was trying to walk today with physical therapy and the left side of her chest started hurting.The chest pain is severe and worsens with deep breathing and palpation. She complains her head is hurting and she has bilateral leg pain and shoulder pain. Her legs usually swell a lot and she tends to sleep in a chair at the [facility]. The employees at the center wrap her legs and they tend to ooze. Today however, they think she may have cellulitis and they are going to start her on an antibiotic. She has a recent history of a fall. According to her daughter, Ms. Baum had a controlled and witnessed fall a couple of days ago during physical therapy and walking with a Gait belt. She also confirms she is not on oxygen. There are no other acute symptoms at this time.         History provided by:  Patient, EMS personnel and relative  Chest Pain   Pain location:  L chest  Pain radiates to:  Does not radiate  Pain severity:  Severe  Duration:  6 hours  Timing:  Constant  Progression:  Worsening  Chronicity:  New  Relieved by:  Nothing  Exacerbated by: deep breathing, palpation.  Ineffective treatments:  Aspirin and nitroglycerin  Associated symptoms: headache        Review of Systems   Cardiovascular: Positive for chest pain and leg swelling.   Musculoskeletal:        Leg pain, shoulder pain   Neurological: Positive for headaches.   All other systems  reviewed and are negative.      Past Medical History:   Diagnosis Date   • Atrial fibrillation (CMS/HCC)    • Cancer (CMS/HCC)     colon   • Coronary artery disease    • GERD (gastroesophageal reflux disease)    • Hypertension        Allergies   Allergen Reactions   • Hydrocodone-Acetaminophen Confusion     Also noted to contribute to increase tiredness along with diminished mental clarity with overall ineffective analgesic effect.    • Crab (Diagnostic) Hives   • Lovenox [Enoxaparin Sodium] Rash   • Penicillins Rash     unsure       Past Surgical History:   Procedure Laterality Date   • CARDIAC ABLATION     • CHOLECYSTECTOMY     • COLON SURGERY      BOWEL RESECTION FOR HX COLON CANCER   • HIP INTERTROCHANTERIC NAILING Left 2/13/2019    Procedure: HIP INTERTROCHANTERIC NAILING LEFT;  Surgeon: Ariel Maynard MD;  Location: [facility];  Service: Orthopedics   • HYSTERECTOMY     • REPLACEMENT TOTAL KNEE         Family History   Problem Relation Age of Onset   • Heart attack Father        Social History     Socioeconomic History   • Marital status:      Spouse name: Not on file   • Number of children: Not on file   • Years of education: Not on file   • Highest education level: Not on file   Tobacco Use   • Smoking status: Never Smoker   • Smokeless tobacco: Never Used   Substance and Sexual Activity   • Alcohol use: No   • Drug use: No   • Sexual activity: Defer         Objective   Physical Exam   Constitutional: She is oriented to person, place, and time. She appears well-developed and well-nourished. She appears distressed.   Elderly female appears uncomfortable, clutching left chest and breast. Occasionally rocking in bed in pain.     Exquisitely tender chest wall with palpation throughout anterior lateral chest wall as well as the breast tissue.     No overlying erythema or contusions. No crepitus, no deformity.    HENT:   Head: Normocephalic and atraumatic.   Nose: Nose normal.   Slightly dry mucous  membranes.    Eyes: Conjunctivae are normal. No scleral icterus.   Neck: Normal range of motion. Neck supple.   Cardiovascular: Normal rate and regular rhythm.   No murmur heard.  Pulmonary/Chest: Effort normal. No respiratory distress. She has no wheezes. She has rhonchi.   mild scattered rhonchi. left greater than right.    Abdominal: Soft. There is no tenderness.   Musculoskeletal: Normal range of motion. She exhibits tenderness. She exhibits no edema.   Neurological: She is alert and oriented to person, place, and time.   2+ radial pulses and 1+ dorsalis pedis pulses.    Skin: Skin is warm and dry. There is erythema.   Bilateral lower extremity erythema with warmth, mostly anterior shins as well as bilateral ankle regions. Right greater than left.   No lymphangitic streaking.     Psychiatric: She has a normal mood and affect. Her behavior is normal.   Nursing note and vitals reviewed.      Procedures         ED Course  ED Course as of Oct 09 0003   Tue Oct 08, 2019   2307 The patient is now received 2 doses of Dilaudid IV.  I have ordered a third dose.  She does get some temporary mild relief but then has more significant discomfort.  We will admit for control of chest pain as well as bilateral lower extremity cellulitis.  [MS]      ED Course User Index  [MS] Wagner Almazan MD     Recent Results (from the past 24 hour(s))   Troponin    Collection Time: 10/08/19  8:35 PM   Result Value Ref Range    Troponin T <0.010 0.000 - 0.030 ng/mL   Comprehensive Metabolic Panel    Collection Time: 10/08/19  8:35 PM   Result Value Ref Range    Glucose 115 (H) 65 - 99 mg/dL    BUN 20 8 - 23 mg/dL    Creatinine 0.67 0.57 - 1.00 mg/dL    Sodium 143 136 - 145 mmol/L    Potassium 4.0 3.5 - 5.2 mmol/L    Chloride 102 98 - 107 mmol/L    CO2 31.0 (H) 22.0 - 29.0 mmol/L    Calcium 9.8 (H) 8.2 - 9.6 mg/dL    Total Protein 7.2 6.0 - 8.5 g/dL    Albumin 4.10 3.50 - 5.20 g/dL    ALT (SGPT) 8 1 - 33 U/L    AST (SGOT) 13 1 - 32 U/L     Alkaline Phosphatase 91 39 - 117 U/L    Total Bilirubin 0.2 0.2 - 1.2 mg/dL    eGFR Non African Amer 82 >60 mL/min/1.73    Globulin 3.1 gm/dL    A/G Ratio 1.3 g/dL    BUN/Creatinine Ratio 29.9 (H) 7.0 - 25.0    Anion Gap 10.0 5.0 - 15.0 mmol/L   Lipase    Collection Time: 10/08/19  8:35 PM   Result Value Ref Range    Lipase 34 13 - 60 U/L   BNP    Collection Time: 10/08/19  8:35 PM   Result Value Ref Range    proBNP 377.7 5.0-1,800.0 pg/mL   Light Blue Top    Collection Time: 10/08/19  8:35 PM   Result Value Ref Range    Extra Tube hold for add-on    Green Top (Gel)    Collection Time: 10/08/19  8:35 PM   Result Value Ref Range    Extra Tube Hold for add-ons.    Lavender Top    Collection Time: 10/08/19  8:35 PM   Result Value Ref Range    Extra Tube hold for add-on    Gold Top - SST    Collection Time: 10/08/19  8:35 PM   Result Value Ref Range    Extra Tube Hold for add-ons.    CBC Auto Differential    Collection Time: 10/08/19  8:35 PM   Result Value Ref Range    WBC 15.16 (H) 3.40 - 10.80 10*3/mm3    RBC 3.97 3.77 - 5.28 10*6/mm3    Hemoglobin 11.0 (L) 12.0 - 15.9 g/dL    Hematocrit 36.4 34.0 - 46.6 %    MCV 91.7 79.0 - 97.0 fL    MCH 27.7 26.6 - 33.0 pg    MCHC 30.2 (L) 31.5 - 35.7 g/dL    RDW 23.1 (H) 12.3 - 15.4 %    RDW-SD 76.8 (H) 37.0 - 54.0 fl    MPV 9.1 6.0 - 12.0 fL    Platelets 283 140 - 450 10*3/mm3    Neutrophil % 79.3 (H) 42.7 - 76.0 %    Lymphocyte % 8.8 (L) 19.6 - 45.3 %    Monocyte % 6.9 5.0 - 12.0 %    Eosinophil % 3.7 0.3 - 6.2 %    Basophil % 0.4 0.0 - 1.5 %    Immature Grans % 0.9 (H) 0.0 - 0.5 %    Neutrophils, Absolute 12.03 (H) 1.70 - 7.00 10*3/mm3    Lymphocytes, Absolute 1.33 0.70 - 3.10 10*3/mm3    Monocytes, Absolute 1.05 (H) 0.10 - 0.90 10*3/mm3    Eosinophils, Absolute 0.56 (H) 0.00 - 0.40 10*3/mm3    Basophils, Absolute 0.06 0.00 - 0.20 10*3/mm3    Immature Grans, Absolute 0.13 (H) 0.00 - 0.05 10*3/mm3    nRBC 0.0 0.0 - 0.2 /100 WBC     Note: In addition to lab results from this  visit, the labs listed above may include labs taken at another facility or during a different encounter within the last 24 hours. Please correlate lab times with ED admission and discharge times for further clarification of the services performed during this visit.    CT Chest Without Contrast   Final Result   1. No active disease in the chest.   2. Old sternum and thoracic vertebral compression fractures. No acute thoracic fracture is identified.   3. Scoliosis.   4. Large hiatal hernia with intrathoracic stomach. Bibasilar pulmonary atelectasis.      Signer Name: Anthony Castañeda MD    Signed: 10/8/2019 10:18 PM    Workstation Name: [id number]     [facility]        Vitals:    10/08/19 2130 10/08/19 2300 10/08/19 2330 10/08/19 2331   BP: 142/68 146/75 145/83    Pulse: 89 98  101   Resp:       Temp:       TempSrc:       SpO2: 94% 93%     Weight:       Height:         Medications   sodium chloride 0.9 % flush 10 mL (not administered)   lidocaine (LIDODERM) 5 % 1 patch (1 patch Transdermal Medication Applied 10/8/19 2323)   HYDROmorphone (DILAUDID) injection 0.25 mg (0.25 mg Intravenous Given 10/8/19 2108)   HYDROmorphone (DILAUDID) injection 0.25 mg (0.25 mg Intravenous Given 10/8/19 2216)   HYDROmorphone (DILAUDID) injection 0.25 mg (0.25 mg Intravenous Given 10/8/19 2323)   cephalexin (KEFLEX) capsule 500 mg (500 mg Oral Given 10/8/19 2351)   morphine concentrated solution solution 5 mg (5 mg Oral Given 10/8/19 2351)     ECG/EMG Results (last 24 hours)     Procedure Component Value Units Date/Time    ECG 12 Lead [[id number]] Collected:  10/08/19 2014     Updated:  10/08/19 2015        ECG 12 Lead   Final Result   Test Reason : chest pain   Blood Pressure : **/** mmHG   Vent. Rate : 101 BPM     Atrial Rate : 101 BPM      P-R Int : 216 ms          QRS Dur : 068 ms       QT Int : 340 ms       P-R-T Axes : 031 013 013 degrees      QTc Int : 440 ms      Sinus tachycardia with 1st degree AV  block with premature supraventricular   complexes   Otherwise normal ECG   Confirmed by SHEBA RADFORD MD ([id number]) on 10/8/2019 11:30:43 PM      Referred By:  EDMD           Confirmed By:SHEBA RADFORD MD                        [facility]    Final diagnoses:   Chest pain, unspecified type   Cellulitis of right lower extremity   Cellulitis of left lower extremity   Leukocytosis, unspecified type       Documentation assistance provided by joaquina Ramos.  Information recorded by the scribe was done at my direction and has been verified and validated by me.     Mitzy Ramos  10/08/19 2213       Sheba Radford MD  10/09/19 0003

## 2019-10-09 NOTE — PLAN OF CARE
Problem: Patient Care Overview  Goal: Plan of Care Review  Outcome: Ongoing (interventions implemented as appropriate)   10/09/19 1502   Coping/Psychosocial   Plan of Care Reviewed With patient;daughter   OTHER   Outcome Summary PT evaluation completed on this date. Pt transferred supine-->sit with MaxAx1, and completed stand pivot transfer from bed-->chair with MaxAx1. Pt's L sided chest pain increased with supine-->sit and OOB activity - RN notified and present to give pain meds. Skilled PT services warranted to improve mobility and safety. Recommend return to SNF at d/c.

## 2019-10-09 NOTE — H&P
[facility] Medicine Services  HISTORY AND PHYSICAL    Patient Name: Debbie Baum  : 1923  MRN: [id number]  Primary Care Physician: Puala Scott MD  Date of admission: 10/8/2019      Subjective   Subjective     Chief Complaint:  Debbie Baum is a 89 y/o female presenting to the ER with chest pain that started yesterday after performing transfer exercises with PT in her new platform shoes.    HPI:  Debbie Baum is a 89 y.o. female presenting to the ER with chest pain that started yesterday after performing transfer exercises with PT in her new platform shoes. 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  Physical therapy was using a gait belt for transfer at precipitated the chest pain.  She is reporting chest pain with movement, deep breathing.  This afternoon at approximately 2 PM during physical therapy, the chest pain became much worse.  She was given a nitroglycerin at the [facility] where she resides without relief.  EMS gave her 3 more doses of nitroglycerin and aspirin that did not relieve her pain.  Patient also reporting with bilateral lower extremity edema with erythema.  Her legs have a tendency to ooze at times.  At the [facility], patient was thought to have had cellulitis and was started on antibiotic.    Attending HPI;  89 y/o pleasant female who c/o left sided nonradiating chest pain which she states began when she was doing a lift/transfer w/ transfer belt in PT yesterday.  Pt states prior to this she was in her normal state of health.  States the pain has been constant since that time.  Pain is worse w/ touch or deep inspiration.  No relief w/ pain meds given in ER (morphine and dilaudid).  lidoderm patch has provided some relief.  No n/v.  No shortness of breath.  No diaphoresis.  Of note, NH was concerned that she may have cellulitis of ble's.  Pt states her legs are  not normally red.  Also notes pain in legs as well.  No f/c.      Review of Systems   Constitutional: Negative for activity change, appetite change, chills and fatigue.   HENT: Negative for rhinorrhea and sore throat.    Eyes: Negative.    Respiratory: Negative for cough and shortness of breath.    Cardiovascular: Positive for leg swelling. Negative for chest pain and palpitations.   Gastrointestinal: Negative for abdominal pain, diarrhea, nausea and vomiting.   Endocrine: Negative.    Genitourinary: Negative for dysuria.   Musculoskeletal:        Reproducible chest pain   Skin:        Erythema and edema to bilateral lower extremities   Allergic/Immunologic: Negative.    Neurological: Negative.    Hematological: Negative.    Psychiatric/Behavioral: Negative.           All other systems reviewed and are negative.     Personal History     Past Medical History:   Diagnosis Date   • Atrial fibrillation (CMS/HCC)    • Cancer (CMS/HCC)     colon   • Coronary artery disease    • GERD (gastroesophageal reflux disease)    • Hypertension        Past Surgical History:   Procedure Laterality Date   • CARDIAC ABLATION     • CHOLECYSTECTOMY     • COLON SURGERY      BOWEL RESECTION FOR HX COLON CANCER   • HIP INTERTROCHANTERIC NAILING Left 2/13/2019    Procedure: HIP INTERTROCHANTERIC NAILING LEFT;  Surgeon: Ariel Maynard MD;  Location: [facility];  Service: Orthopedics   • HYSTERECTOMY     • REPLACEMENT TOTAL KNEE         Family History: family history includes Heart attack in her father. Otherwise pertinent FHx was reviewed and unremarkable.     Social History:  reports that she has never smoked. She has never used smokeless tobacco. She reports that she does not drink alcohol or use drugs.  Social History     Social History Narrative   • Not on file       Medications:    Available home medication information reviewed.    (Not in a hospital admission)    Allergies   Allergen Reactions   • Hydrocodone-Acetaminophen Confusion      Also noted to contribute to increase tiredness along with diminished mental clarity with overall ineffective analgesic effect.    • Crab (Diagnostic) Hives   • Lovenox [Enoxaparin Sodium] Rash   • Penicillins Rash     unsure       Objective   Objective     Vital Signs:   Temp:  [97.5 °F (36.4 °C)] 97.5 °F (36.4 °C)  Heart Rate:  [] 101  Resp:  [20] 20  BP: (142-153)/(66-85) 145/83        Physical Exam   Constitutional: She is oriented to person, place, and time. She appears well-developed. She appears distressed.   Due to pain   HENT:   Head: Normocephalic and atraumatic.   Eyes: Pupils are equal, round, and reactive to light.   Neck: Neck supple. No JVD present.   Cardiovascular: Normal rate and intact distal pulses. Exam reveals no gallop and no friction rub.   No murmur heard.  Irregular rhythm   Pulmonary/Chest: Breath sounds normal. She exhibits tenderness.   Diminished sounds in the bases   Abdominal: Soft. Bowel sounds are normal.   Musculoskeletal: She exhibits edema.   Reproducible chest pain with palpation, movement  Edema and erythema to bilateral extremities   Neurological: She is alert and oriented to person, place, and time.   Skin: There is erythema.   Edema and erythema to bilateral lower extremities   Psychiatric: She has a normal mood and affect.      Separate exam performed by me w/ no changes to the above.      Results Reviewed:  I have personally reviewed current lab and radiology data.    Results from last 7 days   Lab Units 10/08/19  2035   WBC 10*3/mm3 15.16*   HEMOGLOBIN g/dL 11.0*   HEMATOCRIT % 36.4   PLATELETS 10*3/mm3 283     Results from last 7 days   Lab Units 10/08/19  2035   SODIUM mmol/L 143   POTASSIUM mmol/L 4.0   CHLORIDE mmol/L 102   CO2 mmol/L 31.0*   BUN mg/dL 20   CREATININE mg/dL 0.67   GLUCOSE mg/dL 115*   CALCIUM mg/dL 9.8*   ALT (SGPT) U/L 8   AST (SGOT) U/L 13   TROPONIN T ng/mL <0.010   PROBNP pg/mL 377.7     Estimated Creatinine Clearance: 32.1 mL/min (by C-G  formula based on SCr of 0.67 mg/dL).  Brief Urine Lab Results  (Last result in the past 365 days)      Color   Clarity   Blood   Leuk Est   Nitrite   Protein   CREAT   Urine HCG        02/14/19 1453 Other Clear Negative Negative Negative Negative             Imaging Results (last 24 hours)     Procedure Component Value Units Date/Time    CT Chest Without Contrast [[id number]] Collected:  10/08/19 2218     Updated:  10/08/19 2220    Narrative:       CT CHEST, NONCONTRAST, 10/8/2019    HISTORY:  89-year-old female in the ED complaining of left side chest pain beginning yesterday.    TECHNIQUE:  CT imaging of the chest without IV contrast. Radiation dose reduction techniques included automated exposure control. Radiation audit for CT and nuclear cardiology exams in the last 12 months: 4.    COMPARISON:  *  CT chest, 8/8/2018. No chest x-ray has been obtained this visit.    FINDINGS:  No visible acute rib fracture. There is an old displaced mid sternal fracture that is unchanged since the prior exam. There are old T4 and T7 vertebral compression fractures that are also unchanged.    Large hiatal hernia with intrathoracic stomach. Bibasilar compressive atelectasis. Lungs otherwise clear. No acute pulmonary infiltrate, pneumothorax or pleural effusion.      Impression:       1. No active disease in the chest.  2. Old sternum and thoracic vertebral compression fractures. No acute thoracic fracture is identified.  3. Scoliosis.  4. Large hiatal hernia with intrathoracic stomach. Bibasilar pulmonary atelectasis.    Signer Name: Anthony Castañeda MD   Signed: 10/8/2019 10:18 PM   Workstation Name: [email or web address]    [facility]        Results for orders placed during the hospital encounter of 09/17/18   Adult Transthoracic Echo Complete W/ Cont if Necessary Per Protocol    Narrative · Mild-to-moderate mitral valve regurgitation is present          Assessment/Plan   Assessment / Plan     Active Hospital  Problems    Diagnosis POA   • Chest pain [R07.9] Yes   • Costochondral chest pain [R07.1] Unknown   • Cellulitis of right lower extremity [L03.115] Unknown   • Cellulitis of left lower extremity [L03.116] Unknown       Costochondral chest pain; ekg, trop, cct all normal.  History and physical exam suggest this is all musculoskeletal and would prob benefit more from anti-inflammatories rather than narcotics.  Will cont lidoderm patch as outlined below.  Will need to monitor renal function and GI intolerance w/ toradol.  -Toradol 15 mg IV every 6 hours as needed for moderate pain  -Continue home dose of Dilaudid 0.5 mg every 6 hours as needed  -Continue Lidoderm patch as needed  -PT/OT eval and treat    Cellulitis. BLE's w/ leukocytosis  -Continue Keflex 500 mg every 8 hours    Chronic pain syndrome due to osteoarthritis  -New gabapentin 100 mg 3 times a day    Hypertension  -Continue carvedilol 3.125 mg twice daily  -Continue furosemide 20 mg twice a day  -Continue aspirin 81 mg twice a day    GERD  -Continue Nexium 40 mg daily    Depression/anxiety  -Continue Cymbalta and Lexapro        DVT prophylaxis: She is not a candidate for pharmacological or mechanical DVT prophylaxis due to history of allergy to Lovenox with advanced age with cellulitis of bilateral lower extremities    CODE STATUS:    Code Status and Medical Interventions:   Ordered at: 10/08/19 0694     Level Of Support Discussed With:    Patient    Next of Kin (If No Surrogate)     Code Status:    No CPR     Medical Interventions (Level of Support Prior to Arrest):    Comfort Measures       Admission Status:  I believe this patient meets OBSERVATION status, however if further evaluation or treatment plans warrant, status may change.  Based upon current information, I predict patient's care encounter to be less than or equal to 2 midnights.      Electronically signed by LAMAR Ayoub, 10/09/19, 12:04 AM.    ISRAVANTHI MD, personally performed  the services described in this documentation as scribed by the above named individual in my presence, and it is both accurate and complete.  10/9/2019  12:36 AM

## 2019-10-09 NOTE — PLAN OF CARE
Problem: Palliative Care (Adult)  Intervention: Promote Informed Decision Making and Goal Setting   10/09/19 1300   Coping/Psychosocial Interventions   Life Transition/Adjustment decision-making facilitated;palliative care discussed  (Has Palliative at NH and needs help with pain control. Goals are comfort)

## 2019-10-09 NOTE — PROGRESS NOTES
Patient seen and examined.  Please refer to my partners a history of physical dated today for full details.  She is a 89-year-old female who has been in decline since a hip fracture earlier this year.  Is mostly wheelchair-bound but when was  working with therapy she apparently had a gait belt up around her chest.  Since that time she has had a significant pain.  Cardiac work-up was unremarkable.  Imaging was negative for any acute fractures.  This morning she says her pain is a little better but is still quite uncomfortable.  There is also question of some lower extremity redness and cellulitis and was started on Keflex overnight.  Leukocytosis has resolved this morning and clinically the legs do not look bad, will finish 7-day course of Keflex.  Given her age, overall debility, and the significant pain I would like to ask palliative care to assist with pain management, goals of care, and perhaps they can follow when she goes back to the [facility].    Electronically signed by Fadi Muir MD, 10/09/19, 8:07 AM.

## 2019-10-09 NOTE — THERAPY EVALUATION
Patient Name: Debbie Baum  : 1923    MRN: [id number]                              Today's Date: 10/9/2019       Admit Date: 10/8/2019    Visit Dx:     ICD-10-CM ICD-9-CM   1. Chest pain, unspecified type R07.9 786.50   2. Cellulitis of right lower extremity L03.115 682.6   3. Cellulitis of left lower extremity L03.116 682.6   4. Leukocytosis, unspecified type D72.829 288.60     Patient Active Problem List   Diagnosis   • Hyponatremia   • Essential hypertension   • Coronary artery disease   • Weakness generalized   • GERD (gastroesophageal reflux disease)   • Atrial fibrillation (CMS/HCC)   • Somnolence   • Medication adverse effect   • Chronic pain   • Closed fracture of left hip (CMS/HCC)   • Falls   • Periorbital ecchymosis of left eye   • Hematoma   • Humeral head fracture   • Shoulder dislocation, recurrent, right   • Postoperative anemia due to acute blood loss   • Dysphagia   • Chest pain   • Costochondral chest pain   • Cellulitis of right lower extremity   • Cellulitis of left lower extremity     Past Medical History:   Diagnosis Date   • Arthritis    • Atrial fibrillation (CMS/HCC)    • Cancer (CMS/HCC)     colon   • Coronary artery disease    • GERD (gastroesophageal reflux disease)    • History of transfusion    • Hypertension      Past Surgical History:   Procedure Laterality Date   • CARDIAC ABLATION     • CHOLECYSTECTOMY     • COLON SURGERY      BOWEL RESECTION FOR HX COLON CANCER   • EYE SURGERY     • HIP INTERTROCHANTERIC NAILING Left 2019    Procedure: HIP INTERTROCHANTERIC NAILING LEFT;  Surgeon: Ariel Maynard MD;  Location: [facility];  Service: Orthopedics   • HYSTERECTOMY     • REPLACEMENT TOTAL KNEE       General Information     Row Name 10/09/19 1502          PT Evaluation Time/Intention    Document Type  evaluation  -LM     Mode of Treatment  individual therapy;physical therapy  -LM     Row Name 10/09/19 1502          General Information    Patient Profile Reviewed?  yes   -LM     Prior Level of Function  mod assist:;max assist:;transfer;ADL's  -LM     Existing Precautions/Restrictions  fall;oxygen therapy device and L/min;other (see comments)  (Significant)  Gait Belt Placement - please place low and make sure it's tight so it doesn't slide up to chest; B Shoulders Dislocate easily - don't pull on arms  -LM     Barriers to Rehab  previous functional deficit  -LM     Row Name 10/09/19 1502          Relationship/Environment    Lives With  facility resident  -LM     Row Name 10/09/19 1502          Resource/Environmental Concerns    Current Living Arrangements  extended care facility  -LM     Row Name 10/09/19 1502          Home Main Entrance    Stair Railings, Main Entrance  none  -LM     Row Name 10/09/19 1502          Stairs Within Home, Primary    Number of Stairs, Within Home, Primary  none  -LM     Row Name 10/09/19 1502          Cognitive Assessment/Intervention- PT/OT    Orientation Status (Cognition)  oriented x 3  -LM     Row Name 10/09/19 1502          Safety Issues, Functional Mobility    Safety Issues Affecting Function (Mobility)  safety precaution awareness;safety precautions follow-through/compliance  -LM     Impairments Affecting Function (Mobility)  balance;endurance/activity tolerance;pain;strength  -LM     Comment, Safety Issues/Impairments (Mobility)  Pt states chest pain increased to 10/10 once sitting EOB.  Pt began crying and holding her L chest.  RN notified and came into room to give pain meds.  -LM       User Key  (r) = Recorded By, (t) = Taken By, (c) = Cosigned By    Initials Name Provider Type    LM Zulema Gtz, PT Physical Therapist        Mobility     Row Name 10/09/19 1502          Bed Mobility Assessment/Treatment    Bed Mobility Assessment/Treatment  supine-sit;scooting/bridging  -LM     Scooting/Bridging [place] (Bed Mobility)  maximum assist (25% patient effort);1 person assist  -LM     Supine-Sit [place] (Bed Mobility)  maximum assist  (25% patient effort);1 person assist  -LM     Assistive Device (Bed Mobility)  bed rails;draw sheet;head of bed elevated  -LM     Comment (Bed Mobility)  Vc's for sequencing.  -LM     Row Name 10/09/19 1502          Transfer Assessment/Treatment    Comment (Transfers)  Pt very concerned about PT using gait belt during transfer.  Educated pt that I would put it down low on her stomach and tighten it so it didn't slide up.  Pts daughter reports her mom also has a hx of B shoulder dislocations - request not to pull on arms.  -LM     Row Name 10/09/19 1502          Bed-Chair Transfer    Bed-Chair [place] (Transfers)  maximum assist (25% patient effort);1 person assist  -LM     Row Name 10/09/19 1502          Gait/Stairs Assessment/Training    Comment (Gait/Stairs)  Pt is non-ambulatory at baseline.  -LM       User Key  (r) = Recorded By, (t) = Taken By, (c) = Cosigned By    Initials Name Provider Type    LM Zulema Gtz, PT Physical Therapist        Obj/Interventions     Row Name 10/09/19 1502          General ROM    GENERAL ROM COMMENTS  BLEs - Decreased AROM through all planes - AAROM WFL  -LM     Row Name 10/09/19 1502          MMT (Manual Muscle Testing)    General MMT Comments  BLEs grossly 2/5 throughout  -LM     Row Name 10/09/19 1502          Static Sitting Balance    Level of [place] (Unsupported Sitting, Static Balance)  supervision  -LM     Sitting Position (Unsupported Sitting, Static Balance)  sitting on edge of bed  -LM     Time Able to Maintain Position (Unsupported Sitting, Static Balance)  more than 5 minutes  -LM     Row Name 10/09/19 1502          Sensory Assessment/Intervention    Sensory General Assessment  no sensation deficits identified BLEs  -LM       User Key  (r) = Recorded By, (t) = Taken By, (c) = Cosigned By    Initials Name Provider Type    LM Zulema Gtz, PT Physical Therapist        Goals/Plan     Row Name 10/09/19 1502          Bed Mobility Goal 1 (PT)    Activity/Assistive  Device (Bed Mobility Goal 1, PT)  sit to supine/supine to sit  -LM     [place] Level/Cues Needed (Bed Mobility Goal 1, PT)  minimum assist (75% or more patient effort)  -LM     Time Frame (Bed Mobility Goal 1, PT)  long term goal (LTG);2 weeks  -LM     Row Name 10/09/19 1502          Transfer Goal 1 (PT)    Activity/Assistive Device (Transfer Goal 1, PT)  bed-to-chair/chair-to-bed  -LM     [place] Level/Cues Needed (Transfer Goal 1, PT)  moderate assist (50-74% patient effort);1 person assist  -LM     Time Frame (Transfer Goal 1, PT)  long term goal (LTG);2 weeks  -LM       User Key  (r) = Recorded By, (t) = Taken By, (c) = Cosigned By    Initials Name Provider Type    LM Zulema Gtz, PT Physical Therapist        Clinical Impression     Row Name 10/09/19 1502          Pain Assessment    Additional Documentation  Pain Scale: Numbers Pre/Post-Treatment (Group)  -LM     Row Name 10/09/19 1502          Pain Scale: Numbers Pre/Post-Treatment    Pain Scale: Numbers, Pretreatment  8/10  -LM     Pain Scale: Numbers, Post-Treatment  10/10  -LM     Pain Location - Side  Left  -LM     Pain Location - Orientation  generalized  -LM     Pain Location  chest  -LM     Pain Intervention(s)  Repositioned;Ambulation/increased activity RN notified and gave pain meds  -LM     Row Name 10/09/19 1502          Plan of Care Review    Plan of Care Reviewed With  patient;daughter  -     Row Name 10/09/19 1502          Physical Therapy Clinical Impression    Criteria for Skilled Interventions Met (PT Clinical Impression)  yes;treatment indicated  -LM     Rehab Potential (PT Clinical Summary)  good, to achieve stated therapy goals  -LM     Row Name 10/09/19 1502          Vital Signs    Pre Systolic BP Rehab  156  -LM     Pre Treatment Diastolic BP  68  -LM     Pretreatment Heart Rate (beats/min)  80  -LM     Pre SpO2 (%)  96  -LM     O2 Delivery Pre Treatment  supplemental O2  -LM     Pre Patient Position  Supine  -LM     Intra  Patient Position  Standing  -LM     Post Patient Position  Sitting  -LM     Row Name 10/09/19 1502          Positioning and Restraints    Pre-Treatment Position  in bed  -LM     Post Treatment Position  chair  -LM     In Chair  reclined;call light within reach;encouraged to call for assist;exit alarm on;with family/caregiver;waffle cushion;notified nsg  -LM       User Key  (r) = Recorded By, (t) = Taken By, (c) = Cosigned By    Initials Name Provider Type    LM Zulema Gtz, HAYDE Physical Therapist        Outcome Measures    No documentation.       Physical Therapy Education     Title: PT OT SLP Therapies (In Progress)     Topic: Physical Therapy (In Progress)     Point: Mobility training (In Progress)     Learning Progress Summary           Patient Acceptance, E, NR by LM at 10/9/2019  4:12 PM                   Point: Precautions (In Progress)     Learning Progress Summary           Patient Acceptance, E, NR by LM at 10/9/2019  4:12 PM                               User Key     Initials Effective Dates Name Provider Type Discipline     07/24/19 -  Zulema Gtz, HAYDE Physical Therapist PT              PT Recommendation and Plan  Planned Therapy Interventions (PT Eval): balance training, bed mobility training, home exercise program, gait training, neuromuscular re-education, patient/family education, postural re-education, ROM (range of motion), strengthening, stretching, transfer training, wheelchair management/propulsion training  Outcome Summary/Treatment Plan (PT)  Anticipated Discharge Disposition (PT): skilled nursing facility  Plan of Care Reviewed With: patient, daughter  Outcome Summary: PT evaluation completed on this date.  Pt transferred supine-->sit with MaxAx1, and completed stand pivot transfer from bed-->chair with MaxAx1.  Pt's L sided chest pain increased with supine-->sit and OOB activity - RN notified and present to give pain meds.  Skilled PT services warranted to improve mobility and safety.   Recommend return to SNF at d/c.     Time Calculation:   PT Charges     Row Name 10/09/19 1502             Time Calculation    Start Time  1502  -LM      PT Received On  10/09/19  -LM      PT Goal Re-Cert Due Date  10/19/19  -        User Key  (r) = Recorded By, (t) = Taken By, (c) = Cosigned By    Initials Name Provider Type     Zulema Gtz, PT Physical Therapist        Therapy Charges for Today     Code Description Service Date Service Provider Modifiers Qty    [id number] HC PT EVAL MOD COMPLEXITY 4 10/9/2019 Zulema Gtz, PT GP 1               Zulema Gtz, HAYDE  10/9/2019

## 2019-10-09 NOTE — PROGRESS NOTES
Discharge Planning Assessment  [facility]     Patient Name: Debbie Baum  MRN: [id number]  Today's Date: 10/9/2019    Admit Date: 10/8/2019    Discharge Needs Assessment     Row Name 10/09/19 1054       Living Environment    Lives With  facility resident    Current Living Arrangements  extended care facility    Primary Care Provided by  self    Provides Primary Care For  no one, unable/limited ability to care for self    Family Caregiver if Needed  child(savage), adult    Able to Return to Prior Arrangements  yes       Transition Planning    Patient/Family Anticipates Transition to  long term care facility       Discharge Needs Assessment    Equipment Currently Used at Home  wheelchair        Discharge Plan     Row Name 10/09/19 1100       Plan    Plan  [facility]    Patient/Family in Agreement with Plan  yes    Plan Comments  Spoke with patient and son in law at bedside. Patient lives in a private pay [facility] bed at [facility]. PTA she required assist w/ ADL's and has been WC dependent for mobility. She is weight bearing and can stand and pivot for transfers w/ assist and participate in gait training with adaptive equipment. She receives therapy 3 times a week.  will arrange [facility] transport [place] if spots available. No other needs. Plan is to return to the [facility].  following.     Final Discharge Disposition Code  04 - intermediate care facility        Destination - Selection Complete      Service Provider Request Status Selected Services Address Phone Number Fax Number    [facility] [place] [facility] [address] [facility] [address] [phone]      Durable Medical Equipment      No service coordination in this encounter.      Dialysis/Infusion      No service coordination in this encounter.      Home Medical Care      No service coordination in this encounter.      Therapy      No service coordination in this encounter.      Community Resources       No service coordination in this encounter.        Expected Discharge Date and Time     Expected Discharge Date Expected Discharge Time    Oct 11, 2019         Demographic Summary     Row Name 10/09/19 1053       General Information    Arrived From  home    Reason for Consult  discharge planning        Functional Status     Row Name 10/09/19 1053       Functional Status    Usual Activity Tolerance  fair    Current Activity Tolerance  fair        Psychosocial    No documentation.       Abuse/Neglect    No documentation.       Legal    No documentation.       Substance Abuse    No documentation.       Patient Forms    No documentation.           Kirsten Henry RN

## 2019-10-09 NOTE — PLAN OF CARE
Problem: Patient Care Overview  Goal: Plan of Care Review  Outcome: Ongoing (interventions implemented as appropriate)   10/09/19 0304   Coping/Psychosocial   Plan of Care Reviewed With patient   Plan of Care Review   Progress no change   OTHER   Outcome Summary Patient admitted from ED this shift. VSS, placed on 2 L NC for sleep. Damion 14, wound care consult placed. Waffle mattress in place. Will continue to monitor.       Problem: Fall Risk (Adult)  Goal: Identify Related Risk Factors and Signs and Symptoms  Outcome: Ongoing (interventions implemented as appropriate)   10/09/19 0304   Fall Risk (Adult)   Related Risk Factors (Fall Risk) age-related changes;fatigue/slow reaction;fear of falling;gait/mobility problems;history of falls;inadequate lighting;polypharmacy;sensory deficits;environment unfamiliar   Signs and Symptoms (Fall Risk) presence of risk factors       Problem: Cardiac: ACS (Acute Coronary Syndrome) (Adult)  Goal: Signs and Symptoms of Listed Potential Problems Will be Absent, Minimized or Managed (Cardiac: ACS)  Outcome: Ongoing (interventions implemented as appropriate)   10/09/19 0304   Goal/Outcome Evaluation   Problems Assessed (Acute Coronary Syndrome) all   Problems Present (Acute Coronary Syn) dysrhythmia/arrhythmia;situational response;chest pain (angina)       Problem: Skin Injury Risk (Adult)  Goal: Identify Related Risk Factors and Signs and Symptoms  Outcome: Ongoing (interventions implemented as appropriate)   10/09/19 0304   Skin Injury Risk (Adult)   Related Risk Factors (Skin Injury Risk) advanced age;edema;mobility impaired;medical devices;skeletal deformities;mechanical forces;moisture

## 2019-10-09 NOTE — PLAN OF CARE
Problem: Patient Care Overview  Goal: Interprofessional Rounds/Family Conf  Outcome: Ongoing (interventions implemented as appropriate)   10/09/19 1300   Interdisciplinary Rounds/Family Conf   Summary New Palliative consult from Dr. Fadi Muir for GOC and Pain Management. Patient admitted from [facility] r/t chest pain and injury received from gait belt. Chest pain labs normal, cellulitis lower extremities, redness, patient states she is wheel chair bound and can't walk. LAMAR Magdaleno Palliative saw patient on 10/9 at 1033 for pain management and goals of care. Patient was awake, rates her pain servere, states she always has pain. She asked for LAMAR to call daughter Kecia before making any medication changes. Goal is to get pain under control and back to [facility] and Outpatient Palliative is following there.   Participants ;advanced practice nurse;nursing;patient;physician   Palliative Team Meeting Attendance 1300: Dr. Sterling Delgado, LAMAR Magdaleno, Cristiana TerryRN,CHPN, Sabrina Hurd, [facility], Nicky Ontiveros, RN/CM, Hospice and Jacy Rowe RN, CHPN

## 2019-10-10 PROCEDURE — 25010000002 KETOROLAC TROMETHAMINE PER 15 MG: Performed by: NURSE PRACTITIONER

## 2019-10-10 PROCEDURE — 97535 SELF CARE MNGMENT TRAINING: CPT

## 2019-10-10 PROCEDURE — 99224 PR SBSQ OBSERVATION CARE/DAY 15 MINUTES: CPT | Performed by: INTERNAL MEDICINE

## 2019-10-10 PROCEDURE — 97110 THERAPEUTIC EXERCISES: CPT

## 2019-10-10 PROCEDURE — G0378 HOSPITAL OBSERVATION PER HR: HCPCS

## 2019-10-10 PROCEDURE — 97165 OT EVAL LOW COMPLEX 30 MIN: CPT

## 2019-10-10 PROCEDURE — 96376 TX/PRO/DX INJ SAME DRUG ADON: CPT

## 2019-10-10 PROCEDURE — 25010000002 HYDROMORPHONE PER 4 MG: Performed by: NURSE PRACTITIONER

## 2019-10-10 PROCEDURE — 97530 THERAPEUTIC ACTIVITIES: CPT

## 2019-10-10 RX ORDER — MELOXICAM 7.5 MG/1
7.5 TABLET ORAL DAILY
Status: DISCONTINUED | OUTPATIENT
Start: 2019-10-11 | End: 2019-10-12 | Stop reason: HOSPADM

## 2019-10-10 RX ORDER — HYDROMORPHONE HYDROCHLORIDE 2 MG/1
1 TABLET ORAL
Status: DISCONTINUED | OUTPATIENT
Start: 2019-10-10 | End: 2019-10-11

## 2019-10-10 RX ORDER — SENNOSIDES 8.6 MG
2 TABLET ORAL 2 TIMES DAILY
Status: DISCONTINUED | OUTPATIENT
Start: 2019-10-10 | End: 2019-10-12 | Stop reason: HOSPADM

## 2019-10-10 RX ORDER — BISACODYL 10 MG
10 SUPPOSITORY, RECTAL RECTAL DAILY PRN
Status: DISCONTINUED | OUTPATIENT
Start: 2019-10-10 | End: 2019-10-12 | Stop reason: HOSPADM

## 2019-10-10 RX ADMIN — PANTOPRAZOLE SODIUM 40 MG: 40 TABLET, DELAYED RELEASE ORAL at 05:34

## 2019-10-10 RX ADMIN — FUROSEMIDE 20 MG: 20 TABLET ORAL at 08:16

## 2019-10-10 RX ADMIN — HYDROMORPHONE HYDROCHLORIDE 0.5 MG: 1 INJECTION, SOLUTION INTRAMUSCULAR; INTRAVENOUS; SUBCUTANEOUS at 08:13

## 2019-10-10 RX ADMIN — ASPIRIN 81 MG CHEWABLE TABLET 81 MG: 81 TABLET CHEWABLE at 20:42

## 2019-10-10 RX ADMIN — HYDROMORPHONE HYDROCHLORIDE 0.5 MG: 1 INJECTION, SOLUTION INTRAMUSCULAR; INTRAVENOUS; SUBCUTANEOUS at 18:48

## 2019-10-10 RX ADMIN — CARVEDILOL 3.12 MG: 3.12 TABLET, FILM COATED ORAL at 08:16

## 2019-10-10 RX ADMIN — ASPIRIN 81 MG CHEWABLE TABLET 81 MG: 81 TABLET CHEWABLE at 08:16

## 2019-10-10 RX ADMIN — GABAPENTIN 100 MG: 100 CAPSULE ORAL at 20:42

## 2019-10-10 RX ADMIN — HYDROMORPHONE HYDROCHLORIDE 0.5 MG: 1 INJECTION, SOLUTION INTRAMUSCULAR; INTRAVENOUS; SUBCUTANEOUS at 11:07

## 2019-10-10 RX ADMIN — GABAPENTIN 100 MG: 100 CAPSULE ORAL at 08:16

## 2019-10-10 RX ADMIN — HYDROMORPHONE HYDROCHLORIDE 1 MG: 2 TABLET ORAL at 21:46

## 2019-10-10 RX ADMIN — KETOROLAC TROMETHAMINE 15 MG: 30 INJECTION, SOLUTION INTRAMUSCULAR; INTRAVENOUS at 13:38

## 2019-10-10 RX ADMIN — CARVEDILOL 3.12 MG: 3.12 TABLET, FILM COATED ORAL at 18:48

## 2019-10-10 RX ADMIN — GABAPENTIN 100 MG: 100 CAPSULE ORAL at 15:21

## 2019-10-10 RX ADMIN — DULOXETINE HYDROCHLORIDE 20 MG: 20 CAPSULE, DELAYED RELEASE ORAL at 08:16

## 2019-10-10 RX ADMIN — NAPROXEN 250 MG: 250 TABLET ORAL at 18:47

## 2019-10-10 RX ADMIN — HYDROMORPHONE HYDROCHLORIDE 1 MG: 2 TABLET ORAL at 15:21

## 2019-10-10 RX ADMIN — HYDROMORPHONE HYDROCHLORIDE 1 MG: 2 TABLET ORAL at 05:35

## 2019-10-10 RX ADMIN — FUROSEMIDE 20 MG: 20 TABLET ORAL at 18:48

## 2019-10-10 RX ADMIN — LIDOCAINE 2 PATCH: 50 PATCH TOPICAL at 08:15

## 2019-10-10 RX ADMIN — SENNOSIDES 17.2 MG: 8.6 TABLET, FILM COATED ORAL at 20:42

## 2019-10-10 RX ADMIN — NAPROXEN 250 MG: 250 TABLET ORAL at 08:16

## 2019-10-10 RX ADMIN — CEPHALEXIN 500 MG: 250 CAPSULE ORAL at 20:42

## 2019-10-10 RX ADMIN — CEPHALEXIN 500 MG: 250 CAPSULE ORAL at 08:17

## 2019-10-10 RX ADMIN — ESCITALOPRAM OXALATE 10 MG: 10 TABLET ORAL at 08:17

## 2019-10-10 RX ADMIN — SODIUM CHLORIDE, PRESERVATIVE FREE 10 ML: 5 INJECTION INTRAVENOUS at 08:20

## 2019-10-10 NOTE — PLAN OF CARE
Problem: Patient Care Overview  Goal: Interprofessional Rounds/Family Conf  Outcome: Ongoing (interventions implemented as appropriate)  Palliative Team Meeting Attendance 1300: Dr. Sterling Delgado, LAMAR Magdaleno, Cristiana Terry, BENTLEY, PN and Cora Lanier. RN,PN   10/10/19 1300   Interdisciplinary Rounds/Family Conf   Summary Patient up in a chiar, did work with PT. Patient still rates pain before pain med a 10 and after a 5. LAMAR Magdaleno saw and did schedule Dilaudid oral every 6, she left iv pain for severe, Placed massage therapist and spiritual consult. Palliative to continue to follow for support and symptom management.   Participants advanced practice nurse;nursing;physician

## 2019-10-10 NOTE — PROGRESS NOTES
[facility] Medicine Services  PROGRESS NOTE    Patient Name: Debbie Baum  : 1923  MRN: [id number]    Date of Admission: 10/8/2019  Primary Care Physician: Paula Scott MD    Subjective   Subjective     CC:  F/u chest pain    HPI:  Still complains of pain.  Says IV works really well.    Review of Systems  Gen- No fevers, chills  CV- +  chest pain, palpitations  Resp- No cough, dyspnea  GI- No N/V/D, abd pain    All other systems reviewed and negative.     Objective   Objective     Vital Signs:   Temp:  [97.7 °F (36.5 °C)-98.4 °F (36.9 °C)] 97.8 °F (36.6 °C)  Heart Rate:  [62-88] 62  Resp:  [16-18] 16  BP: (117-158)/(64-81) 117/65        Physical Exam:  Constitutional: age appriopriate, sleeping and awakens easily, but complains of pain once awake  HENT: NCAT, mucous membranes moist  Respiratory: Clear to auscultation bilaterally, respiratory effort normal   Cardiovascular: RRR, no murmurs, rubs, or gallops  Gastrointestinal: Positive bowel sounds, soft, nontender, nondistended  Musculoskeletal: No bilateral ankle edema  Psychiatric: Appropriate affect, cooperative  Neurologic: Oriented x 3, no focal deficits  Skin: No rashes      Results Reviewed:    Results from last 7 days   Lab Units 10/09/19  0646 10/09/19  0152 10/08/19  2035   WBC 10*3/mm3 10.31  --  15.16*   HEMOGLOBIN g/dL 11.0*  --  11.0*   HEMATOCRIT % 37.1  --  36.4   PLATELETS 10*3/mm3 262  --  283   PROCALCITONIN ng/mL  --  0.04*  --      Results from last 7 days   Lab Units 10/09/19  0646 10/08/19  2035   SODIUM mmol/L 139 143   POTASSIUM mmol/L 4.0 4.0   CHLORIDE mmol/L 105 102   CO2 mmol/L 27.0 31.0*   BUN mg/dL 19 20   CREATININE mg/dL 0.54* 0.67   GLUCOSE mg/dL 106* 115*   CALCIUM mg/dL 9.9* 9.8*   ALT (SGPT) U/L 6 8   AST (SGOT) U/L 13 13   TROPONIN T ng/mL  --  <0.010   PROBNP pg/mL  --  377.7     Estimated Creatinine Clearance: 32.1 mL/min (A) (by C-G formula based on SCr of 0.54 mg/dL  (L)).    Microbiology Results Abnormal     None          Imaging Results (last 24 hours)     ** No results found for the last 24 hours. **          Results for orders placed during the hospital encounter of 09/17/18   Adult Transthoracic Echo Complete W/ Cont if Necessary Per Protocol    Narrative · Mild-to-moderate mitral valve regurgitation is present          I have reviewed the medications:  Scheduled Meds:  aspirin 81 mg Oral BID   carvedilol 3.125 mg Oral BID With Meals   cephalexin 500 mg Oral Q12H   DULoxetine 20 mg Oral Daily   escitalopram 10 mg Oral Daily   furosemide 20 mg Oral BID   gabapentin 100 mg Oral TID   HYDROmorphone 1 mg Oral Q6H   lidocaine 2 patch Transdermal Q24H   naproxen 250 mg Oral BID With Meals   pantoprazole 40 mg Oral Q AM   senna 2 tablet Oral Nightly   sodium chloride 10 mL Intravenous Q12H     Continuous Infusions:   PRN Meds:.•  acetaminophen **OR** acetaminophen **OR** acetaminophen  •  diclofenac  •  docusate sodium  •  HYDROmorphone  •  ketorolac  •  sodium chloride  •  sodium chloride  •  trolamine salicylate      Assessment/Plan   Assessment / Plan     Active Hospital Problems    Diagnosis  POA   • **Chest pain [R07.9]  Yes     Priority: Medium   • Costochondral chest pain [R07.1]  Yes   • Cellulitis of right lower extremity [L03.115]  Yes   • Cellulitis of left lower extremity [L03.116]  Yes      Resolved Hospital Problems   No resolved problems to display.        Brief Hospital Course to date:  Debbie Baum is a 89 y.o. female presents from SNF with complaints of CP.  Felt to be mostly musculoskeletal.     - appreciate palliative eval.  They were actually following at facility.  She likes the IV dilaudid a lot, try to transistion to PO.  - questional LE cellulitis on admission.  Looks better.  Finish PO course of keflex.  - continue other home meds  - anticipate back to facility tomorrow with palliaitive care to continue to follow there.    DVT Prophylaxis:   mechanical    Disposition: I expect the patient to be discharged to facility tomorrow..    CODE STATUS:   Code Status and Medical Interventions:   Ordered at: 10/08/19 2257     Level Of Support Discussed With:    Patient    Next of Kin (If No Surrogate)     Code Status:    No CPR     Medical Interventions (Level of Support Prior to Arrest):    Comfort Measures       Electronically signed by Fadi Muir MD, 10/10/19, 6:49 PM.

## 2019-10-10 NOTE — PLAN OF CARE
Problem: Fall Risk (Adult)  Goal: Identify Related Risk Factors and Signs and Symptoms  Outcome: Ongoing (interventions implemented as appropriate)   10/10/19 9676   Fall Risk (Adult)   Related Risk Factors (Fall Risk) age-related changes;confusion/agitation;gait/mobility problems;history of falls;environment unfamiliar   Signs and Symptoms (Fall Risk) presence of risk factors

## 2019-10-10 NOTE — PLAN OF CARE
Problem: Patient Care Overview  Goal: Plan of Care Review  Outcome: Ongoing (interventions implemented as appropriate)   10/10/19 1612   Coping/Psychosocial   Plan of Care Reviewed With patient;daughter   Plan of Care Review   Progress no change       Problem: Palliative Care (Adult)  Intervention: Minimize Discomfort   10/10/19 1612   Promote Oxygenation/Perfusion   Pain Management Interventions around-the-clock dosing utilized;care clustered;heat applied;pain management plan reviewed with patient/caregiver;pillow support provided;see MAR;unnecessary movement minimized       Goal: Maximized Comfort  Outcome: Ongoing (interventions implemented as appropriate)   10/10/19 1612   Palliative Care (Adult)   Maximized Comfort achieves outcome

## 2019-10-10 NOTE — PROGRESS NOTES
"Palliative Care Progress Note    Date of Admission: 10/8/2019    Code Status:   Current Code Status     Date Active Code Status Order ID Comments User Context       10/8/2019 23:58 No CPR [id number]  Nicol Cintron APRN ED       Questions for Current Code Status     Question Answer Comment    Code Status No CPR     Medical Interventions (Level of Support Prior to Arrest) Comfort Measures     Level Of Support Discussed With Patient      Next of Kin (If No Surrogate)         Subjective:  Patient has low tolerance to pain.   Reports persistent strong moderate pain to anterior chest.   Sitting up in chair and interested in lunch, no family at bedside.   Denies dyspnea, nausea or anxiety - other than wanting to have her pain managed.   No family at bedside  Reviewed current scheduled and prn medications for route, type, dose, and frequency.  Reviewed medical record     •  acetaminophen **OR** acetaminophen **OR** acetaminophen  •  diclofenac  •  docusate sodium  •  HYDROmorphone  •  ketorolac  •  sodium chloride  •  sodium chloride  •  trolamine salicylate    Objective:  PPS 40% /58 (BP Location: Right arm, Patient Position: Sitting)   Pulse 87   Temp 97.8 °F (36.6 °C) (Oral)   Resp 16   Ht 162.6 cm (64\")   Wt 49.4 kg (109 lb)   LMP  (LMP Unknown)   SpO2 98%   BMI 18.71 kg/m²    Intake & Output (last day)       10/09 0701 - 10/10 0700 10/10 0701 - 10/11 0700    P.O.  120    Total Intake(mL/kg)  120 (2.4)    Urine (mL/kg/hr) 900 (0.8) 375 (0.6)    Total Output 900 375    Net -900 -255              Lab Results (last 24 hours)     ** No results found for the last 24 hours. **        Imaging Results (last 24 hours)     ** No results found for the last 24 hours. **          Physical Exam:  Gen: NAD, sitting up in chair, frail  Skin: warm, dry   Eyes: KEVIN, conjunctiva clear and moist   HEENT: oropharynx , neck soft tissue, no lymphadenopathy   Resp/thorax: even effort, non labored, CTA   CV: RRR   ABD: soft, " bowel sounds +, nontender  Ext: no edema, no redness   Neuro: alert, interactive, no myoclonus       Reviewed labs and diagnostic results.   No results found for: HGBA1C  Results from last 7 days   Lab Units 10/09/19  0646   WBC 10*3/mm3 10.31   HEMOGLOBIN g/dL 11.0*   HEMATOCRIT % 37.1   PLATELETS 10*3/mm3 262     Results from last 7 days   Lab Units 10/09/19  0646   SODIUM mmol/L 139   POTASSIUM mmol/L 4.0   CHLORIDE mmol/L 105   CO2 mmol/L 27.0   BUN mg/dL 19   CREATININE mg/dL 0.54*   CALCIUM mg/dL 9.9*   BILIRUBIN mg/dL 0.3   ALK PHOS U/L 84   ALT (SGPT) U/L 6   AST (SGOT) U/L 13   GLUCOSE mg/dL 106*       Impression: 89 y.o. female with  with acute Left costochondritis, large intrathoracic hiatal hernia     Plan:   Left anterior chest pain, acute - patient has received x4 iv hydromorphone 0.5mg prns today.   Will increase hydromorphone scheduled to every 4 hours.   Continue with prns.     Reviewed with palliative nurse practitioner that usually assists with symptom management at the [place].  Patient has tolerated meloxicam previously with good effect.   Rotate from naprosyn to meloxicam    Constipation - increase stimulant with scheduled bowel med regimen with increase opioid    Palliative team provide support to patient and family    Time: 30 minutes   > 50% time spent in counseling and education concerning current orders for symptom management with patient    Penny Barillas, LAMAR  [phone]  10/10/19  6:58 PM

## 2019-10-10 NOTE — PLAN OF CARE
Problem: Patient Care Overview  Goal: Plan of Care Review  Outcome: Ongoing (interventions implemented as appropriate)   10/10/19 8128   Coping/Psychosocial   Plan of Care Reviewed With patient   Plan of Care Review   Progress no change   OTHER   Outcome Summary Pt decline gait belt and RW during transfers due to pain. Pt perform STS transfers x5 with BUE support and trunk support with mod to max A. Pt stand 15-60 seconds each stand. Pt decline SPT training. HEP reviewed with continued education required. Recommend DC to LTC [facility] rehab

## 2019-10-10 NOTE — PLAN OF CARE
Problem: Patient Care Overview  Goal: Plan of Care Review  Outcome: Ongoing (interventions implemented as appropriate)   10/10/19 0950   Coping/Psychosocial   Plan of Care Reviewed With patient   Plan of Care Review   Progress no change   OTHER   Outcome Summary OT evaluation completed post brief chart review. Pt. demostrating evolving symptoms of weakness, limited endurance and with pain impacting ADL and transfers independence. Pt. appropriate for skilled OT services to promote to return to PLOF. Likely need to return to LTC at discharge with continued therapy.

## 2019-10-10 NOTE — THERAPY EVALUATION
Acute Care - Occupational Therapy Initial Evaluation  [facility]     Patient Name: Debbie Baum  : 1923  MRN: [id number]  Today's Date: 10/10/2019             Admit Date: 10/8/2019       ICD-10-CM ICD-9-CM   1. Chest pain, unspecified type R07.9 786.50   2. Cellulitis of right lower extremity L03.115 682.6   3. Cellulitis of left lower extremity L03.116 682.6   4. Leukocytosis, unspecified type D72.829 288.60   5. Impaired mobility and ADLs Z74.09 799.89     Patient Active Problem List   Diagnosis   • Hyponatremia   • Essential hypertension   • Coronary artery disease   • Weakness generalized   • GERD (gastroesophageal reflux disease)   • Atrial fibrillation (CMS/HCC)   • Somnolence   • Medication adverse effect   • Chronic pain   • Closed fracture of left hip (CMS/HCC)   • Falls   • Periorbital ecchymosis of left eye   • Hematoma   • Humeral head fracture   • Shoulder dislocation, recurrent, right   • Postoperative anemia due to acute blood loss   • Dysphagia   • Chest pain   • Costochondral chest pain   • Cellulitis of right lower extremity   • Cellulitis of left lower extremity     Past Medical History:   Diagnosis Date   • Arthritis    • Atrial fibrillation (CMS/HCC)    • Cancer (CMS/HCC)     colon   • Coronary artery disease    • GERD (gastroesophageal reflux disease)    • History of transfusion    • Hypertension      Past Surgical History:   Procedure Laterality Date   • CARDIAC ABLATION     • CHOLECYSTECTOMY     • COLON SURGERY      BOWEL RESECTION FOR HX COLON CANCER   • EYE SURGERY     • HIP INTERTROCHANTERIC NAILING Left 2019    Procedure: HIP INTERTROCHANTERIC NAILING LEFT;  Surgeon: Ariel Maynard MD;  Location: [facility];  Service: Orthopedics   • HYSTERECTOMY     • REPLACEMENT TOTAL KNEE            OT ASSESSMENT FLOWSHEET (last 12 hours)      Occupational Therapy Evaluation     Row Name 10/10/19 0934                   OT Evaluation Time/Intention    Subjective Information   complains of;pain  -DENICE        Document Type  evaluation  -DENICE        Mode of Treatment  individual therapy;occupational therapy  -DENICE        Patient Effort  adequate  -DENICE        Symptoms Noted During/After Treatment  increased pain Pain goes from none to 10 with movement L chest  -DENICE        Comment  placed gait belt low on waist tight so would not ride up to chest area, but still bothered pt.  Pt. wanting transfer without belt, but not safe without.  -DENICE           General Information    Patient Profile Reviewed?  yes  -DENICE        Patient Observations  alert;cooperative;agree to therapy  -DENICE        Patient/Family Observations  No family present.  Pt. supine in bed on 02, [place] cath and tele  -DENICE        Prior Level of Function  mod assist:;max assist:;transfer;ADL's;min assist:;grooming;feeding per pt. feeds self, wipes self and does grooming.  -DENICE        Equipment Currently Used at Home  grab bar;wheelchair;raised toilet  -DENICE        Existing Precautions/Restrictions  fall;oxygen therapy device and L/min  (Significant)  gait belt low/ tight so does not slide, don't pull on sh.  -DENICE        Limitations/Impairments  safety/cognitive pt. not wanting to use gait belt  -DENICE        Risks Reviewed  patient:;LOB;increased discomfort;change in vital signs  -DENICE        Benefits Reviewed  patient:;improve function;increase independence;increase strength;increase balance;increase knowledge  -DENICE        Barriers to Rehab  previous functional deficit  -DENICE           Relationship/Environment    Primary Source of Support/Comfort  child(savage)  -DENICE        Lives With  facility resident private pay LTC with therapy per report  -DENICE           Resource/Environmental Concerns    Current Living Arrangements  extended care facility  -DENICE           Home Main Entrance    Stair Railings, Main Entrance  none  -DENICE           Stairs Within Home, Primary    Number of Stairs, Within Home, Primary  none  -DENICE           Cognitive Assessment/Intervention- PT/OT     Orientation Status (Cognition)  oriented x 3  -DENICE        Follows Commands (Cognition)  follows one step commands;over 90% accuracy;repetition of directions required  -DENICE        Safety Deficit (Cognitive)  moderate deficit;safety precautions awareness;safety precautions follow-through/compliance;insight into deficits/self awareness;judgment  -DENICE           Safety Issues, Functional Mobility    Safety Issues Affecting Function (Mobility)  safety precaution awareness;safety precautions follow-through/compliance;judgment;insight into deficits/self awareness  -DENICE        Impairments Affecting Function (Mobility)  balance;endurance/activity tolerance;pain;strength;range of motion (ROM)  -DENICE        Comment, Safety Issues/Impairments (Mobility)  Pt. complains of pain with transfer  -DENICE           Bed Mobility Assessment/Treatment    Bed Mobility Assessment/Treatment  supine-sit;scooting/bridging  -DENICE        Scooting/Bridging [place] (Bed Mobility)  maximum assist (25% patient effort);nonverbal cues (demo/gesture);verbal cues  -DENICE        Supine-Sit [place] (Bed Mobility)  maximum assist (25% patient effort);nonverbal cues (demo/gesture);verbal cues  -DENICE        Assistive Device (Bed Mobility)  bed rails;draw sheet;head of bed elevated  -DENICE        Comment (Bed Mobility)  cues to sequence.  Pt. needed assist to bring LE's to EOB and some assist with trunk also.  -DENICE           Functional Mobility    Functional Mobility- Ind. Level  unable to perform  -DENICE        Functional Mobility- Comment  per pt. she moves around in w/c  -DENICE           Transfer Assessment/Treatment    Transfer Assessment/Treatment  sit-stand transfer;stand-sit transfer;bed-chair transfer  -DENICE        Comment (Transfers)  gait belt placed tight on waist so would not pull up on pt. L chest area.  Did not pull on pt. UE but gave gentle support on elbow pushing up to joint not pulling.   Attempted stand with walker, but pt. reporting to much pain and letting  her feet slide out in front of her and leaning to rear so just had to do pivot to recliner.  -DENICE           Bed-Chair Transfer    Bed-Chair [place] (Transfers)  dependent (less than 25% patient effort);2 person assist  -DENICE        Assistive Device (Bed-Chair Transfers)  other (see comments) gait belt  -DENICE           Sit-Stand Transfer    Sit-Stand [place] (Transfers)  maximum assist (25% patient effort);2 person assist;nonverbal cues (demo/gesture);verbal cues  -DENICE        Assistive Device (Sit-Stand Transfers)  walker, front-wheeled  -DENICE           Stand-Sit Transfer    Stand-Sit [place] (Transfers)  maximum assist (25% patient effort);2 person assist;nonverbal cues (demo/gesture);verbal cues  -DENICE        Assistive Device (Stand-Sit Transfers)  walker, front-wheeled  -           ADL Assessment/Intervention    92687 - OT Self Care/Mgmt Minutes  8  -DENICE        BADL Assessment/Intervention  grooming;lower body dressing;feeding  -DENICE           Lower Body Dressing Assessment/Training    Lower Body Dressing [place] Level  doff;don;shoes/slippers;socks;dependent (less than 25% patient effort)  -DENICE        Lower Body Dressing Position  supine  -DENICE           Grooming Assessment/Training    [place] Level (Grooming)  hair care, combing/brushing;oral care regimen;wash face, hands;set up;supervision;minimum assist (75% patient effort)  -DENICE        Grooming Position  supported sitting  -DENICE        Comment (Grooming)  Pt. could comb all of hair minus back strip, pt. needed assist holding items with brushing teeth.  -DENICE           Self-Feeding Assessment/Training    [place] Level (Feeding)  liquids to mouth;set up;independent  -DENICE        Position (Self-Feeding)  supported sitting  -DENICE        Comment (Feeding)  pt. limited shoulder mvmt to reach drink.  -DENICE           BADL Safety/Performance    Impairments, BADL Safety/Performance  balance;endurance/activity tolerance;pain;range of motion;strength;trunk/postural  control  -DENICE           General ROM    GENERAL ROM COMMENTS  BUE shoulders limited 50%, distally intact.  -DENICE           MMT (Manual Muscle Testing)    General MMT Comments  B shoulders 3/5, distally 4/5  -DENICE           Motor Assessment/Interventions    Additional Documentation  Balance (Group);Balance Interventions (Group);Therapeutic Exercise (Group);Therapeutic Exercise Interventions (Group)  -           Therapeutic Exercise    89591 - OT Therapeutic Activity Minutes  8  -DENICE           Balance    Balance  static sitting balance;static standing balance  -           Static Sitting Balance    Level of [place] (Unsupported Sitting, Static Balance)  supervision  -DENICE        Sitting Position (Unsupported Sitting, Static Balance)  sitting on edge of bed  -DENICE        Time Able to Maintain Position (Unsupported Sitting, Static Balance)  more than 5 minutes  -DENICE           Static Standing Balance    Comment (Unsupported Standing, Static Balance)  pt. unable to do full stand, max of 2 for partial stand.  Pt. leaning to rear with feet out in front of her.  -DENICE           Sensory Assessment/Intervention    Sensory General Assessment  no sensation deficits identified BUE per pt. report  -DENICE        Additional Documentation  Vision Assessment/Intervention (Group)  -           Vision Assessment/Intervention    Visual Impairment/Limitations  -- pt. able to locate grooming items  -DENICE           Positioning and Restraints    Pre-Treatment Position  in bed  -DENICE        Post Treatment Position  chair  -DENICE        In Chair  reclined;call light within reach;encouraged to call for assist;exit alarm on;RUE elevated;LUE elevated;legs elevated  -DENICE           Pain Scale: Numbers Pre/Post-Treatment    Pain Scale: Numbers, Pretreatment  0/10 - no pain  -DENICE        Pain Scale: Numbers, Post-Treatment  10/10  -DENICE        Pain Location - Side  Left  -DENICE        Pain Location - Orientation  generalized  -DENICE        Pain Location  chest  -DENICE         Pain Intervention(s)  Repositioned alerted nurse, next pain meds due one hr.  Decreaing as rest  -DENICE           Plan of Care Review    Plan of Care Reviewed With  patient  -DENICE           Clinical Impression (OT)    OT Diagnosis  impaired ADL and transfer independence  -DENICE        Patient/Family Goals Statement (OT Eval)  Pt. wants to improve transfer independence so can get to w/c and off and on toilet.  -DENICE        Criteria for Skilled Therapeutic Interventions Met (OT Eval)  yes;treatment indicated  -DEINCE        Rehab Potential (OT Eval)  good, to achieve stated therapy goals  -DENICE        Therapy Frequency (OT Eval)  5 times/wk  -DENICE        Care Plan Review (OT)  evaluation/treatment results reviewed;care plan/treatment goals reviewed;risks/benefits reviewed;current/potential barriers reviewed;patient/other agree to care plan  -DENICE        Anticipated Discharge Disposition (OT)  [facility]  -DENICE           Vital Signs    Pre Systolic BP Rehab  148  -DENICE        Pre Treatment Diastolic BP  86  -DENICE        Post Systolic BP Rehab  146  -DENICE        Post Treatment Diastolic BP  92  -DENICE        Pretreatment Heart Rate (beats/min)  86  -DENICE        Posttreatment Heart Rate (beats/min)  76  -DENICE        Pre SpO2 (%)  96  -DENICE        O2 Delivery Pre Treatment  supplemental O2  -DENICE        Post SpO2 (%)  97  -DENICE        O2 Delivery Post Treatment  supplemental O2  -DENICE        Pre Patient Position  Supine  -DENICE        Intra Patient Position  Standing  -DENICE        Post Patient Position  Sitting  -DENICE           Planned OT Interventions    Planned Therapy Interventions (OT Eval)  activity tolerance training;BADL retraining;functional balance retraining;occupation/activity based interventions;ROM/therapeutic exercise;strengthening exercise;transfer/mobility retraining;patient/caregiver education/training  -DENICE           OT Goals    Bed Mobility Goal Selection (OT)  bed mobility, OT goal 1  -DENICE        Transfer Goal Selection (OT)  transfer, OT  goal 1  -DENICE        Toileting Goal Selection (OT)  toileting, OT goal 1  -DENICE           Bed Mobility Goal 1 (OT)    Activity/Assistive Device (Bed Mobility Goal 1, OT)  supine to sit;scooting;bed rails  -DENICE        [place] Level/Cues Needed (Bed Mobility Goal 1, OT)  moderate assist (50-74% patient effort)  -DENICE        Time Frame (Bed Mobility Goal 1, OT)  long term goal (LTG);10 days  -DENICE        Progress/Outcomes (Bed Mobility Goal 1, OT)  goal ongoing  -DENICE           Transfer Goal 1 (OT)    Activity/Assistive Device (Transfer Goal 1, OT)  toilet;commode, bedside without drop arms or toilet with grab bar and RTS  -DENICE        [place] Level/Cues Needed (Transfer Goal 1, OT)  maximum assist (25-49% patient effort)  -DENICE        Time Frame (Transfer Goal 1, OT)  long term goal (LTG);10 days  -DENICE        Progress/Outcome (Transfer Goal 1, OT)  goal ongoing  -DENICE           Toileting Goal 1 (OT)    Activity/Device (Toileting Goal 1, OT)  perform perineal hygiene;commode, bedside without drop arms;grab bar/safety frame;raised toilet seat  -DENICE        [place] Level/Cues Needed (Toileting Goal 1, OT)  minimum assist (75% or more patient effort)  -DENICE        Time Frame (Toileting Goal 1, OT)  long term goal (LTG);10 days  -DENICE        Progress/Outcome (Toileting Goal 1, OT)  goal ongoing  -DENICE           Living Environment    Home Accessibility  wheelchair accessible per pt. uses grab bar to get on toilet with RTS and assist  -DENICE          User Key  (r) = Recorded By, (t) = Taken By, (c) = Cosigned By    Initials Name Effective Dates    Amaris Youssef, OT 06/08/18 -          Occupational Therapy Education     Title: PT OT SLP Therapies (In Progress)     Topic: Occupational Therapy (In Progress)     Point: ADL training (In Progress)     Description: Instruct learner(s) on proper safety adaptation and remediation techniques during self care or transfers.   Instruct in proper use of assistive devices.    Learning Progress  Summary           Patient Acceptance, E, NR by DENICE at 10/10/2019  9:34 AM    Comment:  reason for consult, role OT, evaluation results and goal setting, need for gait belt for safety.                   Point: Precautions (In Progress)     Description: Instruct learner(s) on prescribed precautions during self-care and functional transfers.    Learning Progress Summary           Patient Acceptance, E, NR by DENICE at 10/10/2019  9:34 AM    Comment:  reason for consult, role OT, evaluation results and goal setting, need for gait belt for safety.                               User Key     Initials Effective Dates Name Provider Type Discipline    DENICE 06/08/18 -  Amaris Lares, OT Occupational Therapist OT                  OT Recommendation and Plan  Outcome Summary/Treatment Plan (OT)  Anticipated Discharge Disposition (OT): extended care facility  Planned Therapy Interventions (OT Eval): activity tolerance training, BADL retraining, functional balance retraining, occupation/activity based interventions, ROM/therapeutic exercise, strengthening exercise, transfer/mobility retraining, patient/caregiver education/training  Therapy Frequency (OT Eval): 5 times/wk  Plan of Care Review  Plan of Care Reviewed With: patient  Plan of Care Reviewed With: patient  Outcome Summary: OT evaluation completed post brief chart review.  Pt. demostrating evolving symptoms of weakness, limited endurance and with pain impacting ADL and transfers independence.  Pt. appropriate for skilled OT services to promote to return to PLOF.  Likely need to return to LTC at discharge with continued therapy.    Outcome Measures     Row Name 10/10/19 0934             How much help from another is currently needed...    Putting on and taking off regular lower body clothing?  1  -DENICE      Bathing (including washing, rinsing, and drying)  2  -DENICE      Toileting (which includes using toilet bed pan or urinal)  2  -DENICE      Putting on and taking off regular upper body  clothing  1  -DENICE      Taking care of personal grooming (such as brushing teeth)  3  -DENICE      Eating meals  3  -DENICE      AM-PAC 6 Clicks Score (OT)  12  -DENICE         Functional Assessment    Outcome Measure Options  AM-PAC 6 Clicks Daily Activity (OT)  -DENICE        User Key  (r) = Recorded By, (t) = Taken By, (c) = Cosigned By    Initials Name Provider Type    Amaris Youssef, OT Occupational Therapist          Time Calculation:   Time Calculation- OT     Row Name 10/10/19 0934             Time Calculation- OT    OT Start Time  0934  -DENICE      OT Received On  10/10/19  -DENICE      OT Goal Re-Cert Due Date  10/20/19  -DENICE         Timed Charges    81476 - OT Therapeutic Activity Minutes  8  -DENICE      76832 - OT Self Care/Mgmt Minutes  8  -DENICE        User Key  (r) = Recorded By, (t) = Taken By, (c) = Cosigned By    Initials Name Provider Type    Amaris Youssef, OT Occupational Therapist        Therapy Charges for Today     Code Description Service Date Service Provider Modifiers Qty    [id number]  OT EVAL LOW COMPLEXITY 4 10/10/2019 Amaris Lares OT GO 1    [id number]  OT SELF CARE/MGMT/TRAIN EA 15 MIN 10/10/2019 Amaris Lares OT GO 1               Amaris Lares OT  10/10/2019

## 2019-10-10 NOTE — THERAPY TREATMENT NOTE
Patient Name: Debbie Baum  : 1923    MRN: [id number]                              Today's Date: 10/10/2019       Admit Date: 10/8/2019    Visit Dx:     ICD-10-CM ICD-9-CM   1. Chest pain, unspecified type R07.9 786.50   2. Cellulitis of right lower extremity L03.115 682.6   3. Cellulitis of left lower extremity L03.116 682.6   4. Leukocytosis, unspecified type D72.829 288.60   5. Impaired mobility and ADLs Z74.09 799.89     Patient Active Problem List   Diagnosis   • Hyponatremia   • Essential hypertension   • Coronary artery disease   • Weakness generalized   • GERD (gastroesophageal reflux disease)   • Atrial fibrillation (CMS/HCC)   • Somnolence   • Medication adverse effect   • Chronic pain   • Closed fracture of left hip (CMS/HCC)   • Falls   • Periorbital ecchymosis of left eye   • Hematoma   • Humeral head fracture   • Shoulder dislocation, recurrent, right   • Postoperative anemia due to acute blood loss   • Dysphagia   • Chest pain   • Costochondral chest pain   • Cellulitis of right lower extremity   • Cellulitis of left lower extremity     Past Medical History:   Diagnosis Date   • Arthritis    • Atrial fibrillation (CMS/HCC)    • Cancer (CMS/HCC)     colon   • Coronary artery disease    • GERD (gastroesophageal reflux disease)    • History of transfusion    • Hypertension      Past Surgical History:   Procedure Laterality Date   • CARDIAC ABLATION     • CHOLECYSTECTOMY     • COLON SURGERY      BOWEL RESECTION FOR HX COLON CANCER   • EYE SURGERY     • HIP INTERTROCHANTERIC NAILING Left 2019    Procedure: HIP INTERTROCHANTERIC NAILING LEFT;  Surgeon: Ariel Maynard MD;  Location: [facility];  Service: Orthopedics   • HYSTERECTOMY     • REPLACEMENT TOTAL KNEE       General Information     Row Name 10/10/19 1427          PT Evaluation Time/Intention    Document Type  therapy note (daily note)  -AA     Mode of Treatment  physical therapy  -AA     Row Name 10/10/19 1427          General  Information    Patient Profile Reviewed?  yes  -AA     Existing Precautions/Restrictions  fall;oxygen therapy device and L/min gait belt low  -AA     Row Name 10/10/19 1427          Cognitive Assessment/Intervention- PT/OT    Orientation Status (Cognition)  oriented x 3  -AA     Row Name 10/10/19 1427          Safety Issues, Functional Mobility    Safety Issues Affecting Function (Mobility)  safety precautions follow-through/compliance;safety precaution awareness;judgment;sequencing abilities;positioning of assistive device  -AA     Impairments Affecting Function (Mobility)  balance;endurance/activity tolerance;pain;strength;range of motion (ROM)  -AA       User Key  (r) = Recorded By, (t) = Taken By, (c) = Cosigned By    Initials Name Provider Type    Nurys Napoles, PT Physical Therapist        Mobility     Row Name 10/10/19 1427          Bed Mobility Assessment/Treatment    Comment (Bed Mobility)  pt UIC  -AA     Row Name 10/10/19 1427          Transfer Assessment/Treatment    Comment (Transfers)  pt declined gait belt, pt held B PT arm and PT place BUE onto waist to A with transfer.  STS x5 with pt stand 15-60 sec each.  Mod to max A for transfers and standing balance  -AA     Row Name 10/10/19 1427          Sit-Stand Transfer    Sit-Stand [place] (Transfers)  maximum assist (25% patient effort);moderate assist (50% patient effort);verbal cues;nonverbal cues (demo/gesture)  -AA     Assistive Device (Sit-Stand Transfers)  other (see comments) BUe assist, pt decline gait belt and RW  -AA       User Key  (r) = Recorded By, (t) = Taken By, (c) = Cosigned By    Initials Name Provider Type    Nurys Napoles PT Physical Therapist        Obj/Interventions     Row Name 10/10/19 1427          Therapeutic Exercise    Lower Extremity (Therapeutic Exercise)  gluteal sets;heel slides, bilateral;LAQ (long arc quad), bilateral;marching while seated;SLR (straight leg raise), bilateral  -AA     Lower Extremity Range  of Motion (Therapeutic Exercise)  hip abduction/adduction, bilateral;ankle dorsiflexion/plantar flexion, bilateral  -AA     Exercise Type (Therapeutic Exercise)  AAROM (active assistive range of motion);AROM (active range of motion)  -AA     Position (Therapeutic Exercise)  seated  -AA     Sets/Reps (Therapeutic Exercise)  10  -AA     Comment (Therapeutic Exercise)  increased time due to pain and fatigue  -AA     Row Name 10/10/19 1427          Static Sitting Balance    Level of [place] (Unsupported Sitting, Static Balance)  supervision  -AA     Sitting Position (Unsupported Sitting, Static Balance)  sitting in chair  -AA     Time Able to Maintain Position (Unsupported Sitting, Static Balance)  more than 5 minutes  -AA     Row Name 10/10/19 1427          Static Standing Balance    Level of [place] (Supported Standing, Static Balance)  maximal assist, 25 to 49% patient effort;moderate assist, 50 to 74% patient effort  -AA     Time Able to Maintain Position (Supported Standing, Static Balance)  45 to 60 seconds  -AA     Assistive Device Utilized (Supported Standing, Static Balance)  -- BUe assist, pt decline gait belt and RW  -AA       User Key  (r) = Recorded By, (t) = Taken By, (c) = Cosigned By    Initials Name Provider Type    Nurys Napoles, PT Physical Therapist        Goals/Plan    No documentation.       Clinical Impression     Row Name 10/10/19 1427          Pain Assessment    Additional Documentation  Pain Scale: Numbers Pre/Post-Treatment (Group)  -     Row Name 10/10/19 1427          Pain Scale: Numbers Pre/Post-Treatment    Pain Scale: Numbers, Pretreatment  0/10 - no pain  -AA     Pain Scale: Numbers, Post-Treatment  2/10  -AA     Pain Location - Side  Left  -AA     Pain Location - Orientation  generalized  -AA     Pain Location  chest  -AA     Pain Intervention(s)  Repositioned  -AA     Row Name 10/10/19 1427          Plan of Care Review    Plan of Care Reviewed With  patient  -AA     Roz[place]  Name 10/10/19 1427          Positioning and Restraints    Pre-Treatment Position  sitting in chair/recliner  -AA     Post Treatment Position  chair  -AA     In Chair  notified nsg;exit alarm on;reclined;call light within reach;encouraged to call for assist;RUE elevated;heels elevated;waffle cushion;on mechanical lift sling  -AA       User Key  (r) = Recorded By, (t) = Taken By, (c) = Cosigned By    Initials Name Provider Type    Nurys Napoles, PT Physical Therapist        Outcome Measures     Row Name 10/10/19 1530          How much help from another person do you currently need...    Turning from your back to your side while in flat bed without using bedrails?  2  -AA     Moving from lying on back to sitting on the side of a flat bed without bedrails?  2  -AA     Moving to and from a bed to a chair (including a wheelchair)?  1  -AA     Standing up from a chair using your arms (e.g., wheelchair, bedside chair)?  2  -AA     Climbing 3-5 steps with a railing?  1  -AA     To walk in hospital room?  1  -AA     AM-PAC 6 Clicks Score (PT)  9  -AA     Row Name 10/10/19 1530          Functional Assessment    Outcome Measure Options  AM-PAC 6 Clicks Basic Mobility (PT)  -RAGHU       User Key  (r) = Recorded By, (t) = Taken By, (c) = Cosigned By    Initials Name Provider Type    Nurys Napoles PT Physical Therapist        Physical Therapy Education     Title: PT OT SLP Therapies (In Progress)     Topic: Physical Therapy (In Progress)     Point: Mobility training (In Progress)     Learning Progress Summary           Patient Acceptance, E,D, NR by RAGHU at 10/10/2019  2:27 PM    Comment:  transfer sequencing  HEP    Acceptance, E, NR by DWAYNE at 10/9/2019  4:12 PM                   Point: Home exercise program (In Progress)     Learning Progress Summary           Patient Acceptance, E,D, NR by RAGHU at 10/10/2019  2:27 PM    Comment:  transfer sequencing  HEP                   Point: Body mechanics (In Progress)     Learning  Progress Summary           Patient Acceptance, E,D, NR by RAGHU at 10/10/2019  2:27 PM    Comment:  transfer sequencing  HEP                   Point: Precautions (In Progress)     Learning Progress Summary           Patient Acceptance, E,D, NR by RAGHU at 10/10/2019  2:27 PM    Comment:  transfer sequencing  HEP    Acceptance, E, NR by DWAYNE at 10/9/2019  4:12 PM                               User Key     Initials Effective Dates Name Provider Type Discipline     07/24/19 -  Zulema Gtz, PT Physical Therapist PT    RAGHU 04/02/18 -  Nurys Fishman PT Physical Therapist PT              PT Recommendation and Plan     Outcome Summary/Treatment Plan (PT)  Anticipated Discharge Disposition (PT): skilled nursing facility  Plan of Care Reviewed With: patient  Progress: no change  Outcome Summary: Pt decline gait belt and RW during transfers due to pain.  Pt perform STS transfers x5 with BUE support and trunk support with mod to max A.  Pt stand 15-60 seconds each stand.  Pt decline SPT training.  HEP reviewed with continued education required.  Recommend DC to [facility] rehab     Time Calculation:   PT Charges     Row Name 10/10/19 1427             Time Calculation    Start Time  1427  -AA      PT Received On  10/10/19  -AA      PT Goal Re-Cert Due Date  10/19/19  -AA         Time Calculation- PT    Total Timed Code Minutes- PT  24 minute(s)  -AA         Timed Charges    28143 - PT Therapeutic Exercise Minutes  10  -AA      02949 - PT Therapeutic Activity Minutes  14  -AA        User Key  (r) = Recorded By, (t) = Taken By, (c) = Cosigned By    Initials Name Provider Type    AA Nurys Fishman PT Physical Therapist        Therapy Charges for Today     Code Description Service Date Service Provider Modifiers Qty    74994229742 HC PT THER PROC EA 15 MIN 10/10/2019 Nurys Fishman, PT GP 1    07224745213 HC PT THERAPEUTIC ACT EA 15 MIN 10/10/2019 Nurys Fishman, PT GP 1          PT G-Codes  Outcome Measure Options: AM-PAC 6 Clicks  Basic Mobility (PT)  AM-PAC 6 Clicks Score (PT): 9  AM-PAC 6 Clicks Score (OT): 12 April AMELIA Fishman PT  10/10/2019

## 2019-10-11 PROCEDURE — G0378 HOSPITAL OBSERVATION PER HR: HCPCS

## 2019-10-11 PROCEDURE — 97110 THERAPEUTIC EXERCISES: CPT

## 2019-10-11 PROCEDURE — 96376 TX/PRO/DX INJ SAME DRUG ADON: CPT

## 2019-10-11 PROCEDURE — 25010000002 HYDROMORPHONE PER 4 MG: Performed by: NURSE PRACTITIONER

## 2019-10-11 PROCEDURE — 25010000002 KETOROLAC TROMETHAMINE PER 15 MG: Performed by: NURSE PRACTITIONER

## 2019-10-11 PROCEDURE — 97530 THERAPEUTIC ACTIVITIES: CPT

## 2019-10-11 PROCEDURE — 99225 PR SBSQ OBSERVATION CARE/DAY 25 MINUTES: CPT | Performed by: INTERNAL MEDICINE

## 2019-10-11 RX ORDER — CEPHALEXIN 500 MG/1
500 CAPSULE ORAL EVERY 12 HOURS SCHEDULED
Qty: 10 CAPSULE | Refills: 0 | Status: SHIPPED | OUTPATIENT
Start: 2019-10-11 | End: 2019-10-17

## 2019-10-11 RX ORDER — HYDROMORPHONE HYDROCHLORIDE 2 MG/1
1 TABLET ORAL EVERY 4 HOURS
Status: DISCONTINUED | OUTPATIENT
Start: 2019-10-11 | End: 2019-10-12 | Stop reason: HOSPADM

## 2019-10-11 RX ORDER — HYDROMORPHONE HYDROCHLORIDE 2 MG/1
1 TABLET ORAL EVERY 4 HOURS
Qty: 18 TABLET | Refills: 0
Start: 2019-10-11

## 2019-10-11 RX ADMIN — FUROSEMIDE 20 MG: 20 TABLET ORAL at 09:10

## 2019-10-11 RX ADMIN — SODIUM CHLORIDE, PRESERVATIVE FREE 10 ML: 5 INJECTION INTRAVENOUS at 09:13

## 2019-10-11 RX ADMIN — CARVEDILOL 3.12 MG: 3.12 TABLET, FILM COATED ORAL at 09:11

## 2019-10-11 RX ADMIN — SENNOSIDES 17.2 MG: 8.6 TABLET, FILM COATED ORAL at 21:51

## 2019-10-11 RX ADMIN — KETOROLAC TROMETHAMINE 15 MG: 30 INJECTION, SOLUTION INTRAMUSCULAR; INTRAVENOUS at 06:08

## 2019-10-11 RX ADMIN — DULOXETINE HYDROCHLORIDE 20 MG: 20 CAPSULE, DELAYED RELEASE ORAL at 09:11

## 2019-10-11 RX ADMIN — GABAPENTIN 100 MG: 100 CAPSULE ORAL at 09:11

## 2019-10-11 RX ADMIN — MELOXICAM 7.5 MG: 7.5 TABLET ORAL at 09:11

## 2019-10-11 RX ADMIN — SODIUM CHLORIDE, PRESERVATIVE FREE 10 ML: 5 INJECTION INTRAVENOUS at 01:09

## 2019-10-11 RX ADMIN — GABAPENTIN 100 MG: 100 CAPSULE ORAL at 21:51

## 2019-10-11 RX ADMIN — ASPIRIN 81 MG CHEWABLE TABLET 81 MG: 81 TABLET CHEWABLE at 21:51

## 2019-10-11 RX ADMIN — CARVEDILOL 3.12 MG: 3.12 TABLET, FILM COATED ORAL at 19:18

## 2019-10-11 RX ADMIN — HYDROMORPHONE HYDROCHLORIDE 1 MG: 2 TABLET ORAL at 21:51

## 2019-10-11 RX ADMIN — PANTOPRAZOLE SODIUM 40 MG: 40 TABLET, DELAYED RELEASE ORAL at 05:41

## 2019-10-11 RX ADMIN — HYDROMORPHONE HYDROCHLORIDE 1 MG: 2 TABLET ORAL at 14:04

## 2019-10-11 RX ADMIN — GABAPENTIN 100 MG: 100 CAPSULE ORAL at 16:09

## 2019-10-11 RX ADMIN — SODIUM CHLORIDE, PRESERVATIVE FREE 10 ML: 5 INJECTION INTRAVENOUS at 21:52

## 2019-10-11 RX ADMIN — LIDOCAINE 2 PATCH: 50 PATCH TOPICAL at 09:12

## 2019-10-11 RX ADMIN — HYDROMORPHONE HYDROCHLORIDE 0.5 MG: 1 INJECTION, SOLUTION INTRAMUSCULAR; INTRAVENOUS; SUBCUTANEOUS at 01:03

## 2019-10-11 RX ADMIN — HYDROMORPHONE HYDROCHLORIDE 1 MG: 2 TABLET ORAL at 09:52

## 2019-10-11 RX ADMIN — ACETAMINOPHEN 650 MG: 325 TABLET ORAL at 11:33

## 2019-10-11 RX ADMIN — CEPHALEXIN 500 MG: 250 CAPSULE ORAL at 21:51

## 2019-10-11 RX ADMIN — SENNOSIDES 17.2 MG: 8.6 TABLET, FILM COATED ORAL at 09:11

## 2019-10-11 RX ADMIN — CEPHALEXIN 500 MG: 250 CAPSULE ORAL at 09:11

## 2019-10-11 RX ADMIN — HYDROMORPHONE HYDROCHLORIDE 1 MG: 2 TABLET ORAL at 05:41

## 2019-10-11 RX ADMIN — ESCITALOPRAM OXALATE 10 MG: 10 TABLET ORAL at 09:11

## 2019-10-11 RX ADMIN — ASPIRIN 81 MG CHEWABLE TABLET 81 MG: 81 TABLET CHEWABLE at 09:11

## 2019-10-11 RX ADMIN — HYDROMORPHONE HYDROCHLORIDE 1 MG: 2 TABLET ORAL at 19:14

## 2019-10-11 NOTE — PROGRESS NOTES
Case Management Discharge Note    Final Note: Per [facility]/ [facility], they are able to accept patient when medically ready. Nurse to call report to [phone], dc summary will be pulled from [facility]. [facility] scheduled for today at 1600, if needs to be rescheduled their number is [phone]. Patient's family notified.     Destination - Selection Complete      Service Provider Request Status Selected Services Address Phone Number Fax Number    [facility] [id number] [facility] [id number] [phone]           Transportation Services  Ambulance: [facility]    Final Discharge Disposition Code: 04 - intermediate care facility

## 2019-10-11 NOTE — DISCHARGE SUMMARY
[facility] Medicine Services  DISCHARGE SUMMARY    Patient Name: Debbie Baum  : 1923  MRN: [id number]    Date of Admission: 10/8/2019  Date of Discharge:  10/11/19  Primary Care Physician: Paula Scott MD    Consults     Date and Time Order Name Status Description    10/9/2019 0805 Inpatient Palliative Care MD Consult Completed           Hospital Course     Presenting Problem:   Chest pain, unspecified type [R07.9]  Chest pain, unspecified type [R07.9]    Active Hospital Problems    Diagnosis  POA   • **Chest pain [R07.9]  Yes     Priority: Medium   • Costochondral chest pain [R07.1]  Yes   • Cellulitis of right lower extremity [L03.115]  Yes   • Cellulitis of left lower extremity [L03.116]  Yes   • Essential hypertension [I10]  Yes   • Coronary artery disease [I25.10]  Yes   • Weakness generalized [R53.1]  Yes      Resolved Hospital Problems   No resolved problems to display.          Hospital Course:  Debbie Baum is a 89 y.o. female with a history of chronic pain followed by the palliative care service at her nursing facility.  She has had functional decline since her going a hip fracture and surgery in February of this year.  Apparently she was using a gait belt for transfer which precipitated worsening chest pain.  Work-up in the emergency room was negative from a cardiac standpoint.  Is felt that the pain is more musculoskeletal in nature and also somewhat chronic.  Imaging did not show any acute fractures.  Palliative care was consulted here in the increased her Dilaudid scheduled to 1 mg every 4 hours as needed.  This has improved but not completely resolved her pain.  It is felt safe to discharge her back to the [facility].  Palliative care will continue to adjust the pain medications as needed while there.  There is also some questionable lower extremity cellulitis for which she will complete a short course of p.o. Keflex.  Improved at the time of  discharge.    ADDENDUM: discharge this date cancelled due to delay in ambulance transport, will plan to reschedule tomorrow.    Discharge Follow Up Recommendations:  Consult palliative care at facility to continue to follow there.    Day of Discharge     HPI:   Pain is improved, but still occurring.  Plan to transfer back to facility and palliative care will continue to follow.    Review of Systems  Limited ROS d/t mental status    Otherwise ROS is negative except as mentioned in the HPI.    Vital Signs:   Temp:  [97.2 °F (36.2 °C)-98 °F (36.7 °C)] 97.8 °F (36.6 °C)  Heart Rate:  [62-87] 83  Resp:  [16-18] 18  BP: ()/(51-89) 115/70     Physical Exam:  Constitutional: frail, chronically ill appearing, sleeping but easily awakens  HENT: NCAT, mucous membranes moist  Respiratory: Clear to auscultation bilaterally, respiratory effort normal   Cardiovascular: RRR, no murmurs, rubs, or gallops  Gastrointestinal: Positive bowel sounds, soft, nontender, nondistended  Musculoskeletal: No bilateral ankle edema  Psychiatric: Appropriate affect, cooperative  Neurologic: Oriented x 2, weak, but no focal deficits  Skin: No rashes      Pertinent  and/or Most Recent Results     Results from last 7 days   Lab Units 10/09/19  0646 10/08/19  2035   WBC 10*3/mm3 10.31 15.16*   HEMOGLOBIN g/dL 11.0* 11.0*   HEMATOCRIT % 37.1 36.4   PLATELETS 10*3/mm3 262 283   SODIUM mmol/L 139 143   POTASSIUM mmol/L 4.0 4.0   CHLORIDE mmol/L 105 102   CO2 mmol/L 27.0 31.0*   BUN mg/dL 19 20   CREATININE mg/dL 0.54* 0.67   GLUCOSE mg/dL 106* 115*   CALCIUM mg/dL 9.9* 9.8*     Results from last 7 days   Lab Units 10/09/19  0646 10/08/19  2035   BILIRUBIN mg/dL 0.3 0.2   ALK PHOS U/L 84 91   ALT (SGPT) U/L 6 8   AST (SGOT) U/L 13 13           Invalid input(s): TG, LDLCALC, LDLREALC  Results from last 7 days   Lab Units 10/09/19  0152 10/08/19  2035   PROBNP pg/mL  --  377.7   TROPONIN T ng/mL  --  <0.010   PROCALCITONIN ng/mL 0.04*  --    LACTATE  mmol/L 1.3  --        Brief Urine Lab Results  (Last result in the past 365 days)      Color   Clarity   Blood   Leuk Est   Nitrite   Protein   CREAT   Urine HCG        02/14/19 1453 Other Clear Negative Negative Negative Negative               Microbiology Results Abnormal     None          Imaging Results (all)     Procedure Component Value Units Date/Time    CT Chest Without Contrast [[id number]] Collected:  10/08/19 2218     Updated:  10/08/19 2220    Narrative:       CT CHEST, NONCONTRAST, 10/8/2019    HISTORY:  89-year-old female in the ED complaining of left side chest pain beginning yesterday.    TECHNIQUE:  CT imaging of the chest without IV contrast. Radiation dose reduction techniques included automated exposure control. Radiation audit for CT and nuclear cardiology exams in the last 12 months: 4.    COMPARISON:  *  CT chest, 8/8/2018. No chest x-ray has been obtained this visit.    FINDINGS:  No visible acute rib fracture. There is an old displaced mid sternal fracture that is unchanged since the prior exam. There are old T4 and T7 vertebral compression fractures that are also unchanged.    Large hiatal hernia with intrathoracic stomach. Bibasilar compressive atelectasis. Lungs otherwise clear. No acute pulmonary infiltrate, pneumothorax or pleural effusion.      Impression:       1. No active disease in the chest.  2. Old sternum and thoracic vertebral compression fractures. No acute thoracic fracture is identified.  3. Scoliosis.  4. Large hiatal hernia with intrathoracic stomach. Bibasilar pulmonary atelectasis.    Signer Name: Anthony Castañeda MD   Signed: 10/8/2019 10:18 PM   Workstation Name: [email or web address]    [facility]          Results for orders placed during the hospital encounter of 09/08/18   Duplex Venous Upper Extremity - Right    Narrative · Normal right upper extremity venous duplex scan.     PRELIMINARY TECH FINDINGS ONLY  All veins in the right upper extremity  appear compressible where   visualized.          Results for orders placed during the hospital encounter of 09/08/18   Duplex Venous Upper Extremity - Right    Narrative · Normal right upper extremity venous duplex scan.     PRELIMINARY TECH FINDINGS ONLY  All veins in the right upper extremity appear compressible where   visualized.          Results for orders placed during the hospital encounter of 09/17/18   Adult Transthoracic Echo Complete W/ Cont if Necessary Per Protocol    Narrative · Mild-to-moderate mitral valve regurgitation is present            Discharge Details        Discharge Medications      New Medications      Instructions Start Date   cephalexin 500 MG capsule  Commonly known as:  KEFLEX   500 mg, Oral, Every 12 Hours Scheduled      HYDROmorphone 2 MG tablet  Commonly known as:  DILAUDID  Replaces:  HYDROmorphone 1 MG/ML liquid liquid   1 mg, Oral, Every 4 Hours         Continue These Medications      Instructions Start Date   acetaminophen 650 MG 8 hr tablet  Commonly known as:  TYLENOL   1,300 mg, Oral, Nightly      aspirin 81 MG chewable tablet   81 mg, Oral, 2 Times Daily      B-12 PO   1 tablet, Oral, Daily      carvedilol 3.125 MG tablet  Commonly known as:  COREG   3.125 mg, Oral, 2 Times Daily With Meals      cholecalciferol 1000 units tablet  Commonly known as:  VITAMIN D3   1,000 Units, Oral, Daily      diclofenac 1 % gel gel  Commonly known as:  VOLTAREN   2 g, Topical, 4 Times Daily      docusate sodium 150 MG/15ML liquid  Commonly known as:  COLACE   100 mg, Oral, 2 Times Daily PRN      DULoxetine 20 MG capsule  Commonly known as:  CYMBALTA   20 mg, Oral, Daily      escitalopram 10 MG tablet  Commonly known as:  LEXAPRO   10 mg, Oral, Daily      esomeprazole 40 MG capsule  Commonly known as:  nexIUM   40 mg, Oral, Every Morning Before Breakfast      furosemide 20 MG tablet  Commonly known as:  LASIX   20 mg, Oral, 2 Times Daily      gabapentin 100 MG capsule  Commonly known as:   NEURONTIN   100 mg, Oral, 3 Times Daily      lidocaine 5 %  Commonly known as:  LIDODERM   1 patch, Transdermal, Every 24 Hours Scheduled, Remove & Discard patch within 12 hours or as directed by MD      meloxicam 7.5 MG tablet  Commonly known as:  MOBIC   7.5 mg, Oral, Daily      sennosides-docusate sodium 8.6-50 MG tablet  Commonly known as:  SENOKOT-S   2 tablets, Oral, Nightly PRN      trolamine salicylate 10 % cream  Commonly known as:  ASPERCREME   Topical, As Needed      uribel 118 MG capsule capsule   1 capsule, Oral, Every Night at Bedtime         Stop These Medications    HYDROmorphone 1 MG/ML liquid liquid  Commonly known as:  DILAUDID  Replaced by:  HYDROmorphone 2 MG tablet     Morphine 10 MG/5ML solution            Allergies   Allergen Reactions   • Hydrocodone-Acetaminophen Confusion     Also noted to contribute to increase tiredness along with diminished mental clarity with overall ineffective analgesic effect.    • Crab (Diagnostic) Hives   • Lovenox [Enoxaparin Sodium] Rash   • Penicillins Rash     unsure         Discharge Disposition:  Skilled Nursing Facility (DC - External)    Diet:  Hospital:  Diet Order   Procedures   • Diet Regular     Discharge:   Diet Instructions     Diet: Regular      Discharge Diet:  Regular          Discharge Activity:   Activity Instructions     Activity as Tolerated              CODE STATUS:    Code Status and Medical Interventions:   Ordered at: 10/08/19 3762     Level Of Support Discussed With:    Patient    Next of Kin (If No Surrogate)     Code Status:    No CPR     Medical Interventions (Level of Support Prior to Arrest):    Comfort Measures         No future appointments.        Time Spent on Discharge:  35 minutes    Electronically signed by Fadi Muir MD, 10/11/19, 1:31 PM.

## 2019-10-11 NOTE — PLAN OF CARE
Problem: Patient Care Overview  Goal: Interprofessional Rounds/Family Conf  Outcome: Ongoing (interventions implemented as appropriate)   10/11/19 1300   Interdisciplinary Rounds/Family Conf   Summary Patient working with PT. Liked her massage. Likes contact with people. Nurse requested heating pad. Ordered and placed on patient. Patient is returning back to nursing home today with [facility] Palliative Team to follow.   Participants ;advanced practice nurse;nursing;social work/services;physician   Palliative Care Team Attendance 1300:  Dr. Sterling Delgado, LAMAR Magdaleno, LAMAR Fernandez, Cristiana Terry, BENTLEY, CHPN, MARÍA ELENA Rivero, Nicky Ontiveros, RN, CM,Hospice, and Cora Lanier RN, CHPN

## 2019-10-11 NOTE — PLAN OF CARE
Problem: Patient Care Overview  Goal: Plan of Care Review  Outcome: Ongoing (interventions implemented as appropriate)   10/11/19 9253   Coping/Psychosocial   Plan of Care Reviewed With patient   Plan of Care Review   Progress improving   OTHER   Outcome Summary pt pain control continues to not be well controlled. spoke with daughter about plan of care reguarding pain control and to try and wean from iv to po. pt has only gotten 1 dose of iv pain medication this shift and will get a po dose.

## 2019-10-11 NOTE — PLAN OF CARE
Problem: Patient Care Overview  Goal: Plan of Care Review  Outcome: Ongoing (interventions implemented as appropriate)   10/11/19 9499   Coping/Psychosocial   Plan of Care Reviewed With patient   Plan of Care Review   Progress no change   OTHER   Outcome Summary Pt performs STS x5 with max A, BUE and trunk support, and pt refuse gait belt. Pt unable to stand >15 seconds due to pain. Pt scoot in chair mod A. HEP reviewed with pt and continued education required. Pt possible DC to SNF today

## 2019-10-11 NOTE — PLAN OF CARE
Problem: Patient Care Overview  Goal: Plan of Care Review  Outcome: Ongoing (interventions implemented as appropriate)   10/11/19 1343   Coping/Psychosocial   Plan of Care Reviewed With patient   Plan of Care Review   Progress improving   OTHER   Outcome Summary Pt to return to [facility] via ambulance.       Problem: Palliative Care (Adult)  Goal: Maximized Comfort  Outcome: Ongoing (interventions implemented as appropriate)  Suggest timing medications so patient receives some type of pill q 2 hours while awake.this seems to add an element of distraction as well as pain relief Heating pad to left shoulder seems to help w pain and relaxation.   10/11/19 1343   Palliative Care (Adult)   Maximized Comfort making progress toward out   10/11/19 1343   Palliative Care (Adult)   Maximized Comfort making progress toward outcome   come

## 2019-10-11 NOTE — PLAN OF CARE
Problem: Fall Risk (Adult)  Goal: Identify Related Risk Factors and Signs and Symptoms  Outcome: Ongoing (interventions implemented as appropriate)   10/11/19 0300   Fall Risk (Adult)   Related Risk Factors (Fall Risk) age-related changes;culprit medication(s);fear of falling;gait/mobility problems;polypharmacy;environment unfamiliar   Signs and Symptoms (Fall Risk) presence of risk factors

## 2019-10-11 NOTE — THERAPY TREATMENT NOTE
Patient Name: Debbie Baum  : 1923    MRN: [id number]                              Today's Date: 10/11/2019       Admit Date: 10/8/2019    Visit Dx:     ICD-10-CM ICD-9-CM   1. Chest pain, unspecified type R07.9 786.50   2. Cellulitis of right lower extremity L03.115 682.6   3. Cellulitis of left lower extremity L03.116 682.6   4. Leukocytosis, unspecified type D72.829 288.60   5. Impaired mobility and ADLs Z74.09 799.89     Patient Active Problem List   Diagnosis   • Hyponatremia   • Essential hypertension   • Coronary artery disease   • Weakness generalized   • GERD (gastroesophageal reflux disease)   • Atrial fibrillation (CMS/HCC)   • Somnolence   • Medication adverse effect   • Chronic pain   • Closed fracture of left hip (CMS/HCC)   • Falls   • Periorbital ecchymosis of left eye   • Hematoma   • Humeral head fracture   • Shoulder dislocation, recurrent, right   • Postoperative anemia due to acute blood loss   • Dysphagia   • Chest pain   • Costochondral chest pain   • Cellulitis of right lower extremity   • Cellulitis of left lower extremity     Past Medical History:   Diagnosis Date   • Arthritis    • Atrial fibrillation (CMS/HCC)    • Cancer (CMS/HCC)     colon   • Coronary artery disease    • GERD (gastroesophageal reflux disease)    • History of transfusion    • Hypertension      Past Surgical History:   Procedure Laterality Date   • CARDIAC ABLATION     • CHOLECYSTECTOMY     • COLON SURGERY      BOWEL RESECTION FOR HX COLON CANCER   • EYE SURGERY     • HIP INTERTROCHANTERIC NAILING Left 2019    Procedure: HIP INTERTROCHANTERIC NAILING LEFT;  Surgeon: Ariel Maynard MD;  Location: [facility];  Service: Orthopedics   • HYSTERECTOMY     • REPLACEMENT TOTAL KNEE       General Information     Row Name 10/11/19 1109          PT Evaluation Time/Intention    Mode of Treatment  physical therapy  -AA     Row Name 10/11/19 1109          General Information    Patient Profile Reviewed?  yes  -AA      Existing Precautions/Restrictions  fall;oxygen therapy device and L/min  -AA     Row Name 10/11/19 1109          Cognitive Assessment/Intervention- PT/OT    Orientation Status (Cognition)  oriented x 3  -AA     Row Name 10/11/19 1109          Safety Issues, Functional Mobility    Safety Issues Affecting Function (Mobility)  judgment;insight into deficits/self awareness;safety precautions follow-through/compliance;sequencing abilities  -AA     Impairments Affecting Function (Mobility)  balance;endurance/activity tolerance;pain;strength;range of motion (ROM)  -AA       User Key  (r) = Recorded By, (t) = Taken By, (c) = Cosigned By    Initials Name Provider Type    Nurys Napoles, PT Physical Therapist        Mobility     Row Name 10/11/19 1109          Bed Mobility Assessment/Treatment    Comment (Bed Mobility)  pt UIC  -AA     Row Name 10/11/19 1109          Transfer Assessment/Treatment    Comment (Transfers)  pt declined gait belt.  Pt reports pain in L pectoral musculature during transfers.  STS x5   -AA     Row Name 10/11/19 1109          Sit-Stand Transfer    Sit-Stand [place] (Transfers)  maximum assist (25% patient effort);verbal cues  -AA     Assistive Device (Sit-Stand Transfers)  -- UE support  -AA       User Key  (r) = Recorded By, (t) = Taken By, (c) = Cosigned By    Initials Name Provider Type    Nurys Napoles, PT Physical Therapist        Obj/Interventions     Row Name 10/11/19 1109          Therapeutic Exercise    Lower Extremity (Therapeutic Exercise)  gluteal sets;heel slides, bilateral;LAQ (long arc quad), bilateral;marching while seated;quad sets, bilateral;SLR (straight leg raise), bilateral  -AA     Lower Extremity Range of Motion (Therapeutic Exercise)  hip abduction/adduction, bilateral;ankle dorsiflexion/plantar flexion, bilateral  -AA     Exercise Type (Therapeutic Exercise)  AROM (active range of motion)  -AA     Position (Therapeutic Exercise)  seated  -AA     Sets/Reps  (Therapeutic Exercise)  10  -AA     Comment (Therapeutic Exercise)  pt perform through comfortable ROM  -AA     Row Name 10/11/19 1109          Static Sitting Balance    Level of [place] (Unsupported Sitting, Static Balance)  supervision  -AA     Sitting Position (Unsupported Sitting, Static Balance)  sitting in chair  -AA     Time Able to Maintain Position (Unsupported Sitting, Static Balance)  more than 5 minutes  -AA     Row Name 10/11/19 1109          Static Standing Balance    Level of [place] (Supported Standing, Static Balance)  maximal assist, 25 to 49% patient effort  -AA     Assistive Device Utilized (Supported Standing, Static Balance)  -- trunk and BUE support  -AA       User Key  (r) = Recorded By, (t) = Taken By, (c) = Cosigned By    Initials Name Provider Type    Nurys Napoles PT Physical Therapist        Goals/Plan    No documentation.       Clinical Impression     Row Name 10/11/19 1109          Pain Assessment    Additional Documentation  Pain Scale: Numbers Pre/Post-Treatment (Group)  -     Row Name 10/11/19 1109          Pain Scale: Numbers Pre/Post-Treatment    Pain Scale: Numbers, Pretreatment  5/10  -AA     Pain Scale: Numbers, Post-Treatment  5/10  -AA     Pain Location - Side  Left  -AA     Pain Location - Orientation  generalized  -AA     Pain Location  chest  -AA     Pain Intervention(s)  Repositioned  -     Row Name 10/11/19 1109          Plan of Care Review    Plan of Care Reviewed With  patient  -     Row Name 10/11/19 1109          Positioning and Restraints    Pre-Treatment Position  sitting in chair/recliner  -AA     Post Treatment Position  chair  -AA     In Chair  notified nsg;exit alarm on;waffle cushion;reclined;on mechanical lift sling;call light within reach;encouraged to call for assist  -AA       User Key  (r) = Recorded By, (t) = Taken By, (c) = Cosigned By    Initials Name Provider Type    Nurys Napoles PT Physical Therapist        Outcome Measures      Row Name 10/11/19 1109          How much help from another person do you currently need...    Turning from your back to your side while in flat bed without using bedrails?  2  -AA     Moving from lying on back to sitting on the side of a flat bed without bedrails?  2  -AA     Moving to and from a bed to a chair (including a wheelchair)?  1  -AA     Standing up from a chair using your arms (e.g., wheelchair, bedside chair)?  2  -AA     Climbing 3-5 steps with a railing?  1  -AA     To walk in hospital room?  1  -AA     AM-PAC 6 Clicks Score (PT)  9  -AA     Row Name 10/11/19 1109          Functional Assessment    Outcome Measure Options  AM-PAC 6 Clicks Basic Mobility (PT)  -AA       User Key  (r) = Recorded By, (t) = Taken By, (c) = Cosigned By    Initials Name Provider Type    Nurys Napoles, PT Physical Therapist        Physical Therapy Education     Title: PT OT SLP Therapies (In Progress)     Topic: Physical Therapy (In Progress)     Point: Mobility training (In Progress)     Learning Progress Summary           Patient Acceptance, E,D, NR by AA at 10/11/2019 11:09 AM    Comment:  sequencing during transfers  HEP review    Acceptance, E,D, NR by AA at 10/10/2019  2:27 PM    Comment:  transfer sequencing  HEP    Acceptance, E, NR by LM at 10/9/2019  4:12 PM                   Point: Home exercise program (In Progress)     Learning Progress Summary           Patient Acceptance, E,D, NR by AA at 10/11/2019 11:09 AM    Comment:  sequencing during transfers  HEP review    Acceptance, E,D, NR by AA at 10/10/2019  2:27 PM    Comment:  transfer sequencing  HEP                   Point: Body mechanics (In Progress)     Learning Progress Summary           Patient Acceptance, E,D, NR by AA at 10/10/2019  2:27 PM    Comment:  transfer sequencing  HEP                   Point: Precautions (In Progress)     Learning Progress Summary           Patient Acceptance, E,D, NR by AA at 10/10/2019  2:27 PM    Comment:  transfer  sequencing  HEP    Acceptance, E, NR by DWAYNE at 10/9/2019  4:12 PM                               User Key     Initials Effective Dates Name Provider Type Discipline     07/24/19 -  Zulema Gtz, PT Physical Therapist PT     04/02/18 -  Nurys Fishman PT Physical Therapist PT              PT Recommendation and Plan     Outcome Summary/Treatment Plan (PT)  Anticipated Discharge Disposition (PT): skilled nursing facility  Plan of Care Reviewed With: patient  Progress: no change  Outcome Summary: Pt performs STS x5 with max A, BUE and trunk support, and pt refuse gait belt.  Pt unable to stand >15 seconds due to pain.  Pt scoot in chair mod A.  HEP reviewed with pt and continued education required.  Pt possible DC to SNF today     Time Calculation:   PT Charges     Row Name 10/11/19 1109             Time Calculation    Start Time  1109  -AA      PT Received On  10/11/19  -AA      PT Goal Re-Cert Due Date  10/19/19  -AA         Time Calculation- PT    Total Timed Code Minutes- PT  24 minute(s)  -AA         Timed Charges    88364 - PT Therapeutic Exercise Minutes  10  -AA      63930 - PT Therapeutic Activity Minutes  14  -AA        User Key  (r) = Recorded By, (t) = Taken By, (c) = Cosigned By    Initials Name Provider Type    AA Nurys Fishman PT Physical Therapist        Therapy Charges for Today     Code Description Service Date Service Provider Modifiers Qty    53999962815 HC PT THER PROC EA 15 MIN 10/10/2019 Nurys Fishman, PT GP 1    80357520028 HC PT THERAPEUTIC ACT EA 15 MIN 10/10/2019 Nurys Fishman, PT GP 1    18276203493 HC PT THER PROC EA 15 MIN 10/11/2019 Nurys Fishman, PT GP 1    82546889035 HC PT THERAPEUTIC ACT EA 15 MIN 10/11/2019 Nurys Fishman, PT GP 1          PT G-Codes  Outcome Measure Options: AM-PAC 6 Clicks Basic Mobility (PT)  AM-PAC 6 Clicks Score (PT): 9  AM-PAC 6 Clicks Score (OT): 12    Nurys Fishman PT  10/11/2019

## 2019-10-12 VITALS
DIASTOLIC BLOOD PRESSURE: 59 MMHG | HEIGHT: 64 IN | OXYGEN SATURATION: 87 % | RESPIRATION RATE: 18 BRPM | TEMPERATURE: 97.8 F | WEIGHT: 109 LBS | BODY MASS INDEX: 18.61 KG/M2 | HEART RATE: 87 BPM | SYSTOLIC BLOOD PRESSURE: 121 MMHG

## 2019-10-12 PROCEDURE — G0378 HOSPITAL OBSERVATION PER HR: HCPCS

## 2019-10-12 PROCEDURE — 96376 TX/PRO/DX INJ SAME DRUG ADON: CPT

## 2019-10-12 PROCEDURE — 99217 PR OBSERVATION CARE DISCHARGE MANAGEMENT: CPT | Performed by: INTERNAL MEDICINE

## 2019-10-12 PROCEDURE — 25010000002 HYDROMORPHONE PER 4 MG: Performed by: NURSE PRACTITIONER

## 2019-10-12 RX ADMIN — ASPIRIN 81 MG CHEWABLE TABLET 81 MG: 81 TABLET CHEWABLE at 08:05

## 2019-10-12 RX ADMIN — FUROSEMIDE 20 MG: 20 TABLET ORAL at 08:06

## 2019-10-12 RX ADMIN — LIDOCAINE 2 PATCH: 50 PATCH TOPICAL at 08:08

## 2019-10-12 RX ADMIN — GABAPENTIN 100 MG: 100 CAPSULE ORAL at 08:06

## 2019-10-12 RX ADMIN — HYDROMORPHONE HYDROCHLORIDE 1 MG: 2 TABLET ORAL at 06:44

## 2019-10-12 RX ADMIN — CEPHALEXIN 500 MG: 250 CAPSULE ORAL at 08:06

## 2019-10-12 RX ADMIN — ESCITALOPRAM OXALATE 10 MG: 10 TABLET ORAL at 08:05

## 2019-10-12 RX ADMIN — DULOXETINE HYDROCHLORIDE 20 MG: 20 CAPSULE, DELAYED RELEASE ORAL at 08:05

## 2019-10-12 RX ADMIN — HYDROMORPHONE HYDROCHLORIDE 0.5 MG: 1 INJECTION, SOLUTION INTRAMUSCULAR; INTRAVENOUS; SUBCUTANEOUS at 08:07

## 2019-10-12 RX ADMIN — PANTOPRAZOLE SODIUM 40 MG: 40 TABLET, DELAYED RELEASE ORAL at 06:44

## 2019-10-12 RX ADMIN — CARVEDILOL 3.12 MG: 3.12 TABLET, FILM COATED ORAL at 08:05

## 2019-10-12 RX ADMIN — MELOXICAM 7.5 MG: 7.5 TABLET ORAL at 08:06

## 2019-10-12 NOTE — PLAN OF CARE
Problem: Cardiac: ACS (Acute Coronary Syndrome) (Adult)  Goal: Signs and Symptoms of Listed Potential Problems Will be Absent, Minimized or Managed (Cardiac: ACS)   10/12/19 0612   Goal/Outcome Evaluation   Problems Assessed (Acute Coronary Syndrome) all   Problems Present (Acute Coronary Syn) none

## 2019-10-12 NOTE — PLAN OF CARE
Problem: Skin Injury Risk (Adult)  Goal: Identify Related Risk Factors and Signs and Symptoms   10/12/19 0612   Skin Injury Risk (Adult)   Related Risk Factors (Skin Injury Risk) advanced age;tissue perfusion altered

## 2019-10-12 NOTE — DISCHARGE SUMMARY
[facility] Medicine Services  DISCHARGE SUMMARY    Patient Name: Debbie Baum  : 1923  MRN: [id number]    Date of Admission: 10/8/2019  Date of Discharge:  10/12/19  Primary Care Physician: Paula Scott MD    Consults     Date and Time Order Name Status Description    10/9/2019 0805 Inpatient Palliative Care MD Consult Completed           Hospital Course     Presenting Problem:   Chest pain, unspecified type [R07.9]  Chest pain, unspecified type [R07.9]    Active Hospital Problems    Diagnosis  POA   • **Chest pain [R07.9]  Yes     Priority: Medium   • Costochondral chest pain [R07.1]  Yes   • Cellulitis of right lower extremity [L03.115]  Yes   • Cellulitis of left lower extremity [L03.116]  Yes   • Essential hypertension [I10]  Yes   • Coronary artery disease [I25.10]  Yes   • Weakness generalized [R53.1]  Yes      Resolved Hospital Problems   No resolved problems to display.          Hospital Course:  Debbie Baum is a 89 y.o. female with a history of chronic pain followed by the palliative care service at her nursing facility.  She has had functional decline since her going a hip fracture and surgery in February of this year.  Apparently she was using a gait belt for transfer which precipitated worsening chest pain.  Work-up in the emergency room was negative from a cardiac standpoint.  Is felt that the pain is more musculoskeletal in nature and also somewhat chronic.  Imaging did not show any acute fractures.  Palliative care was consulted here in the increased her Dilaudid scheduled to 1 mg every 4 hours as needed.  This has improved but not completely resolved her pain.  It is felt safe to discharge her back to the [facility].  Palliative care will continue to adjust the pain medications as needed while there.  There is also some questionable lower extremity cellulitis for which she will complete a short course of p.o. Keflex.  Improved at the time of discharge.  Original  discharge on 10/11 had to be canceled due to delay and ambulance.  There is no change in her clinical status and she is still okay to go.    Discharge Follow Up Recommendations:  Consult palliative care at facility to continue to follow there.    Day of Discharge     HPI:   Continues to complain of pain whenever she is awake, but doing ok and is comfortable going back to SNF today.    Review of Systems  Limited ROS d/t mental status    Vital Signs:   Temp:  [97.2 °F (36.2 °C)-98.4 °F (36.9 °C)] 97.8 °F (36.6 °C)  Heart Rate:  [] 87  Resp:  [18] 18  BP: ()/(49-81) 121/59     Physical Exam:  Constitutional: frail, chronically ill appearing, sleeping in bed but easily awakens and then complains of pain  HENT: NCAT, mucous membranes moist  Respiratory: Clear to auscultation bilaterally, respiratory effort normal   Cardiovascular: RRR, no murmurs, rubs, or gallops  Gastrointestinal: Positive bowel sounds, soft, nontender, nondistended  Musculoskeletal: No bilateral ankle edema  Psychiatric: Appropriate affect, cooperative  Neurologic: Oriented x 2, weak, but no focal deficits  Skin: No rashes      Pertinent  and/or Most Recent Results     Results from last 7 days   Lab Units 10/09/19  0646 10/08/19  2035   WBC 10*3/mm3 10.31 15.16*   HEMOGLOBIN g/dL 11.0* 11.0*   HEMATOCRIT % 37.1 36.4   PLATELETS 10*3/mm3 262 283   SODIUM mmol/L 139 143   POTASSIUM mmol/L 4.0 4.0   CHLORIDE mmol/L 105 102   CO2 mmol/L 27.0 31.0*   BUN mg/dL 19 20   CREATININE mg/dL 0.54* 0.67   GLUCOSE mg/dL 106* 115*   CALCIUM mg/dL 9.9* 9.8*     Results from last 7 days   Lab Units 10/09/19  0646 10/08/19  2035   BILIRUBIN mg/dL 0.3 0.2   ALK PHOS U/L 84 91   ALT (SGPT) U/L 6 8   AST (SGOT) U/L 13 13           Invalid input(s): TG, LDLCALC, LDLREALC  Results from last 7 days   Lab Units 10/09/19  0152 10/08/19  2035   PROBNP pg/mL  --  377.7   TROPONIN T ng/mL  --  <0.010   PROCALCITONIN ng/mL 0.04*  --    LACTATE mmol/L 1.3  --         Brief Urine Lab Results  (Last result in the past 365 days)      Color   Clarity   Blood   Leuk Est   Nitrite   Protein   CREAT   Urine HCG        02/14/19 1453 Other Clear Negative Negative Negative Negative               Microbiology Results Abnormal     None          Imaging Results (all)     Procedure Component Value Units Date/Time    CT Chest Without Contrast [[id number]] Collected:  10/08/19 2218     Updated:  10/08/19 2220    Narrative:       CT CHEST, NONCONTRAST, 10/8/2019    HISTORY:  89-year-old female in the ED complaining of left side chest pain beginning yesterday.    TECHNIQUE:  CT imaging of the chest without IV contrast. Radiation dose reduction techniques included automated exposure control. Radiation audit for CT and nuclear cardiology exams in the last 12 months: 4.    COMPARISON:  *  CT chest, 8/8/2018. No chest x-ray has been obtained this visit.    FINDINGS:  No visible acute rib fracture. There is an old displaced mid sternal fracture that is unchanged since the prior exam. There are old T4 and T7 vertebral compression fractures that are also unchanged.    Large hiatal hernia with intrathoracic stomach. Bibasilar compressive atelectasis. Lungs otherwise clear. No acute pulmonary infiltrate, pneumothorax or pleural effusion.      Impression:       1. No active disease in the chest.  2. Old sternum and thoracic vertebral compression fractures. No acute thoracic fracture is identified.  3. Scoliosis.  4. Large hiatal hernia with intrathoracic stomach. Bibasilar pulmonary atelectasis.    Signer Name: Anthony Castañeda MD   Signed: 10/8/2019 10:18 PM   Workstation Name: [id number]    [facility]          Results for orders placed during the hospital encounter of 09/08/18   Duplex Venous Upper Extremity - Right    Narrative · Normal right upper extremity venous duplex scan.     PRELIMINARY TECH FINDINGS ONLY  All veins in the right upper extremity appear  compressible where   visualized.          Results for orders placed during the hospital encounter of 09/08/18   Duplex Venous Upper Extremity - Right    Narrative · Normal right upper extremity venous duplex scan.     PRELIMINARY TECH FINDINGS ONLY  All veins in the right upper extremity appear compressible where   visualized.          Results for orders placed during the hospital encounter of 09/17/18   Adult Transthoracic Echo Complete W/ Cont if Necessary Per Protocol    Narrative · Mild-to-moderate mitral valve regurgitation is present            Discharge Details        Discharge Medications      New Medications      Instructions Start Date   cephalexin 500 MG capsule  Commonly known as:  KEFLEX   500 mg, Oral, Every 12 Hours Scheduled      HYDROmorphone 2 MG tablet  Commonly known as:  DILAUDID  Replaces:  HYDROmorphone 1 MG/ML liquid liquid   1 mg, Oral, Every 4 Hours         Continue These Medications      Instructions Start Date   acetaminophen 650 MG 8 hr tablet  Commonly known as:  TYLENOL   1,300 mg, Oral, Nightly      aspirin 81 MG chewable tablet   81 mg, Oral, 2 Times Daily      B-12 PO   1 tablet, Oral, Daily      carvedilol 3.125 MG tablet  Commonly known as:  COREG   3.125 mg, Oral, 2 Times Daily With Meals      cholecalciferol 1000 units tablet  Commonly known as:  VITAMIN D3   1,000 Units, Oral, Daily      diclofenac 1 % gel gel  Commonly known as:  VOLTAREN   2 g, Topical, 4 Times Daily      docusate sodium 150 MG/15ML liquid  Commonly known as:  COLACE   100 mg, Oral, 2 Times Daily PRN      DULoxetine 20 MG capsule  Commonly known as:  CYMBALTA   20 mg, Oral, Daily      escitalopram 10 MG tablet  Commonly known as:  LEXAPRO   10 mg, Oral, Daily      esomeprazole 40 MG capsule  Commonly known as:  nexIUM   40 mg, Oral, Every Morning Before Breakfast      furosemide 20 MG tablet  Commonly known as:  LASIX   20 mg, Oral, 2 Times Daily      gabapentin 100 MG capsule  Commonly known as:  NEURONTIN    100 mg, Oral, 3 Times Daily      lidocaine 5 %  Commonly known as:  LIDODERM   1 patch, Transdermal, Every 24 Hours Scheduled, Remove & Discard patch within 12 hours or as directed by MD      meloxicam 7.5 MG tablet  Commonly known as:  MOBIC   7.5 mg, Oral, Daily      sennosides-docusate sodium 8.6-50 MG tablet  Commonly known as:  SENOKOT-S   2 tablets, Oral, Nightly PRN      trolamine salicylate 10 % cream  Commonly known as:  ASPERCREME   Topical, As Needed      uribel 118 MG capsule capsule   1 capsule, Oral, Every Night at Bedtime         Stop These Medications    HYDROmorphone 1 MG/ML liquid liquid  Commonly known as:  DILAUDID  Replaced by:  HYDROmorphone 2 MG tablet     Morphine 10 MG/5ML solution            Allergies   Allergen Reactions   • Hydrocodone-Acetaminophen Confusion     Also noted to contribute to increase tiredness along with diminished mental clarity with overall ineffective analgesic effect.    • Crab (Diagnostic) Hives   • Lovenox [Enoxaparin Sodium] Rash   • Penicillins Rash     unsure         Discharge Disposition:  Skilled Nursing Facility (DC - External)    Diet:  Hospital:  Diet Order   Procedures   • Diet Regular     Discharge:   Diet Instructions     Diet: Regular      Discharge Diet:  Regular    Diet: Regular      Discharge Diet:  Regular          Discharge Activity:   Activity Instructions     Activity as Tolerated      Activity as Tolerated            CODE STATUS:    Code Status and Medical Interventions:   Ordered at: 10/08/19 1881     Level Of Support Discussed With:    Patient    Next of Kin (If No Surrogate)     Code Status:    No CPR     Medical Interventions (Level of Support Prior to Arrest):    Comfort Measures         No future appointments.      Time Spent on Discharge:  20 minutes    Electronically signed by Fadi Muir MD, 10/11/19, 1:31 PM.

## 2019-10-21 ENCOUNTER — TRANSCRIBE ORDERS (OUTPATIENT)
Dept: ADMINISTRATIVE | Facility: HOSPITAL | Age: 84
End: 2019-10-21

## 2019-10-21 DIAGNOSIS — N93.9 ABNORMAL UTERINE BLEEDING (AUB): Primary | ICD-10-CM

## 2019-10-28 ENCOUNTER — HOSPITAL ENCOUNTER (EMERGENCY)
Facility: HOSPITAL | Age: 84
Discharge: HOME | End: 2019-10-28
Attending: EMERGENCY MEDICINE | Admitting: EMERGENCY MEDICINE

## 2019-10-28 VITALS
SYSTOLIC BLOOD PRESSURE: 123 MMHG | HEART RATE: 76 BPM | DIASTOLIC BLOOD PRESSURE: 66 MMHG | BODY MASS INDEX: 18.66 KG/M2 | TEMPERATURE: 98.3 F | OXYGEN SATURATION: 97 % | RESPIRATION RATE: 16 BRPM | HEIGHT: 65 IN | WEIGHT: 112 LBS

## 2019-10-28 DIAGNOSIS — I87.2 VENOUS STASIS DERMATITIS OF BOTH LOWER EXTREMITIES: Primary | ICD-10-CM

## 2019-10-28 LAB
ANION GAP SERPL CALCULATED.3IONS-SCNC: 10 MMOL/L (ref 5–15)
BASOPHILS # BLD AUTO: 0.03 10*3/MM3 (ref 0–0.2)
BASOPHILS NFR BLD AUTO: 0.3 % (ref 0–1.5)
BUN BLD-MCNC: 24 MG/DL (ref 8–23)
BUN/CREAT SERPL: 33.8 (ref 7–25)
CALCIUM SPEC-SCNC: 9.5 MG/DL (ref 8.2–9.6)
CHLORIDE SERPL-SCNC: 101 MMOL/L (ref 98–107)
CO2 SERPL-SCNC: 31 MMOL/L (ref 22–29)
CREAT BLD-MCNC: 0.71 MG/DL (ref 0.57–1)
CRP SERPL-MCNC: 0.2 MG/DL (ref 0–0.5)
DEPRECATED RDW RBC AUTO: 68.2 FL (ref 37–54)
EOSINOPHIL # BLD AUTO: 0.76 10*3/MM3 (ref 0–0.4)
EOSINOPHIL NFR BLD AUTO: 8.5 % (ref 0.3–6.2)
ERYTHROCYTE [DISTWIDTH] IN BLOOD BY AUTOMATED COUNT: 20.3 % (ref 12.3–15.4)
ERYTHROCYTE [SEDIMENTATION RATE] IN BLOOD: 36 MM/HR (ref 0–30)
GFR SERPL CREATININE-BSD FRML MDRD: 76 ML/MIN/1.73
GLUCOSE BLD-MCNC: 83 MG/DL (ref 65–99)
HCT VFR BLD AUTO: 35.3 % (ref 34–46.6)
HGB BLD-MCNC: 10.5 G/DL (ref 12–15.9)
IMM GRANULOCYTES # BLD AUTO: 0.04 10*3/MM3 (ref 0–0.05)
IMM GRANULOCYTES NFR BLD AUTO: 0.4 % (ref 0–0.5)
LYMPHOCYTES # BLD AUTO: 1.35 10*3/MM3 (ref 0.7–3.1)
LYMPHOCYTES NFR BLD AUTO: 15.2 % (ref 19.6–45.3)
MCH RBC QN AUTO: 27.5 PG (ref 26.6–33)
MCHC RBC AUTO-ENTMCNC: 29.7 G/DL (ref 31.5–35.7)
MCV RBC AUTO: 92.4 FL (ref 79–97)
MONOCYTES # BLD AUTO: 0.72 10*3/MM3 (ref 0.1–0.9)
MONOCYTES NFR BLD AUTO: 8.1 % (ref 5–12)
NEUTROPHILS # BLD AUTO: 6.01 10*3/MM3 (ref 1.7–7)
NEUTROPHILS NFR BLD AUTO: 67.5 % (ref 42.7–76)
NRBC BLD AUTO-RTO: 0 /100 WBC (ref 0–0.2)
PLATELET # BLD AUTO: 319 10*3/MM3 (ref 140–450)
PMV BLD AUTO: 9.4 FL (ref 6–12)
POTASSIUM BLD-SCNC: 3.9 MMOL/L (ref 3.5–5.2)
RBC # BLD AUTO: 3.82 10*6/MM3 (ref 3.77–5.28)
SODIUM BLD-SCNC: 142 MMOL/L (ref 136–145)
WBC NRBC COR # BLD: 8.91 10*3/MM3 (ref 3.4–10.8)

## 2019-10-28 PROCEDURE — 85652 RBC SED RATE AUTOMATED: CPT | Performed by: EMERGENCY MEDICINE

## 2019-10-28 PROCEDURE — 85025 COMPLETE CBC W/AUTO DIFF WBC: CPT | Performed by: EMERGENCY MEDICINE

## 2019-10-28 PROCEDURE — 99284 EMERGENCY DEPT VISIT MOD MDM: CPT

## 2019-10-28 PROCEDURE — 80048 BASIC METABOLIC PNL TOTAL CA: CPT | Performed by: EMERGENCY MEDICINE

## 2019-10-28 PROCEDURE — 86140 C-REACTIVE PROTEIN: CPT | Performed by: EMERGENCY MEDICINE

## 2019-10-28 RX ORDER — POTASSIUM CHLORIDE 20 MEQ/1
20 TABLET, EXTENDED RELEASE ORAL 2 TIMES DAILY
COMMUNITY

## 2019-10-28 RX ORDER — IBUPROFEN 400 MG/1
400 TABLET ORAL EVERY 6 HOURS PRN
COMMUNITY

## 2019-10-28 RX ORDER — PANTOPRAZOLE SODIUM 40 MG/1
40 TABLET, DELAYED RELEASE ORAL DAILY
COMMUNITY

## 2019-10-28 RX ORDER — HYDROMORPHONE HYDROCHLORIDE 2 MG/1
1 TABLET ORAL ONCE
Status: COMPLETED | OUTPATIENT
Start: 2019-10-28 | End: 2019-10-28

## 2019-10-28 RX ORDER — CEPHALEXIN 500 MG/1
500 CAPSULE ORAL 2 TIMES DAILY
COMMUNITY
End: 2019-10-28

## 2019-10-28 RX ADMIN — HYDROMORPHONE HYDROCHLORIDE 1 MG: 2 TABLET ORAL at 18:44

## 2019-10-29 ENCOUNTER — APPOINTMENT (OUTPATIENT)
Dept: ULTRASOUND IMAGING | Facility: HOSPITAL | Age: 84
End: 2019-10-29

## 2019-11-06 ENCOUNTER — TRANSCRIBE ORDERS (OUTPATIENT)
Dept: ADMINISTRATIVE | Facility: HOSPITAL | Age: 84
End: 2019-11-06

## 2019-11-06 DIAGNOSIS — N93.9 VAGINAL HEMORRHAGE: Primary | ICD-10-CM

## 2019-11-08 ENCOUNTER — HOSPITAL ENCOUNTER (OUTPATIENT)
Dept: ULTRASOUND IMAGING | Facility: HOSPITAL | Age: 84
Discharge: HOME | End: 2019-11-08
Admitting: INTERNAL MEDICINE

## 2019-11-08 DIAGNOSIS — N93.9 VAGINAL HEMORRHAGE: ICD-10-CM

## 2019-11-08 PROCEDURE — 76856 US EXAM PELVIC COMPLETE: CPT

## 2020-01-01 ENCOUNTER — APPOINTMENT (OUTPATIENT)
Dept: CT IMAGING | Facility: HOSPITAL | Age: 85
End: 2020-01-01

## 2020-01-01 ENCOUNTER — ANESTHESIA EVENT (OUTPATIENT)
Dept: GASTROENTEROLOGY | Facility: HOSPITAL | Age: 85
End: 2020-01-01

## 2020-01-01 ENCOUNTER — APPOINTMENT (OUTPATIENT)
Dept: GENERAL RADIOLOGY | Facility: HOSPITAL | Age: 85
End: 2020-01-01

## 2020-01-01 ENCOUNTER — HOSPITAL ENCOUNTER (INPATIENT)
Facility: HOSPITAL | Age: 85
LOS: 12 days | End: 2021-01-02
Attending: EMERGENCY MEDICINE | Admitting: FAMILY MEDICINE

## 2020-01-01 ENCOUNTER — HOSPITAL ENCOUNTER (INPATIENT)
Facility: HOSPITAL | Age: 85
End: 2020-01-01
Attending: INTERNAL MEDICINE | Admitting: INTERNAL MEDICINE

## 2020-01-01 ENCOUNTER — ANESTHESIA (OUTPATIENT)
Dept: GASTROENTEROLOGY | Facility: HOSPITAL | Age: 85
End: 2020-01-01

## 2020-01-01 DIAGNOSIS — S16.1XXA STRAIN OF NECK MUSCLE, INITIAL ENCOUNTER: ICD-10-CM

## 2020-01-01 DIAGNOSIS — R13.10 DYSPHAGIA, UNSPECIFIED TYPE: ICD-10-CM
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
## 2020-01-01 DIAGNOSIS — E87.6 HYPOKALEMIA: Primary | ICD-10-CM

## 2020-01-01 LAB
ALBUMIN SERPL-MCNC: 3.1 G/DL (ref 3.5–5.2)
ALBUMIN/GLOB SERPL: 0.9 G/DL
ALP SERPL-CCNC: 95 U/L (ref 39–117)
ALT SERPL W P-5'-P-CCNC: 14 U/L (ref 1–33)
ANION GAP SERPL CALCULATED.3IONS-SCNC: 10 MMOL/L (ref 5–15)
ANION GAP SERPL CALCULATED.3IONS-SCNC: 10 MMOL/L (ref 5–15)
ANION GAP SERPL CALCULATED.3IONS-SCNC: 11 MMOL/L (ref 5–15)
ANION GAP SERPL CALCULATED.3IONS-SCNC: 11 MMOL/L (ref 5–15)
ANION GAP SERPL CALCULATED.3IONS-SCNC: 12 MMOL/L (ref 5–15)
ANION GAP SERPL CALCULATED.3IONS-SCNC: 13 MMOL/L (ref 5–15)
ANION GAP SERPL CALCULATED.3IONS-SCNC: 14 MMOL/L (ref 5–15)
ANION GAP SERPL CALCULATED.3IONS-SCNC: 15 MMOL/L (ref 5–15)
AST SERPL-CCNC: 18 U/L (ref 1–32)
BACTERIA SPEC AEROBE CULT: NORMAL
BACTERIA UR QL AUTO: ABNORMAL /HPF
BASOPHILS # BLD AUTO: 0.02 10*3/MM3 (ref 0–0.2)
BASOPHILS # BLD AUTO: 0.02 10*3/MM3 (ref 0–0.2)
BASOPHILS # BLD AUTO: 0.03 10*3/MM3 (ref 0–0.2)
BASOPHILS # BLD AUTO: 0.03 10*3/MM3 (ref 0–0.2)
BASOPHILS # BLD AUTO: 0.04 10*3/MM3 (ref 0–0.2)
BASOPHILS # BLD AUTO: 0.04 10*3/MM3 (ref 0–0.2)
BASOPHILS # BLD AUTO: 0.05 10*3/MM3 (ref 0–0.2)
BASOPHILS NFR BLD AUTO: 0.2 % (ref 0–1.5)
BASOPHILS NFR BLD AUTO: 0.2 % (ref 0–1.5)
BASOPHILS NFR BLD AUTO: 0.3 % (ref 0–1.5)
BASOPHILS NFR BLD AUTO: 0.3 % (ref 0–1.5)
BASOPHILS NFR BLD AUTO: 0.4 % (ref 0–1.5)
BILIRUB SERPL-MCNC: 0.5 MG/DL (ref 0–1.2)
BILIRUB UR QL STRIP: NEGATIVE
BUN SERPL-MCNC: 6 MG/DL (ref 8–23)
BUN SERPL-MCNC: 7 MG/DL (ref 8–23)
BUN SERPL-MCNC: 8 MG/DL (ref 8–23)
BUN/CREAT SERPL: 10.4 (ref 7–25)
BUN/CREAT SERPL: 10.5 (ref 7–25)
BUN/CREAT SERPL: 10.5 (ref 7–25)
BUN/CREAT SERPL: 10.9 (ref 7–25)
BUN/CREAT SERPL: 10.9 (ref 7–25)
BUN/CREAT SERPL: 12.7 (ref 7–25)
BUN/CREAT SERPL: 13 (ref 7–25)
BUN/CREAT SERPL: 8.7 (ref 7–25)
BURR CELLS BLD QL SMEAR: NORMAL
CALCIUM SPEC-SCNC: 8.8 MG/DL (ref 8.2–9.6)
CALCIUM SPEC-SCNC: 9 MG/DL (ref 8.2–9.6)
CALCIUM SPEC-SCNC: 9.3 MG/DL (ref 8.2–9.6)
CALCIUM SPEC-SCNC: 9.6 MG/DL (ref 8.2–9.6)
CALCIUM SPEC-SCNC: 9.7 MG/DL (ref 8.2–9.6)
CALCIUM SPEC-SCNC: 9.8 MG/DL (ref 8.2–9.6)
CHLORIDE SERPL-SCNC: 100 MMOL/L (ref 98–107)
CHLORIDE SERPL-SCNC: 104 MMOL/L (ref 98–107)
CHLORIDE SERPL-SCNC: 108 MMOL/L (ref 98–107)
CHLORIDE SERPL-SCNC: 110 MMOL/L (ref 98–107)
CHLORIDE SERPL-SCNC: 95 MMOL/L (ref 98–107)
CHLORIDE SERPL-SCNC: 99 MMOL/L (ref 98–107)
CK SERPL-CCNC: 117 U/L (ref 20–180)
CLARITY UR: CLEAR
CLUMPED PLATELETS: PRESENT
CO2 SERPL-SCNC: 18 MMOL/L (ref 22–29)
CO2 SERPL-SCNC: 19 MMOL/L (ref 22–29)
CO2 SERPL-SCNC: 21 MMOL/L (ref 22–29)
CO2 SERPL-SCNC: 23 MMOL/L (ref 22–29)
CO2 SERPL-SCNC: 24 MMOL/L (ref 22–29)
CO2 SERPL-SCNC: 25 MMOL/L (ref 22–29)
CO2 SERPL-SCNC: 27 MMOL/L (ref 22–29)
CO2 SERPL-SCNC: 32 MMOL/L (ref 22–29)
COLOR UR: YELLOW
CREAT SERPL-MCNC: 0.54 MG/DL (ref 0.57–1)
CREAT SERPL-MCNC: 0.55 MG/DL (ref 0.57–1)
CREAT SERPL-MCNC: 0.55 MG/DL (ref 0.57–1)
CREAT SERPL-MCNC: 0.57 MG/DL (ref 0.57–1)
CREAT SERPL-MCNC: 0.64 MG/DL (ref 0.57–1)
CREAT SERPL-MCNC: 0.67 MG/DL (ref 0.57–1)
CREAT SERPL-MCNC: 0.69 MG/DL (ref 0.57–1)
CREAT SERPL-MCNC: 0.76 MG/DL (ref 0.57–1)
D-LACTATE SERPL-SCNC: 1.4 MMOL/L (ref 0.5–2)
DEPRECATED RDW RBC AUTO: 57.5 FL (ref 37–54)
DEPRECATED RDW RBC AUTO: 59.6 FL (ref 37–54)
DEPRECATED RDW RBC AUTO: 59.9 FL (ref 37–54)
DEPRECATED RDW RBC AUTO: 60.2 FL (ref 37–54)
DEPRECATED RDW RBC AUTO: 65.8 FL (ref 37–54)
DEPRECATED RDW RBC AUTO: 66 FL (ref 37–54)
DEPRECATED RDW RBC AUTO: 67.8 FL (ref 37–54)
EOSINOPHIL # BLD AUTO: 0.02 10*3/MM3 (ref 0–0.4)
EOSINOPHIL # BLD AUTO: 0.03 10*3/MM3 (ref 0–0.4)
EOSINOPHIL # BLD AUTO: 0.07 10*3/MM3 (ref 0–0.4)
EOSINOPHIL # BLD AUTO: 0.09 10*3/MM3 (ref 0–0.4)
EOSINOPHIL # BLD AUTO: 0.11 10*3/MM3 (ref 0–0.4)
EOSINOPHIL NFR BLD AUTO: 0.2 % (ref 0.3–6.2)
EOSINOPHIL NFR BLD AUTO: 0.3 % (ref 0.3–6.2)
EOSINOPHIL NFR BLD AUTO: 0.6 % (ref 0.3–6.2)
EOSINOPHIL NFR BLD AUTO: 0.8 % (ref 0.3–6.2)
EOSINOPHIL NFR BLD AUTO: 1.2 % (ref 0.3–6.2)
ERYTHROCYTE [DISTWIDTH] IN BLOOD BY AUTOMATED COUNT: 17.2 % (ref 12.3–15.4)
ERYTHROCYTE [DISTWIDTH] IN BLOOD BY AUTOMATED COUNT: 17.2 % (ref 12.3–15.4)
ERYTHROCYTE [DISTWIDTH] IN BLOOD BY AUTOMATED COUNT: 17.3 % (ref 12.3–15.4)
ERYTHROCYTE [DISTWIDTH] IN BLOOD BY AUTOMATED COUNT: 17.5 % (ref 12.3–15.4)
ERYTHROCYTE [DISTWIDTH] IN BLOOD BY AUTOMATED COUNT: 18.1 % (ref 12.3–15.4)
ERYTHROCYTE [DISTWIDTH] IN BLOOD BY AUTOMATED COUNT: 18.3 % (ref 12.3–15.4)
ERYTHROCYTE [DISTWIDTH] IN BLOOD BY AUTOMATED COUNT: 18.3 % (ref 12.3–15.4)
FLUAV RNA RESP QL NAA+PROBE: NOT DETECTED
FLUBV RNA RESP QL NAA+PROBE: NOT DETECTED
FOLATE SERPL-MCNC: 6.4 NG/ML (ref 4.78–24.2)
GFR SERPL CREATININE-BSD FRML MDRD: 102 ML/MIN/1.73
GFR SERPL CREATININE-BSD FRML MDRD: 102 ML/MIN/1.73
GFR SERPL CREATININE-BSD FRML MDRD: 104 ML/MIN/1.73
GFR SERPL CREATININE-BSD FRML MDRD: 70 ML/MIN/1.73
GFR SERPL CREATININE-BSD FRML MDRD: 79 ML/MIN/1.73
GFR SERPL CREATININE-BSD FRML MDRD: 81 ML/MIN/1.73
GFR SERPL CREATININE-BSD FRML MDRD: 86 ML/MIN/1.73
GFR SERPL CREATININE-BSD FRML MDRD: 98 ML/MIN/1.73
GLOBULIN UR ELPH-MCNC: 3.3 GM/DL
GLUCOSE SERPL-MCNC: 145 MG/DL (ref 65–99)
GLUCOSE SERPL-MCNC: 54 MG/DL (ref 65–99)
GLUCOSE SERPL-MCNC: 64 MG/DL (ref 65–99)
GLUCOSE SERPL-MCNC: 69 MG/DL (ref 65–99)
GLUCOSE SERPL-MCNC: 69 MG/DL (ref 65–99)
GLUCOSE SERPL-MCNC: 88 MG/DL (ref 65–99)
GLUCOSE SERPL-MCNC: 90 MG/DL (ref 65–99)
GLUCOSE SERPL-MCNC: 95 MG/DL (ref 65–99)
GLUCOSE UR STRIP-MCNC: NEGATIVE MG/DL
HCT VFR BLD AUTO: 35.6 % (ref 34–46.6)
HCT VFR BLD AUTO: 36 % (ref 34–46.6)
HCT VFR BLD AUTO: 36.2 % (ref 34–46.6)
HCT VFR BLD AUTO: 37.6 % (ref 34–46.6)
HCT VFR BLD AUTO: 37.7 % (ref 34–46.6)
HCT VFR BLD AUTO: 39.5 % (ref 34–46.6)
HCT VFR BLD AUTO: 39.7 % (ref 34–46.6)
HGB BLD-MCNC: 11.1 G/DL (ref 12–15.9)
HGB BLD-MCNC: 11.2 G/DL (ref 12–15.9)
HGB BLD-MCNC: 11.3 G/DL (ref 12–15.9)
HGB BLD-MCNC: 11.3 G/DL (ref 12–15.9)
HGB BLD-MCNC: 11.4 G/DL (ref 12–15.9)
HGB BLD-MCNC: 11.8 G/DL (ref 12–15.9)
HGB BLD-MCNC: 11.8 G/DL (ref 12–15.9)
HGB UR QL STRIP.AUTO: ABNORMAL
HYALINE CASTS UR QL AUTO: ABNORMAL /LPF
IMM GRANULOCYTES # BLD AUTO: 0.05 10*3/MM3 (ref 0–0.05)
IMM GRANULOCYTES # BLD AUTO: 0.05 10*3/MM3 (ref 0–0.05)
IMM GRANULOCYTES # BLD AUTO: 0.06 10*3/MM3 (ref 0–0.05)
IMM GRANULOCYTES # BLD AUTO: 0.09 10*3/MM3 (ref 0–0.05)
IMM GRANULOCYTES # BLD AUTO: 0.1 10*3/MM3 (ref 0–0.05)
IMM GRANULOCYTES NFR BLD AUTO: 0.5 % (ref 0–0.5)
IMM GRANULOCYTES NFR BLD AUTO: 0.8 % (ref 0–0.5)
IMM GRANULOCYTES NFR BLD AUTO: 1.1 % (ref 0–0.5)
IRON 24H UR-MRATE: 39 MCG/DL (ref 37–145)
IRON SATN MFR SERPL: 20 % (ref 20–50)
KETONES UR QL STRIP: NEGATIVE
LEUKOCYTE ESTERASE UR QL STRIP.AUTO: ABNORMAL
LYMPHOCYTES # BLD AUTO: 1.33 10*3/MM3 (ref 0.7–3.1)
LYMPHOCYTES # BLD AUTO: 1.39 10*3/MM3 (ref 0.7–3.1)
LYMPHOCYTES # BLD AUTO: 1.61 10*3/MM3 (ref 0.7–3.1)
LYMPHOCYTES # BLD AUTO: 1.65 10*3/MM3 (ref 0.7–3.1)
LYMPHOCYTES # BLD AUTO: 1.69 10*3/MM3 (ref 0.7–3.1)
LYMPHOCYTES # BLD AUTO: 1.76 10*3/MM3 (ref 0.7–3.1)
LYMPHOCYTES # BLD AUTO: 1.82 10*3/MM3 (ref 0.7–3.1)
LYMPHOCYTES NFR BLD AUTO: 11.8 % (ref 19.6–45.3)
LYMPHOCYTES NFR BLD AUTO: 12.7 % (ref 19.6–45.3)
LYMPHOCYTES NFR BLD AUTO: 13.4 % (ref 19.6–45.3)
LYMPHOCYTES NFR BLD AUTO: 15.8 % (ref 19.6–45.3)
LYMPHOCYTES NFR BLD AUTO: 16.6 % (ref 19.6–45.3)
LYMPHOCYTES NFR BLD AUTO: 18.1 % (ref 19.6–45.3)
LYMPHOCYTES NFR BLD AUTO: 18.3 % (ref 19.6–45.3)
MAGNESIUM SERPL-MCNC: 1.6 MG/DL (ref 1.7–2.3)
MCH RBC QN AUTO: 28.4 PG (ref 26.6–33)
MCH RBC QN AUTO: 28.9 PG (ref 26.6–33)
MCH RBC QN AUTO: 29.3 PG (ref 26.6–33)
MCH RBC QN AUTO: 29.4 PG (ref 26.6–33)
MCH RBC QN AUTO: 29.4 PG (ref 26.6–33)
MCH RBC QN AUTO: 29.6 PG (ref 26.6–33)
MCH RBC QN AUTO: 30 PG (ref 26.6–33)
MCHC RBC AUTO-ENTMCNC: 28.9 G/DL (ref 31.5–35.7)
MCHC RBC AUTO-ENTMCNC: 29.4 G/DL (ref 31.5–35.7)
MCHC RBC AUTO-ENTMCNC: 29.7 G/DL (ref 31.5–35.7)
MCHC RBC AUTO-ENTMCNC: 31.2 G/DL (ref 31.5–35.7)
MCHC RBC AUTO-ENTMCNC: 31.4 G/DL (ref 31.5–35.7)
MCHC RBC AUTO-ENTMCNC: 31.4 G/DL (ref 31.5–35.7)
MCHC RBC AUTO-ENTMCNC: 31.5 G/DL (ref 31.5–35.7)
MCV RBC AUTO: 101.9 FL (ref 79–97)
MCV RBC AUTO: 92.1 FL (ref 79–97)
MCV RBC AUTO: 93.4 FL (ref 79–97)
MCV RBC AUTO: 93.8 FL (ref 79–97)
MCV RBC AUTO: 94.2 FL (ref 79–97)
MCV RBC AUTO: 98.5 FL (ref 79–97)
MCV RBC AUTO: 98.8 FL (ref 79–97)
MONOCYTES # BLD AUTO: 0.63 10*3/MM3 (ref 0.1–0.9)
MONOCYTES # BLD AUTO: 0.66 10*3/MM3 (ref 0.1–0.9)
MONOCYTES # BLD AUTO: 0.69 10*3/MM3 (ref 0.1–0.9)
MONOCYTES # BLD AUTO: 0.74 10*3/MM3 (ref 0.1–0.9)
MONOCYTES # BLD AUTO: 0.76 10*3/MM3 (ref 0.1–0.9)
MONOCYTES # BLD AUTO: 0.82 10*3/MM3 (ref 0.1–0.9)
MONOCYTES # BLD AUTO: 0.87 10*3/MM3 (ref 0.1–0.9)
MONOCYTES NFR BLD AUTO: 6 % (ref 5–12)
MONOCYTES NFR BLD AUTO: 6.2 % (ref 5–12)
MONOCYTES NFR BLD AUTO: 6.5 % (ref 5–12)
MONOCYTES NFR BLD AUTO: 6.9 % (ref 5–12)
MONOCYTES NFR BLD AUTO: 7.1 % (ref 5–12)
MONOCYTES NFR BLD AUTO: 7.3 % (ref 5–12)
MONOCYTES NFR BLD AUTO: 8.9 % (ref 5–12)
NEUTROPHILS NFR BLD AUTO: 6.6 10*3/MM3 (ref 1.7–7)
NEUTROPHILS NFR BLD AUTO: 6.63 10*3/MM3 (ref 1.7–7)
NEUTROPHILS NFR BLD AUTO: 71.8 % (ref 42.7–76)
NEUTROPHILS NFR BLD AUTO: 72.3 % (ref 42.7–76)
NEUTROPHILS NFR BLD AUTO: 75.4 % (ref 42.7–76)
NEUTROPHILS NFR BLD AUTO: 76.2 % (ref 42.7–76)
NEUTROPHILS NFR BLD AUTO: 77.9 % (ref 42.7–76)
NEUTROPHILS NFR BLD AUTO: 79.3 % (ref 42.7–76)
NEUTROPHILS NFR BLD AUTO: 8.25 10*3/MM3 (ref 1.7–7)
NEUTROPHILS NFR BLD AUTO: 8.29 10*3/MM3 (ref 1.7–7)
NEUTROPHILS NFR BLD AUTO: 8.49 10*3/MM3 (ref 1.7–7)
NEUTROPHILS NFR BLD AUTO: 80.6 % (ref 42.7–76)
NEUTROPHILS NFR BLD AUTO: 9.36 10*3/MM3 (ref 1.7–7)
NEUTROPHILS NFR BLD AUTO: 9.47 10*3/MM3 (ref 1.7–7)
NITRITE UR QL STRIP: NEGATIVE
NRBC BLD AUTO-RTO: 0 /100 WBC (ref 0–0.2)
NT-PROBNP SERPL-MCNC: 1923 PG/ML (ref 0–1800)
PH UR STRIP.AUTO: 7 [PH] (ref 5–8)
PLATELET # BLD AUTO: 336 10*3/MM3 (ref 140–450)
PLATELET # BLD AUTO: 343 10*3/MM3 (ref 140–450)
PLATELET # BLD AUTO: 352 10*3/MM3 (ref 140–450)
PLATELET # BLD AUTO: 360 10*3/MM3 (ref 140–450)
PLATELET # BLD AUTO: 364 10*3/MM3 (ref 140–450)
PLATELET # BLD AUTO: 384 10*3/MM3 (ref 140–450)
PLATELET # BLD AUTO: 384 10*3/MM3 (ref 140–450)
PMV BLD AUTO: 9.2 FL (ref 6–12)
PMV BLD AUTO: 9.2 FL (ref 6–12)
PMV BLD AUTO: 9.4 FL (ref 6–12)
PMV BLD AUTO: 9.4 FL (ref 6–12)
PMV BLD AUTO: 9.5 FL (ref 6–12)
PMV BLD AUTO: 9.5 FL (ref 6–12)
PMV BLD AUTO: 9.8 FL (ref 6–12)
POTASSIUM SERPL-SCNC: 2.2 MMOL/L (ref 3.5–5.2)
POTASSIUM SERPL-SCNC: 2.6 MMOL/L (ref 3.5–5.2)
POTASSIUM SERPL-SCNC: 2.7 MMOL/L (ref 3.5–5.2)
POTASSIUM SERPL-SCNC: 3 MMOL/L (ref 3.5–5.2)
POTASSIUM SERPL-SCNC: 3.6 MMOL/L (ref 3.5–5.2)
POTASSIUM SERPL-SCNC: 3.8 MMOL/L (ref 3.5–5.2)
POTASSIUM SERPL-SCNC: 4.2 MMOL/L (ref 3.5–5.2)
POTASSIUM SERPL-SCNC: 4.6 MMOL/L (ref 3.5–5.2)
POTASSIUM SERPL-SCNC: 4.7 MMOL/L (ref 3.5–5.2)
POTASSIUM SERPL-SCNC: 5 MMOL/L (ref 3.5–5.2)
POTASSIUM SERPL-SCNC: 5 MMOL/L (ref 3.5–5.2)
PROCALCITONIN SERPL-MCNC: 0.07 NG/ML (ref 0–0.25)
PROT SERPL-MCNC: 6.4 G/DL (ref 6–8.5)
PROT UR QL STRIP: NEGATIVE
QT INTERVAL: 340 MS
QTC INTERVAL: 418 MS
RBC # BLD AUTO: 3.7 10*6/MM3 (ref 3.77–5.28)
RBC # BLD AUTO: 3.81 10*6/MM3 (ref 3.77–5.28)
RBC # BLD AUTO: 3.86 10*6/MM3 (ref 3.77–5.28)
RBC # BLD AUTO: 3.91 10*6/MM3 (ref 3.77–5.28)
RBC # BLD AUTO: 3.99 10*6/MM3 (ref 3.77–5.28)
RBC # BLD AUTO: 4.01 10*6/MM3 (ref 3.77–5.28)
RBC # BLD AUTO: 4.02 10*6/MM3 (ref 3.77–5.28)
RBC # UR: ABNORMAL /HPF
REF LAB TEST METHOD: ABNORMAL
RETICS # AUTO: 0.05 10*6/MM3 (ref 0.02–0.13)
RETICS/RBC NFR AUTO: 1.35 % (ref 0.7–1.9)
SARS-COV-2 RNA RESP QL NAA+PROBE: NOT DETECTED
SCHISTOCYTES BLD QL SMEAR: NORMAL
SMALL PLATELETS BLD QL SMEAR: ADEQUATE
SODIUM SERPL-SCNC: 138 MMOL/L (ref 136–145)
SODIUM SERPL-SCNC: 139 MMOL/L (ref 136–145)
SODIUM SERPL-SCNC: 140 MMOL/L (ref 136–145)
SP GR UR STRIP: 1.01 (ref 1–1.03)
SQUAMOUS #/AREA URNS HPF: ABNORMAL /HPF
TIBC SERPL-MCNC: 191 MCG/DL (ref 298–536)
TRANSFERRIN SERPL-MCNC: 128 MG/DL (ref 200–360)
TROPONIN T SERPL-MCNC: 0.02 NG/ML (ref 0–0.03)
UROBILINOGEN UR QL STRIP: ABNORMAL
VIT B12 BLD-MCNC: 1214 PG/ML (ref 211–946)
WBC # BLD AUTO: 10.45 10*3/MM3 (ref 3.4–10.8)
WBC # BLD AUTO: 10.95 10*3/MM3 (ref 3.4–10.8)
WBC # BLD AUTO: 11.15 10*3/MM3 (ref 3.4–10.8)
WBC # BLD AUTO: 11.74 10*3/MM3 (ref 3.4–10.8)
WBC # BLD AUTO: 11.99 10*3/MM3 (ref 3.4–10.8)
WBC # BLD AUTO: 9.13 10*3/MM3 (ref 3.4–10.8)
WBC # BLD AUTO: 9.24 10*3/MM3 (ref 3.4–10.8)
WBC MORPH BLD: NORMAL
WBC UR QL AUTO: ABNORMAL /HPF

## 2020-01-01 PROCEDURE — 80048 BASIC METABOLIC PNL TOTAL CA: CPT | Performed by: INTERNAL MEDICINE

## 2020-01-01 PROCEDURE — 25010000002 KETOROLAC TROMETHAMINE PER 15 MG: Performed by: FAMILY MEDICINE

## 2020-01-01 PROCEDURE — 99233 SBSQ HOSP IP/OBS HIGH 50: CPT | Performed by: INTERNAL MEDICINE

## 2020-01-01 PROCEDURE — 85025 COMPLETE CBC W/AUTO DIFF WBC: CPT | Performed by: EMERGENCY MEDICINE

## 2020-01-01 PROCEDURE — 25010000002 HYDROMORPHONE PER 4 MG: Performed by: FAMILY MEDICINE

## 2020-01-01 PROCEDURE — 99232 SBSQ HOSP IP/OBS MODERATE 35: CPT | Performed by: NURSE PRACTITIONER

## 2020-01-01 PROCEDURE — 85045 AUTOMATED RETICULOCYTE COUNT: CPT | Performed by: INTERNAL MEDICINE

## 2020-01-01 PROCEDURE — 83735 ASSAY OF MAGNESIUM: CPT | Performed by: EMERGENCY MEDICINE

## 2020-01-01 PROCEDURE — 25010000002 HYDROMORPHONE PER 4 MG: Performed by: INTERNAL MEDICINE

## 2020-01-01 PROCEDURE — 87636 SARSCOV2 & INF A&B AMP PRB: CPT | Performed by: FAMILY MEDICINE

## 2020-01-01 PROCEDURE — 43248 EGD GUIDE WIRE INSERTION: CPT | Performed by: INTERNAL MEDICINE

## 2020-01-01 PROCEDURE — 25010000002 ONDANSETRON PER 1 MG: Performed by: INTERNAL MEDICINE

## 2020-01-01 PROCEDURE — 25010000003 POTASSIUM CHLORIDE 10 MEQ/100ML SOLUTION: Performed by: INTERNAL MEDICINE

## 2020-01-01 PROCEDURE — 84484 ASSAY OF TROPONIN QUANT: CPT | Performed by: EMERGENCY MEDICINE

## 2020-01-01 PROCEDURE — 82550 ASSAY OF CK (CPK): CPT | Performed by: INTERNAL MEDICINE

## 2020-01-01 PROCEDURE — 85025 COMPLETE CBC W/AUTO DIFF WBC: CPT | Performed by: INTERNAL MEDICINE

## 2020-01-01 PROCEDURE — 25010000002 KETOROLAC TROMETHAMINE PER 15 MG: Performed by: NURSE PRACTITIONER

## 2020-01-01 PROCEDURE — 0D718ZZ DILATION OF UPPER ESOPHAGUS, VIA NATURAL OR ARTIFICIAL OPENING ENDOSCOPIC: ICD-10-PCS | Performed by: INTERNAL MEDICINE

## 2020-01-01 PROCEDURE — 25010000002 LORAZEPAM PER 2 MG: Performed by: INTERNAL MEDICINE

## 2020-01-01 PROCEDURE — 99232 SBSQ HOSP IP/OBS MODERATE 35: CPT | Performed by: FAMILY MEDICINE

## 2020-01-01 PROCEDURE — 99233 SBSQ HOSP IP/OBS HIGH 50: CPT | Performed by: FAMILY MEDICINE

## 2020-01-01 PROCEDURE — 71045 X-RAY EXAM CHEST 1 VIEW: CPT

## 2020-01-01 PROCEDURE — 25010000002 HEPARIN (PORCINE) PER 1000 UNITS: Performed by: INTERNAL MEDICINE

## 2020-01-01 PROCEDURE — 25010000003 POTASSIUM CHLORIDE 10 MEQ/100ML SOLUTION

## 2020-01-01 PROCEDURE — 25010000002 HYDROMORPHONE 1 MG/ML SOLUTION: Performed by: INTERNAL MEDICINE

## 2020-01-01 PROCEDURE — 25010000002 LORAZEPAM PER 2 MG: Performed by: FAMILY MEDICINE

## 2020-01-01 PROCEDURE — 92610 EVALUATE SWALLOWING FUNCTION: CPT | Performed by: SPEECH-LANGUAGE PATHOLOGIST

## 2020-01-01 PROCEDURE — 92610 EVALUATE SWALLOWING FUNCTION: CPT

## 2020-01-01 PROCEDURE — 84132 ASSAY OF SERUM POTASSIUM: CPT | Performed by: INTERNAL MEDICINE

## 2020-01-01 PROCEDURE — 97166 OT EVAL MOD COMPLEX 45 MIN: CPT

## 2020-01-01 PROCEDURE — 83880 ASSAY OF NATRIURETIC PEPTIDE: CPT | Performed by: EMERGENCY MEDICINE

## 2020-01-01 PROCEDURE — 25010000002 POTASSIUM CHLORIDE PER 2 MEQ OF POTASSIUM: Performed by: INTERNAL MEDICINE

## 2020-01-01 PROCEDURE — 81001 URINALYSIS AUTO W/SCOPE: CPT | Performed by: EMERGENCY MEDICINE

## 2020-01-01 PROCEDURE — 83605 ASSAY OF LACTIC ACID: CPT | Performed by: INTERNAL MEDICINE

## 2020-01-01 PROCEDURE — 84466 ASSAY OF TRANSFERRIN: CPT | Performed by: INTERNAL MEDICINE

## 2020-01-01 PROCEDURE — 99223 1ST HOSP IP/OBS HIGH 75: CPT | Performed by: NURSE PRACTITIONER

## 2020-01-01 PROCEDURE — 36415 COLL VENOUS BLD VENIPUNCTURE: CPT

## 2020-01-01 PROCEDURE — 25010000002 CEFTRIAXONE PER 250 MG: Performed by: INTERNAL MEDICINE

## 2020-01-01 PROCEDURE — 25010000002 ALBUMIN HUMAN 25% PER 50 ML: Performed by: INTERNAL MEDICINE

## 2020-01-01 PROCEDURE — 80053 COMPREHEN METABOLIC PANEL: CPT | Performed by: EMERGENCY MEDICINE

## 2020-01-01 PROCEDURE — P9612 CATHETERIZE FOR URINE SPEC: HCPCS

## 2020-01-01 PROCEDURE — 84145 PROCALCITONIN (PCT): CPT | Performed by: INTERNAL MEDICINE

## 2020-01-01 PROCEDURE — 83540 ASSAY OF IRON: CPT | Performed by: INTERNAL MEDICINE

## 2020-01-01 PROCEDURE — 93005 ELECTROCARDIOGRAM TRACING: CPT | Performed by: EMERGENCY MEDICINE

## 2020-01-01 PROCEDURE — P9047 ALBUMIN (HUMAN), 25%, 50ML: HCPCS | Performed by: INTERNAL MEDICINE

## 2020-01-01 PROCEDURE — 70450 CT HEAD/BRAIN W/O DYE: CPT

## 2020-01-01 PROCEDURE — 97161 PT EVAL LOW COMPLEX 20 MIN: CPT

## 2020-01-01 PROCEDURE — 87040 BLOOD CULTURE FOR BACTERIA: CPT | Performed by: INTERNAL MEDICINE

## 2020-01-01 PROCEDURE — C1769 GUIDE WIRE: HCPCS | Performed by: INTERNAL MEDICINE

## 2020-01-01 PROCEDURE — 99284 EMERGENCY DEPT VISIT MOD MDM: CPT

## 2020-01-01 PROCEDURE — 97110 THERAPEUTIC EXERCISES: CPT

## 2020-01-01 PROCEDURE — 72125 CT NECK SPINE W/O DYE: CPT

## 2020-01-01 PROCEDURE — 25010000002 PROPOFOL 10 MG/ML EMULSION: Performed by: NURSE ANESTHETIST, CERTIFIED REGISTERED

## 2020-01-01 PROCEDURE — 99223 1ST HOSP IP/OBS HIGH 75: CPT | Performed by: INTERNAL MEDICINE

## 2020-01-01 PROCEDURE — 25010000002 MAGNESIUM SULFATE IN D5W 1G/100ML (PREMIX) 1-5 GM/100ML-% SOLUTION: Performed by: INTERNAL MEDICINE

## 2020-01-01 PROCEDURE — 99285 EMERGENCY DEPT VISIT HI MDM: CPT

## 2020-01-01 PROCEDURE — 97530 THERAPEUTIC ACTIVITIES: CPT

## 2020-01-01 PROCEDURE — 85007 BL SMEAR W/DIFF WBC COUNT: CPT | Performed by: INTERNAL MEDICINE

## 2020-01-01 PROCEDURE — 87086 URINE CULTURE/COLONY COUNT: CPT | Performed by: INTERNAL MEDICINE

## 2020-01-01 PROCEDURE — 99232 SBSQ HOSP IP/OBS MODERATE 35: CPT | Performed by: INTERNAL MEDICINE

## 2020-01-01 PROCEDURE — 82607 VITAMIN B-12: CPT | Performed by: INTERNAL MEDICINE

## 2020-01-01 PROCEDURE — 25010000003 POTASSIUM CHLORIDE 10 MEQ/100ML SOLUTION: Performed by: EMERGENCY MEDICINE

## 2020-01-01 PROCEDURE — 82746 ASSAY OF FOLIC ACID SERUM: CPT | Performed by: INTERNAL MEDICINE

## 2020-01-01 RX ORDER — POTASSIUM CHLORIDE 1.5 G/1.77G
20 POWDER, FOR SOLUTION ORAL 2 TIMES DAILY
Status: CANCELLED | OUTPATIENT
Start: 2020-01-01

## 2020-01-01 RX ORDER — HYDROMORPHONE HYDROCHLORIDE 1 MG/ML
0.5 INJECTION, SOLUTION INTRAMUSCULAR; INTRAVENOUS; SUBCUTANEOUS
Status: DISCONTINUED | OUTPATIENT
Start: 2020-01-01 | End: 2021-01-01

## 2020-01-01 RX ORDER — HYDROMORPHONE HYDROCHLORIDE 1 MG/ML
0.5 INJECTION, SOLUTION INTRAMUSCULAR; INTRAVENOUS; SUBCUTANEOUS
Status: CANCELLED | OUTPATIENT
Start: 2020-01-01

## 2020-01-01 RX ORDER — PROMETHAZINE HYDROCHLORIDE 25 MG/1
25 TABLET ORAL ONCE AS NEEDED
Status: DISCONTINUED | OUTPATIENT
Start: 2020-01-01 | End: 2020-01-01 | Stop reason: HOSPADM

## 2020-01-01 RX ORDER — MIRTAZAPINE 15 MG/1
15 TABLET, FILM COATED ORAL NIGHTLY
Status: CANCELLED | OUTPATIENT
Start: 2020-01-01

## 2020-01-01 RX ORDER — QUETIAPINE FUMARATE 25 MG/1
12.5 TABLET, FILM COATED ORAL NIGHTLY
Status: CANCELLED | OUTPATIENT
Start: 2020-01-01

## 2020-01-01 RX ORDER — POTASSIUM CHLORIDE 7.45 MG/ML
10 INJECTION INTRAVENOUS
Status: DISCONTINUED | OUTPATIENT
Start: 2020-01-01 | End: 2020-01-01

## 2020-01-01 RX ORDER — DROPERIDOL 2.5 MG/ML
0.62 INJECTION, SOLUTION INTRAMUSCULAR; INTRAVENOUS ONCE AS NEEDED
Status: DISCONTINUED | OUTPATIENT
Start: 2020-01-01 | End: 2020-01-01 | Stop reason: HOSPADM

## 2020-01-01 RX ORDER — HYDROMORPHONE HYDROCHLORIDE 2 MG/1
1 TABLET ORAL EVERY 6 HOURS
Status: DISCONTINUED | OUTPATIENT
Start: 2020-01-01 | End: 2020-01-01

## 2020-01-01 RX ORDER — ASPIRIN 81 MG/1
81 TABLET, CHEWABLE ORAL DAILY
Status: CANCELLED | OUTPATIENT
Start: 2020-01-01

## 2020-01-01 RX ORDER — NALOXONE HCL 0.4 MG/ML
0.4 VIAL (ML) INJECTION AS NEEDED
Status: DISCONTINUED | OUTPATIENT
Start: 2020-01-01 | End: 2020-01-01 | Stop reason: HOSPADM

## 2020-01-01 RX ORDER — SODIUM CHLORIDE 9 MG/ML
30 INJECTION, SOLUTION INTRAVENOUS CONTINUOUS PRN
Status: DISCONTINUED | OUTPATIENT
Start: 2020-01-01 | End: 2020-01-01

## 2020-01-01 RX ORDER — LORAZEPAM 2 MG/ML
0.25 INJECTION INTRAMUSCULAR EVERY 4 HOURS PRN
Status: CANCELLED | OUTPATIENT
Start: 2020-01-01 | End: 2021-01-01

## 2020-01-01 RX ORDER — SODIUM CHLORIDE 0.9 % (FLUSH) 0.9 %
3-10 SYRINGE (ML) INJECTION AS NEEDED
Status: DISCONTINUED | OUTPATIENT
Start: 2020-01-01 | End: 2020-01-01 | Stop reason: HOSPADM

## 2020-01-01 RX ORDER — ASPIRIN 300 MG/1
150 SUPPOSITORY RECTAL DAILY
Status: DISCONTINUED | OUTPATIENT
Start: 2020-01-01 | End: 2020-01-01

## 2020-01-01 RX ORDER — MIRTAZAPINE 15 MG/1
15 TABLET, FILM COATED ORAL NIGHTLY
COMMUNITY

## 2020-01-01 RX ORDER — HYDROMORPHONE HYDROCHLORIDE 1 MG/ML
0.5 INJECTION, SOLUTION INTRAMUSCULAR; INTRAVENOUS; SUBCUTANEOUS
Status: DISCONTINUED | OUTPATIENT
Start: 2020-01-01 | End: 2020-01-01 | Stop reason: HOSPADM

## 2020-01-01 RX ORDER — MIRTAZAPINE 15 MG/1
15 TABLET, FILM COATED ORAL NIGHTLY
Status: DISCONTINUED | OUTPATIENT
Start: 2020-01-01 | End: 2020-01-01

## 2020-01-01 RX ORDER — SODIUM CHLORIDE, SODIUM LACTATE, POTASSIUM CHLORIDE, CALCIUM CHLORIDE 600; 310; 30; 20 MG/100ML; MG/100ML; MG/100ML; MG/100ML
9 INJECTION, SOLUTION INTRAVENOUS CONTINUOUS
Status: DISCONTINUED | OUTPATIENT
Start: 2020-01-01 | End: 2020-01-01

## 2020-01-01 RX ORDER — DEXTROSE AND SODIUM CHLORIDE 5; .9 G/100ML; G/100ML
15 INJECTION, SOLUTION INTRAVENOUS CONTINUOUS
Status: DISCONTINUED | OUTPATIENT
Start: 2020-01-01 | End: 2020-01-01

## 2020-01-01 RX ORDER — HYDROMORPHONE HYDROCHLORIDE 1 MG/ML
0.25 INJECTION, SOLUTION INTRAMUSCULAR; INTRAVENOUS; SUBCUTANEOUS EVERY 6 HOURS SCHEDULED
Status: CANCELLED | OUTPATIENT
Start: 2020-01-01 | End: 2020-01-01

## 2020-01-01 RX ORDER — LIDOCAINE HYDROCHLORIDE 10 MG/ML
INJECTION, SOLUTION EPIDURAL; INFILTRATION; INTRACAUDAL; PERINEURAL AS NEEDED
Status: DISCONTINUED | OUTPATIENT
Start: 2020-01-01 | End: 2020-01-01 | Stop reason: SURG

## 2020-01-01 RX ORDER — SODIUM CHLORIDE 9 MG/ML
30 INJECTION, SOLUTION INTRAVENOUS CONTINUOUS PRN
Status: CANCELLED | OUTPATIENT
Start: 2020-01-01

## 2020-01-01 RX ORDER — QUETIAPINE FUMARATE 25 MG/1
12.5 TABLET, FILM COATED ORAL NIGHTLY
Status: DISCONTINUED | OUTPATIENT
Start: 2020-01-01 | End: 2020-01-01

## 2020-01-01 RX ORDER — ONDANSETRON 2 MG/ML
4 INJECTION INTRAMUSCULAR; INTRAVENOUS ONCE AS NEEDED
Status: DISCONTINUED | OUTPATIENT
Start: 2020-01-01 | End: 2020-01-01 | Stop reason: HOSPADM

## 2020-01-01 RX ORDER — ACETAMINOPHEN 325 MG/1
650 TABLET ORAL EVERY 4 HOURS PRN
Status: CANCELLED | OUTPATIENT
Start: 2020-01-01

## 2020-01-01 RX ORDER — ACETAMINOPHEN 650 MG/1
650 SUPPOSITORY RECTAL EVERY 4 HOURS PRN
Status: CANCELLED | OUTPATIENT
Start: 2020-01-01

## 2020-01-01 RX ORDER — AMOXICILLIN 250 MG
2 CAPSULE ORAL NIGHTLY PRN
Status: CANCELLED | OUTPATIENT
Start: 2020-01-01

## 2020-01-01 RX ORDER — SODIUM CHLORIDE 0.9 % (FLUSH) 0.9 %
10 SYRINGE (ML) INJECTION EVERY 12 HOURS SCHEDULED
Status: CANCELLED | OUTPATIENT
Start: 2020-01-01

## 2020-01-01 RX ORDER — LORATADINE 10 MG/1
CAPSULE, LIQUID FILLED ORAL
COMMUNITY

## 2020-01-01 RX ORDER — ONDANSETRON 2 MG/ML
4 INJECTION INTRAMUSCULAR; INTRAVENOUS EVERY 6 HOURS PRN
Status: CANCELLED | OUTPATIENT
Start: 2020-01-01

## 2020-01-01 RX ORDER — HYDROMORPHONE HYDROCHLORIDE 1 MG/ML
0.5 INJECTION, SOLUTION INTRAMUSCULAR; INTRAVENOUS; SUBCUTANEOUS EVERY 6 HOURS PRN
Status: DISCONTINUED | OUTPATIENT
Start: 2020-01-01 | End: 2020-01-01

## 2020-01-01 RX ORDER — POTASSIUM CHLORIDE 750 MG/1
20 CAPSULE, EXTENDED RELEASE ORAL ONCE
Status: DISCONTINUED | OUTPATIENT
Start: 2020-01-01 | End: 2020-01-01

## 2020-01-01 RX ORDER — MAGNESIUM SULFATE 1 G/100ML
1 INJECTION INTRAVENOUS ONCE
Status: COMPLETED | OUTPATIENT
Start: 2020-01-01 | End: 2020-01-01

## 2020-01-01 RX ORDER — SODIUM CHLORIDE 0.9 % (FLUSH) 0.9 %
10 SYRINGE (ML) INJECTION EVERY 12 HOURS SCHEDULED
Status: DISCONTINUED | OUTPATIENT
Start: 2020-01-01 | End: 2020-01-01 | Stop reason: HOSPADM

## 2020-01-01 RX ORDER — BISACODYL 10 MG
10 SUPPOSITORY, RECTAL RECTAL DAILY PRN
Status: DISCONTINUED | OUTPATIENT
Start: 2020-01-01 | End: 2021-01-01 | Stop reason: HOSPADM

## 2020-01-01 RX ORDER — POTASSIUM CHLORIDE 7.45 MG/ML
10 INJECTION INTRAVENOUS ONCE
Status: COMPLETED | OUTPATIENT
Start: 2020-01-01 | End: 2020-01-01

## 2020-01-01 RX ORDER — ACETAMINOPHEN 160 MG/5ML
650 SOLUTION ORAL EVERY 4 HOURS PRN
Status: CANCELLED | OUTPATIENT
Start: 2020-01-01

## 2020-01-01 RX ORDER — SODIUM CHLORIDE 9 MG/ML
100 INJECTION, SOLUTION INTRAVENOUS CONTINUOUS
Status: DISCONTINUED | OUTPATIENT
Start: 2020-01-01 | End: 2020-01-01

## 2020-01-01 RX ORDER — DILTIAZEM HCL IN NACL,ISO-OSM 125 MG/125
5-15 PLASTIC BAG, INJECTION (ML) INTRAVENOUS
Status: DISCONTINUED | OUTPATIENT
Start: 2020-01-01 | End: 2020-01-01

## 2020-01-01 RX ORDER — LORAZEPAM 2 MG/ML
0.25 INJECTION INTRAMUSCULAR ONCE
Status: COMPLETED | OUTPATIENT
Start: 2020-01-01 | End: 2020-01-01

## 2020-01-01 RX ORDER — LIDOCAINE HYDROCHLORIDE 10 MG/ML
0.5 INJECTION, SOLUTION EPIDURAL; INFILTRATION; INTRACAUDAL; PERINEURAL ONCE AS NEEDED
Status: DISCONTINUED | OUTPATIENT
Start: 2020-01-01 | End: 2020-01-01 | Stop reason: HOSPADM

## 2020-01-01 RX ORDER — IPRATROPIUM BROMIDE AND ALBUTEROL SULFATE 2.5; .5 MG/3ML; MG/3ML
3 SOLUTION RESPIRATORY (INHALATION) ONCE AS NEEDED
Status: DISCONTINUED | OUTPATIENT
Start: 2020-01-01 | End: 2020-01-01 | Stop reason: HOSPADM

## 2020-01-01 RX ORDER — CASTOR OIL AND BALSAM, PERU 788; 87 MG/G; MG/G
OINTMENT TOPICAL EVERY 12 HOURS SCHEDULED
Status: CANCELLED | OUTPATIENT
Start: 2020-01-01

## 2020-01-01 RX ORDER — ONDANSETRON 2 MG/ML
4 INJECTION INTRAMUSCULAR; INTRAVENOUS EVERY 6 HOURS PRN
Status: DISCONTINUED | OUTPATIENT
Start: 2020-01-01 | End: 2021-01-01 | Stop reason: HOSPADM

## 2020-01-01 RX ORDER — POTASSIUM CHLORIDE 750 MG/1
40 CAPSULE, EXTENDED RELEASE ORAL AS NEEDED
Status: DISCONTINUED | OUTPATIENT
Start: 2020-01-01 | End: 2020-01-01

## 2020-01-01 RX ORDER — POTASSIUM CHLORIDE 7.45 MG/ML
10 INJECTION INTRAVENOUS
Status: CANCELLED | OUTPATIENT
Start: 2020-01-01

## 2020-01-01 RX ORDER — SODIUM CHLORIDE 0.9 % (FLUSH) 0.9 %
10 SYRINGE (ML) INJECTION AS NEEDED
Status: DISCONTINUED | OUTPATIENT
Start: 2020-01-01 | End: 2020-01-01 | Stop reason: HOSPADM

## 2020-01-01 RX ORDER — SODIUM CHLORIDE 0.9 % (FLUSH) 0.9 %
10 SYRINGE (ML) INJECTION AS NEEDED
Status: DISCONTINUED | OUTPATIENT
Start: 2020-01-01 | End: 2021-01-01 | Stop reason: HOSPADM

## 2020-01-01 RX ORDER — SODIUM CHLORIDE 0.9 % (FLUSH) 0.9 %
3 SYRINGE (ML) INJECTION EVERY 12 HOURS SCHEDULED
Status: DISCONTINUED | OUTPATIENT
Start: 2020-01-01 | End: 2020-01-01 | Stop reason: HOSPADM

## 2020-01-01 RX ORDER — HYDROMORPHONE HYDROCHLORIDE 2 MG/1
0.5 TABLET ORAL EVERY 6 HOURS PRN
Status: DISCONTINUED | OUTPATIENT
Start: 2020-01-01 | End: 2020-01-01

## 2020-01-01 RX ORDER — HYDROCODONE BITARTRATE AND ACETAMINOPHEN 5; 325 MG/1; MG/1
1 TABLET ORAL ONCE AS NEEDED
Status: DISCONTINUED | OUTPATIENT
Start: 2020-01-01 | End: 2020-01-01 | Stop reason: HOSPADM

## 2020-01-01 RX ORDER — CHOLECALCIFEROL (VITAMIN D3) 125 MCG
5 CAPSULE ORAL
COMMUNITY

## 2020-01-01 RX ORDER — ALBUMIN (HUMAN) 12.5 G/50ML
12.5 SOLUTION INTRAVENOUS ONCE
Status: COMPLETED | OUTPATIENT
Start: 2020-01-01 | End: 2020-01-01

## 2020-01-01 RX ORDER — LORAZEPAM 2 MG/ML
0.25 INJECTION INTRAMUSCULAR EVERY 4 HOURS PRN
Status: DISCONTINUED | OUTPATIENT
Start: 2020-01-01 | End: 2021-01-01

## 2020-01-01 RX ORDER — KETOROLAC TROMETHAMINE 15 MG/ML
15 INJECTION, SOLUTION INTRAMUSCULAR; INTRAVENOUS EVERY 6 HOURS PRN
Status: DISPENSED | OUTPATIENT
Start: 2020-01-01 | End: 2020-01-01

## 2020-01-01 RX ORDER — HYDROMORPHONE HYDROCHLORIDE 1 MG/ML
0.5 INJECTION, SOLUTION INTRAMUSCULAR; INTRAVENOUS; SUBCUTANEOUS EVERY 6 HOURS SCHEDULED
Status: DISCONTINUED | OUTPATIENT
Start: 2020-01-01 | End: 2021-01-01

## 2020-01-01 RX ORDER — PANTOPRAZOLE SODIUM 40 MG/1
40 TABLET, DELAYED RELEASE ORAL
Status: DISCONTINUED | OUTPATIENT
Start: 2020-01-01 | End: 2020-01-01

## 2020-01-01 RX ORDER — AMOXICILLIN 250 MG
2 CAPSULE ORAL NIGHTLY PRN
Status: DISCONTINUED | OUTPATIENT
Start: 2020-01-01 | End: 2020-01-01

## 2020-01-01 RX ORDER — ACETAMINOPHEN 325 MG/1
650 TABLET ORAL EVERY 4 HOURS PRN
Status: DISCONTINUED | OUTPATIENT
Start: 2020-01-01 | End: 2021-01-01 | Stop reason: HOSPADM

## 2020-01-01 RX ORDER — DOCUSATE SODIUM 50 MG/5 ML
100 LIQUID (ML) ORAL 2 TIMES DAILY PRN
Status: CANCELLED | OUTPATIENT
Start: 2020-01-01

## 2020-01-01 RX ORDER — DROPERIDOL 2.5 MG/ML
0.62 INJECTION, SOLUTION INTRAMUSCULAR; INTRAVENOUS AS NEEDED
Status: DISCONTINUED | OUTPATIENT
Start: 2020-01-01 | End: 2020-01-01 | Stop reason: HOSPADM

## 2020-01-01 RX ORDER — POTASSIUM CHLORIDE 1.5 G/1.77G
20 POWDER, FOR SOLUTION ORAL 2 TIMES DAILY
Status: DISCONTINUED | OUTPATIENT
Start: 2020-01-01 | End: 2020-01-01

## 2020-01-01 RX ORDER — ASPIRIN 81 MG/1
81 TABLET, CHEWABLE ORAL DAILY
Status: DISCONTINUED | OUTPATIENT
Start: 2020-01-01 | End: 2020-01-01

## 2020-01-01 RX ORDER — DILTIAZEM HCL IN NACL,ISO-OSM 125 MG/125
5-15 PLASTIC BAG, INJECTION (ML) INTRAVENOUS
Status: CANCELLED | OUTPATIENT
Start: 2020-01-01

## 2020-01-01 RX ORDER — HYDROMORPHONE HYDROCHLORIDE 1 MG/ML
0.25 INJECTION, SOLUTION INTRAMUSCULAR; INTRAVENOUS; SUBCUTANEOUS EVERY 6 HOURS SCHEDULED
Status: DISCONTINUED | OUTPATIENT
Start: 2020-01-01 | End: 2020-01-01

## 2020-01-01 RX ORDER — DOCUSATE SODIUM 50 MG/5 ML
100 LIQUID (ML) ORAL 2 TIMES DAILY PRN
Status: DISCONTINUED | OUTPATIENT
Start: 2020-01-01 | End: 2020-01-01

## 2020-01-01 RX ORDER — HEPARIN SODIUM 5000 [USP'U]/ML
5000 INJECTION, SOLUTION INTRAVENOUS; SUBCUTANEOUS EVERY 12 HOURS SCHEDULED
Status: DISCONTINUED | OUTPATIENT
Start: 2020-01-01 | End: 2020-01-01

## 2020-01-01 RX ORDER — PANTOPRAZOLE SODIUM 40 MG/10ML
40 INJECTION, POWDER, LYOPHILIZED, FOR SOLUTION INTRAVENOUS
Status: DISCONTINUED | OUTPATIENT
Start: 2020-01-01 | End: 2020-01-01

## 2020-01-01 RX ORDER — HEPARIN SODIUM 5000 [USP'U]/ML
5000 INJECTION, SOLUTION INTRAVENOUS; SUBCUTANEOUS EVERY 12 HOURS SCHEDULED
Status: CANCELLED | OUTPATIENT
Start: 2020-01-01

## 2020-01-01 RX ORDER — PROPOFOL 10 MG/ML
VIAL (ML) INTRAVENOUS AS NEEDED
Status: DISCONTINUED | OUTPATIENT
Start: 2020-01-01 | End: 2020-01-01 | Stop reason: SURG

## 2020-01-01 RX ORDER — PANTOPRAZOLE SODIUM 40 MG/10ML
40 INJECTION, POWDER, LYOPHILIZED, FOR SOLUTION INTRAVENOUS
Status: CANCELLED | OUTPATIENT
Start: 2020-01-01

## 2020-01-01 RX ORDER — ACETAMINOPHEN 500 MG
1000 TABLET ORAL ONCE
Status: DISCONTINUED | OUTPATIENT
Start: 2020-01-01 | End: 2020-01-01

## 2020-01-01 RX ORDER — POTASSIUM CHLORIDE 7.45 MG/ML
10 INJECTION INTRAVENOUS
Status: COMPLETED | OUTPATIENT
Start: 2020-01-01 | End: 2020-01-01

## 2020-01-01 RX ORDER — LIDOCAINE 50 MG/G
2 PATCH TOPICAL
Status: DISCONTINUED | OUTPATIENT
Start: 2020-01-01 | End: 2021-01-01 | Stop reason: HOSPADM

## 2020-01-01 RX ORDER — SODIUM CHLORIDE 0.9 % (FLUSH) 0.9 %
10 SYRINGE (ML) INJECTION AS NEEDED
Status: CANCELLED | OUTPATIENT
Start: 2020-01-01

## 2020-01-01 RX ORDER — PROMETHAZINE HYDROCHLORIDE 25 MG/1
25 SUPPOSITORY RECTAL ONCE AS NEEDED
Status: DISCONTINUED | OUTPATIENT
Start: 2020-01-01 | End: 2020-01-01 | Stop reason: HOSPADM

## 2020-01-01 RX ORDER — ACETAMINOPHEN 160 MG/5ML
650 SOLUTION ORAL EVERY 4 HOURS PRN
Status: DISCONTINUED | OUTPATIENT
Start: 2020-01-01 | End: 2021-01-01 | Stop reason: HOSPADM

## 2020-01-01 RX ORDER — KETOROLAC TROMETHAMINE 30 MG/ML
30 INJECTION, SOLUTION INTRAMUSCULAR; INTRAVENOUS EVERY 6 HOURS PRN
Status: DISCONTINUED | OUTPATIENT
Start: 2020-01-01 | End: 2020-01-01

## 2020-01-01 RX ORDER — SODIUM CHLORIDE 0.9 % (FLUSH) 0.9 %
10 SYRINGE (ML) INJECTION EVERY 12 HOURS SCHEDULED
Status: DISCONTINUED | OUTPATIENT
Start: 2020-01-01 | End: 2021-01-01 | Stop reason: HOSPADM

## 2020-01-01 RX ORDER — DEXTROSE AND SODIUM CHLORIDE 5; .9 G/100ML; G/100ML
100 INJECTION, SOLUTION INTRAVENOUS CONTINUOUS
Status: CANCELLED | OUTPATIENT
Start: 2020-01-01

## 2020-01-01 RX ORDER — CASTOR OIL AND BALSAM, PERU 788; 87 MG/G; MG/G
OINTMENT TOPICAL EVERY 12 HOURS SCHEDULED
Status: DISCONTINUED | OUTPATIENT
Start: 2020-01-01 | End: 2021-01-01 | Stop reason: HOSPADM

## 2020-01-01 RX ORDER — ACETAMINOPHEN 650 MG/1
650 SUPPOSITORY RECTAL EVERY 4 HOURS PRN
Status: DISCONTINUED | OUTPATIENT
Start: 2020-01-01 | End: 2021-01-01 | Stop reason: HOSPADM

## 2020-01-01 RX ORDER — POTASSIUM CHLORIDE 1.5 G/1.77G
40 POWDER, FOR SOLUTION ORAL AS NEEDED
Status: DISCONTINUED | OUTPATIENT
Start: 2020-01-01 | End: 2020-01-01

## 2020-01-01 RX ORDER — HYDROCODONE BITARTRATE AND ACETAMINOPHEN 5; 325 MG/1; MG/1
1 TABLET ORAL EVERY 4 HOURS PRN
Status: DISCONTINUED | OUTPATIENT
Start: 2020-01-01 | End: 2020-01-01

## 2020-01-01 RX ADMIN — SODIUM CHLORIDE, PRESERVATIVE FREE 10 ML: 5 INJECTION INTRAVENOUS at 20:59

## 2020-01-01 RX ADMIN — POTASSIUM CHLORIDE 10 MEQ: 7.46 INJECTION, SOLUTION INTRAVENOUS at 00:03

## 2020-01-01 RX ADMIN — ASPIRIN 150 MG: 300 SUPPOSITORY RECTAL at 11:26

## 2020-01-01 RX ADMIN — HYDROMORPHONE HYDROCHLORIDE 0.25 MG: 1 INJECTION, SOLUTION INTRAMUSCULAR; INTRAVENOUS; SUBCUTANEOUS at 04:51

## 2020-01-01 RX ADMIN — BISACODYL 10 MG: 10 SUPPOSITORY RECTAL at 10:43

## 2020-01-01 RX ADMIN — KETOROLAC TROMETHAMINE 30 MG: 30 INJECTION, SOLUTION INTRAMUSCULAR; INTRAVENOUS at 17:13

## 2020-01-01 RX ADMIN — MINERAL OIL: 1000 LIQUID ORAL at 13:41

## 2020-01-01 RX ADMIN — LIDOCAINE HYDROCHLORIDE 100 MG: 10 INJECTION, SOLUTION EPIDURAL; INFILTRATION; INTRACAUDAL; PERINEURAL at 10:53

## 2020-01-01 RX ADMIN — HYDROMORPHONE HYDROCHLORIDE 0.5 MG: 1 INJECTION, SOLUTION INTRAMUSCULAR; INTRAVENOUS; SUBCUTANEOUS at 23:07

## 2020-01-01 RX ADMIN — LIDOCAINE 2 PATCH: 50 PATCH CUTANEOUS at 09:20

## 2020-01-01 RX ADMIN — CASTOR OIL AND BALSAM, PERU: 788; 87 OINTMENT TOPICAL at 21:24

## 2020-01-01 RX ADMIN — KETOROLAC TROMETHAMINE 15 MG: 15 INJECTION, SOLUTION INTRAMUSCULAR; INTRAVENOUS at 04:26

## 2020-01-01 RX ADMIN — ONDANSETRON 4 MG: 2 INJECTION INTRAMUSCULAR; INTRAVENOUS at 14:55

## 2020-01-01 RX ADMIN — HYDROMORPHONE HYDROCHLORIDE 0.5 MG: 1 INJECTION, SOLUTION INTRAMUSCULAR; INTRAVENOUS; SUBCUTANEOUS at 05:54

## 2020-01-01 RX ADMIN — SODIUM CHLORIDE, PRESERVATIVE FREE 10 ML: 5 INJECTION INTRAVENOUS at 08:15

## 2020-01-01 RX ADMIN — CASTOR OIL AND BALSAM, PERU: 788; 87 OINTMENT TOPICAL at 08:51

## 2020-01-01 RX ADMIN — HYDROMORPHONE HYDROCHLORIDE 0.25 MG: 1 INJECTION, SOLUTION INTRAMUSCULAR; INTRAVENOUS; SUBCUTANEOUS at 21:03

## 2020-01-01 RX ADMIN — POTASSIUM CHLORIDE 10 MEQ: 7.46 INJECTION, SOLUTION INTRAVENOUS at 12:52

## 2020-01-01 RX ADMIN — PANTOPRAZOLE SODIUM 40 MG: 40 TABLET, DELAYED RELEASE ORAL at 05:40

## 2020-01-01 RX ADMIN — SODIUM CHLORIDE, PRESERVATIVE FREE 10 ML: 5 INJECTION INTRAVENOUS at 04:08

## 2020-01-01 RX ADMIN — PANTOPRAZOLE SODIUM 40 MG: 40 INJECTION, POWDER, LYOPHILIZED, FOR SOLUTION INTRAVENOUS at 18:41

## 2020-01-01 RX ADMIN — MIRTAZAPINE 15 MG: 15 TABLET, FILM COATED ORAL at 21:25

## 2020-01-01 RX ADMIN — PROPOFOL 20 MG: 10 INJECTION, EMULSION INTRAVENOUS at 11:05

## 2020-01-01 RX ADMIN — CASTOR OIL AND BALSAM, PERU: 788; 87 OINTMENT TOPICAL at 20:29

## 2020-01-01 RX ADMIN — ASPIRIN 81 MG CHEWABLE TABLET 81 MG: 81 TABLET CHEWABLE at 09:04

## 2020-01-01 RX ADMIN — HYDROMORPHONE HYDROCHLORIDE 0.25 MG: 1 INJECTION, SOLUTION INTRAMUSCULAR; INTRAVENOUS; SUBCUTANEOUS at 09:23

## 2020-01-01 RX ADMIN — PANTOPRAZOLE SODIUM 40 MG: 40 INJECTION, POWDER, LYOPHILIZED, FOR SOLUTION INTRAVENOUS at 18:36

## 2020-01-01 RX ADMIN — PROPOFOL 30 MG: 10 INJECTION, EMULSION INTRAVENOUS at 10:53

## 2020-01-01 RX ADMIN — POTASSIUM CHLORIDE 10 MEQ: 7.46 INJECTION, SOLUTION INTRAVENOUS at 11:45

## 2020-01-01 RX ADMIN — HYDROMORPHONE HYDROCHLORIDE 0.25 MG: 1 INJECTION, SOLUTION INTRAMUSCULAR; INTRAVENOUS; SUBCUTANEOUS at 03:20

## 2020-01-01 RX ADMIN — MIRTAZAPINE 15 MG: 15 TABLET, FILM COATED ORAL at 03:56

## 2020-01-01 RX ADMIN — SODIUM CHLORIDE, PRESERVATIVE FREE 10 ML: 5 INJECTION INTRAVENOUS at 20:11

## 2020-01-01 RX ADMIN — POTASSIUM CHLORIDE 50 ML/HR: 2 INJECTION, SOLUTION, CONCENTRATE INTRAVENOUS at 04:07

## 2020-01-01 RX ADMIN — CASTOR OIL AND BALSAM, PERU: 788; 87 OINTMENT TOPICAL at 09:00

## 2020-01-01 RX ADMIN — MIRTAZAPINE 15 MG: 15 TABLET, FILM COATED ORAL at 21:31

## 2020-01-01 RX ADMIN — HYDROMORPHONE HYDROCHLORIDE 0.5 MG: 1 INJECTION, SOLUTION INTRAMUSCULAR; INTRAVENOUS; SUBCUTANEOUS at 09:03

## 2020-01-01 RX ADMIN — HEPARIN SODIUM 5000 UNITS: 5000 INJECTION INTRAVENOUS; SUBCUTANEOUS at 08:50

## 2020-01-01 RX ADMIN — HYDROMORPHONE HYDROCHLORIDE 0.25 MG: 1 INJECTION, SOLUTION INTRAMUSCULAR; INTRAVENOUS; SUBCUTANEOUS at 03:29

## 2020-01-01 RX ADMIN — HYDROMORPHONE HYDROCHLORIDE 0.5 MG: 1 INJECTION, SOLUTION INTRAMUSCULAR; INTRAVENOUS; SUBCUTANEOUS at 09:06

## 2020-01-01 RX ADMIN — CASTOR OIL AND BALSAM, PERU: 788; 87 OINTMENT TOPICAL at 14:20

## 2020-01-01 RX ADMIN — LORAZEPAM 0.25 MG: 2 INJECTION INTRAMUSCULAR; INTRAVENOUS at 09:39

## 2020-01-01 RX ADMIN — SODIUM CHLORIDE 250 ML: 9 INJECTION, SOLUTION INTRAVENOUS at 17:16

## 2020-01-01 RX ADMIN — POTASSIUM CHLORIDE 10 MEQ: 7.46 INJECTION, SOLUTION INTRAVENOUS at 14:20

## 2020-01-01 RX ADMIN — HYDROMORPHONE HYDROCHLORIDE 0.5 MG: 1 INJECTION, SOLUTION INTRAMUSCULAR; INTRAVENOUS; SUBCUTANEOUS at 17:45

## 2020-01-01 RX ADMIN — HYDROMORPHONE HYDROCHLORIDE 0.5 MG: 1 INJECTION, SOLUTION INTRAMUSCULAR; INTRAVENOUS; SUBCUTANEOUS at 18:56

## 2020-01-01 RX ADMIN — SODIUM CHLORIDE, PRESERVATIVE FREE 10 ML: 5 INJECTION INTRAVENOUS at 16:31

## 2020-01-01 RX ADMIN — HEPARIN SODIUM 5000 UNITS: 5000 INJECTION INTRAVENOUS; SUBCUTANEOUS at 21:32

## 2020-01-01 RX ADMIN — MINERAL OIL: 1000 LIQUID ORAL at 21:32

## 2020-01-01 RX ADMIN — HEPARIN SODIUM 5000 UNITS: 5000 INJECTION INTRAVENOUS; SUBCUTANEOUS at 09:21

## 2020-01-01 RX ADMIN — POTASSIUM CHLORIDE 10 MEQ: 7.46 INJECTION, SOLUTION INTRAVENOUS at 15:48

## 2020-01-01 RX ADMIN — CASTOR OIL AND BALSAM, PERU: 788; 87 OINTMENT TOPICAL at 08:00

## 2020-01-01 RX ADMIN — HEPARIN SODIUM 5000 UNITS: 5000 INJECTION INTRAVENOUS; SUBCUTANEOUS at 21:25

## 2020-01-01 RX ADMIN — PANTOPRAZOLE SODIUM 40 MG: 40 INJECTION, POWDER, LYOPHILIZED, FOR SOLUTION INTRAVENOUS at 09:39

## 2020-01-01 RX ADMIN — HYDROMORPHONE HYDROCHLORIDE 0.25 MG: 1 INJECTION, SOLUTION INTRAMUSCULAR; INTRAVENOUS; SUBCUTANEOUS at 16:44

## 2020-01-01 RX ADMIN — HEPARIN SODIUM 5000 UNITS: 5000 INJECTION INTRAVENOUS; SUBCUTANEOUS at 08:22

## 2020-01-01 RX ADMIN — HEPARIN SODIUM 5000 UNITS: 5000 INJECTION INTRAVENOUS; SUBCUTANEOUS at 09:40

## 2020-01-01 RX ADMIN — MIRTAZAPINE 15 MG: 15 TABLET, FILM COATED ORAL at 21:22

## 2020-01-01 RX ADMIN — POTASSIUM CHLORIDE 20 MEQ: 1.5 POWDER, FOR SOLUTION ORAL at 21:32

## 2020-01-01 RX ADMIN — KETOROLAC TROMETHAMINE 15 MG: 15 INJECTION, SOLUTION INTRAMUSCULAR; INTRAVENOUS at 08:14

## 2020-01-01 RX ADMIN — CASTOR OIL AND BALSAM, PERU: 788; 87 OINTMENT TOPICAL at 20:55

## 2020-01-01 RX ADMIN — PANTOPRAZOLE SODIUM 40 MG: 40 INJECTION, POWDER, LYOPHILIZED, FOR SOLUTION INTRAVENOUS at 08:50

## 2020-01-01 RX ADMIN — HEPARIN SODIUM 5000 UNITS: 5000 INJECTION INTRAVENOUS; SUBCUTANEOUS at 21:14

## 2020-01-01 RX ADMIN — POTASSIUM CHLORIDE 20 MEQ: 1.5 POWDER, FOR SOLUTION ORAL at 14:33

## 2020-01-01 RX ADMIN — HEPARIN SODIUM 5000 UNITS: 5000 INJECTION INTRAVENOUS; SUBCUTANEOUS at 08:51

## 2020-01-01 RX ADMIN — QUETIAPINE FUMARATE 12.5 MG: 25 TABLET ORAL at 21:25

## 2020-01-01 RX ADMIN — HYDROMORPHONE HYDROCHLORIDE 0.25 MG: 1 INJECTION, SOLUTION INTRAMUSCULAR; INTRAVENOUS; SUBCUTANEOUS at 04:31

## 2020-01-01 RX ADMIN — MINERAL OIL: 1000 LIQUID ORAL at 14:00

## 2020-01-01 RX ADMIN — LORAZEPAM 0.25 MG: 2 INJECTION INTRAMUSCULAR; INTRAVENOUS at 14:57

## 2020-01-01 RX ADMIN — PANTOPRAZOLE SODIUM 40 MG: 40 INJECTION, POWDER, LYOPHILIZED, FOR SOLUTION INTRAVENOUS at 06:09

## 2020-01-01 RX ADMIN — HYDROMORPHONE HYDROCHLORIDE 0.5 MG: 1 INJECTION, SOLUTION INTRAMUSCULAR; INTRAVENOUS; SUBCUTANEOUS at 17:34

## 2020-01-01 RX ADMIN — PANTOPRAZOLE SODIUM 40 MG: 40 INJECTION, POWDER, LYOPHILIZED, FOR SOLUTION INTRAVENOUS at 18:11

## 2020-01-01 RX ADMIN — POTASSIUM CHLORIDE 10 MEQ: 7.46 INJECTION, SOLUTION INTRAVENOUS at 01:57

## 2020-01-01 RX ADMIN — SODIUM CHLORIDE, PRESERVATIVE FREE 10 ML: 5 INJECTION INTRAVENOUS at 12:27

## 2020-01-01 RX ADMIN — HYDROMORPHONE HYDROCHLORIDE 0.25 MG: 1 INJECTION, SOLUTION INTRAMUSCULAR; INTRAVENOUS; SUBCUTANEOUS at 04:50

## 2020-01-01 RX ADMIN — DOCUSATE SODIUM 50MG AND SENNOSIDES 8.6MG 2 TABLET: 8.6; 5 TABLET, FILM COATED ORAL at 09:39

## 2020-01-01 RX ADMIN — HYDROMORPHONE HYDROCHLORIDE 0.5 MG: 1 INJECTION, SOLUTION INTRAMUSCULAR; INTRAVENOUS; SUBCUTANEOUS at 06:15

## 2020-01-01 RX ADMIN — HEPARIN SODIUM 5000 UNITS: 5000 INJECTION INTRAVENOUS; SUBCUTANEOUS at 20:55

## 2020-01-01 RX ADMIN — PANTOPRAZOLE SODIUM 40 MG: 40 INJECTION, POWDER, LYOPHILIZED, FOR SOLUTION INTRAVENOUS at 08:14

## 2020-01-01 RX ADMIN — SODIUM CHLORIDE, PRESERVATIVE FREE 10 ML: 5 INJECTION INTRAVENOUS at 21:33

## 2020-01-01 RX ADMIN — HYDROMORPHONE HYDROCHLORIDE 0.5 MG: 1 INJECTION, SOLUTION INTRAMUSCULAR; INTRAVENOUS; SUBCUTANEOUS at 16:05

## 2020-01-01 RX ADMIN — ASPIRIN 81 MG CHEWABLE TABLET 81 MG: 81 TABLET CHEWABLE at 09:39

## 2020-01-01 RX ADMIN — QUETIAPINE FUMARATE 12.5 MG: 25 TABLET ORAL at 20:59

## 2020-01-01 RX ADMIN — HYDROMORPHONE HYDROCHLORIDE 0.25 MG: 1 INJECTION, SOLUTION INTRAMUSCULAR; INTRAVENOUS; SUBCUTANEOUS at 15:16

## 2020-01-01 RX ADMIN — PANTOPRAZOLE SODIUM 40 MG: 40 INJECTION, POWDER, LYOPHILIZED, FOR SOLUTION INTRAVENOUS at 16:58

## 2020-01-01 RX ADMIN — MIRTAZAPINE 15 MG: 15 TABLET, FILM COATED ORAL at 20:59

## 2020-01-01 RX ADMIN — HYDROMORPHONE HYDROCHLORIDE 0.5 MG: 1 INJECTION, SOLUTION INTRAMUSCULAR; INTRAVENOUS; SUBCUTANEOUS at 06:09

## 2020-01-01 RX ADMIN — LORAZEPAM 0.25 MG: 2 INJECTION INTRAMUSCULAR; INTRAVENOUS at 04:44

## 2020-01-01 RX ADMIN — HEPARIN SODIUM 5000 UNITS: 5000 INJECTION INTRAVENOUS; SUBCUTANEOUS at 20:59

## 2020-01-01 RX ADMIN — HYDROMORPHONE HYDROCHLORIDE 0.25 MG: 1 INJECTION, SOLUTION INTRAMUSCULAR; INTRAVENOUS; SUBCUTANEOUS at 10:30

## 2020-01-01 RX ADMIN — DEXTROSE AND SODIUM CHLORIDE 100 ML/HR: 5; 900 INJECTION, SOLUTION INTRAVENOUS at 21:15

## 2020-01-01 RX ADMIN — DEXTROSE AND SODIUM CHLORIDE 100 ML/HR: 5; 900 INJECTION, SOLUTION INTRAVENOUS at 07:42

## 2020-01-01 RX ADMIN — PROPOFOL 20 MG: 10 INJECTION, EMULSION INTRAVENOUS at 10:59

## 2020-01-01 RX ADMIN — MINERAL OIL: 1000 LIQUID ORAL at 07:57

## 2020-01-01 RX ADMIN — HYDROMORPHONE HYDROCHLORIDE 0.25 MG: 1 INJECTION, SOLUTION INTRAMUSCULAR; INTRAVENOUS; SUBCUTANEOUS at 21:32

## 2020-01-01 RX ADMIN — SODIUM CHLORIDE, PRESERVATIVE FREE 10 ML: 5 INJECTION INTRAVENOUS at 17:34

## 2020-01-01 RX ADMIN — HEPARIN SODIUM 5000 UNITS: 5000 INJECTION INTRAVENOUS; SUBCUTANEOUS at 20:58

## 2020-01-01 RX ADMIN — LIDOCAINE 2 PATCH: 50 PATCH CUTANEOUS at 12:56

## 2020-01-01 RX ADMIN — PANTOPRAZOLE SODIUM 40 MG: 40 INJECTION, POWDER, LYOPHILIZED, FOR SOLUTION INTRAVENOUS at 17:00

## 2020-01-01 RX ADMIN — HYDROMORPHONE HYDROCHLORIDE 0.5 MG: 1 INJECTION, SOLUTION INTRAMUSCULAR; INTRAVENOUS; SUBCUTANEOUS at 12:56

## 2020-01-01 RX ADMIN — MINERAL OIL: 1000 LIQUID ORAL at 20:29

## 2020-01-01 RX ADMIN — HYDROMORPHONE HYDROCHLORIDE 0.25 MG: 1 INJECTION, SOLUTION INTRAMUSCULAR; INTRAVENOUS; SUBCUTANEOUS at 10:08

## 2020-01-01 RX ADMIN — CASTOR OIL AND BALSAM, PERU: 788; 87 OINTMENT TOPICAL at 20:58

## 2020-01-01 RX ADMIN — SODIUM CHLORIDE 500 ML: 9 INJECTION, SOLUTION INTRAVENOUS at 21:24

## 2020-01-01 RX ADMIN — HYDROMORPHONE HYDROCHLORIDE 0.25 MG: 1 INJECTION, SOLUTION INTRAMUSCULAR; INTRAVENOUS; SUBCUTANEOUS at 21:25

## 2020-01-01 RX ADMIN — SODIUM CHLORIDE 100 ML/HR: 9 INJECTION, SOLUTION INTRAVENOUS at 01:52

## 2020-01-01 RX ADMIN — POTASSIUM CHLORIDE 10 MEQ: 7.46 INJECTION, SOLUTION INTRAVENOUS at 21:25

## 2020-01-01 RX ADMIN — HYDROMORPHONE HYDROCHLORIDE 0.5 MG: 1 INJECTION, SOLUTION INTRAMUSCULAR; INTRAVENOUS; SUBCUTANEOUS at 15:27

## 2020-01-01 RX ADMIN — MINERAL OIL: 1000 LIQUID ORAL at 20:58

## 2020-01-01 RX ADMIN — HYDROMORPHONE HYDROCHLORIDE 0.25 MG: 1 INJECTION, SOLUTION INTRAMUSCULAR; INTRAVENOUS; SUBCUTANEOUS at 09:39

## 2020-01-01 RX ADMIN — PANTOPRAZOLE SODIUM 40 MG: 40 INJECTION, POWDER, LYOPHILIZED, FOR SOLUTION INTRAVENOUS at 16:30

## 2020-01-01 RX ADMIN — MAGNESIUM SULFATE HEPTAHYDRATE 1 G: 1 INJECTION, SOLUTION INTRAVENOUS at 04:07

## 2020-01-01 RX ADMIN — SODIUM CHLORIDE, PRESERVATIVE FREE 10 ML: 5 INJECTION INTRAVENOUS at 20:28

## 2020-01-01 RX ADMIN — DEXTROSE AND SODIUM CHLORIDE 50 ML/HR: 5; 900 INJECTION, SOLUTION INTRAVENOUS at 12:52

## 2020-01-01 RX ADMIN — SODIUM CHLORIDE, PRESERVATIVE FREE 10 ML: 5 INJECTION INTRAVENOUS at 16:06

## 2020-01-01 RX ADMIN — HYDROMORPHONE HYDROCHLORIDE 0.5 MG: 1 INJECTION, SOLUTION INTRAMUSCULAR; INTRAVENOUS; SUBCUTANEOUS at 08:21

## 2020-01-01 RX ADMIN — HYDROMORPHONE HYDROCHLORIDE 0.5 MG: 1 INJECTION, SOLUTION INTRAMUSCULAR; INTRAVENOUS; SUBCUTANEOUS at 00:22

## 2020-01-01 RX ADMIN — POTASSIUM CHLORIDE 20 MEQ: 1.5 POWDER, FOR SOLUTION ORAL at 20:59

## 2020-01-01 RX ADMIN — HYDROMORPHONE HYDROCHLORIDE 0.5 MG: 1 INJECTION, SOLUTION INTRAMUSCULAR; INTRAVENOUS; SUBCUTANEOUS at 14:29

## 2020-01-01 RX ADMIN — SODIUM CHLORIDE, PRESERVATIVE FREE 10 ML: 5 INJECTION INTRAVENOUS at 13:39

## 2020-01-01 RX ADMIN — MIRTAZAPINE 15 MG: 15 TABLET, FILM COATED ORAL at 21:14

## 2020-01-01 RX ADMIN — CASTOR OIL AND BALSAM, PERU: 788; 87 OINTMENT TOPICAL at 21:26

## 2020-01-01 RX ADMIN — POTASSIUM CHLORIDE 20 MEQ: 1.5 POWDER, FOR SOLUTION ORAL at 08:49

## 2020-01-01 RX ADMIN — POTASSIUM CHLORIDE 20 MEQ: 1.5 POWDER, FOR SOLUTION ORAL at 21:40

## 2020-01-01 RX ADMIN — MINERAL OIL: 1000 LIQUID ORAL at 08:14

## 2020-01-01 RX ADMIN — HYDROMORPHONE HYDROCHLORIDE 0.5 MG: 1 INJECTION, SOLUTION INTRAMUSCULAR; INTRAVENOUS; SUBCUTANEOUS at 20:23

## 2020-01-01 RX ADMIN — QUETIAPINE FUMARATE 12.5 MG: 25 TABLET ORAL at 21:32

## 2020-01-01 RX ADMIN — SODIUM CHLORIDE, PRESERVATIVE FREE 10 ML: 5 INJECTION INTRAVENOUS at 21:41

## 2020-01-01 RX ADMIN — ASPIRIN 81 MG CHEWABLE TABLET 81 MG: 81 TABLET CHEWABLE at 14:33

## 2020-01-01 RX ADMIN — PANTOPRAZOLE SODIUM 40 MG: 40 INJECTION, POWDER, LYOPHILIZED, FOR SOLUTION INTRAVENOUS at 09:03

## 2020-01-01 RX ADMIN — HYDROMORPHONE HYDROCHLORIDE 0.25 MG: 1 INJECTION, SOLUTION INTRAMUSCULAR; INTRAVENOUS; SUBCUTANEOUS at 21:00

## 2020-01-01 RX ADMIN — CASTOR OIL AND BALSAM, PERU: 788; 87 OINTMENT TOPICAL at 08:22

## 2020-01-01 RX ADMIN — QUETIAPINE FUMARATE 12.5 MG: 25 TABLET ORAL at 21:23

## 2020-01-01 RX ADMIN — HYDROMORPHONE HYDROCHLORIDE 0.5 MG: 1 INJECTION, SOLUTION INTRAMUSCULAR; INTRAVENOUS; SUBCUTANEOUS at 17:03

## 2020-01-01 RX ADMIN — CASTOR OIL AND BALSAM, PERU: 788; 87 OINTMENT TOPICAL at 07:56

## 2020-01-01 RX ADMIN — PANTOPRAZOLE SODIUM 40 MG: 40 INJECTION, POWDER, LYOPHILIZED, FOR SOLUTION INTRAVENOUS at 09:04

## 2020-01-01 RX ADMIN — SODIUM CHLORIDE, PRESERVATIVE FREE 10 ML: 5 INJECTION INTRAVENOUS at 09:03

## 2020-01-01 RX ADMIN — HYDROMORPHONE HYDROCHLORIDE 0.5 MG: 1 INJECTION, SOLUTION INTRAMUSCULAR; INTRAVENOUS; SUBCUTANEOUS at 18:18

## 2020-01-01 RX ADMIN — HYDROMORPHONE HYDROCHLORIDE 0.5 MG: 1 INJECTION, SOLUTION INTRAMUSCULAR; INTRAVENOUS; SUBCUTANEOUS at 08:50

## 2020-01-01 RX ADMIN — POTASSIUM CHLORIDE 20 MEQ: 1.5 POWDER, FOR SOLUTION ORAL at 09:39

## 2020-01-01 RX ADMIN — LORAZEPAM 0.25 MG: 2 INJECTION INTRAMUSCULAR; INTRAVENOUS at 17:23

## 2020-01-01 RX ADMIN — HEPARIN SODIUM 5000 UNITS: 5000 INJECTION INTRAVENOUS; SUBCUTANEOUS at 11:00

## 2020-01-01 RX ADMIN — HEPARIN SODIUM 5000 UNITS: 5000 INJECTION INTRAVENOUS; SUBCUTANEOUS at 21:40

## 2020-01-01 RX ADMIN — CASTOR OIL AND BALSAM, PERU: 788; 87 OINTMENT TOPICAL at 08:52

## 2020-01-01 RX ADMIN — HEPARIN SODIUM 5000 UNITS: 5000 INJECTION INTRAVENOUS; SUBCUTANEOUS at 21:22

## 2020-01-01 RX ADMIN — HYDROMORPHONE HYDROCHLORIDE 0.25 MG: 1 INJECTION, SOLUTION INTRAMUSCULAR; INTRAVENOUS; SUBCUTANEOUS at 16:16

## 2020-01-01 RX ADMIN — SODIUM CHLORIDE, PRESERVATIVE FREE 10 ML: 5 INJECTION INTRAVENOUS at 13:36

## 2020-01-01 RX ADMIN — CASTOR OIL AND BALSAM, PERU: 788; 87 OINTMENT TOPICAL at 08:14

## 2020-01-01 RX ADMIN — CASTOR OIL AND BALSAM, PERU: 788; 87 OINTMENT TOPICAL at 21:14

## 2020-01-01 RX ADMIN — SODIUM CHLORIDE, PRESERVATIVE FREE 10 ML: 5 INJECTION INTRAVENOUS at 08:51

## 2020-01-01 RX ADMIN — HYDROMORPHONE HYDROCHLORIDE 0.25 MG: 1 INJECTION, SOLUTION INTRAMUSCULAR; INTRAVENOUS; SUBCUTANEOUS at 03:12

## 2020-01-01 RX ADMIN — HYDROMORPHONE HYDROCHLORIDE 0.5 MG: 1 INJECTION, SOLUTION INTRAMUSCULAR; INTRAVENOUS; SUBCUTANEOUS at 01:26

## 2020-01-01 RX ADMIN — HYDROMORPHONE HYDROCHLORIDE 0.5 MG: 2 TABLET ORAL at 13:00

## 2020-01-01 RX ADMIN — HYDROMORPHONE HYDROCHLORIDE 0.25 MG: 1 INJECTION, SOLUTION INTRAMUSCULAR; INTRAVENOUS; SUBCUTANEOUS at 14:57

## 2020-01-01 RX ADMIN — HYDROMORPHONE HYDROCHLORIDE 0.5 MG: 1 INJECTION, SOLUTION INTRAMUSCULAR; INTRAVENOUS; SUBCUTANEOUS at 23:38

## 2020-01-01 RX ADMIN — LORAZEPAM 0.25 MG: 2 INJECTION INTRAMUSCULAR; INTRAVENOUS at 14:18

## 2020-01-01 RX ADMIN — POTASSIUM CHLORIDE 20 MEQ: 1.5 POWDER, FOR SOLUTION ORAL at 21:04

## 2020-01-01 RX ADMIN — HEPARIN SODIUM 5000 UNITS: 5000 INJECTION INTRAVENOUS; SUBCUTANEOUS at 21:04

## 2020-01-01 RX ADMIN — HYDROMORPHONE HYDROCHLORIDE 0.25 MG: 1 INJECTION, SOLUTION INTRAMUSCULAR; INTRAVENOUS; SUBCUTANEOUS at 10:11

## 2020-01-01 RX ADMIN — DEXTROSE AND SODIUM CHLORIDE 100 ML/HR: 5; 900 INJECTION, SOLUTION INTRAVENOUS at 10:44

## 2020-01-01 RX ADMIN — ASPIRIN 81 MG CHEWABLE TABLET 81 MG: 81 TABLET CHEWABLE at 08:13

## 2020-01-01 RX ADMIN — HYDROMORPHONE HYDROCHLORIDE 0.5 MG: 1 INJECTION, SOLUTION INTRAMUSCULAR; INTRAVENOUS; SUBCUTANEOUS at 11:34

## 2020-01-01 RX ADMIN — PANTOPRAZOLE SODIUM 40 MG: 40 INJECTION, POWDER, LYOPHILIZED, FOR SOLUTION INTRAVENOUS at 16:43

## 2020-01-01 RX ADMIN — QUETIAPINE FUMARATE 12.5 MG: 25 TABLET ORAL at 21:14

## 2020-01-01 RX ADMIN — HYDROMORPHONE HYDROCHLORIDE 0.25 MG: 1 INJECTION, SOLUTION INTRAMUSCULAR; INTRAVENOUS; SUBCUTANEOUS at 03:17

## 2020-01-01 RX ADMIN — POTASSIUM CHLORIDE 20 MEQ: 1.5 POWDER, FOR SOLUTION ORAL at 09:03

## 2020-01-01 RX ADMIN — HEPARIN SODIUM 5000 UNITS: 5000 INJECTION INTRAVENOUS; SUBCUTANEOUS at 08:13

## 2020-01-01 RX ADMIN — SODIUM CHLORIDE, PRESERVATIVE FREE 10 ML: 5 INJECTION INTRAVENOUS at 07:51

## 2020-01-01 RX ADMIN — LORAZEPAM 0.25 MG: 2 INJECTION INTRAMUSCULAR; INTRAVENOUS at 17:30

## 2020-01-01 RX ADMIN — HYDROMORPHONE HYDROCHLORIDE 0.5 MG: 1 INJECTION, SOLUTION INTRAMUSCULAR; INTRAVENOUS; SUBCUTANEOUS at 13:35

## 2020-01-01 RX ADMIN — CASTOR OIL AND BALSAM, PERU: 788; 87 OINTMENT TOPICAL at 21:40

## 2020-01-01 RX ADMIN — HYDROMORPHONE HYDROCHLORIDE 0.25 MG: 1 INJECTION, SOLUTION INTRAMUSCULAR; INTRAVENOUS; SUBCUTANEOUS at 16:31

## 2020-01-01 RX ADMIN — PANTOPRAZOLE SODIUM 40 MG: 40 INJECTION, POWDER, LYOPHILIZED, FOR SOLUTION INTRAVENOUS at 07:51

## 2020-01-01 RX ADMIN — LORAZEPAM 0.25 MG: 2 INJECTION INTRAMUSCULAR; INTRAVENOUS at 06:43

## 2020-01-01 RX ADMIN — HEPARIN SODIUM 5000 UNITS: 5000 INJECTION INTRAVENOUS; SUBCUTANEOUS at 09:03

## 2020-01-01 RX ADMIN — SODIUM CHLORIDE, PRESERVATIVE FREE 10 ML: 5 INJECTION INTRAVENOUS at 09:38

## 2020-01-01 RX ADMIN — PANTOPRAZOLE SODIUM 40 MG: 40 INJECTION, POWDER, LYOPHILIZED, FOR SOLUTION INTRAVENOUS at 09:22

## 2020-01-01 RX ADMIN — KETOROLAC TROMETHAMINE 15 MG: 15 INJECTION, SOLUTION INTRAMUSCULAR; INTRAVENOUS at 04:47

## 2020-01-01 RX ADMIN — POTASSIUM CHLORIDE 20 MEQ: 1.5 POWDER, FOR SOLUTION ORAL at 21:23

## 2020-01-01 RX ADMIN — POTASSIUM CHLORIDE 10 MEQ: 7.46 INJECTION, SOLUTION INTRAVENOUS at 16:46

## 2020-01-01 RX ADMIN — PANTOPRAZOLE SODIUM 40 MG: 40 INJECTION, POWDER, LYOPHILIZED, FOR SOLUTION INTRAVENOUS at 17:07

## 2020-01-01 RX ADMIN — HYDROMORPHONE HYDROCHLORIDE 0.25 MG: 1 INJECTION, SOLUTION INTRAMUSCULAR; INTRAVENOUS; SUBCUTANEOUS at 16:56

## 2020-01-01 RX ADMIN — PANTOPRAZOLE SODIUM 40 MG: 40 TABLET, DELAYED RELEASE ORAL at 06:26

## 2020-01-01 RX ADMIN — ACETAMINOPHEN ORAL SOLUTION 649.6 MG: 650 SOLUTION ORAL at 08:48

## 2020-01-01 RX ADMIN — HYDROMORPHONE HYDROCHLORIDE 0.5 MG: 1 INJECTION, SOLUTION INTRAMUSCULAR; INTRAVENOUS; SUBCUTANEOUS at 12:52

## 2020-01-01 RX ADMIN — HYDROMORPHONE HYDROCHLORIDE 0.25 MG: 1 INJECTION, SOLUTION INTRAMUSCULAR; INTRAVENOUS; SUBCUTANEOUS at 21:33

## 2020-01-01 RX ADMIN — KETOROLAC TROMETHAMINE 30 MG: 30 INJECTION, SOLUTION INTRAMUSCULAR; INTRAVENOUS at 14:43

## 2020-01-01 RX ADMIN — HYDROMORPHONE HYDROCHLORIDE 0.25 MG: 1 INJECTION, SOLUTION INTRAMUSCULAR; INTRAVENOUS; SUBCUTANEOUS at 21:16

## 2020-01-01 RX ADMIN — LORAZEPAM 0.25 MG: 2 INJECTION INTRAMUSCULAR; INTRAVENOUS at 16:31

## 2020-01-01 RX ADMIN — ASPIRIN 81 MG CHEWABLE TABLET 81 MG: 81 TABLET CHEWABLE at 08:50

## 2020-01-01 RX ADMIN — HEPARIN SODIUM 5000 UNITS: 5000 INJECTION INTRAVENOUS; SUBCUTANEOUS at 08:03

## 2020-01-01 RX ADMIN — HYDROMORPHONE HYDROCHLORIDE 0.25 MG: 1 INJECTION, SOLUTION INTRAMUSCULAR; INTRAVENOUS; SUBCUTANEOUS at 22:04

## 2020-01-01 RX ADMIN — LORAZEPAM 0.25 MG: 2 INJECTION INTRAMUSCULAR; INTRAVENOUS at 07:51

## 2020-01-01 RX ADMIN — CASTOR OIL AND BALSAM, PERU: 788; 87 OINTMENT TOPICAL at 09:22

## 2020-01-01 RX ADMIN — SODIUM CHLORIDE, PRESERVATIVE FREE 10 ML: 5 INJECTION INTRAVENOUS at 21:04

## 2020-01-01 RX ADMIN — ALBUMIN HUMAN 12.5 G: 0.25 SOLUTION INTRAVENOUS at 01:53

## 2020-01-01 RX ADMIN — SODIUM CHLORIDE 500 ML: 9 INJECTION, SOLUTION INTRAVENOUS at 20:10

## 2020-01-01 RX ADMIN — POTASSIUM CHLORIDE 10 MEQ: 7.46 INJECTION, SOLUTION INTRAVENOUS at 22:19

## 2020-01-01 RX ADMIN — SODIUM CHLORIDE, PRESERVATIVE FREE 10 ML: 5 INJECTION INTRAVENOUS at 21:25

## 2020-01-01 RX ADMIN — CASTOR OIL AND BALSAM, PERU: 788; 87 OINTMENT TOPICAL at 20:59

## 2020-01-01 RX ADMIN — HYDROCODONE BITARTRATE AND ACETAMINOPHEN 1 TABLET: 5; 325 TABLET ORAL at 16:46

## 2020-01-01 RX ADMIN — POTASSIUM CHLORIDE 10 MEQ: 7.46 INJECTION, SOLUTION INTRAVENOUS at 10:34

## 2020-01-01 RX ADMIN — SODIUM CHLORIDE 500 ML: 9 INJECTION, SOLUTION INTRAVENOUS at 13:15

## 2020-01-01 RX ADMIN — HYDROMORPHONE HYDROCHLORIDE 0.5 MG: 1 INJECTION, SOLUTION INTRAMUSCULAR; INTRAVENOUS; SUBCUTANEOUS at 21:41

## 2020-01-01 RX ADMIN — SODIUM CHLORIDE 500 ML: 9 INJECTION, SOLUTION INTRAVENOUS at 20:41

## 2020-01-01 RX ADMIN — LIDOCAINE 2 PATCH: 50 PATCH CUTANEOUS at 09:03

## 2020-01-01 RX ADMIN — QUETIAPINE FUMARATE 12.5 MG: 25 TABLET ORAL at 21:04

## 2020-01-01 RX ADMIN — HYDROMORPHONE HYDROCHLORIDE 0.25 MG: 1 INJECTION, SOLUTION INTRAMUSCULAR; INTRAVENOUS; SUBCUTANEOUS at 22:20

## 2020-01-01 RX ADMIN — ASPIRIN 81 MG CHEWABLE TABLET 81 MG: 81 TABLET CHEWABLE at 08:04

## 2020-01-01 RX ADMIN — SODIUM CHLORIDE, PRESERVATIVE FREE 10 ML: 5 INJECTION INTRAVENOUS at 21:14

## 2020-01-01 RX ADMIN — POTASSIUM CHLORIDE 10 MEQ: 7.46 INJECTION, SOLUTION INTRAVENOUS at 03:50

## 2020-01-01 RX ADMIN — MIRTAZAPINE 15 MG: 15 TABLET, FILM COATED ORAL at 21:41

## 2020-01-01 RX ADMIN — CASTOR OIL AND BALSAM, PERU: 788; 87 OINTMENT TOPICAL at 21:32

## 2020-01-01 RX ADMIN — MINERAL OIL: 1000 LIQUID ORAL at 14:21

## 2020-01-01 RX ADMIN — POTASSIUM CHLORIDE 10 MEQ: 7.46 INJECTION, SOLUTION INTRAVENOUS at 05:40

## 2020-01-01 RX ADMIN — MINERAL OIL: 1000 LIQUID ORAL at 20:59

## 2020-01-01 RX ADMIN — POTASSIUM CHLORIDE 10 MEQ: 7.46 INJECTION, SOLUTION INTRAVENOUS at 04:07

## 2020-01-01 RX ADMIN — MINERAL OIL: 1000 LIQUID ORAL at 09:10

## 2020-01-01 RX ADMIN — ONDANSETRON 4 MG: 2 INJECTION INTRAMUSCULAR; INTRAVENOUS at 09:06

## 2020-01-01 RX ADMIN — LORAZEPAM 0.25 MG: 2 INJECTION INTRAMUSCULAR; INTRAVENOUS at 08:45

## 2020-01-01 RX ADMIN — POTASSIUM CHLORIDE 10 MEQ: 7.46 INJECTION, SOLUTION INTRAVENOUS at 23:02

## 2020-01-01 RX ADMIN — HYDROMORPHONE HYDROCHLORIDE 0.5 MG: 1 INJECTION, SOLUTION INTRAMUSCULAR; INTRAVENOUS; SUBCUTANEOUS at 06:59

## 2020-01-01 RX ADMIN — DEXTROSE AND SODIUM CHLORIDE 50 ML/HR: 5; 900 INJECTION, SOLUTION INTRAVENOUS at 18:11

## 2020-01-01 RX ADMIN — CEFTRIAXONE SODIUM 1 G: 1 INJECTION, POWDER, FOR SOLUTION INTRAMUSCULAR; INTRAVENOUS at 21:41

## 2020-01-01 RX ADMIN — HYDROMORPHONE HYDROCHLORIDE 0.5 MG: 1 INJECTION, SOLUTION INTRAMUSCULAR; INTRAVENOUS; SUBCUTANEOUS at 12:27

## 2020-01-01 RX ADMIN — KETOROLAC TROMETHAMINE 15 MG: 15 INJECTION, SOLUTION INTRAMUSCULAR; INTRAVENOUS at 14:18

## 2020-01-01 RX ADMIN — CASTOR OIL AND BALSAM, PERU: 788; 87 OINTMENT TOPICAL at 09:16

## 2020-01-01 RX ADMIN — DEXTROSE AND SODIUM CHLORIDE 50 ML/HR: 5; 900 INJECTION, SOLUTION INTRAVENOUS at 09:12

## 2020-01-01 RX ADMIN — HYDROMORPHONE HYDROCHLORIDE 0.5 MG: 1 INJECTION, SOLUTION INTRAMUSCULAR; INTRAVENOUS; SUBCUTANEOUS at 17:07

## 2020-01-01 RX ADMIN — LORAZEPAM 0.25 MG: 2 INJECTION INTRAMUSCULAR; INTRAVENOUS at 12:30

## 2020-01-01 RX ADMIN — HYDROMORPHONE HYDROCHLORIDE 0.5 MG: 1 INJECTION, SOLUTION INTRAMUSCULAR; INTRAVENOUS; SUBCUTANEOUS at 01:52

## 2020-01-01 RX ADMIN — LIDOCAINE 2 PATCH: 50 PATCH CUTANEOUS at 08:22

## 2020-01-01 RX ADMIN — HYDROMORPHONE HYDROCHLORIDE 0.25 MG: 1 INJECTION, SOLUTION INTRAMUSCULAR; INTRAVENOUS; SUBCUTANEOUS at 11:00

## 2020-01-01 RX ADMIN — HYDROMORPHONE HYDROCHLORIDE 0.25 MG: 1 INJECTION, SOLUTION INTRAMUSCULAR; INTRAVENOUS; SUBCUTANEOUS at 10:55

## 2020-01-01 RX ADMIN — KETOROLAC TROMETHAMINE 30 MG: 30 INJECTION, SOLUTION INTRAMUSCULAR; INTRAVENOUS at 07:49

## 2020-01-01 RX ADMIN — KETOROLAC TROMETHAMINE 15 MG: 15 INJECTION, SOLUTION INTRAMUSCULAR; INTRAVENOUS at 05:41

## 2020-01-01 RX ADMIN — DEXTROSE AND SODIUM CHLORIDE 50 ML/HR: 5; 900 INJECTION, SOLUTION INTRAVENOUS at 15:17

## 2020-01-01 RX ADMIN — HYDROMORPHONE HYDROCHLORIDE 0.5 MG: 1 INJECTION, SOLUTION INTRAMUSCULAR; INTRAVENOUS; SUBCUTANEOUS at 14:55

## 2020-01-01 RX ADMIN — CASTOR OIL AND BALSAM, PERU: 788; 87 OINTMENT TOPICAL at 21:05

## 2020-01-01 RX ADMIN — KETOROLAC TROMETHAMINE 15 MG: 15 INJECTION, SOLUTION INTRAMUSCULAR; INTRAVENOUS at 10:11

## 2020-01-01 RX ADMIN — SODIUM CHLORIDE, PRESERVATIVE FREE 10 ML: 5 INJECTION INTRAVENOUS at 14:55

## 2020-01-01 RX ADMIN — SODIUM CHLORIDE 100 ML/HR: 9 INJECTION, SOLUTION INTRAVENOUS at 17:17

## 2020-01-01 RX ADMIN — HYDROMORPHONE HYDROCHLORIDE 0.5 MG: 1 INJECTION, SOLUTION INTRAMUSCULAR; INTRAVENOUS; SUBCUTANEOUS at 09:05

## 2020-01-01 RX ADMIN — MIRTAZAPINE 15 MG: 15 TABLET, FILM COATED ORAL at 21:04

## 2020-01-01 RX ADMIN — SODIUM CHLORIDE, POTASSIUM CHLORIDE, SODIUM LACTATE AND CALCIUM CHLORIDE 9 ML/HR: 600; 310; 30; 20 INJECTION, SOLUTION INTRAVENOUS at 10:38

## 2020-01-01 RX ADMIN — POTASSIUM CHLORIDE 20 MEQ: 1.5 POWDER, FOR SOLUTION ORAL at 08:13

## 2020-01-01 RX ADMIN — CEFTRIAXONE SODIUM 1 G: 1 INJECTION, POWDER, FOR SOLUTION INTRAMUSCULAR; INTRAVENOUS at 02:31

## 2020-01-01 RX ADMIN — LORAZEPAM 0.25 MG: 2 INJECTION INTRAMUSCULAR; INTRAVENOUS at 11:32

## 2020-03-21 NOTE — PLAN OF CARE
Problem: Pain, Acute (Adult)  Goal: Identify Related Risk Factors and Signs and Symptoms  Outcome: Ongoing (interventions implemented as appropriate)   02/17/19 1705   Pain, Acute (Adult)   Related Risk Factors (Acute Pain) knowledge deficit;patient perception;positioning;surgery;trauma   Signs and Symptoms (Acute Pain) alteration in muscle tone;BADLs/IADLs reluctance/inability to perform;facial mask of pain/grimace;guarding/abnormal posturing/positioning;verbalization of pain descriptors;sleep pattern alteration     Goal: Acceptable Pain Control/Comfort Level  Outcome: Ongoing (interventions implemented as appropriate)   02/17/19 1705   Pain, Acute (Adult)   Acceptable Pain Control/Comfort Level making progress toward outcome       Problem: Patient Care Overview  Goal: Plan of Care Review  Outcome: Ongoing (interventions implemented as appropriate)   02/17/19 1705   Coping/Psychosocial   Plan of Care Reviewed With patient   Plan of Care Review   Progress no change   OTHER   Outcome Summary Pt has had pain throughout the day. Morphine, Norco, and skelaxin were given. Ropivacane is going at 10ml/hr. She has been resting more comfortably this afternoon. She has had potassium replaced. She has been stating well on room air. Pt. was bladder scanned at 400 and we got 400 out at 1300. Will bladder scan again and see if in and out cath is needed.        Problem: Skin Injury Risk (Adult)  Goal: Identify Related Risk Factors and Signs and Symptoms  Outcome: Ongoing (interventions implemented as appropriate)   02/17/19 1705   Skin Injury Risk (Adult)   Related Risk Factors (Skin Injury Risk) advanced age;mobility impaired     Goal: Skin Health and Integrity  Outcome: Ongoing (interventions implemented as appropriate)   02/17/19 1705   Skin Injury Risk (Adult)   Skin Health and Integrity making progress toward outcome       Problem: Fall Risk (Adult)  Goal: Identify Related Risk Factors and Signs and Symptoms  Outcome: Ongoing  (interventions implemented as appropriate)   02/17/19 1705   Fall Risk (Adult)   Related Risk Factors (Fall Risk) age-related changes;bladder function altered;confusion/agitation;fatigue/slow reaction;gait/mobility problems;history of falls;polypharmacy;environment unfamiliar   Signs and Symptoms (Fall Risk) presence of risk factors     Goal: Absence of Fall  Outcome: Ongoing (interventions implemented as appropriate)   02/17/19 1705   Fall Risk (Adult)   Absence of Fall making progress toward outcome       Problem: Fractured Hip (Adult)  Goal: Signs and Symptoms of Listed Potential Problems Will be Absent, Minimized or Managed (Fractured Hip)  Outcome: Ongoing (interventions implemented as appropriate)   02/17/19 1705   Goal/Outcome Evaluation   Problems Assessed (Fractured Hip) acute cognitive dysfunction;pain;pneumonia   Problems Present (Fractured Hip) acute cognitive dysfunction;pain       Problem: Surgery Nonspecified (Adult)  Goal: Signs and Symptoms of Listed Potential Problems Will be Absent, Minimized or Managed (Surgery Nonspecified)  Outcome: Ongoing (interventions implemented as appropriate)   02/17/19 1705   Goal/Outcome Evaluation   Problems Assessed (Surgery) all   Problems Present (Surgery) pain          yes...

## 2020-12-21 PROBLEM — E87.6 HYPOKALEMIA: Status: ACTIVE | Noted: 2020-01-01

## 2020-12-22 PROBLEM — A49.9 UTI (URINARY TRACT INFECTION), BACTERIAL: Status: ACTIVE | Noted: 2020-01-01

## 2020-12-22 PROBLEM — R62.7 FAILURE TO THRIVE IN ADULT: Status: ACTIVE | Noted: 2020-01-01

## 2020-12-22 PROBLEM — N39.0 UTI (URINARY TRACT INFECTION), BACTERIAL: Status: ACTIVE | Noted: 2020-01-01

## 2020-12-22 PROBLEM — E86.0 DEHYDRATION: Status: ACTIVE | Noted: 2020-01-01

## 2020-12-23 NOTE — ANESTHESIA POSTPROCEDURE EVALUATION
Patient: Debbie Baum    Procedure Summary     Date: 12/23/20 Room / [facility]:  [facility]    Anesthesia Start: 1050 Anesthesia Stop:     Procedure: ESOPHAGOGASTRODUODENOSCOPY with esophageal dilation (N/A ) Diagnosis:       Dysphagia, unspecified type      (Dysphagia, unspecified type [R13.10])    Surgeon: Brunner, Mark I, MD Provider: Raj Borja MD    Anesthesia Type: general ASA Status: 4          Anesthesia Type: general    Vitals  No vitals data found for the desired time range.          Post Anesthesia Care and Evaluation    Patient location during evaluation: PACU  Patient participation: complete - patient participated  Level of consciousness: awake  Pain management: adequate  Airway patency: patent  Anesthetic complications: No anesthetic complications  PONV Status: none  Cardiovascular status: acceptable  Respiratory status: acceptable  Hydration status: acceptable     t 97  Hr 87  sAT 100  /72   No

## 2020-12-23 NOTE — ANESTHESIA PREPROCEDURE EVALUATION
Anesthesia Evaluation     Patient summary reviewed and Nursing notes reviewed                Airway   Mallampati: II  TM distance: >3 FB  Neck ROM: full  No difficulty expected  Dental - normal exam     Pulmonary - negative pulmonary ROS and normal exam   Cardiovascular - normal exam    (+) hypertension, valvular problems/murmurs (MILD-MOD MR), CAD, dysrhythmias (S/P ABLATION) Atrial Fib,       Neuro/Psych- negative ROS  GI/Hepatic/Renal/Endo    (+)  GERD, GI bleeding ,     Musculoskeletal     (+) chronic pain,   Abdominal  - normal exam    Bowel sounds: normal.   Substance History - negative use     OB/GYN negative ob/gyn ROS         Other   arthritis,    history of cancer (COLON)      Other Comment: ADVANCED AGE                Anesthesia Plan    ASA 4     general   (PROPOFOL)  intravenous induction     Anesthetic plan, all risks, benefits, and alternatives have been provided, discussed and informed consent has been obtained with: patient.    Plan discussed with CRNA.

## 2021-01-01 VITALS
DIASTOLIC BLOOD PRESSURE: 70 MMHG | RESPIRATION RATE: 9 BRPM | SYSTOLIC BLOOD PRESSURE: 109 MMHG | HEIGHT: 65 IN | OXYGEN SATURATION: 97 % | HEART RATE: 91 BPM | TEMPERATURE: 98.8 F | BODY MASS INDEX: 17.18 KG/M2 | WEIGHT: 103.1 LBS

## 2021-01-01 PROCEDURE — 25010000002 LORAZEPAM PER 2 MG: Performed by: FAMILY MEDICINE

## 2021-01-01 PROCEDURE — 25010000002 HYDROMORPHONE PER 4 MG: Performed by: INTERNAL MEDICINE

## 2021-01-01 PROCEDURE — 25010000002 HYDROMORPHONE 1 MG/ML SOLUTION: Performed by: FAMILY MEDICINE

## 2021-01-01 PROCEDURE — 25010000002 HYDROMORPHONE PER 4 MG: Performed by: FAMILY MEDICINE

## 2021-01-01 PROCEDURE — 99232 SBSQ HOSP IP/OBS MODERATE 35: CPT | Performed by: INTERNAL MEDICINE

## 2021-01-01 PROCEDURE — 99233 SBSQ HOSP IP/OBS HIGH 50: CPT | Performed by: FAMILY MEDICINE

## 2021-01-01 RX ORDER — LORAZEPAM 2 MG/ML
0.5 INJECTION INTRAMUSCULAR EVERY 4 HOURS PRN
Status: DISCONTINUED | OUTPATIENT
Start: 2021-01-01 | End: 2021-01-01 | Stop reason: HOSPADM

## 2021-01-01 RX ORDER — GLYCOPYRROLATE 0.2 MG/ML
0.2 INJECTION INTRAMUSCULAR; INTRAVENOUS EVERY 4 HOURS PRN
Status: DISCONTINUED | OUTPATIENT
Start: 2021-01-01 | End: 2021-01-01 | Stop reason: HOSPADM

## 2021-01-01 RX ADMIN — LORAZEPAM 0.25 MG: 2 INJECTION INTRAMUSCULAR; INTRAVENOUS at 00:10

## 2021-01-01 RX ADMIN — HYDROMORPHONE HYDROCHLORIDE 1 MG: 1 INJECTION, SOLUTION INTRAMUSCULAR; INTRAVENOUS; SUBCUTANEOUS at 03:26

## 2021-01-01 RX ADMIN — HYDROMORPHONE HYDROCHLORIDE 0.5 MG: 1 INJECTION, SOLUTION INTRAMUSCULAR; INTRAVENOUS; SUBCUTANEOUS at 02:08

## 2021-01-01 RX ADMIN — LORAZEPAM 0.5 MG: 2 INJECTION INTRAMUSCULAR; INTRAVENOUS at 14:09

## 2021-01-01 RX ADMIN — CASTOR OIL AND BALSAM, PERU: 788; 87 OINTMENT TOPICAL at 08:21

## 2021-01-01 RX ADMIN — HYDROMORPHONE HYDROCHLORIDE 0.5 MG: 1 INJECTION, SOLUTION INTRAMUSCULAR; INTRAVENOUS; SUBCUTANEOUS at 08:14

## 2021-01-01 RX ADMIN — HYDROMORPHONE HYDROCHLORIDE 1 MG: 1 INJECTION, SOLUTION INTRAMUSCULAR; INTRAVENOUS; SUBCUTANEOUS at 09:30

## 2021-01-01 RX ADMIN — HYDROMORPHONE HYDROCHLORIDE 1 MG: 1 INJECTION, SOLUTION INTRAMUSCULAR; INTRAVENOUS; SUBCUTANEOUS at 17:03

## 2021-01-01 RX ADMIN — HYDROMORPHONE HYDROCHLORIDE 1 MG: 1 INJECTION, SOLUTION INTRAMUSCULAR; INTRAVENOUS; SUBCUTANEOUS at 22:44

## 2021-01-01 RX ADMIN — HYDROMORPHONE HYDROCHLORIDE 1 MG: 1 INJECTION, SOLUTION INTRAMUSCULAR; INTRAVENOUS; SUBCUTANEOUS at 06:08

## 2021-01-01 RX ADMIN — HYDROMORPHONE HYDROCHLORIDE 1 MG: 1 INJECTION, SOLUTION INTRAMUSCULAR; INTRAVENOUS; SUBCUTANEOUS at 18:36

## 2021-01-01 RX ADMIN — MINERAL OIL: 1000 LIQUID ORAL at 08:48

## 2021-01-01 RX ADMIN — MINERAL OIL: 1000 LIQUID ORAL at 09:38

## 2021-01-01 RX ADMIN — HYDROMORPHONE HYDROCHLORIDE 1 MG: 1 INJECTION, SOLUTION INTRAMUSCULAR; INTRAVENOUS; SUBCUTANEOUS at 11:06

## 2021-01-01 RX ADMIN — HYDROMORPHONE HYDROCHLORIDE 1 MG: 1 INJECTION, SOLUTION INTRAMUSCULAR; INTRAVENOUS; SUBCUTANEOUS at 14:10

## 2021-01-01 RX ADMIN — HYDROMORPHONE HYDROCHLORIDE 0.5 MG: 1 INJECTION, SOLUTION INTRAMUSCULAR; INTRAVENOUS; SUBCUTANEOUS at 05:47

## 2021-01-01 RX ADMIN — HYDROMORPHONE HYDROCHLORIDE 1 MG: 1 INJECTION, SOLUTION INTRAMUSCULAR; INTRAVENOUS; SUBCUTANEOUS at 09:37

## 2021-01-01 RX ADMIN — LORAZEPAM 0.5 MG: 2 INJECTION INTRAMUSCULAR; INTRAVENOUS at 09:38

## 2021-01-01 RX ADMIN — GLYCOPYRROLATE 0.2 MG: 0.2 INJECTION, SOLUTION INTRAMUSCULAR; INTRAVENOUS at 12:56

## 2021-01-01 RX ADMIN — HYDROMORPHONE HYDROCHLORIDE 1 MG: 1 INJECTION, SOLUTION INTRAMUSCULAR; INTRAVENOUS; SUBCUTANEOUS at 01:06

## 2021-01-01 RX ADMIN — HYDROMORPHONE HYDROCHLORIDE 0.5 MG: 1 INJECTION, SOLUTION INTRAMUSCULAR; INTRAVENOUS; SUBCUTANEOUS at 03:46

## 2021-01-01 RX ADMIN — HYDROMORPHONE HYDROCHLORIDE 1 MG: 1 INJECTION, SOLUTION INTRAMUSCULAR; INTRAVENOUS; SUBCUTANEOUS at 12:56

## 2021-01-01 RX ADMIN — LORAZEPAM 0.5 MG: 2 INJECTION INTRAMUSCULAR; INTRAVENOUS at 18:36

## 2021-01-01 RX ADMIN — MINERAL OIL: 1000 LIQUID ORAL at 22:45

## 2021-01-01 RX ADMIN — CASTOR OIL AND BALSAM, PERU: 788; 87 OINTMENT TOPICAL at 20:49

## 2021-01-01 RX ADMIN — HYDROMORPHONE HYDROCHLORIDE 1 MG: 1 INJECTION, SOLUTION INTRAMUSCULAR; INTRAVENOUS; SUBCUTANEOUS at 10:58

## 2021-01-01 RX ADMIN — LORAZEPAM 0.5 MG: 2 INJECTION INTRAMUSCULAR; INTRAVENOUS at 22:44

## 2021-01-01 RX ADMIN — LORAZEPAM 0.25 MG: 2 INJECTION INTRAMUSCULAR; INTRAVENOUS at 05:07

## 2021-01-01 RX ADMIN — HYDROMORPHONE HYDROCHLORIDE 1 MG: 1 INJECTION, SOLUTION INTRAMUSCULAR; INTRAVENOUS; SUBCUTANEOUS at 08:18

## 2021-01-01 RX ADMIN — SODIUM CHLORIDE, PRESERVATIVE FREE 10 ML: 5 INJECTION INTRAVENOUS at 08:15

## 2021-01-01 RX ADMIN — HYDROMORPHONE HYDROCHLORIDE 1 MG: 1 INJECTION, SOLUTION INTRAMUSCULAR; INTRAVENOUS; SUBCUTANEOUS at 20:49

## 2021-01-01 NOTE — PROGRESS NOTES
[facility] Medicine Services  PROGRESS NOTE    Patient Name: Debbie Baum  : 1923  MRN: [id number]    Date of Admission: 2020  Primary Care Physician: Paula Scott MD    Subjective   Subjective     CC:  F/u FTT    HPI:  Patient is resting in bed. Had a moment of moaning. She has respiratory rate of 8.     ROS:  Unable to assess         Objective   Objective     Vital Signs:   Temp:  [98.3 °F (36.8 °C)] 98.3 °F (36.8 °C)  Heart Rate:  [] 105  Resp:  [6-16] 8  BP: (90-93)/(68-83) 93/83        Physical Exam:  Constitutional:  elderly female resting in bed, no longer responds to stimuli   HENT: NCAT, mucous membranes dry   Respiratory:  respiratory effort normal   Cardiovascular: IRR, no murmurs, rubs, or gallops  Gastrointestinal: nondistended  Musculoskeletal: trace  bilateral ankle edema, mild  upper extremity edema   Psychiatric: Unable to assess   Neurologic: no longer responds to stimuli, spontaneously moves all four extremities   Skin: No rashes      Results Reviewed:  Results from last 7 days   Lab Units 20  0808   WBC 10*3/mm3 9.13   HEMOGLOBIN g/dL 11.1*   HEMATOCRIT % 37.7   PLATELETS 10*3/mm3 352     Results from last 7 days   Lab Units 20  0654 20  0808   SODIUM mmol/L 138 140   POTASSIUM mmol/L 4.2 4.6   CHLORIDE mmol/L 110* 108*   CO2 mmol/L 18.0* 21.0*   BUN mg/dL 6* 7*   CREATININE mg/dL 0.69 0.67   GLUCOSE mg/dL 145* 54*   CALCIUM mg/dL 9.3 9.0     Estimated Creatinine Clearance: 29.7 mL/min (by C-G formula based on SCr of 0.69 mg/dL).    Microbiology Results Abnormal     Procedure Component Value - Date/Time    Blood Culture - Blood, Arm, Right [[id number]] Collected: 20    Lab Status: Final result Specimen: Blood from Arm, Right Updated: 20     Blood Culture No growth at 5 days    Blood Culture - Blood, Arm, Left [[id number]] Collected: 20    Lab Status: Final result Specimen: Blood from Arm, Left  Updated: 12/27/20 0215     Blood Culture No growth at 5 days    Urine Culture - Urine, Urine, Catheter [[id number]] Collected: 12/21/20 2239    Lab Status: Final result Specimen: Urine, Catheter Updated: 12/23/20 0855     Urine Culture 25,000 CFU/mL Mixed Rakesh Isolated    Narrative:      Specimen contains mixed organisms of questionable pathogenicity which indicates contamination with commensal rakesh.  Further identification is unlikely to provide clinically useful information.  Suggest recollection.    COVID PRE-OP / PRE-PROCEDURE SCREENING ORDER (NO ISOLATION) - Swab, Nasopharynx [[id number]]  (Normal) Collected: 12/21/20 2253    Lab Status: Final result Specimen: Swab from Nasopharynx Updated: 12/22/20 0158    Narrative:      The following orders were created for panel order COVID PRE-OP / PRE-PROCEDURE SCREENING ORDER (NO ISOLATION) - Swab, Nasopharynx.  Procedure                               Abnormality         Status                     ---------                               -----------         ------                     COVID-19 and FLU A/B PCR...[[id number]]  Normal              Final result                 Please view results for these tests on the individual orders.    COVID-19 and FLU A/B PCR - Swab, Nasopharynx [[id number]]  (Normal) Collected: 12/21/20 2253    Lab Status: Final result Specimen: Swab from Nasopharynx Updated: 12/22/20 0158     COVID19 Not Detected     Influenza A PCR Not Detected     Influenza B PCR Not Detected    Narrative:      Fact sheet for providers: https://www.fda.gov/media/778408/download    Fact sheet for patients: https://www.fda.gov/media/473261/download    Test performed by PCR.          Imaging Results (Last 24 Hours)     ** No results found for the last 24 hours. **          Results for orders placed during the hospital encounter of 09/17/18   Adult Transthoracic Echo Complete W/ Cont if Necessary Per Protocol    Narrative · Mild-to-moderate mitral valve regurgitation is  present          I have reviewed the medications:  Scheduled Meds:castor oil-balsam peru, , Topical, Q12H  HYDROmorphone, 1 mg, Intravenous, Q6H  lidocaine, 2 patch, Transdermal, Q24H  palliative care oral rinse, , Mouth/Throat, TID - RT  sodium chloride, 10 mL, Intravenous, Q12H      Continuous Infusions:   PRN Meds:.•  acetaminophen **OR** acetaminophen **OR** acetaminophen  •  bisacodyl  •  HYDROmorphone  •  LORazepam  •  ondansetron  •  sodium chloride    Assessment/Plan   Assessment & Plan     Active Hospital Problems    Diagnosis  POA   • **Fall [W19.XXXA]  Unknown   • Dehydration [E86.0]  Unknown   • Failure to thrive in adult [R62.7]  Unknown   • UTI (urinary tract infection), bacterial [N39.0, A49.9]  Yes   • Hypokalemia [E87.6]  Yes   • Dysphagia [R13.10]  Yes   • Chronic pain [G89.29]  Yes   • Essential hypertension [I10]  Yes   • Coronary artery disease [I25.10]  Yes   • GERD (gastroesophageal reflux disease) [K21.9]  Yes   • Weakness generalized [R53.1]  Yes   • Atrial fibrillation (CMS/HCC) [I48.91]  Yes      Resolved Hospital Problems   No resolved problems to display.        Brief Hospital Course to date:  Debbie Baum is a 89 y.o. female with PMH of esophageal stricture with dilatation, chronic dysphagia, HTN, CAD, GERD, afaib on ASA not AC, who presented with 16 pound weight loss over the last month, progressive worsening dysphagia, difficulty tolerating oral medications, and mechanical fall out of wheelchair at nursing home (the [facility]). Per the [facility] she has been declining significantly over the past few months. During this admission she was changed to comfort measures only. On 12/31 the family agreed to stop IVF. She has not made urine today and is no longer responsive.    This patient's problems and plans were partially entered by my partner and updated as appropriate by me 01/01/21.    Severe Dysphagia  FTT, malnutrition  Esophageal Stricture s/p multiple dilations  - repeat EGD with  dilation on 12/23, noted large paraesophageal hernia    - all po medications have been discontinued       Dementia w/agitation  - d/c seroquel      Chronic Pain  - continue PRN IV dilaudid, increased dose today   Chronic A.fib  - not anticoagulated secondary to fall risk   CAD   GERD    GOC/Family Planning  Today I had a long conversation with the patient's son. We continue to focus on comfort. Given that she is moaning we increased dilaudid and ativan doses.      DVT Prophylaxis:  Heparin       Disposition: I expect the patient to pass away within 12 hours.     CODE STATUS:   Code Status and Medical Interventions:   Ordered at: 12/30/20 1111     Level Of Support Discussed With:    Health Care Surrogate     Code Status:    No CPR     Medical Interventions (Level of Support Prior to Arrest):    Comfort Measures       Malena Fonseca,   01/01/21

## 2021-01-01 NOTE — PROGRESS NOTES
Palliative Care Progress Note    Date of Admission: 12/21/2020    Subjective: Nursing staff reports that since increasing lorazepam and dilaudid dosing this am, patient has rested comfortably and not required break through dosing. SonFadi is at bedside. He reports that patient is resting comfortably and they want to continue on this course. They are very happy with pain control at this time.   Current Code Status     Date Active Code Status Order ID Comments User Context       12/21/2020 2340 No CPR [id number]  Frandy Tolliver DO ED     Advance Care Planning Activity      Questions for Current Code Status     Question Answer Comment    Code Status No CPR     Medical Interventions (Level of Support Prior to Arrest) Limited     Limited Support to NOT Include Intubation         No current facility-administered medications on file prior to encounter.      Current Outpatient Medications on File Prior to Encounter   Medication Sig Dispense Refill   • Loratadine (Claritin) 10 MG capsule Take  by mouth.     • melatonin 5 MG tablet tablet Take 5 mg by mouth.     • mirtazapine (REMERON) 15 MG tablet Take 15 mg by mouth Every Night.     • acetaminophen (TYLENOL) 650 MG 8 hr tablet Take 1,300 mg by mouth Every Night.     • aspirin 81 MG chewable tablet Chew 1 tablet 2 (Two) Times a Day.     • carvedilol (COREG) 3.125 MG tablet Take 3.125 mg by mouth 2 (Two) Times a Day With Meals.     • cholecalciferol (VITAMIN D3) 1000 units tablet Take 1,000 Units by mouth Daily.     • Cyanocobalamin (B-12 PO) Take 1 tablet by mouth Daily.     • diclofenac (VOLTAREN) 1 % gel gel Apply 2 g topically to the appropriate area as directed 4 (Four) Times a Day.     • docusate sodium (COLACE) 150 MG/15ML liquid Take 10 mL by mouth 2 (Two) Times a Day As Needed (constipation).     • DULoxetine (CYMBALTA) 20 MG capsule Take 20 mg by mouth Daily.     • furosemide (LASIX) 20 MG tablet Take 20 mg by mouth 2 (Two) Times a Day.     • gabapentin (NEURONTIN)  "100 MG capsule Take 300 mg by mouth 3 (Three) Times a Day.     • HYDROmorphone (DILAUDID) 2 MG tablet Take 0.5 tablets (1 MG) by mouth Every 4 (Four) Hours (Scheduled) 18 tablet 0   • ibuprofen (ADVIL,MOTRIN) 400 MG tablet Take 400 mg by mouth Every 6 (Six) Hours As Needed for Mild Pain .     • lidocaine (LIDODERM) 5 % Place 1 patch on the skin as directed by provider Daily. Remove & Discard patch within 12 hours or as directed by MD     • meloxicam (MOBIC) 7.5 MG tablet Take 7.5 mg by mouth Daily.     • pantoprazole (PROTONIX) 40 MG EC tablet Take 40 mg by mouth Daily.     • potassium chloride (K-DUR,KLOR-CON) 20 MEQ CR tablet Take 20 mEq by mouth 2 (Two) Times a Day.     • sennosides-docusate sodium (SENOKOT-S) 8.6-50 MG tablet Take 2 tablets by mouth At Night As Needed for Constipation.     • trolamine salicylate (ASPERCREME) 10 % cream Apply  topically to the appropriate area as directed As Needed for Muscle / Joint Pain.     • uribel (URO-MP) 118 MG capsule capsule Take 1 capsule by mouth every night at bedtime.          •  acetaminophen **OR** acetaminophen **OR** acetaminophen  •  bisacodyl  •  HYDROmorphone  •  LORazepam  •  ondansetron  •  sodium chloride    Objective: BP 93/83 (BP Location: Right arm, Patient Position: Lying)   Pulse 105   Temp 98.3 °F (36.8 °C) (Oral)   Resp 8   Ht 165.1 cm (65\")   Wt 46.8 kg (103 lb 1.6 oz)   LMP  (LMP Unknown)   SpO2 97%   BMI 17.16 kg/m²    No intake or output data in the 24 hours ending 01/01/21 1407  Physical Exam:      General Appearance:    Frail, resting quietly, NAD   Head:    Normocephalic, without obvious abnormality, atraumatic   Eyes:            Lids and lashes normal, eyes closed   Ears:    Ears appear intact with no abnormalities noted   Throat:   No oral lesions, no thrush, oral mucosa dry, mouth open, cyanotic around nose and mouth.   Neck:   No adenopathy, supple, trachea midline, no thyromegaly, no     carotid bruit, no JVD   Back:     No " kyphosis present, no scoliosis present, no skin lesions,       erythema or scars, no tenderness to percussion or                   palpation,   range of motion normal   Lungs:     Clear to auscultation, intermittent apnea, unlabored, diminished in bases    Heart:    Regular rhythm and normal rate, normal S1 and S2, no            murmur, no gallop, no rub, no click   Breast Exam:    Deferred   Abdomen:     Normal bowel sounds, no masses, no organomegaly, soft        non-tender, non-distended, no guarding, no rebound                 tenderness   Genitalia:    Deferred   Extremities:   Moves all extremities well, RUE moderate edema, no cyanosis, no              redness   Pulses:   Pulses palpable and equal bilaterally   Skin:   No bleeding, bruising or rash   Lymph nodes:   No palpable adenopathy   Neurologic:  Asleep, no tremor     Results from last 7 days   Lab Units 12/26/20  0808   WBC 10*3/mm3 9.13   HEMOGLOBIN g/dL 11.1*   HEMATOCRIT % 37.7   PLATELETS 10*3/mm3 352     Results from last 7 days   Lab Units 12/27/20  0654   SODIUM mmol/L 138   POTASSIUM mmol/L 4.2   CHLORIDE mmol/L 110*   CO2 mmol/L 18.0*   BUN mg/dL 6*   CREATININE mg/dL 0.69   CALCIUM mg/dL 9.3   GLUCOSE mg/dL 145*       Impression: Esophageal stricture  FTT  Dysphagia  GOC    Plan: Spoke with son, Fadi who was at bedside. He reports that they are very happy with current pain control and how well patient is resting. They are happy with their current treatment team and plan. They are prepared as a family for the patient's death and expresses appropriate grief and acceptance. They decline hospice service, they do not want the patient transferred from current location. They are appreciative of all the support they have received.            Jim Henry, APRN  01/01/21  14:07 EST

## 2021-01-02 NOTE — PROGRESS NOTES
[facility] Medicine Services  PROGRESS NOTE    Patient Name: Debbie Baum  : 1923  MRN: [id number]    Date of Admission: 2020  Primary Care Physician: Paula Scott MD    Subjective   Subjective     CC:  F/u comfort measures    HPI:  Patient resting in bed. She appears comfortable. Per family she is resting very well and they are happy with pain control.    ROS:  Unable to obtain    Objective   Objective     Vital Signs:   Temp:  [98.8 °F (37.1 °C)] 98.8 °F (37.1 °C)  Heart Rate:  [] 113  Resp:  [9] 9  BP: (109)/(70) 109/70        Physical Exam:  Constitutional: No acute distress, sleeping in bed. Very frail.  Cardiovascular: RRR  Musculoskeletal: No bilateral ankle edema  Neurologic: appears comfortable, minimally responsive, sleeping    Results Reviewed:      Results from last 7 days   Lab Units 20  0654   SODIUM mmol/L 138   POTASSIUM mmol/L 4.2   CHLORIDE mmol/L 110*   CO2 mmol/L 18.0*   BUN mg/dL 6*   CREATININE mg/dL 0.69   GLUCOSE mg/dL 145*   CALCIUM mg/dL 9.3     Estimated Creatinine Clearance: 29.7 mL/min (by C-G formula based on SCr of 0.69 mg/dL).    Microbiology Results Abnormal     Procedure Component Value - Date/Time    Blood Culture - Blood, Arm, Right [[id number]] Collected: 20 0153    Lab Status: Final result Specimen: Blood from Arm, Right Updated: 20     Blood Culture No growth at 5 days    Blood Culture - Blood, Arm, Left [[id number]] Collected: 20 0158    Lab Status: Final result Specimen: Blood from Arm, Left Updated: 20     Blood Culture No growth at 5 days    Urine Culture - Urine, Urine, Catheter [[id number]] Collected: 20    Lab Status: Final result Specimen: Urine, Catheter Updated: 20 0855     Urine Culture 25,000 CFU/mL Mixed Renata Isolated    Narrative:      Specimen contains mixed organisms of questionable pathogenicity which indicates contamination with commensal renata.  Further  identification is unlikely to provide clinically useful information.  Suggest recollection.    COVID PRE-OP / PRE-PROCEDURE SCREENING ORDER (NO ISOLATION) - Swab, Nasopharynx [[id number]]  (Normal) Collected: 12/21/20 2253    Lab Status: Final result Specimen: Swab from Nasopharynx Updated: 12/22/20 0158    Narrative:      The following orders were created for panel order COVID PRE-OP / PRE-PROCEDURE SCREENING ORDER (NO ISOLATION) - Swab, Nasopharynx.  Procedure                               Abnormality         Status                     ---------                               -----------         ------                     COVID-19 and FLU A/B PCR...[[id number]]  Normal              Final result                 Please view results for these tests on the individual orders.    COVID-19 and FLU A/B PCR - Swab, Nasopharynx [[id number]]  (Normal) Collected: 12/21/20 2253    Lab Status: Final result Specimen: Swab from Nasopharynx Updated: 12/22/20 0158     COVID19 Not Detected     Influenza A PCR Not Detected     Influenza B PCR Not Detected    Narrative:      Fact sheet for providers: https://www.fda.gov/media/382311/download    Fact sheet for patients: https://www.fda.gov/media/595053/download    Test performed by PCR.          Imaging Results (Last 24 Hours)     ** No results found for the last 24 hours. **          Results for orders placed during the hospital encounter of 09/17/18   Adult Transthoracic Echo Complete W/ Cont if Necessary Per Protocol    Narrative · Mild-to-moderate mitral valve regurgitation is present          I have reviewed the medications:  Scheduled Meds:castor oil-balsam peru, , Topical, Q12H  HYDROmorphone, 1 mg, Intravenous, Q6H  lidocaine, 2 patch, Transdermal, Q24H  palliative care oral rinse, , Mouth/Throat, TID - RT  sodium chloride, 10 mL, Intravenous, Q12H      Continuous Infusions:   PRN Meds:.•  acetaminophen **OR** acetaminophen **OR** acetaminophen  •  bisacodyl  •  HYDROmorphone  •   LORazepam  •  ondansetron  •  sodium chloride    Assessment/Plan   Assessment & Plan     Active Hospital Problems    Diagnosis  POA   • **Fall [W19.XXXA]  Unknown   • Dehydration [E86.0]  Unknown   • Failure to thrive in adult [R62.7]  Unknown   • UTI (urinary tract infection), bacterial [N39.0, A49.9]  Yes   • Hypokalemia [E87.6]  Yes   • Dysphagia [R13.10]  Yes   • Chronic pain [G89.29]  Yes   • Essential hypertension [I10]  Yes   • Coronary artery disease [I25.10]  Yes   • GERD (gastroesophageal reflux disease) [K21.9]  Yes   • Weakness generalized [R53.1]  Yes   • Atrial fibrillation (CMS/HCC) [I48.91]  Yes      Resolved Hospital Problems   No resolved problems to display.        Brief Hospital Course to date:  Debbie Baum is a 89 y.o. female with PMH of esophageal stricture with dilatation, chronic dysphagia, HTN, CAD, GERD, afaib on ASA not AC, who presented with 16 pound weight loss over the last month, progressive worsening dysphagia, difficulty tolerating oral medications, and mechanical fall out of wheelchair at nursing home (the [facility]). Per the [facility] she has been declining significantly over the past few months. During this admission she was changed to comfort measures only. On 12/31 the family agreed to stop IVF.     Severe Dysphagia  FTT, malnutrition  Esophageal Stricture s/p multiple dilations  - repeat EGD with dilation on 12/23, noted large paraesophageal hernia , patient with poor PO intake and all medications PO medications discontinued as she is now comfort measures.    Dementia w/agitation     Chronic Pain  - continue PRN IV dilaudid    Chronic A.fib   CAD     GERD     GOC/Family Planning  Daughter at bedside today, states patient appears much more comfortable and is happy with pain control. Continue current doses. Palliative care following also. Decline hospice as they did not want to move patient from her current room.      Disposition: Patient is comfort measures only, she will likely  pass away within the next several days.    CODE STATUS:   Code Status and Medical Interventions:   Ordered at: 12/30/20 1111     Level Of Support Discussed With:    Health Care Surrogate     Code Status:    No CPR     Medical Interventions (Level of Support Prior to Arrest):    Comfort Measures       Mitzy Dugan DO  01/02/21

## 2021-01-02 NOTE — SIGNIFICANT NOTE
Exam confirms with auscultation zero audible heart tones and zero audible respirations. Ms.Marita CARLEEN Baum was pronounced dead at 1508.  MD notified by Patient's RN.    Kayli Holm RN  Clinical House Supervisor  1/2/2021 15:43 EST

## 2021-01-02 NOTE — PLAN OF CARE
Patient is actively dying, shallow/agonal breathing, appears to be comfortable at time of visit.  Discussion with family regarding slight audible congestion - pt appears to be very close, agreed and encouraged to contact patient's dtr who wishes to be present (left earlier to go home for rest and shower).    Problem: Palliative Care  Goal: Enhanced Quality of Life  Intervention: Optimize Psychosocial Wellbeing  Recent Flowsheet Documentation  Taken 1/2/2021 1507 by Vicky Hurd, OLGA  Grieving Process Facilitation: family/significant other support facilitated  Spiritual Activities Assistance: affirmation provided  Family/Support System Care:   support provided   caregiver stress acknowledged  Patient family receptive to supportive/empathic presence, validation of feelings, discussion about end of life process.

## 2021-01-04 NOTE — DISCHARGE SUMMARY
[facility] Medicine Services  DEATH SUMMARY    Patient Name: Debbie Baum  : 1923  MRN: [id number]    Date of Admission: 2020  Date of Death:  2021   Time of Death:  1508    Primary Care Physician: Paula Scott MD    Consults     Date and Time Order Name Status Description    2020 1338 Inpatient Gastroenterology Consult Completed     2020 0223 Inpatient Palliative Care MD Consult Completed     2020 0012 Inpatient Palliative Care MD Consult Completed           Summary of Hospital Events     Presenting Problem:   Hypokalemia [E87.6]  Hypokalemia [E87.6]  Hypokalemia [E87.6]  Hypokalemia [E87.6]    Active Hospital Problems    Diagnosis  POA   • **Fall [W19.XXXA]  Unknown   • Dehydration [E86.0]  Unknown   • Failure to thrive in adult [R62.7]  Unknown   • UTI (urinary tract infection), bacterial [N39.0, A49.9]  Yes   • Hypokalemia [E87.6]  Yes   • Dysphagia [R13.10]  Yes   • Chronic pain [G89.29]  Yes   • Essential hypertension [I10]  Yes   • Coronary artery disease [I25.10]  Yes   • GERD (gastroesophageal reflux disease) [K21.9]  Yes   • Weakness generalized [R53.1]  Yes   • Atrial fibrillation (CMS/HCC) [I48.91]  Yes      Resolved Hospital Problems   No resolved problems to display.          Hospital Course:  Debbie Baum was a 89 y.o. female with PMH of esophageal stricture with dilatation, chronic dysphagia, HTN, CAD, GERD, afaib on ASA not AC, who presented with 16 pound weight loss over the last month, progressive worsening dysphagia, difficulty tolerating oral medications, and mechanical fall out of wheelchair at nursing home. Per the [facility] she has been declining significantly over the past few months. During this admission she continued to decline despite repeat esophageal dilation. Family decided to keep her comfortable, she was changed to comfort measures only. She was pronounced  on 2021 at 1508.      Official Cause of Death  and any directly related diagnoses:    Failure to Thrive   Severe Dysphagia  Esophageal Stricture s/p multiple dilations  Dementia    Mitzy Dugan,   01/04/21

## 2021-01-05 LAB
ATMOSPHERIC PRESS: NORMAL MM[HG]
BASE EXCESS BLDV CALC-SCNC: NORMAL MMOL/L
BDY SITE: NORMAL
BODY TEMPERATURE: NORMAL
CO2 BLDA-SCNC: NORMAL MMOL/L
COHGB MFR BLD: NORMAL %
EPAP: 0
HCO3 BLDV-SCNC: NORMAL MMOL/L
HGB BLDA-MCNC: NORMAL G/DL
INHALED O2 CONCENTRATION: NORMAL %
IPAP: 0
METHGB BLD QL: NORMAL
MODALITY: NORMAL
NOTE: NORMAL
OXYHGB MFR BLDV: NORMAL %
PAW @ PEAK INSP FLOW SETTING VENT: 0 CMH2O
PCO2 BLDV: NORMAL MM[HG]
PH BLDV: NORMAL [PH]
PO2 BLDV: NORMAL MM[HG]
TOTAL RATE: 0 BREATHS/MINUTE

## 2021-03-30 NOTE — PLAN OF CARE
Problem: Fall Risk (Adult)  Goal: Absence of Fall  Outcome: Ongoing (interventions implemented as appropriate)   10/10/19 0447   Fall Risk (Adult)   Absence of Fall making progress toward outcome          7

## 2023-03-20 NOTE — PLAN OF CARE
Problem: Patient Care Overview  Goal: Plan of Care Review  Outcome: Ongoing (interventions implemented as appropriate)   10/09/19 1509   Coping/Psychosocial   Plan of Care Reviewed With patient;family   Plan of Care Review   Progress no change   OTHER   Outcome Summary Pt vss, wound consulted this morning,off loading heel boots applied, has been co of pain all day throughout her chest, has been requesting Pt throughout day, she wants to sit in wheelchair, appetite has been poor, sttes no appetite. conintue to monitor        Problem: Fall Risk (Adult)  Goal: Identify Related Risk Factors and Signs and Symptoms  Outcome: Ongoing (interventions implemented as appropriate)   10/09/19 1509   Fall Risk (Adult)   Related Risk Factors (Fall Risk) gait/mobility problems   Signs and Symptoms (Fall Risk) presence of risk factors       Problem: Cardiac: ACS (Acute Coronary Syndrome) (Adult)  Goal: Signs and Symptoms of Listed Potential Problems Will be Absent, Minimized or Managed (Cardiac: ACS)  Outcome: Ongoing (interventions implemented as appropriate)   10/09/19 1509   Goal/Outcome Evaluation   Problems Assessed (Acute Coronary Syndrome) all   Problems Present (Acute Coronary Syn) none  (has chest wall pain from fall, )       Problem: Skin Injury Risk (Adult)  Goal: Identify Related Risk Factors and Signs and Symptoms  Outcome: Ongoing (interventions implemented as appropriate)   10/09/19 1509   Skin Injury Risk (Adult)   Related Risk Factors (Skin Injury Risk) advanced age;hospitalization prolonged;nutritional deficiencies;tissue perfusion altered;mobility impaired       Problem: Palliative Care (Adult)  Goal: Identify Related Risk Factors and Signs and Symptoms  Outcome: Ongoing (interventions implemented as appropriate)   10/09/19 1509   Palliative Care (Adult)   Palliative Care: Related Risk Factors advanced age;injury (severe and disabling)   Palliative Care: Signs and Symptoms pain          Today you are seen for your symptoms are most consistent with a COPD exacerbation.  Please take the prescribed steroids over the next 5 days.  She will follow-up closely with your primary care provider within 2 days for further management.  I also think given your history would be good to see a pulmonologist if you have not yet seen when I would like you to follow-up with her pulmonologist I have listed above.  If your symptoms worsen prior please immediate return to the emergency department.

## 2023-05-23 NOTE — PLAN OF CARE
Other Problem: Patient Care Overview  Goal: Plan of Care Review  Outcome: Ongoing (interventions implemented as appropriate)      Problem: Pain, Acute (Adult)  Goal: Identify Related Risk Factors and Signs and Symptoms  Outcome: Ongoing (interventions implemented as appropriate)          Follow Up Call

## 2025-02-02 NOTE — PROGRESS NOTES
[facility] Medicine Services  PROGRESS NOTE    Patient Name: Debbie Baum  : 1923  MRN: [id number]    Date of Admission: 2018  Length of Stay: 6  Primary Care Physician: Paula Scott MD    Subjective   Subjective     CC: F/U generalized weakness    Subjective:  Resting in a chair in no acute distress. Does not have any specific complaint except urinary retention.  Diarrhea has improved markedly.  Patient still is retaining urine and nursing staff needs to in and out catheterized her.     Review of Systems  Gen- No fevers, chills  CV- No chest pain, palpitations  Resp- No cough, dyspnea  GI- No further diarrhea, no abd pain    Otherwise ROS is negative except as mentioned in the HPI.    Objective   Objective     Vital Signs:   Temp:  [98.3 °F (36.8 °C)-98.8 °F (37.1 °C)] 98.8 °F (37.1 °C)  Heart Rate:  [62-85] 68  Resp:  [18] 18  BP: (116-142)/(68-85) 116/68        Physical Exam:  Constitutional: No acute distress, awake, alert, sitting up in chair  HENT: NCAT, mucous membranes moist  Respiratory: Clear to auscultation bilaterally, respiratory effort normal   Cardiovascular: RRR, no murmurs, rubs, or gallops  Gastrointestinal: Positive bowel sounds, soft, nontender, nondistended  Musculoskeletal: No bilateral ankle edema  Psychiatric: Appropriate affect, cooperative  Neurologic: Awake, alert, follows commands.  Skin: No rashes    Results Reviewed:  I have personally reviewed current lab, radiology, and data and agree.      Results from last 7 days  Lab Units 18  0847 18  0438 18  0537   WBC 10*3/mm3 9.06 11.94* 9.24   HEMOGLOBIN g/dL 10.6* 11.7 10.4*   HEMATOCRIT % 33.3* 36.7 31.6*   PLATELETS 10*3/mm3 373 442 400       Results from last 7 days  Lab Units 04/10/18  0424 18  0503 18  0847  18  1321   SODIUM mmol/L 135 136 136  < > 121*   POTASSIUM mmol/L 3.6 3.7 3.9  < > 3.9   CHLORIDE mmol/L 105 103 104  < > 86*   CO2 mmol/L 27.0 26.0  27.0  < > 25.0   BUN mg/dL 10 10 9  < > 6*   CREATININE mg/dL 0.40* 0.50* 0.60  < > 0.60   GLUCOSE mg/dL 98 86 157*  < > 107*   CALCIUM mg/dL 9.1 9.4 9.1  < > 9.2   ALT (SGPT) U/L  --   --   --   --  31   AST (SGOT) U/L  --   --   --   --  33   < > = values in this interval not displayed.  Estimated Creatinine Clearance: 38.7 mL/min (by C-G formula based on SCr of 0.4 mg/dL (L)).  No results found for: BNP  No results found for: PHART    Microbiology Results Abnormal     None          Imaging Results (last 24 hours)     ** No results found for the last 24 hours. **             I have reviewed the medications.    Assessment/Plan   Assessment / Plan     Hospital Problem List     * (Principal)Hyponatremia    Essential hypertension    Coronary artery disease    Weakness generalized    GERD (gastroesophageal reflux disease)    Atrial fibrillation             Brief Hospital Course to date:  Debbie Baum is a 89 y.o. female with recently diagnosed Influenza B in [facility] in 03/2018, A. Fib/flutter s/p ablation about 2 yrs ago, recent hx of pancreatitis presented with worsening generalized weakness. She was seen at [facility] ED on 4/1 and noted to be mildly hyponatremic, urine culture obtained grew Proteus. Bactrim was called in. She has taken a couple of doses since. Also saw PCP who stopped her HCTZ for hyponatremia and started her on lasix for LE edema.       Assessment & Plan:    Hyponatremia  -Etiology uncertain but Resolved      Urinary retention  -Likely due to PRN hydroxyzine which was added on admission  -[place] Pal catheter and continue Flomax.   -Bladder training.  If the problem continues we will Consult urology.      Diarrhea  -Much improved.  -Continue probiotic    Asymptomatic bacteruria  -Initial UA this admission not suggestive of UTI and she does not have dysuria.  UA collected in ED last week had multiple squamous epithelial cells suggesting contamination.  -Antibiotics discontinued    Generalized  weakness  -Likely due to recent illness and hyponatremia  -PT/OT following and case management working on rehab    Left shoulder pain  -Reviewed records from [facility] which show arthritic changes on X-ray  -ESR normal    GERD  -Continue PPI    DVT Prophylaxis: Mechanical-had significant local reaction to lovenox injection per nursing.      CODE STATUS: Conditional Code    Disposition: I expect the patient to be discharged to rehab. She states her first choice is [facility], but initially case management was looking at [facility].      Electronically signed by Rm Orona MD, 04/10/18, 4:27 PM.     Stable has been on softer side 90s to 100s   Continue Metoprolol Tartrate 12.5mg PO BID and Imdur ER 30mg PO daily.

## (undated) DEVICE — SPNG GZ WOVN 4X4IN 12PLY 10/BX STRL

## (undated) DEVICE — CVR HNDL LT SURG ACCSSRY BLU STRL

## (undated) DEVICE — SOL IRR H2O BTL 1000ML STRL

## (undated) DEVICE — ENCORE® LATEX MICRO SIZE 8, STERILE LATEX POWDER-FREE SURGICAL GLOVE: Brand: ENCORE

## (undated) DEVICE — CONTN GRAD MEAS TRIANG 32OZ BLK

## (undated) DEVICE — DRILL, AO SMALL: Brand: GAMMA

## (undated) DEVICE — LEGGINGS, PAIR, 29X43, STERILE: Brand: MEDLINE

## (undated) DEVICE — ANTIBACTERIAL UNDYED BRAIDED (POLYGLACTIN 910), SYNTHETIC ABSORBABLE SUTURE: Brand: COATED VICRYL

## (undated) DEVICE — HYBRID CO2 TUBING/CAP SET FOR OLYMPUS® SCOPES & CO2 SOURCE: Brand: ERBE

## (undated) DEVICE — LUBE GEL ENDOGLIDE 1.1OZ

## (undated) DEVICE — DRSNG WND BORDR/ADHS NONADHR/GZ LF 4X4IN STRL

## (undated) DEVICE — SUT ETHLN 3/0 PC5 18IN 1893G

## (undated) DEVICE — SYR LUERLOK 50ML

## (undated) DEVICE — GLV SURG TRIUMPH ORTHO W/ALOE PF LTX 8 STRL

## (undated) DEVICE — KT ORCA ORCAPOD DISP STRL

## (undated) DEVICE — REAMER SHAFT, MOD.TRINKLE: Brand: BIXCUT

## (undated) DEVICE — GLV SURG SIGNATURE TOUCH PF LTX 8 STRL BX/50

## (undated) DEVICE — ENCORE® LATEX MICRO SIZE 6.5, STERILE LATEX POWDER-FREE SURGICAL GLOVE: Brand: ENCORE

## (undated) DEVICE — INTRO ACCSR BLNT TP

## (undated) DEVICE — DRSNG WND GZ PAD BORDERED LF 4X5IN STRL

## (undated) DEVICE — 3M™ IOBAN™ 2 ANTIMICROBIAL INCISE DRAPE 6650EZ: Brand: IOBAN™ 2

## (undated) DEVICE — THE BITE BLOCK MAXI, LATEX FREE STRAP IS USED TO PROTECT THE ENDOSCOPE INSERTION TUBE FROM BEING BITTEN BY THE PATIENT.

## (undated) DEVICE — SELF-ADHERENT BANDAGE 3" X5YD; STERILE; FOR SINGLE USE ONLY; STORE IN A COOL, DRY PLACE.: Brand: CARDINAL HEALTH

## (undated) DEVICE — GLV SURG SENSICARE MICRO PF LF 6.5 STRL

## (undated) DEVICE — SPNG ENDO BEDSIDE TUB ENZYM

## (undated) DEVICE — ENCORE® LATEX MICRO SIZE 7, STERILE LATEX POWDER-FREE SURGICAL GLOVE: Brand: ENCORE

## (undated) DEVICE — Device: Brand: COVER, PERINEAL POST, 12 PK

## (undated) DEVICE — SUCTION CANISTER, 2500CC, RIGID: Brand: DEROYAL

## (undated) DEVICE — DRSNG WND BORDR/ADHS NONADHR/GZ LF 4X14IN STRL

## (undated) DEVICE — GUIDE WIRE, BALL-TIPPED, STERILE

## (undated) DEVICE — PK MAJ FX HIP 10

## (undated) DEVICE — Device

## (undated) DEVICE — DRSNG GZ PETROLTM XEROFORM CURAD 1X8IN STRL

## (undated) DEVICE — SNAP KOVER: Brand: UNBRANDED

## (undated) DEVICE — Device: Brand: PROTECTORS, LEG SPAR BALL JOINT, 12/PR

## (undated) DEVICE — TUBING, SUCTION, 1/4" X 10', STRAIGHT: Brand: MEDLINE